# Patient Record
Sex: FEMALE | URBAN - METROPOLITAN AREA
[De-identification: names, ages, dates, MRNs, and addresses within clinical notes are randomized per-mention and may not be internally consistent; named-entity substitution may affect disease eponyms.]

---

## 2024-07-05 ENCOUNTER — HOSPITAL ENCOUNTER (EMERGENCY)
Facility: HOSPITAL | Age: 64
End: 2024-07-05
Attending: EMERGENCY MEDICINE

## 2024-07-05 ENCOUNTER — HOSPITAL ENCOUNTER (INPATIENT)
Facility: HOSPITAL | Age: 64
LOS: 9 days | Discharge: HOME | DRG: 192 | End: 2024-07-14
Attending: INTERNAL MEDICINE | Admitting: INTERNAL MEDICINE
Payer: COMMERCIAL

## 2024-07-05 ENCOUNTER — APPOINTMENT (INPATIENT)
Dept: NON INVASIVE DIAGNOSTICS | Facility: HOSPITAL | Age: 64
DRG: 192 | End: 2024-07-05
Payer: COMMERCIAL

## 2024-07-05 ENCOUNTER — APPOINTMENT (EMERGENCY)
Dept: RADIOLOGY | Facility: HOSPITAL | Age: 64
End: 2024-07-05

## 2024-07-05 VITALS
WEIGHT: 183.42 LBS | TEMPERATURE: 97.6 F | OXYGEN SATURATION: 96 % | DIASTOLIC BLOOD PRESSURE: 57 MMHG | BODY MASS INDEX: 36.01 KG/M2 | RESPIRATION RATE: 27 BRPM | HEART RATE: 85 BPM | HEIGHT: 60 IN | SYSTOLIC BLOOD PRESSURE: 102 MMHG

## 2024-07-05 DIAGNOSIS — I50.9 ACUTE DECOMPENSATED HEART FAILURE (HCC): Primary | ICD-10-CM

## 2024-07-05 DIAGNOSIS — J81.0 FLASH PULMONARY EDEMA (HCC): ICD-10-CM

## 2024-07-05 DIAGNOSIS — I50.9 CHF (CONGESTIVE HEART FAILURE) (HCC): Primary | ICD-10-CM

## 2024-07-05 PROBLEM — I16.1 HYPERTENSIVE EMERGENCY: Status: ACTIVE | Noted: 2024-07-05

## 2024-07-05 PROBLEM — R65.10 SIRS (SYSTEMIC INFLAMMATORY RESPONSE SYNDROME) (HCC): Status: ACTIVE | Noted: 2024-07-05

## 2024-07-05 LAB
2HR DELTA HS TROPONIN: 0 NG/L
4HR DELTA HS TROPONIN: 1 NG/L
ALBUMIN SERPL BCG-MCNC: 4 G/DL (ref 3.5–5)
ALP SERPL-CCNC: 94 U/L (ref 34–104)
ALT SERPL W P-5'-P-CCNC: 40 U/L (ref 7–52)
ANION GAP SERPL CALCULATED.3IONS-SCNC: 8 MMOL/L (ref 4–13)
ANION GAP SERPL CALCULATED.3IONS-SCNC: 9 MMOL/L (ref 4–13)
AORTIC ROOT: 2.9 CM
AORTIC VALVE MEAN VELOCITY: 8.5 M/S
APICAL FOUR CHAMBER EJECTION FRACTION: 6 %
ARTERIAL PATENCY WRIST A: YES
ASCENDING AORTA: 3.4 CM
AST SERPL W P-5'-P-CCNC: 30 U/L (ref 13–39)
ATRIAL RATE: 121 BPM
ATRIAL RATE: 87 BPM
AV AREA BY CONTINUOUS VTI: 1.7 CM2
AV AREA PEAK VELOCITY: 1.7 CM2
AV LVOT MEAN GRADIENT: 1 MMHG
AV LVOT PEAK GRADIENT: 2 MMHG
AV MEAN GRADIENT: 3 MMHG
AV PEAK GRADIENT: 6 MMHG
AV VALVE AREA: 1.65 CM2
AV VELOCITY RATIO: 0.55
BASE EXCESS BLDA CALC-SCNC: -5.1 MMOL/L
BASOPHILS # BLD AUTO: 0.07 THOUSANDS/ÂΜL (ref 0–0.1)
BASOPHILS NFR BLD AUTO: 1 % (ref 0–1)
BILIRUB SERPL-MCNC: 1.34 MG/DL (ref 0.2–1)
BNP SERPL-MCNC: 2673 PG/ML (ref 0–100)
BSA FOR ECHO PROCEDURE: 1.83 M2
BUN SERPL-MCNC: 11 MG/DL (ref 5–25)
BUN SERPL-MCNC: 13 MG/DL (ref 5–25)
CALCIUM SERPL-MCNC: 9 MG/DL (ref 8.4–10.2)
CALCIUM SERPL-MCNC: 9.2 MG/DL (ref 8.4–10.2)
CARDIAC TROPONIN I PNL SERPL HS: 11 NG/L
CARDIAC TROPONIN I PNL SERPL HS: 19 NG/L
CARDIAC TROPONIN I PNL SERPL HS: 19 NG/L
CARDIAC TROPONIN I PNL SERPL HS: 20 NG/L
CHLORIDE SERPL-SCNC: 101 MMOL/L (ref 96–108)
CHLORIDE SERPL-SCNC: 104 MMOL/L (ref 96–108)
CO2 SERPL-SCNC: 25 MMOL/L (ref 21–32)
CO2 SERPL-SCNC: 32 MMOL/L (ref 21–32)
CREAT SERPL-MCNC: 0.61 MG/DL (ref 0.6–1.3)
CREAT SERPL-MCNC: 0.75 MG/DL (ref 0.6–1.3)
DOP CALC AO PEAK VEL: 1.2 M/S
DOP CALC AO VTI: 18.77 CM
DOP CALC LVOT AREA: 3.14 CM2
DOP CALC LVOT CARDIAC INDEX: 1.57 L/MIN/M2
DOP CALC LVOT CARDIAC OUTPUT: 2.88 L/MIN
DOP CALC LVOT DIAMETER: 2 CM
DOP CALC LVOT PEAK VEL VTI: 9.89 CM
DOP CALC LVOT PEAK VEL: 0.66 M/S
DOP CALC LVOT STROKE INDEX: 16.8 ML/M2
DOP CALC LVOT STROKE VOLUME: 31.05
E WAVE DECELERATION TIME: 132 MS
E/A RATIO: 1.71
EOSINOPHIL # BLD AUTO: 0.16 THOUSAND/ÂΜL (ref 0–0.61)
EOSINOPHIL NFR BLD AUTO: 1 % (ref 0–6)
ERYTHROCYTE [DISTWIDTH] IN BLOOD BY AUTOMATED COUNT: 14.3 % (ref 11.6–15.1)
FLUAV RNA RESP QL NAA+PROBE: NEGATIVE
FLUBV RNA RESP QL NAA+PROBE: NEGATIVE
FRACTIONAL SHORTENING: 7 (ref 28–44)
GFR SERPL CREATININE-BSD FRML MDRD: 85 ML/MIN/1.73SQ M
GFR SERPL CREATININE-BSD FRML MDRD: 96 ML/MIN/1.73SQ M
GLUCOSE SERPL-MCNC: 126 MG/DL (ref 65–140)
GLUCOSE SERPL-MCNC: 157 MG/DL (ref 65–140)
GLUCOSE SERPL-MCNC: 182 MG/DL (ref 65–140)
HCO3 BLDA-SCNC: 22.4 MMOL/L (ref 22–28)
HCT VFR BLD AUTO: 47.5 % (ref 34.8–46.1)
HGB BLD-MCNC: 16.1 G/DL (ref 11.5–15.4)
IMM GRANULOCYTES # BLD AUTO: 0.04 THOUSAND/UL (ref 0–0.2)
IMM GRANULOCYTES NFR BLD AUTO: 0 % (ref 0–2)
INTERVENTRICULAR SEPTUM IN DIASTOLE (PARASTERNAL SHORT AXIS VIEW): 0.9 CM
INTERVENTRICULAR SEPTUM: 0.9 CM (ref 0.6–1.1)
IPAP: 14
IVC: 5 MM
LAAS-AP4: 19.9 CM2
LACTATE SERPL-SCNC: 1.9 MMOL/L (ref 0.5–2)
LEFT ATRIUM SIZE: 4.2 CM
LEFT INTERNAL DIMENSION IN SYSTOLE: 5.3 CM (ref 2.1–4)
LEFT VENTRICULAR INTERNAL DIMENSION IN DIASTOLE: 5.7 CM (ref 3.5–6)
LEFT VENTRICULAR POSTERIOR WALL IN END DIASTOLE: 0.8 CM
LEFT VENTRICULAR STROKE VOLUME: 22 ML
LVSV (TEICH): 22 ML
LYMPHOCYTES # BLD AUTO: 2.32 THOUSANDS/ÂΜL (ref 0.6–4.47)
LYMPHOCYTES NFR BLD AUTO: 19 % (ref 14–44)
MAGNESIUM SERPL-MCNC: 2 MG/DL (ref 1.9–2.7)
MCH RBC QN AUTO: 30.7 PG (ref 26.8–34.3)
MCHC RBC AUTO-ENTMCNC: 33.9 G/DL (ref 31.4–37.4)
MCV RBC AUTO: 91 FL (ref 82–98)
MONOCYTES # BLD AUTO: 0.65 THOUSAND/ÂΜL (ref 0.17–1.22)
MONOCYTES NFR BLD AUTO: 5 % (ref 4–12)
MV PEAK A VEL: 0.41 M/S
MV PEAK E VEL: 70 CM/S
MV STENOSIS PRESSURE HALF TIME: 38 MS
MV VALVE AREA P 1/2 METHOD: 5.79
NEUTROPHILS # BLD AUTO: 9.17 THOUSANDS/ÂΜL (ref 1.85–7.62)
NEUTS SEG NFR BLD AUTO: 74 % (ref 43–75)
NON VENT- BIPAP: ABNORMAL
NRBC BLD AUTO-RTO: 0 /100 WBCS
O2 CT BLDA-SCNC: 21.1 ML/DL (ref 16–23)
OXYHGB MFR BLDA: 94.6 % (ref 94–97)
P AXIS: 55 DEGREES
P AXIS: 65 DEGREES
PCO2 BLDA: 50.8 MM HG (ref 36–44)
PEEP MAX SETTING VENT: 6 CM[H2O]
PH BLDA: 7.26 [PH] (ref 7.35–7.45)
PHOSPHATE SERPL-MCNC: 4 MG/DL (ref 2.3–4.1)
PLATELET # BLD AUTO: 262 THOUSANDS/UL (ref 149–390)
PMV BLD AUTO: 12.7 FL (ref 8.9–12.7)
PO2 BLDA: 92.6 MM HG (ref 75–129)
POTASSIUM SERPL-SCNC: 3.2 MMOL/L (ref 3.5–5.3)
POTASSIUM SERPL-SCNC: 3.8 MMOL/L (ref 3.5–5.3)
PR INTERVAL: 138 MS
PR INTERVAL: 158 MS
PROT SERPL-MCNC: 7.1 G/DL (ref 6.4–8.4)
QRS AXIS: 51 DEGREES
QRS AXIS: 55 DEGREES
QRSD INTERVAL: 104 MS
QRSD INTERVAL: 94 MS
QT INTERVAL: 324 MS
QT INTERVAL: 396 MS
QTC INTERVAL: 460 MS
QTC INTERVAL: 477 MS
RA PRESSURE ESTIMATED: 3 MMHG
RBC # BLD AUTO: 5.24 MILLION/UL (ref 3.81–5.12)
RIGHT ATRIAL 2D VOLUME: 49 ML
RIGHT ATRIUM AREA SYSTOLE A4C: 16.7 CM2
RIGHT VENTRICLE ID DIMENSION: 4.2 CM
RSV RNA RESP QL NAA+PROBE: NEGATIVE
SARS-COV-2 RNA RESP QL NAA+PROBE: NEGATIVE
SL CV LV EF: 15
SL CV PED ECHO LEFT VENTRICLE DIASTOLIC VOLUME (MOD BIPLANE) 2D: 158 ML
SL CV PED ECHO LEFT VENTRICLE SYSTOLIC VOLUME (MOD BIPLANE) 2D: 136 ML
SODIUM SERPL-SCNC: 138 MMOL/L (ref 135–147)
SODIUM SERPL-SCNC: 141 MMOL/L (ref 135–147)
SPECIMEN SOURCE: ABNORMAL
T WAVE AXIS: 239 DEGREES
T WAVE AXIS: 27 DEGREES
TRICUSPID ANNULAR PLANE SYSTOLIC EXCURSION: 1.3 CM
VENT BIPAP FIO2: 50 %
VENTRICULAR RATE: 121 BPM
VENTRICULAR RATE: 87 BPM
WBC # BLD AUTO: 12.41 THOUSAND/UL (ref 4.31–10.16)

## 2024-07-05 PROCEDURE — 93306 TTE W/DOPPLER COMPLETE: CPT | Performed by: INTERNAL MEDICINE

## 2024-07-05 PROCEDURE — 84484 ASSAY OF TROPONIN QUANT: CPT

## 2024-07-05 PROCEDURE — 93010 ELECTROCARDIOGRAM REPORT: CPT | Performed by: INTERNAL MEDICINE

## 2024-07-05 PROCEDURE — 84100 ASSAY OF PHOSPHORUS: CPT | Performed by: STUDENT IN AN ORGANIZED HEALTH CARE EDUCATION/TRAINING PROGRAM

## 2024-07-05 PROCEDURE — 80048 BASIC METABOLIC PNL TOTAL CA: CPT

## 2024-07-05 PROCEDURE — 94760 N-INVAS EAR/PLS OXIMETRY 1: CPT

## 2024-07-05 PROCEDURE — 71045 X-RAY EXAM CHEST 1 VIEW: CPT

## 2024-07-05 PROCEDURE — 99291 CRITICAL CARE FIRST HOUR: CPT | Performed by: INTERNAL MEDICINE

## 2024-07-05 PROCEDURE — 93005 ELECTROCARDIOGRAM TRACING: CPT

## 2024-07-05 PROCEDURE — 99255 IP/OBS CONSLTJ NEW/EST HI 80: CPT | Performed by: INTERNAL MEDICINE

## 2024-07-05 PROCEDURE — 99285 EMERGENCY DEPT VISIT HI MDM: CPT | Performed by: EMERGENCY MEDICINE

## 2024-07-05 PROCEDURE — 36415 COLL VENOUS BLD VENIPUNCTURE: CPT

## 2024-07-05 PROCEDURE — 99285 EMERGENCY DEPT VISIT HI MDM: CPT

## 2024-07-05 PROCEDURE — 82805 BLOOD GASES W/O2 SATURATION: CPT | Performed by: EMERGENCY MEDICINE

## 2024-07-05 PROCEDURE — 83735 ASSAY OF MAGNESIUM: CPT | Performed by: STUDENT IN AN ORGANIZED HEALTH CARE EDUCATION/TRAINING PROGRAM

## 2024-07-05 PROCEDURE — 93306 TTE W/DOPPLER COMPLETE: CPT

## 2024-07-05 PROCEDURE — 96375 TX/PRO/DX INJ NEW DRUG ADDON: CPT

## 2024-07-05 PROCEDURE — 93308 TTE F-UP OR LMTD: CPT | Performed by: INTERNAL MEDICINE

## 2024-07-05 PROCEDURE — 85025 COMPLETE CBC W/AUTO DIFF WBC: CPT

## 2024-07-05 PROCEDURE — 83880 ASSAY OF NATRIURETIC PEPTIDE: CPT | Performed by: EMERGENCY MEDICINE

## 2024-07-05 PROCEDURE — 94002 VENT MGMT INPAT INIT DAY: CPT

## 2024-07-05 PROCEDURE — 96366 THER/PROPH/DIAG IV INF ADDON: CPT

## 2024-07-05 PROCEDURE — 80053 COMPREHEN METABOLIC PANEL: CPT

## 2024-07-05 PROCEDURE — 82948 REAGENT STRIP/BLOOD GLUCOSE: CPT

## 2024-07-05 PROCEDURE — 96365 THER/PROPH/DIAG IV INF INIT: CPT

## 2024-07-05 PROCEDURE — 0241U HB NFCT DS VIR RESP RNA 4 TRGT: CPT | Performed by: EMERGENCY MEDICINE

## 2024-07-05 PROCEDURE — 83605 ASSAY OF LACTIC ACID: CPT

## 2024-07-05 RX ORDER — ACETAMINOPHEN 325 MG/1
650 TABLET ORAL EVERY 6 HOURS PRN
Status: DISCONTINUED | OUTPATIENT
Start: 2024-07-05 | End: 2024-07-14 | Stop reason: HOSPADM

## 2024-07-05 RX ORDER — CARVEDILOL 6.25 MG/1
6.25 TABLET ORAL 2 TIMES DAILY WITH MEALS
Status: DISCONTINUED | OUTPATIENT
Start: 2024-07-06 | End: 2024-07-11

## 2024-07-05 RX ORDER — LISINOPRIL 10 MG/1
10 TABLET ORAL DAILY
Status: DISCONTINUED | OUTPATIENT
Start: 2024-07-05 | End: 2024-07-06

## 2024-07-05 RX ORDER — NITROGLYCERIN 0.4 MG/1
TABLET SUBLINGUAL
Status: COMPLETED
Start: 2024-07-05 | End: 2024-07-05

## 2024-07-05 RX ORDER — NITROGLYCERIN 0.4 MG/1
0.4 TABLET SUBLINGUAL
Status: DISCONTINUED | OUTPATIENT
Start: 2024-07-05 | End: 2024-07-05 | Stop reason: HOSPADM

## 2024-07-05 RX ORDER — FUROSEMIDE 10 MG/ML
40 INJECTION INTRAMUSCULAR; INTRAVENOUS ONCE
Status: COMPLETED | OUTPATIENT
Start: 2024-07-05 | End: 2024-07-05

## 2024-07-05 RX ORDER — CARVEDILOL 12.5 MG/1
12.5 TABLET ORAL 2 TIMES DAILY WITH MEALS
Status: DISCONTINUED | OUTPATIENT
Start: 2024-07-05 | End: 2024-07-05

## 2024-07-05 RX ORDER — ASPIRIN 81 MG/1
324 TABLET, CHEWABLE ORAL ONCE
Status: COMPLETED | OUTPATIENT
Start: 2024-07-05 | End: 2024-07-05

## 2024-07-05 RX ORDER — NITROGLYCERIN 20 MG/100ML
5-200 INJECTION INTRAVENOUS
Status: DISCONTINUED | OUTPATIENT
Start: 2024-07-05 | End: 2024-07-05 | Stop reason: HOSPADM

## 2024-07-05 RX ORDER — POTASSIUM CHLORIDE 20MEQ/15ML
40 LIQUID (ML) ORAL
Status: DISCONTINUED | OUTPATIENT
Start: 2024-07-05 | End: 2024-07-05

## 2024-07-05 RX ORDER — NITROGLYCERIN 0.4 MG/1
0.4 TABLET SUBLINGUAL
Status: DISCONTINUED | OUTPATIENT
Start: 2024-07-05 | End: 2024-07-10 | Stop reason: SDUPTHER

## 2024-07-05 RX ORDER — ENOXAPARIN SODIUM 100 MG/ML
40 INJECTION SUBCUTANEOUS DAILY
Status: DISCONTINUED | OUTPATIENT
Start: 2024-07-05 | End: 2024-07-14 | Stop reason: HOSPADM

## 2024-07-05 RX ORDER — FUROSEMIDE 10 MG/ML
40 INJECTION INTRAMUSCULAR; INTRAVENOUS
Status: DISCONTINUED | OUTPATIENT
Start: 2024-07-05 | End: 2024-07-05

## 2024-07-05 RX ORDER — FUROSEMIDE 10 MG/ML
40 INJECTION INTRAMUSCULAR; INTRAVENOUS
Status: DISCONTINUED | OUTPATIENT
Start: 2024-07-06 | End: 2024-07-06

## 2024-07-05 RX ORDER — LORAZEPAM 2 MG/ML
1 INJECTION INTRAMUSCULAR ONCE
Status: COMPLETED | OUTPATIENT
Start: 2024-07-05 | End: 2024-07-05

## 2024-07-05 RX ORDER — NITROGLYCERIN 20 MG/100ML
INJECTION INTRAVENOUS
Status: COMPLETED
Start: 2024-07-05 | End: 2024-07-05

## 2024-07-05 RX ORDER — CHLORHEXIDINE GLUCONATE ORAL RINSE 1.2 MG/ML
15 SOLUTION DENTAL EVERY 12 HOURS SCHEDULED
Status: DISCONTINUED | OUTPATIENT
Start: 2024-07-05 | End: 2024-07-06

## 2024-07-05 RX ORDER — POTASSIUM CHLORIDE 20 MEQ/1
40 TABLET, EXTENDED RELEASE ORAL
Status: COMPLETED | OUTPATIENT
Start: 2024-07-05 | End: 2024-07-05

## 2024-07-05 RX ORDER — NITROGLYCERIN 20 MG/100ML
5-200 INJECTION INTRAVENOUS
Status: DISCONTINUED | OUTPATIENT
Start: 2024-07-05 | End: 2024-07-05

## 2024-07-05 RX ADMIN — FUROSEMIDE 40 MG: 10 INJECTION, SOLUTION INTRAVENOUS at 12:37

## 2024-07-05 RX ADMIN — LISINOPRIL 10 MG: 10 TABLET ORAL at 12:37

## 2024-07-05 RX ADMIN — CARVEDILOL 12.5 MG: 12.5 TABLET, FILM COATED ORAL at 15:45

## 2024-07-05 RX ADMIN — ENOXAPARIN SODIUM 40 MG: 40 INJECTION SUBCUTANEOUS at 12:22

## 2024-07-05 RX ADMIN — PERFLUTREN 0.4 ML/MIN: 6.52 INJECTION, SUSPENSION INTRAVENOUS at 14:54

## 2024-07-05 RX ADMIN — CHLORHEXIDINE GLUCONATE 15 ML: 1.2 RINSE ORAL at 20:00

## 2024-07-05 RX ADMIN — NITROGLYCERIN 5 MCG/MIN: 20 INJECTION INTRAVENOUS at 07:08

## 2024-07-05 RX ADMIN — NITROGLYCERIN 0.4 MG: 0.4 TABLET SUBLINGUAL at 07:03

## 2024-07-05 RX ADMIN — CHLORHEXIDINE GLUCONATE 15 ML: 1.2 RINSE ORAL at 12:22

## 2024-07-05 RX ADMIN — POTASSIUM CHLORIDE 40 MEQ: 1500 TABLET, EXTENDED RELEASE ORAL at 17:45

## 2024-07-05 RX ADMIN — POTASSIUM CHLORIDE 40 MEQ: 1500 TABLET, EXTENDED RELEASE ORAL at 15:45

## 2024-07-05 RX ADMIN — ASPIRIN 81 MG CHEWABLE TABLET 324 MG: 81 TABLET CHEWABLE at 07:06

## 2024-07-05 RX ADMIN — ACETAMINOPHEN 325MG 650 MG: 325 TABLET ORAL at 14:59

## 2024-07-05 RX ADMIN — LORAZEPAM 1 MG: 2 INJECTION INTRAMUSCULAR; INTRAVENOUS at 08:03

## 2024-07-05 RX ADMIN — FUROSEMIDE 40 MG: 10 INJECTION, SOLUTION INTRAVENOUS at 07:07

## 2024-07-05 NOTE — EMTALA/ACUTE CARE TRANSFER
Formerly [facility] EMERGENCY DEPARTMENT  [address] [address]  [phone]  Dept: [phone]      EMTALA TRANSFER CONSENT    NAME Nancy Calderon                                         1960                              MRN [id number]    I have been informed of my rights regarding examination, treatment, and transfer   by Dr. Yasir Tao MD    Benefits: Specialized equipment and/or services available at the receiving facility (Include comment)________________________    Risks: Potential for delay in receiving treatment      Consent for Transfer:  I acknowledge that my medical condition has been evaluated and explained to me by the emergency department physician or other qualified medical person and/or my attending physician, who has recommended that I be transferred to the service of  Accepting Physician: Dr Griffin at Accepting Facility Name, City & State : [facility]. The above potential benefits of such transfer, the potential risks associated with such transfer, and the probable risks of not being transferred have been explained to me, and I fully understand them.  The doctor has explained that, in my case, the benefits of transfer outweigh the risks.  I agree to be transferred.    I authorize the performance of emergency medical procedures and treatments upon me in both transit and upon arrival at the receiving facility.  Additionally, I authorize the release of any and all medical records to the receiving facility and request they be transported with me, if possible.  I understand that the safest mode of transportation during a medical emergency is an ambulance and that the Hospital advocates the use of this mode of transport. Risks of traveling to the receiving facility by car, including absence of medical control, life sustaining equipment, such as oxygen, and medical personnel has been explained to me and I fully understand them.    (CHAU CORRECT BOX  BELOW)  [  ]  I consent to the stated transfer and to be transported by ambulance/helicopter.  [  ]  I consent to the stated transfer, but refuse transportation by ambulance and accept full responsibility for my transportation by car.  I understand the risks of non-ambulance transfers and I exonerate the Hospital and its staff from any deterioration in my condition that results from this refusal.    X___________________________________________    DATE  24  TIME________  Signature of patient or legally responsible individual signing on patient behalf           RELATIONSHIP TO PATIENT_________________________          Provider Certification    NAME Nancy Calderon                                         1960                              MRN [id number]    A medical screening exam was performed on the above named patient.  Based on the examination:    Condition Necessitating Transfer The encounter diagnosis was CHF (congestive heart failure) (HCC).    Patient Condition: The patient has been stabilized such that within reasonable medical probability, no material deterioration of the patient condition or the condition of the unborn child(shivam) is likely to result from the transfer    Reason for Transfer: Level of Care needed not available at this facility    Transfer Requirements: Facility [facility]   Space available and qualified personnel available for treatment as acknowledged by    Agreed to accept transfer and to provide appropriate medical treatment as acknowledged by       Dr Griffin  Appropriate medical records of the examination and treatment of the patient are provided at the time of transfer   STAFF INITIAL WHEN COMPLETED _______  Transfer will be performed by qualified personnel from    and appropriate transfer equipment as required, including the use of necessary and appropriate life support measures.    Provider Certification: I have examined the patient and explained the  following risks and benefits of being transferred/refusing transfer to the patient/family:  General risk, such as traffic hazards, adverse weather conditions, rough terrain or turbulence, possible failure of equipment (including vehicle or aircraft), or consequences of actions of persons outside the control of the transport personnel      Based on these reasonable risks and benefits to the patient and/or the unborn child(shivam), and based upon the information available at the time of the patient’s examination, I certify that the medical benefits reasonably to be expected from the provision of appropriate medical treatments at another medical facility outweigh the increasing risks, if any, to the individual’s medical condition, and in the case of labor to the unborn child, from effecting the transfer.    X____________________________________________ DATE 07/05/24        TIME_______      ORIGINAL - SEND TO MEDICAL RECORDS   COPY - SEND WITH PATIENT DURING TRANSFER

## 2024-07-05 NOTE — CASE MANAGEMENT
Case Management Assessment & Discharge Planning Note    Patient name Nancy Calderon  Location ICU 11/ICU 11 MRN [id number]  : 1960 Date 2024       Current Admission Date: 2024  Current Admission Diagnosis:Acute decompensated heart failure (HCC)   Patient Active Problem List    Diagnosis Date Noted Date Diagnosed    Acute decompensated heart failure (HCC) 2024     Hypertensive emergency 2024     SIRS (systemic inflammatory response syndrome) (HCC) 2024       LOS (days): 0  Geometric Mean LOS (GMLOS) (days):   Days to GMLOS:     OBJECTIVE:    Risk of Unplanned Readmission Score: 8.66         Current admission status: Inpatient       [facility] [facility] [address]  Phone: [phone] Fax: [phone]    Primary Care Provider: No primary care provider on file.    Primary Insurance: [employer] PENDING  Secondary Insurance:     ASSESSMENT:  Active Health Care Proxies    There are no active Health Care Proxies on file.       Advance Directives  Does patient have a Health Care POA?: No  Was patient offered paperwork?: No  Does patient currently have a Health Care decision maker?: Yes, please see Health Care Proxy section  Does patient have Advance Directives?: No  Was patient offered paperwork?: No    Patient Information  Admitted from:: Home  Mental Status: Alert  During Assessment patient was accompanied by: Not accompanied during assessment  Support Systems: [facility]  County of Residence: [place]  What [facility] do you live in?: [place]    Activities of Daily Living Prior to Admission  Functional Status: Independent  Completes ADLs independently?: Yes  Ambulates independently?: Yes  Does patient use assisted devices?: No  Does patient currently own DME?: No  CHARGE DETAILS:             CM contacted family/caregiver?: Yes (voicemail message for Boni Berman - [phone])      Contacts  Patient Contacts: Boni Berman - [phone] (family)  Relationship to Patient:: Family  Contact Method: Phone  Phone Number: [phone] (family)  Reason/Outcome: Emergency Contact    Additional Comments: CM attempted to contact patient's daughter Shireen Berman to complete intake assessment and for discharge planning. CM left voicemail message detailing the nature of the call and CM callback information. CM department will continue to follow patient through hospital discharge.

## 2024-07-05 NOTE — PROCEDURES
POC Cardiac US    Date/Time: 7/5/2024 3:58 PM    Performed by: Francisca Macdonald MD  Authorized by: Francisca Macdonald MD    Patient location:  ICU  Procedure details:     Exam Type:  Diagnostic and educational    Indications: suspected volume depletion      Indications comment:  Heart failure    Assessment / Evaluation for: cardiac function, pericardial effusion, inferior vena cava for fluid responsiveness and intravascular volume status      Exam Type: initial exam      Image quality: limited diagnostic      Image availability:  Not saved  Patient Details:     Cardiac Rhythm:  Regular    Mechanical ventilation: No    Cardiac findings:     Echo technique: limited 2D      Views obtained: parasternal long axis, parasternal short axis, subcostal and apical      Pericardial effusion: absent      Tamponade physiology: present      Wall motion: normal      LV systolic function: depressed      RV dilation: none    Pulmonary findings:     Left Lung Findings: left pleural effusion present and left lung sliding      Right lung findings: right pleural effusion present and right lung sliding      B-lines: 1 to 3    IVC findings:     IVC Inspiratory Collapse: normal      Maximum IVC Diameter (cm):  20  Interpretation:     Fluid Status:  Euvolemic

## 2024-07-05 NOTE — PLAN OF CARE
Problem: PAIN - ADULT  Goal: Verbalizes/displays adequate comfort level or baseline comfort level  Description: Interventions:  - Encourage patient to monitor pain and request assistance  - Assess pain using appropriate pain scale  - Administer analgesics based on type and severity of pain and evaluate response  - Implement non-pharmacological measures as appropriate and evaluate response  - Consider cultural and social influences on pain and pain management  - Notify physician/advanced practitioner if interventions unsuccessful or patient reports new pain  Outcome: Progressing     Problem: INFECTION - ADULT  Goal: Absence or prevention of progression during hospitalization  Description: INTERVENTIONS:  - Assess and monitor for signs and symptoms of infection  - Monitor lab/diagnostic results  - Monitor all insertion sites, i.e. indwelling lines, tubes, and drains  - Monitor endotracheal if appropriate and nasal secretions for changes in amount and color  - [place] appropriate cooling/warming therapies per order  - Administer medications as ordered  - Instruct and encourage patient and family to use good hand hygiene technique  - Identify and instruct in appropriate isolation precautions for identified infection/condition  Outcome: Progressing     Problem: SAFETY ADULT  Goal: Patient will remain free of falls  Description: INTERVENTIONS:  - Educate patient/family on patient safety including physical limitations  - Instruct patient to call for assistance with activity   - Consult OT/PT to assist with strengthening/mobility   - Keep Call bell within reach  - Keep bed low and locked with side rails adjusted as appropriate  - Keep care items and personal belongings within reach  - Initiate and maintain comfort rounds  - Make Fall Risk Sign visible to staff  - Offer Toileting every  Hours, in advance of need  - Initiate/Maintain bed alarm  - Obtain necessary fall  Problem: CARDIOVASCULAR - ADULT  Goal: Maintains  optimal cardiac output and hemodynamic stability  Description: INTERVENTIONS:  - Monitor I/O, vital signs and rhythm  - Monitor for S/S and trends of decreased cardiac output  - Administer and titrate ordered vasoactive medications to optimize hemodynamic stability  - Assess quality of pulses, skin color and temperature  - Assess for signs of decreased coronary artery perfusion  - Instruct patient to report change in severity of symptoms  Reactivated     - Apply yellow socks and bracelet for high fall risk patients  - Consider moving patient to room near nurses station  Outcome: Progressing     Problem: DISCHARGE PLANNING  Goal: Discharge to home or other facility with appropriate resources  Description: INTERVENTIONS:  - Identify barriers to discharge w/patient and caregiver  - Arrange for needed discharge resources and transportation as appropriate  - Identify discharge learning needs (meds, wound care, etc.)  - Arrange for interpretive services to assist at discharge as needed  - Refer to Case Management Department for coordinating discharge planning if the patient needs post-hospital services based on physician/advanced practitioner order or complex needs related to functional status, cognitive ability, or social support system  Outcome: Progressing     Problem: Knowledge Deficit  Goal: Patient/family/caregiver demonstrates understanding of disease process, treatment plan, medications, and discharge instructions  Description: Complete learning assessment and assess knowledge base.  Interventions:  - Provide teaching at level of understanding  - Provide teaching via preferred learning methods  Outcome: Progressing

## 2024-07-05 NOTE — ASSESSMENT & PLAN NOTE
Patient with PMH of hypertension and noncompliant with medications presented with worsening shortness of breath  CXR with flash pulmonary edema    Plan  Vasodilation with nitroglycerin gtt for target -150  See remainder of plan above

## 2024-07-05 NOTE — CONSULTS
Cardiology Consult H&P  Nancy Calderon 63 y.o. female MRN: [id number]  Unit/Bed#: ICU 11 Encounter: [id number]  Physician Requesting Consult: Bao Griffin MD  Reason for Consult: Acute decompensated heart failure.  Chest pain.    HPI  63 y.o. female presented to the ED on 7/5 complaining acute onset chest pain that began this morning around 2 AM.  The symptoms woke her up from sleep.  She also reported shortness of breath and lower extremity edema over the last 2 months.  Her past medical history is unclear as she moved here from the [facility] [place] 5 years ago but she has a reported history of hypertension, heart stents (last in 2011?) and possible CHF.  She followed with a cardiologist in the [facility] but has not establish care with a cardiologist since being in the [place].  She has also not been taking her cardiac medications due to insurance problems (Coreg and valsartan?).  The ED she was noted to be hypoxic with an SpO2 in the 80s requiring Bipap.  Troponin was negative.  BNP was elevated at 2673.  CXR showed pulmonary edema.  She was hemodynamically stable.  Echocardiogram showed a severely reduced EF.  Cardiology was consulted.    The patient was seen and examined at bedside.  Overall she reports feeling better and reports that her chest pain has completely resolved since coming to the hospital.  She is currently on a nitro drip.  Also reports that she has noticed URI-like symptoms including a cough, sore throat and nasal congestion.  She also complains of right leg cramping/pain.  Otherwise she had no other acute complaints.    A&P  #Cardiomyopathy  # Acute decompensated heart Failure with Reduced Ejection Fraction  #Chest Pain  Patient presented to the ED after waking up at 2 AM with chest pain.  She also reported worsening dyspnea on exertion and lower extremity swelling over the last 2 months.  Her prior cardiac history is unclear as she used to follow with a  "cardiologist in [place] over 5 years ago.  In the ED troponin was negative and EKG did not show any acute ischemic changes.  Her BNP was elevated at 2673.  She was also hypoxic requiring supplemental O2.  He was started on a nitro drip and her chest pain completely resolved.  Echocardiogram showed severely reduced systolic function with global hypokinesis and an EF around 15%.  Continue to titrate down the nitro drip.  Continue with up titration of GDMT -> currently receiving Coreg 12.5 mg twice daily and lisinopril 10 mg daily.  Continue diuresis as she appears volume overloaded -> IV Lasix 40 mg twice daily ordered  Monitor I's and O's, volume status and renal function.  Maintain SpO2>90%  Continue telemetry monitoring.  Continue to monitor for return of chest pain symptoms.  Will plan for cardiac catheterization on Monday to evaluate for underlying ischemic disease.    # Hypokalemia  Monitor potassium and replete as needed.    # Leg pain  Patient complained of severe right calf pain that was new.  Is likely secondary to a muscle cramp, however can consider obtaining lower extremity VAS to rule out possible clot.  Continue to monitor    #SIRS  Met SIRS criteria with leukocytosis and tachypnea.  No suspected source of infection and patient is being monitored off antibiotics.  Trend fever curve and WBC count        Intake/Output Summary (Last 24 hours) at 7/5/2024 1555  Last data filed at 7/5/2024 1449  Gross per 24 hour   Intake 120 ml   Output 2650 ml   Net -2530 ml         Bao Fournier MD  -PGY-5 Cardiology Fellow  -[place] text enabled      ======================================================    Physical exam  Vitals: Blood pressure 98/54, pulse 94, temperature (!) 97 °F (36.1 °C), temperature source Axillary, resp. rate 21, height 5' 4\" (1.626 m), weight 78 kg (172 lb), SpO2 96%.  Gen: Well appearing  Psych: AOx3  Skin: Intact  Neck: Supple. JVD.  Cardiac: S1, S2, regular rate, no S3 or S4 " appreciated. No murmurs. +2 PT, radial pulses.  Mild bilateral lower extremity edema. No carotid bruits.  Resp: Decreased breath sounds in lung bases bilaterally.On 6L NC.   Abd: Nontender, nondistended  Rheum: No joint deformities in UE or LE    ======================================================    TREADMILL STRESS  No results found for this or any previous visit.     ----------------------------------------------------------------------------------------------  NUCLEAR STRESS TEST: No results found for this or any previous visit.    No results found for this or any previous visit.      --------------------------------------------------------------------------------  CATH:  No results found for this or any previous visit.    --------------------------------------------------------------------------------  ECHO:   No results found for this or any previous visit.    No results found for this or any previous visit.    --------------------------------------------------------------------------------  HOLTER  No results found for this or any previous visit.    --------------------------------------------------------------------------------  CAROTIDS  No results found for this or any previous visit.       [unfilled]   ======================================================      Review of Systems   Constitutional:  Positive for fatigue. Negative for activity change, appetite change, chills, diaphoresis and fever.   HENT:  Negative for congestion, ear discharge, ear pain, hearing loss, sinus pressure, sinus pain and sore throat.    Eyes:  Negative for pain, discharge, redness and itching.   Respiratory:  Positive for shortness of breath. Negative for cough, chest tightness and wheezing.    Cardiovascular:  Negative for chest pain, palpitations and leg swelling.   Gastrointestinal:  Negative for abdominal distention, abdominal pain, blood in stool, constipation, diarrhea, nausea and vomiting.   Genitourinary:   "Negative for difficulty urinating, dysuria, flank pain and frequency.   Musculoskeletal:  Positive for arthralgias. Negative for back pain and gait problem.   Skin:  Negative for color change, pallor and rash.   Neurological:  Negative for dizziness, weakness, light-headedness, numbness and headaches.   Psychiatric/Behavioral:  Negative for agitation, behavioral problems, confusion and decreased concentration.      ROS as noted above, otherwise 12 point review of systems was performed and is negative.     Historical Information   Past Medical History:   Diagnosis Date    Hypertension      No past surgical history on file.  Social History     Substance and Sexual Activity   Alcohol Use Not on file     Social History     Substance and Sexual Activity   Drug Use Not on file     Social History     Tobacco Use   Smoking Status Not on file   Smokeless Tobacco Not on file     Family History: No family history on file.    Meds/Allergies   Hospital Medications:   Current Facility-Administered Medications   Medication Dose Route Frequency    acetaminophen (TYLENOL) tablet 650 mg  650 mg Oral Q6H PRN    carvedilol (COREG) tablet 12.5 mg  12.5 mg Oral BID With Meals    chlorhexidine (PERIDEX) 0.12 % oral rinse 15 mL  15 mL Mouth/Throat Q12H TWAN    enoxaparin (LOVENOX) subcutaneous injection 40 mg  40 mg Subcutaneous Daily    [START ON 7/6/2024] furosemide (LASIX) injection 40 mg  40 mg Intravenous BID (diuretic)    lisinopril (ZESTRIL) tablet 10 mg  10 mg Oral Daily    nitroglycerin (NITROSTAT) SL tablet 0.4 mg  0.4 mg Sublingual Q5 Min PRN    nitroGLYcerin (TRIDIL) 50 mg in 250 ml infusion (premix)  5-200 mcg/min Intravenous Titrated    potassium chloride (Klor-Con M20) CR tablet 40 mEq  40 mEq Oral Q2H     Home Medications: No medications prior to admission.    No Known Allergies    Objective   Vitals: Blood pressure 98/54, pulse 94, temperature (!) 97 °F (36.1 °C), temperature source Axillary, resp. rate 21, height 5' 4\" " (1.626 m), weight 78 kg (172 lb), SpO2 96%.  Orthostatic Blood Pressures      Flowsheet Row Most Recent Value   Blood Pressure 98/54 filed at 07/05/2024 1530   Patient Position - Orthostatic VS Lying filed at 07/05/2024 1100              Intake/Output Summary (Last 24 hours) at 7/5/2024 1555  Last data filed at 7/5/2024 1449  Gross per 24 hour   Intake 120 ml   Output 2650 ml   Net -2530 ml       Invasive Devices       Peripheral Intravenous Line  Duration             Peripheral IV 07/05/24 Left;Dorsal (posterior) Hand <1 day    Peripheral IV 07/05/24 Proximal;Right;Ventral (anterior) Forearm <1 day              Drain  Duration             External Urinary Catheter <1 day                      Lab Results: I have personally reviewed pertinent lab results.    Results from last 7 days   Lab Units 07/05/24  0704   WBC Thousand/uL 12.41*   HEMOGLOBIN g/dL 16.1*   HEMATOCRIT % 47.5*   PLATELETS Thousands/uL 262     Results from last 7 days   Lab Units 07/05/24  1422 07/05/24  0704   POTASSIUM mmol/L 3.2* 3.8   CHLORIDE mmol/L 101 104   CO2 mmol/L 32 25   BUN mg/dL 11 13   CREATININE mg/dL 0.61 0.75   CALCIUM mg/dL 9.0 9.2         Results from last 7 days   Lab Units 07/05/24  1422   MAGNESIUM mg/dL 2.0

## 2024-07-05 NOTE — PLAN OF CARE
Problem: Prexisting or High Potential for Compromised Skin Integrity  Goal: Skin integrity is maintained or improved  Description: INTERVENTIONS:  - Identify patients at risk for skin breakdown  - Assess and monitor skin integrity  - Assess and monitor nutrition and hydration status  - Monitor labs   - Assess for incontinence   - Turn and reposition patient  - Assist with mobility/ambulation  - Relieve pressure over bony prominences  - Avoid friction and shearing  - Provide appropriate hygiene as needed including keeping skin clean and dry  - Evaluate need for skin moisturizer/barrier cream  - Collaborate with interdisciplinary team   - Patient/family teaching  - Consider wound care consult   Outcome: Progressing     Problem: PAIN - ADULT  Goal: Verbalizes/displays adequate comfort level or baseline comfort level  Description: Interventions:  - Encourage patient to monitor pain and request assistance  - Assess pain using appropriate pain scale  - Administer analgesics based on type and severity of pain and evaluate response  - Implement non-pharmacological measures as appropriate and evaluate response  - Consider cultural and social influences on pain and pain management  - Notify physician/advanced practitioner if interventions unsuccessful or patient reports new pain  Outcome: Progressing     Problem: INFECTION - ADULT  Goal: Absence or prevention of progression during hospitalization  Description: INTERVENTIONS:  - Assess and monitor for signs and symptoms of infection  - Monitor lab/diagnostic results  - Monitor all insertion sites, i.e. indwelling lines, tubes, and drains  - Monitor endotracheal if appropriate and nasal secretions for changes in amount and color  - [place] appropriate cooling/warming therapies per order  - Administer medications as ordered  - Instruct and encourage patient and family to use good hand hygiene technique  - Identify and instruct in appropriate isolation precautions for  identified infection/condition  Outcome: Progressing  Goal: Absence of fever/infection during neutropenic period  Description: INTERVENTIONS:  - Monitor WBC    Outcome: Progressing     Problem: SAFETY ADULT  Goal: Patient will remain free of falls  Description: INTERVENTIONS:  - Educate patient/family on patient safety including physical limitations  - Instruct patient to call for assistance with activity   - Consult OT/PT to assist with strengthening/mobility   - Keep Call bell within reach  - Keep bed low and locked with side rails adjusted as appropriate  - Keep care items and personal belongings within reach  - Initiate and maintain comfort rounds  - Make Fall Risk Sign visible to staff  - Apply yellow socks and bracelet for high fall risk patients  - Consider moving patient to room near nurses station  Outcome: Progressing  Goal: Maintain or return to baseline ADL function  Description: INTERVENTIONS:  -  Assess patient's ability to carry out ADLs; assess patient's baseline for ADL function and identify physical deficits which impact ability to perform ADLs (bathing, care of mouth/teeth, toileting, grooming, dressing, etc.)  - Assess/evaluate cause of self-care deficits   - Assess range of motion  - Assess patient's mobility; develop plan if impaired  - Assess patient's need for assistive devices and provide as appropriate  - Encourage maximum independence but intervene and supervise when necessary  - Involve family in performance of ADLs  - Assess for home care needs following discharge   - Consider OT consult to assist with ADL evaluation and planning for discharge  - Provide patient education as appropriate  Outcome: Progressing  Goal: Maintains/Returns to pre admission functional level  Description: INTERVENTIONS:  - Perform AM-PAC 6 Click Basic Mobility/ Daily Activity assessment daily.  - Set and communicate daily mobility goal to care team and patient/family/caregiver.   - Collaborate with rehabilitation  services on mobility goals if consulted  - Out of bed for toileting  - Record patient progress and toleration of activity level   Outcome: Progressing

## 2024-07-05 NOTE — ASSESSMENT & PLAN NOTE
Wt Readings from Last 3 Encounters:   07/05/24 78.3 kg (172 lb 9.9 oz)   07/05/24 83.2 kg (183 lb 6.8 oz)     Patient with PMH of HTN presented with 1 day history of acutely worsening shortness of breath, chest pain, and 1 month history of bilateral lower extremity edema.  She initially presented to [place] ED, administered nitroglycerin gtt Lasix 40 mg x1 dose. Her respiratory status worsened, following which she was placed on BiPAP.   BNP 2673  CXR - pulmonary edema, mild cardiomegaly  Patient reports that she was previously prescribed antihypertensive medications in the [place], but has not taken it for a long time as she is uninsured. She also reports being told that she had a large heart and a blockage in the [place] after undergoing imaging/testing. Denies any history of myocardial infarction  Etiology: likely secondary to prolonged hypertension/hypertensive emergency    Plan  Continue nitroglycerin gtt nitroglycerin gtt for target -150  Start GDMT with lisinopril, carvedilol  Lasix 40 mg BID  Continue supplemental oxygen to maintain SpO2 > 94, wean as tolerable  Follow troponins  Monitor I/Os  Cardiology consulted, appreciate recommendations

## 2024-07-05 NOTE — ASSESSMENT & PLAN NOTE
Met SIRS criteria with WBC [id number] and tachypnea  No suspected source of infection    Plan  Monitor fever curve and WBC  Monitor off antibiotics

## 2024-07-05 NOTE — ED PROVIDER NOTES
History  Chief Complaint   Patient presents with    Chest Pain     Pt presents to the ED with c/o chest pain and SOB that woke her up from sleeping around 0200. Pt presents with edema to legs and face and is tripoding. Pt is 83% on RA.       Patient with history of hypertension questionable history of CHF has been having problems with shortness of breath for 2 months but got really bad last night at 2 in the morning as well as chest pain has had increased swelling in her lower legs around her face no fevers no vomiting diarrhea      History provided by:  Patient   used: Yes    Chest Pain  Associated symptoms: shortness of breath    Associated symptoms: no abdominal pain, no back pain, no cough, no fever, no palpitations and not vomiting        None       Past Medical History:   Diagnosis Date    Hypertension        No past surgical history on file.    No family history on file.  I have reviewed and agree with the history as documented.    E-Cigarette/Vaping     E-Cigarette/Vaping Substances          Review of Systems   Constitutional:  Negative for chills and fever.   Respiratory:  Positive for shortness of breath. Negative for cough.    Cardiovascular:  Positive for chest pain. Negative for palpitations.   Gastrointestinal:  Negative for abdominal pain and vomiting.   Musculoskeletal:  Negative for arthralgias and back pain.   Skin:  Negative for color change and rash.   Neurological:  Negative for seizures and syncope.   Psychiatric/Behavioral:  Negative for confusion.    All other systems reviewed and are negative.      Physical Exam  Physical Exam  Vitals and nursing note reviewed.   Constitutional:       General: She is in acute distress.      Appearance: She is well-developed. She is diaphoretic. She is not toxic-appearing.   HENT:      Head: Normocephalic and atraumatic.   Eyes:      Extraocular Movements: Extraocular movements intact.      Conjunctiva/sclera: Conjunctivae normal.    Cardiovascular:      Rate and Rhythm: Regular rhythm. Bradycardia present.      Heart sounds: No murmur heard.  Pulmonary:      Effort: Pulmonary effort is normal. No respiratory distress.      Breath sounds: Examination of the right-middle field reveals rales. Examination of the left-middle field reveals rales. Examination of the right-lower field reveals rales. Examination of the left-lower field reveals rales. Rales present.   Abdominal:      Palpations: Abdomen is soft.      Tenderness: There is no abdominal tenderness.   Musculoskeletal:         General: No swelling.      Cervical back: Normal range of motion and neck supple.      Right lower leg: Edema present.      Left lower leg: Edema present.      Comments: 2 pitting edema both lower legs   Skin:     General: Skin is warm.      Capillary Refill: Capillary refill takes less than 2 seconds.   Neurological:      Mental Status: She is alert.   Psychiatric:         Mood and Affect: Mood normal.         Vital Signs  ED Triage Vitals   Temperature Pulse Respirations Blood Pressure SpO2   07/05/24 0706 07/05/24 0703 07/05/24 0703 07/05/24 0703 07/05/24 0703   97.6 °F (36.4 °C) (!) 126 (!) 28 (!) 197/129 98 %      Temp Source Heart Rate Source Patient Position - Orthostatic VS BP Location FiO2 (%)   07/05/24 0706 07/05/24 0703 07/05/24 0703 07/05/24 0703 --   Tympanic Monitor Sitting Left arm       Pain Score       07/05/24 0709       8           Vitals:    07/05/24 0745 07/05/24 0800 07/05/24 0812 07/05/24 0827   BP: (!) 194/121 (!) 190/126 142/91 128/98   Pulse: (!) 118 (!) 132 (!) 113 103   Patient Position - Orthostatic VS: Sitting Sitting Sitting          Visual Acuity      ED Medications  Medications   nitroglycerin (NITROSTAT) SL tablet 0.4 mg (0.4 mg Sublingual Given 7/5/24 0703)   nitroGLYcerin (TRIDIL) 50 mg in 250 ml infusion (premix) (100 mcg/min Intravenous Rate/Dose Change 7/5/24 0805)   aspirin chewable tablet 324 mg (324 mg Oral Given 7/5/24 0706)    furosemide (LASIX) injection 40 mg (40 mg Intravenous Given 7/5/24 0707)   LORazepam (ATIVAN) injection 1 mg (1 mg Intravenous Given 7/5/24 0803)       Diagnostic Studies  Results Reviewed       Procedure Component Value Units Date/Time    Blood gas, arterial [[id number]]  (Abnormal) Collected: 07/05/24 0812    Lab Status: Final result Specimen: Blood, Arterial from Radial, Left Updated: 07/05/24 0821     pH, Arterial 7.262     pCO2, Arterial 50.8 mm Hg      pO2, Arterial 92.6 mm Hg      HCO3, Arterial 22.4 mmol/L      Base Excess, Arterial -5.1 mmol/L      O2 Content, Arterial 21.1 mL/dL      O2 HGB,Arterial  94.6 %      SOURCE Radial, Left     FATUMA TEST Yes     Non Vent type BIPAP BIPAP     IPAP 14     EPAP 6     BIPAP fio2 50 %     FLU/RSV/COVID - if FLU/RSV clinically relevant [[id number]]     Lab Status: No result Specimen: Nares from Nose     B-Type Natriuretic Peptide(BNP) [[id number]]  (Abnormal) Collected: 07/05/24 0704    Lab Status: Final result Specimen: Blood from Arm, Right Updated: 07/05/24 0811     BNP 2,673 pg/mL     HS Troponin I 4hr [[id number]]     Lab Status: No result Specimen: Blood     HS Troponin I 2hr [[id number]]     Lab Status: No result Specimen: Blood     HS Troponin 0hr (reflex protocol) [[id number]]  (Normal) Collected: 07/05/24 0704    Lab Status: Final result Specimen: Blood from Arm, Right Updated: 07/05/24 0732     hs TnI 0hr 11 ng/L     CBC and differential [[id number]]  (Abnormal) Collected: 07/05/24 0704    Lab Status: Final result Specimen: Blood from Arm, Right Updated: 07/05/24 0727     WBC 12.41 Thousand/uL      RBC 5.24 Million/uL      Hemoglobin 16.1 g/dL      Hematocrit 47.5 %      MCV 91 fL      MCH 30.7 pg      MCHC 33.9 g/dL      RDW 14.3 %      MPV 12.7 fL      Platelets 262 Thousands/uL      nRBC 0 /100 WBCs      Segmented % 74 %      Immature Grans % 0 %      Lymphocytes % 19 %      Monocytes % 5 %      Eosinophils Relative 1 %      Basophils Relative 1 %      Absolute  Neutrophils 9.17 Thousands/µL      Absolute Immature Grans 0.04 Thousand/uL      Absolute Lymphocytes 2.32 Thousands/µL      Absolute Monocytes 0.65 Thousand/µL      Eosinophils Absolute 0.16 Thousand/µL      Basophils Absolute 0.07 Thousands/µL     Comprehensive metabolic panel [[id number]]  (Abnormal) Collected: 07/05/24 0704    Lab Status: Final result Specimen: Blood from Arm, Right Updated: 07/05/24 0725     Sodium 138 mmol/L      Potassium 3.8 mmol/L      Chloride 104 mmol/L      CO2 25 mmol/L      ANION GAP 9 mmol/L      BUN 13 mg/dL      Creatinine 0.75 mg/dL      Glucose 182 mg/dL      Calcium 9.2 mg/dL      AST 30 U/L      ALT 40 U/L      Alkaline Phosphatase 94 U/L      Total Protein 7.1 g/dL      Albumin 4.0 g/dL      Total Bilirubin 1.34 mg/dL      eGFR 85 ml/min/1.73sq m     Narrative:      National Kidney Disease Foundation guidelines for Chronic Kidney Disease (CKD):     Stage 1 with normal or high GFR (GFR > 90 mL/min/1.73 square meters)    Stage 2 Mild CKD (GFR = 60-89 mL/min/1.73 square meters)    Stage 3A Moderate CKD (GFR = 45-59 mL/min/1.73 square meters)    Stage 3B Moderate CKD (GFR = 30-44 mL/min/1.73 square meters)    Stage 4 Severe CKD (GFR = 15-29 mL/min/1.73 square meters)    Stage 5 End Stage CKD (GFR <15 mL/min/1.73 square meters)  Note: GFR calculation is accurate only with a steady state creatinine                   XR chest 1 view portable   ED Interpretation by Yasir Tao MD (07/05 0726)   Cm  pulmonary edema                 Procedures  ECG 12 Lead Documentation Only    Date/Time: 7/5/2024 7:10 AM    Performed by: Yasir Tao MD  Authorized by: Yasir Tao MD    Patient location:  ED  Rate:     ECG rate:  121  Rhythm:     Rhythm: sinus tachycardia    QRS:     QRS intervals:  Normal  ST segments:     ST segments:  Normal  Comments:      Qt nml           ED Course                               SBIRT 22yo+      Flowsheet Row Most Recent Value   Initial Alcohol Screen: US AUDIT-C      1. How often do you have a drink containing alcohol? 0 Filed at: 07/05/2024 0715   2. How many drinks containing alcohol do you have on a typical day you are drinking?  0 Filed at: 07/05/2024 0715   3a. Male UNDER 65: How often do you have five or more drinks on one occasion? 0 Filed at: 07/05/2024 0715   3b. FEMALE Any Age, or MALE 65+: How often do you have 4 or more drinks on one occassion? 0 Filed at: 07/05/2024 0715   Audit-C Score 0 Filed at: 07/05/2024 0715   MARIFER: How many times in the past year have you...    Used an illegal drug or used a prescription medication for non-medical reasons? Never Filed at: 07/05/2024 0715                      Medical Decision Making  Patient presents in acute respiratory distress diaphragmatic clinically in CHF patient was given nitro and placed on a nitro drip titrated eventually to t 80 mics now as well as got Lasix here and aspirin initial EKG shows sinus tach no signs of ischemia troponin was 11 her BNP was elevated at 2600 roughly after initial treatment she seemed to be doing little better then she took a turn for the worse again and her blood pressure increased and respiratory effort got worse she was placed on BiPAP with the thought of possible intubation after adjusting the settings on the BiPAP patient has seemed to turn the corner now tidal volume is about 500 her respiratory rates in the mid 20s 98 9697% on 50%  Chest x-ray my reading is consistent with cardiomegaly and moderate-severe pulmonary edema    Case discussed with Dr Griffin critical care accepts the patient at [facility]  Show diagnosis includes acute CHF possible exacerbated by ischemic event second troponin pending COVID was sent   Differentials valvular problem PE    Amount and/or Complexity of Data Reviewed  Labs: ordered.  Radiology: independent interpretation performed.    Risk  OTC drugs.  Prescription drug management.    Arterial blood gas showed a pH of 7.26 pCO2 of 50 she had respiratory acidosis  after somewhat adjustment on the BiPAP patient is seem to turn the corner and doing better at this time low volumes about 500 Center rate is in the mid 20s         Disposition  Final diagnoses:   CHF (congestive heart failure) (HCC)     Time reflects when diagnosis was documented in both MDM as applicable and the Disposition within this note       Time User Action Codes Description Comment    7/5/2024  8:44 AM Yasir Tao Add [I50.9] CHF (congestive heart failure) (HCC)           ED Disposition       ED Disposition   Transfer to Another Facility-In Network    Condition   --    Date/Time   Fri Jul 5, 2024 0844    Comment   Nancy Calderon should be transferred out to [facility].               MD Documentation      Flowsheet Row Most Recent Value   Patient Condition The patient has been stabilized such that within reasonable medical probability, no material deterioration of the patient condition or the condition of the unborn child(shivam) is likely to result from the transfer   Reason for Transfer Level of Care needed not available at this facility   Benefits of Transfer Specialized equipment and/or services available at the receiving facility (Include comment)________________________   Risks of Transfer Potential for delay in receiving treatment   Accepting Physician Dr Griffin   Accepting Facility Name, [facility]   Sending MD Dr Tao   Provider Certification General risk, such as traffic hazards, adverse weather conditions, rough terrain or turbulence, possible failure of equipment (including vehicle or aircraft), or consequences of actions of persons outside the control of the transport personnel          RN Documentation      Flowsheet Row Most Recent Value   Accepting Facility Name, [facility]          Follow-up Information    None         Patient's Medications    No medications on file       No discharge procedures on file.    PDMP Review       None             ED Provider  Electronically Signed by             Yasir Tao MD  07/05/24 0851

## 2024-07-05 NOTE — H&P
Critical access hospital  H&P  Name: Nancy Calderon 63 y.o. female I MRN: [id number]  Unit/Bed#: ICU 11 I Date of Admission: 7/5/2024   Date of Service: 7/5/2024 I Hospital Day: 0      Assessment & Plan   Acute decompensated heart failure (HCC)  Assessment & Plan  Wt Readings from Last 3 Encounters:   07/05/24 78.3 kg (172 lb 9.9 oz)   07/05/24 83.2 kg (183 lb 6.8 oz)     Patient with PMH of HTN presented with 1 day history of acutely worsening shortness of breath, chest pain, and 1 month history of bilateral lower extremity edema.  She initially presented to [place] ED, administered nitroglycerin gtt Lasix 40 mg x1 dose. Her respiratory status worsened, following which she was placed on BiPAP.   BNP 2673  CXR - pulmonary edema, mild cardiomegaly  Patient reports that she was previously prescribed antihypertensive medications in the [place], but has not taken it for a long time as she is uninsured. She also reports being told that she had a large heart and a blockage in the [place] after undergoing imaging/testing. Denies any history of myocardial infarction  Etiology: likely secondary to prolonged hypertension/hypertensive emergency    Plan  Continue nitroglycerin gtt nitroglycerin gtt for target -150  Start GDMT with lisinopril, carvedilol  Lasix 40 mg BID  Continue supplemental oxygen to maintain SpO2 > 94, wean as tolerable  Follow troponins  Monitor I/Os  Cardiology consulted, appreciate recommendations    Hypertensive emergency  Assessment & Plan  Patient with PMH of hypertension and noncompliant with medications presented with worsening shortness of breath  CXR with flash pulmonary edema    Plan  Vasodilation with nitroglycerin gtt for target -150  See remainder of plan above    SIRS (systemic inflammatory response syndrome) (HCC)  Assessment & Plan  Met SIRS criteria with WBC 84651u and tachypnea  No suspected source of  "infection    Plan  Monitor fever curve and WBC  Monitor off antibiotics           History of Present Illness     HPI: Nancy Calderon is a Romansh-speaking 63 y.o. with PMH of HTN who initially presented to the [place] ED due to stabbing chest pain and acutely worsening shortness of breath onset last night. Patient reported that she has been having shortness of breath for the past 1-2 months along with bilateral lower extremity edema. In the [place] ED, CXR revealed pulmonary edema. She was started on a nitroglycerin gtt and given Lasix 40 mg x1 dose. She was also placed on BiPAP due to worsening respiratory status and hypercarbia, following which she stabilized and was transferred to [facility] ICU.     On my evaluation, patient is on MFNC 6L/min. She denies any more chest pain, and states she is feeling better compared to last night. She states that she moved to the  from the [place] approximately 5 years ago, and has been unable to take antihypertensives for a long time due to lack of medical insurance. In the [place], she had reportedly underwent echocardiogram, and was told she had a \"large heart\" and a \"blockage.\"     History obtained from the patient and review of ED note.  Review of Systems: See HPI for Review of Systems  Disposition: Critical care  Historical Information   Past Medical History:  No date: Hypertension No past surgical history on file.   No current outpatient medications No Known Allergies     No family history on file.       Objective                            Vitals I/O      Most Recent Min/Max in 24hrs   Temp 97.6 °F (36.4 °C) Temp  Min: 97.6 °F (36.4 °C)  Max: 97.6 °F (36.4 °C)   Pulse 88 Pulse  Min: 85  Max: 132   Resp 22 Resp  Min: 17  Max: 37   /66 BP  Min: 96/75  Max: 197/129   O2 Sat 96 % SpO2  Min: 93 %  Max: 98 %      Intake/Output Summary (Last 24 hours) at 7/5/2024 1426  Last data filed at 7/5/2024 1310  Gross per 24 hour   Intake -- "   Output 1450 ml   Net -1450 ml       Diet Cardiovascular; Sodium 2 GM; Fluid Restriction 1800 ML    Invasive Monitoring           Physical Exam   Physical Exam  Eyes:      Extraocular Movements: Extraocular movements intact.      Pupils: Pupils are equal, round, and reactive to light.   Skin:     General: Skin is warm.   HENT:      Head: Normocephalic and atraumatic.   Cardiovascular:      Rate and Rhythm: Normal rate and regular rhythm.      Heart sounds: Normal heart sounds.   Musculoskeletal:      Right lower le+ Edema present.      Left lower le+ Edema present.   Abdominal:      Palpations: Abdomen is soft.      Tenderness: There is no abdominal tenderness.   Pulmonary:      Breath sounds: Rales (bilateral middle and lower lung fields) present.   Neurological:      Mental Status: She is alert and oriented to person, place and time. Mental status is at baseline.      Motor: Strength full and intact in all extremities.            Diagnostic Studies      EKG: NSR 87 bpm  Imaging: CXR with pulmonary edema I have personally reviewed pertinent reports.   and I have personally reviewed pertinent films in PACS     Medications:  Scheduled PRN   carvedilol, 12.5 mg, BID With Meals  chlorhexidine, 15 mL, Q12H TWAN  enoxaparin, 40 mg, Daily  furosemide, 40 mg, BID (diuretic)  lisinopril, 10 mg, Daily      acetaminophen, 650 mg, Q6H PRN  nitroglycerin, 0.4 mg, Q5 Min PRN       Continuous    nitroGLYcerin, 5-200 mcg/min, Last Rate: 55 mcg/min (24 1416)         Labs:    CBC    Recent Labs     24  0704   WBC 12.41*   HGB 16.1*   HCT 47.5*        BMP    Recent Labs     24  0704   SODIUM 138   K 3.8      CO2 25   AGAP 9   BUN 13   CREATININE 0.75   CALCIUM 9.2       Coags    No recent results     Additional Electrolytes  No recent results       Blood Gas    Recent Labs     24  0812   PHART 7.262*   KQZ2QNR 50.8*   PO2ART 92.6   OSF8STP 22.4   BEART -5.1   SOURCE Radial, Left     Recent  Labs     07/05/24  0812   SOURCE Radial, Left    LFTs  Recent Labs     07/05/24  0704   ALT 40   AST 30   ALKPHOS 94   ALB 4.0   TBILI 1.34*       Infectious  No recent results  Glucose  Recent Labs     07/05/24  0704   GLUC 182*             Anticipated Length of Stay is > 2 midnights  Reshma Song MD

## 2024-07-06 ENCOUNTER — APPOINTMENT (INPATIENT)
Dept: RADIOLOGY | Facility: HOSPITAL | Age: 64
DRG: 192 | End: 2024-07-06
Payer: COMMERCIAL

## 2024-07-06 LAB
ANION GAP SERPL CALCULATED.3IONS-SCNC: 5 MMOL/L (ref 4–13)
BASOPHILS # BLD AUTO: 0.05 THOUSANDS/ÂΜL (ref 0–0.1)
BASOPHILS NFR BLD AUTO: 1 % (ref 0–1)
BUN SERPL-MCNC: 15 MG/DL (ref 5–25)
CA-I BLD-SCNC: 1.12 MMOL/L (ref 1.12–1.32)
CALCIUM SERPL-MCNC: 8.7 MG/DL (ref 8.4–10.2)
CHLORIDE SERPL-SCNC: 105 MMOL/L (ref 96–108)
CO2 SERPL-SCNC: 31 MMOL/L (ref 21–32)
CREAT SERPL-MCNC: 0.66 MG/DL (ref 0.6–1.3)
EOSINOPHIL # BLD AUTO: 0.19 THOUSAND/ÂΜL (ref 0–0.61)
EOSINOPHIL NFR BLD AUTO: 2 % (ref 0–6)
ERYTHROCYTE [DISTWIDTH] IN BLOOD BY AUTOMATED COUNT: 14.1 % (ref 11.6–15.1)
EST. AVERAGE GLUCOSE BLD GHB EST-MCNC: 180 MG/DL
GFR SERPL CREATININE-BSD FRML MDRD: 94 ML/MIN/1.73SQ M
GLUCOSE SERPL-MCNC: 116 MG/DL (ref 65–140)
HBA1C MFR BLD: 7.9 %
HCT VFR BLD AUTO: 44.7 % (ref 34.8–46.1)
HGB BLD-MCNC: 14.5 G/DL (ref 11.5–15.4)
IMM GRANULOCYTES # BLD AUTO: 0.03 THOUSAND/UL (ref 0–0.2)
IMM GRANULOCYTES NFR BLD AUTO: 0 % (ref 0–2)
LACTATE SERPL-SCNC: 1.9 MMOL/L (ref 0.5–2)
LYMPHOCYTES # BLD AUTO: 1.84 THOUSANDS/ÂΜL (ref 0.6–4.47)
LYMPHOCYTES NFR BLD AUTO: 18 % (ref 14–44)
MAGNESIUM SERPL-MCNC: 2.2 MG/DL (ref 1.9–2.7)
MCH RBC QN AUTO: 30.3 PG (ref 26.8–34.3)
MCHC RBC AUTO-ENTMCNC: 32.4 G/DL (ref 31.4–37.4)
MCV RBC AUTO: 93 FL (ref 82–98)
MONOCYTES # BLD AUTO: 0.69 THOUSAND/ÂΜL (ref 0.17–1.22)
MONOCYTES NFR BLD AUTO: 7 % (ref 4–12)
NEUTROPHILS # BLD AUTO: 7.68 THOUSANDS/ÂΜL (ref 1.85–7.62)
NEUTS SEG NFR BLD AUTO: 72 % (ref 43–75)
NRBC BLD AUTO-RTO: 0 /100 WBCS
PHOSPHATE SERPL-MCNC: 4.6 MG/DL (ref 2.3–4.1)
PLATELET # BLD AUTO: 215 THOUSANDS/UL (ref 149–390)
PMV BLD AUTO: 12 FL (ref 8.9–12.7)
POTASSIUM SERPL-SCNC: 5 MMOL/L (ref 3.5–5.3)
RBC # BLD AUTO: 4.79 MILLION/UL (ref 3.81–5.12)
SODIUM SERPL-SCNC: 141 MMOL/L (ref 135–147)
WBC # BLD AUTO: 10.48 THOUSAND/UL (ref 4.31–10.16)

## 2024-07-06 PROCEDURE — 82330 ASSAY OF CALCIUM: CPT

## 2024-07-06 PROCEDURE — 83036 HEMOGLOBIN GLYCOSYLATED A1C: CPT | Performed by: INTERNAL MEDICINE

## 2024-07-06 PROCEDURE — 99232 SBSQ HOSP IP/OBS MODERATE 35: CPT | Performed by: INTERNAL MEDICINE

## 2024-07-06 PROCEDURE — 80048 BASIC METABOLIC PNL TOTAL CA: CPT

## 2024-07-06 PROCEDURE — NC001 PR NO CHARGE: Performed by: INTERNAL MEDICINE

## 2024-07-06 PROCEDURE — 84100 ASSAY OF PHOSPHORUS: CPT

## 2024-07-06 PROCEDURE — 83735 ASSAY OF MAGNESIUM: CPT

## 2024-07-06 PROCEDURE — 83605 ASSAY OF LACTIC ACID: CPT | Performed by: PHYSICIAN ASSISTANT

## 2024-07-06 PROCEDURE — 99233 SBSQ HOSP IP/OBS HIGH 50: CPT | Performed by: INTERNAL MEDICINE

## 2024-07-06 PROCEDURE — 85025 COMPLETE CBC W/AUTO DIFF WBC: CPT

## 2024-07-06 RX ORDER — LISINOPRIL 5 MG/1
5 TABLET ORAL DAILY
Status: DISCONTINUED | OUTPATIENT
Start: 2024-07-07 | End: 2024-07-13

## 2024-07-06 RX ORDER — ALBUMIN (HUMAN) 12.5 G/50ML
75 SOLUTION INTRAVENOUS ONCE
Status: COMPLETED | OUTPATIENT
Start: 2024-07-06 | End: 2024-07-06

## 2024-07-06 RX ORDER — FUROSEMIDE 10 MG/ML
20 INJECTION INTRAMUSCULAR; INTRAVENOUS
Status: DISCONTINUED | OUTPATIENT
Start: 2024-07-06 | End: 2024-07-10

## 2024-07-06 RX ADMIN — ENOXAPARIN SODIUM 40 MG: 40 INJECTION SUBCUTANEOUS at 08:00

## 2024-07-06 RX ADMIN — CHLORHEXIDINE GLUCONATE 15 ML: 1.2 RINSE ORAL at 08:00

## 2024-07-06 RX ADMIN — FUROSEMIDE 40 MG: 10 INJECTION, SOLUTION INTRAVENOUS at 07:38

## 2024-07-06 RX ADMIN — FUROSEMIDE 20 MG: 10 INJECTION, SOLUTION INTRAMUSCULAR; INTRAVENOUS at 16:10

## 2024-07-06 RX ADMIN — CARVEDILOL 6.25 MG: 6.25 TABLET, FILM COATED ORAL at 17:41

## 2024-07-06 RX ADMIN — ALBUMIN (HUMAN) 75 G: 0.25 INJECTION, SOLUTION INTRAVENOUS at 10:13

## 2024-07-06 RX ADMIN — CARVEDILOL 6.25 MG: 6.25 TABLET, FILM COATED ORAL at 07:38

## 2024-07-06 NOTE — ASSESSMENT & PLAN NOTE
Wt Readings from Last 3 Encounters:   07/05/24 78 kg (172 lb)   07/05/24 83.2 kg (183 lb 6.8 oz)     Patient with PMH of HTN presented with 1 day history of acutely worsening shortness of breath, chest pain, and 1 month history of bilateral lower extremity edema.  She initially presented to [place] ED, administered nitroglycerin gtt Lasix 40 mg x1 dose. Her respiratory status worsened, following which she was placed on BiPAP.   BNP 2673. Troponins 19 > 19 > 20.  CXR - pulmonary edema, mild cardiomegaly  Patient reports that she was previously prescribed antihypertensive medications in the [place], but has not taken it for a long time as she is uninsured. She also reports being told that she had a large heart and a blockage in the [place] after undergoing imaging/testing. Denies any history of myocardial infarction  Etiology: likely secondary to prolonged hypertension/hypertensive emergency    Plan  S/p nitroglycerin gtt  Continue lisinopril and carvedilol (started this admission)  Lasix 40 mg BID  Continue supplemental oxygen to maintain SpO2 > 94, wean as tolerable  Monitor I/Os closely  Cardiology consulted, appreciate recommendations  Cardiac catheterization prior to discharge

## 2024-07-06 NOTE — ASSESSMENT & PLAN NOTE
Met SIRS criteria with WBC 40827p and tachypnea  No suspected source of infection    Plan  Monitor fever curve and WBC  Monitor off antibiotics

## 2024-07-06 NOTE — PLAN OF CARE
Problem: PAIN - ADULT  Goal: Verbalizes/displays adequate comfort level or baseline comfort level  Description: Interventions:  - Encourage patient to monitor pain and request assistance  - Assess pain using appropriate pain scale  - Administer analgesics based on type and severity of pain and evaluate response  - Implement non-pharmacological measures as appropriate and evaluate response  - Consider cultural and social influences on pain and pain management  - Notify physician/advanced practitioner if interventions unsuccessful or patient reports new pain  Outcome: Progressing     Problem: SAFETY ADULT  Goal: Patient will remain free of falls  Description: INTERVENTIONS:  - Educate patient/family on patient safety including physical limitations  - Instruct patient to call for assistance with activity   - Consult OT/PT to assist with strengthening/mobility   - Keep Call bell within reach  - Keep bed low and locked with side rails adjusted as appropriate  - Keep care items and personal belongings within reach  - Initiate and maintain comfort rounds  - Make Fall Risk Sign visible to staff  - Offer Toileting every  Hours, in advance of need  - Initiate/Maintain alarm  - Obtain necessary fall risk management equipment:   Problem: DISCHARGE PLANNING  Goal: Discharge to home or other facility with appropriate resources  Description: INTERVENTIONS:  - Identify barriers to discharge w/patient and caregiver  - Arrange for needed discharge resources and transportation as appropriate  - Identify discharge learning needs (meds, wound care, etc.)  - Arrange for interpretive services to assist at discharge as needed  - Refer to Case Management Department for coordinating discharge planning if the patient needs post-hospital services based on physician/advanced practitioner order or complex needs related to functional status, cognitive ability, or social support system  Outcome: Progressing     Problem: Knowledge Deficit  Goal:  Patient/family/caregiver demonstrates understanding of disease process, treatment plan, medications, and discharge instructions  Description: Complete learning assessment and assess knowledge base.  Interventions:  - Provide teaching at level of understanding  - Provide teaching via preferred learning methods  Outcome: Progressing     Problem: CARDIOVASCULAR - ADULT  Goal: Maintains optimal cardiac output and hemodynamic stability  Description: INTERVENTIONS:  - Monitor I/O, vital signs and rhythm  - Monitor for S/S and trends of decreased cardiac output  - Administer and titrate ordered vasoactive medications to optimize hemodynamic stability  - Assess quality of pulses, skin color and temperature  - Assess for signs of decreased coronary artery perfusion  - Instruct patient to report change in severity of symptoms  Outcome: Progressing     - Apply yellow socks and bracelet for high fall risk patients  - Consider moving patient to room near nurses station  Outcome: Progressing     Problem: DISCHARGE PLANNING  Goal: Discharge to home or other facility with appropriate resources  Description: INTERVENTIONS:  - Identify barriers to discharge w/patient and caregiver  - Arrange for needed discharge resources and transportation as appropriate  - Identify discharge learning needs (meds, wound care, etc.)  - Arrange for interpretive services to assist at discharge as needed  - Refer to Case Management Department for coordinating discharge planning if the patient needs post-hospital services based on physician/advanced practitioner order or complex needs related to functional status, cognitive ability, or social support system  Outcome: Progressing

## 2024-07-06 NOTE — ASSESSMENT & PLAN NOTE
Patient with PMH of hypertension and noncompliant with medications presented with worsening shortness of breath  CXR with flash pulmonary edema  S/p nitroglycerin gtt    Plan  See remainder of plan above

## 2024-07-06 NOTE — PROGRESS NOTES
Progress Note - Cardiology   Nancy Calderon 63 y.o. female MRN: [id number]  Unit/Bed#: ICU 11 Encounter: [id number]        Problem List:  Principal Problem:    Acute decompensated heart failure (HCC)  Active Problems:    Hypertensive emergency    SIRS (systemic inflammatory response syndrome) (HCC)      Assessment:  Acute HFrEF/ Unspecified cardiomyopathy  7/5/2024 echo: EF 50%, grade 2 diastolic dysfunction, normal RV size with reduced RV systolic function, no hemodynamically significant valvular disease  Prehospital patient not taking any medications due to lack of insurance  Vague history of dilated cardiomyopathy although we have no records as patient received this diagnosis and most medical care in the [facility] [place] prior to moving to the  (approximately 5 years ago)  Hypotension  7/6/2024 patient noted to be hypotensive with systolic pressure in the 80s    Plan/ Discussion:  Acute HFrEF/ cardiomyopathy with EF 15%  Volume overloaded on arrival  Treated with IV diuretics and net -2.2 L  Renal function and electrolytes stable but she is now hypotensive. Legs somewhat cool on exam. We will check a lactic acid level   IV albumin infusing for hypotension, but if lactic is up significantly then may need to consider inotropic support  GDMT as tolerated  Plan for left heart cath next week    Subjective:  Feeling ok today. Having a cough. Urinating well    Vitals:  Vitals:    07/05/24 1207 07/05/24 1445   Weight: 78.3 kg (172 lb 9.9 oz) 78 kg (172 lb)   ,  Vitals:    07/06/24 0757 07/06/24 0900 07/06/24 1000 07/06/24 1012   BP: 119/84 (!) 88/58 (!) 79/51    BP Location:       Pulse: 98 82 76 76   Resp: 20 19 (!) 24 (!) 26   Temp:    97.9 °F (36.6 °C)   TempSrc:    Oral   SpO2: 97% 94% 93% 94%   Weight:       Height:           Exam:  General: Alert awake and oriented, no acute distress  Heart:  Regular rate and rhythm, no murmurs, Normal S1, no edema    Respiratory effort/ Lungs:  Breathing  comfortably on room air, bilateral rales, no wheezing  Abdominal: Non-tender to palpation, + bowel sounds, soft, no masses or distension  Lower Limbs: 1-2+ pitting edema            Telemetry:       Normal sinus rhythm, , Heart Rate 70's    Medications:    Current Facility-Administered Medications:     acetaminophen (TYLENOL) tablet 650 mg, 650 mg, Oral, Q6H PRN, Reshma Song MD, 650 mg at 07/05/24 1459    carvedilol (COREG) tablet 6.25 mg, 6.25 mg, Oral, BID With Meals, Keven Zhao MD, 6.25 mg at 07/06/24 0738    chlorhexidine (PERIDEX) 0.12 % oral rinse 15 mL, 15 mL, Mouth/Throat, Q12H TWAN, Reshma Song MD, 15 mL at 07/06/24 0800    enoxaparin (LOVENOX) subcutaneous injection 40 mg, 40 mg, Subcutaneous, Daily, Reshma Song MD, 40 mg at 07/06/24 0800    lisinopril (ZESTRIL) tablet 10 mg, 10 mg, Oral, Daily, Reshma Song MD, 10 mg at 07/05/24 1237    nitroglycerin (NITROSTAT) SL tablet 0.4 mg, 0.4 mg, Sublingual, Q5 Min PRN, Reshma Song MD      Labs/Data:        Results from last 7 days   Lab Units 07/06/24  0618 07/05/24  0704   WBC Thousand/uL 10.48* 12.41*   HEMOGLOBIN g/dL 14.5 16.1*   HEMATOCRIT % 44.7 47.5*   PLATELETS Thousands/uL 215 262     Results from last 7 days   Lab Units 07/06/24  0618 07/05/24  1422 07/05/24  0704   POTASSIUM mmol/L 5.0 3.2* 3.8   CHLORIDE mmol/L 105 101 104   CO2 mmol/L 31 32 25   BUN mg/dL 15 11 13   CREATININE mg/dL 0.66 0.61 0.75

## 2024-07-06 NOTE — PROGRESS NOTES
Critical Care Interval Transfer Note:    Brief Hospital Summary:   63 y.o.[place]oc[place]-speaking female with PMH of HTN who initially presented to [place] ED on 7/5 with stabbing chest pain and acute shortness of breath onset the night before. She was hypertensive to 190s/110s, and found to have diffuse pulmonary edema. She was started on nitroglycerin gtt  Due to worsening dyspnea and hypoxia, she was placed on BiPAP and transferred to [facility] ICU. For background, patient has not taken antihypertensive medications that she was previously prescribed due to lack of medical insurance.    Chest pain has since resolved. Lisinopril, carvedilol, and IV Lasix were started. Patient was weaned off nitroglycerin gtt and BiPAP yesterday. She is diuresing appropriately. TTE revealed severely reduced EF and severe global hypokinesis. Cardiology was consulted, and are planning for cardiac catheterization. Earlier this morning, patient was hypotensive, but responded well to concentrated albumin. Thought to be secondary to diuresis. She is now stable for downgrade to [facility] with telemetry.    Barriers to discharge:   Acute on chronic systolic heart failure - newly diagnosed severely reduced EF 15%  Cardiac catheterization prior to discharge - tentatively planned for 7/8  Case management assistance with prescription drug cost coverage     Consults: IP CONSULT TO CARDIOLOGY  IP CONSULT TO CASE MANAGEMENT    Recommended to review admission imaging for incidental findings and document in discharge navigator: Chart reviewed, no known incidental findings noted at this time.      Discharge Plan: Anticipate discharge in 48-72 hrs to home.            Patient seen and evaluated by Critical Care today and deemed to be appropriate for transfer to [facility] with Telemetry. Spoke to Dr. Tristan from [facility] to accept transfer. Critical care can be contacted via [employer] with any questions or concerns.

## 2024-07-06 NOTE — PLAN OF CARE
Problem: Prexisting or High Potential for Compromised Skin Integrity  Goal: Skin integrity is maintained or improved  Description: INTERVENTIONS:  - Identify patients at risk for skin breakdown  - Assess and monitor skin integrity  - Assess and monitor nutrition and hydration status  - Monitor labs   - Assess for incontinence   - Turn and reposition patient  - Assist with mobility/ambulation  - Relieve pressure over bony prominences  - Avoid friction and shearing  - Provide appropriate hygiene as needed including keeping skin clean and dry  - Evaluate need for skin moisturizer/barrier cream  - Collaborate with interdisciplinary team   - Patient/family teaching  - Consider wound care consult   Outcome: Progressing     Problem: PAIN - ADULT  Goal: Verbalizes/displays adequate comfort level or baseline comfort level  Description: Interventions:  - Encourage patient to monitor pain and request assistance  - Assess pain using appropriate pain scale  - Administer analgesics based on type and severity of pain and evaluate response  - Implement non-pharmacological measures as appropriate and evaluate response  - Consider cultural and social influences on pain and pain management  - Notify physician/advanced practitioner if interventions unsuccessful or patient reports new pain  Outcome: Progressing     Problem: INFECTION - ADULT  Goal: Absence or prevention of progression during hospitalization  Description: INTERVENTIONS:  - Assess and monitor for signs and symptoms of infection  - Monitor lab/diagnostic results  - Monitor all insertion sites, i.e. indwelling lines, tubes, and drains  - Monitor endotracheal if appropriate and nasal secretions for changes in amount and color  - [place] appropriate cooling/warming therapies per order  - Administer medications as ordered  - Instruct and encourage patient and family to use good hand hygiene technique  - Identify and instruct in appropriate isolation precautions for  identified infection/condition  Outcome: Progressing  Goal: Absence of fever/infection during neutropenic period  Description: INTERVENTIONS:  - Monitor WBC    Outcome: Progressing     Problem: SAFETY ADULT  Goal: Patient will remain free of falls  Description: INTERVENTIONS:  - Educate patient/family on patient safety including physical limitations  - Instruct patient to call for assistance with activity   - Consult OT/PT to assist with strengthening/mobility   - Keep Call bell within reach  - Keep bed low and locked with side rails adjusted as appropriate  - Keep care items and personal belongings within reach  - Initiate and maintain comfort rounds  - Make Fall Risk Sign visible to staff  - Apply yellow socks and bracelet for high fall risk patients  - Consider moving patient to room near nurses station  Outcome: Progressing  Goal: Maintain or return to baseline ADL function  Description: INTERVENTIONS:  -  Assess patient's ability to carry out ADLs; assess patient's baseline for ADL function and identify physical deficits which impact ability to perform ADLs (bathing, care of mouth/teeth, toileting, grooming, dressing, etc.)  - Assess/evaluate cause of self-care deficits   - Assess range of motion  - Assess patient's mobility; develop plan if impaired  - Assess patient's need for assistive devices and provide as appropriate  - Encourage maximum independence but intervene and supervise when necessary  - Involve family in performance of ADLs  - Assess for home care needs following discharge   - Consider OT consult to assist with ADL evaluation and planning for discharge  - Provide patient education as appropriate  Outcome: Progressing  Goal: Maintains/Returns to pre admission functional level  Description: INTERVENTIONS:  - Perform AM-PAC 6 Click Basic Mobility/ Daily Activity assessment daily.  - Set and communicate daily mobility goal to care team and patient/family/caregiver.   - Collaborate with rehabilitation  services on mobility goals if consulted  - Out of bed for toileting  - Record patient progress and toleration of activity level   Outcome: Progressing

## 2024-07-06 NOTE — PROGRESS NOTES
[facility]  Progress Note  Name: Nancy Small  MRN: [id number]  Unit/Bed#: ICU 11 I Date of Admission: 7/5/2024   Date of Service: 7/6/2024 I Hospital Day: 1    Assessment & Plan   * Acute decompensated heart failure (HCC)  Assessment & Plan  Wt Readings from Last 3 Encounters:   07/05/24 78 kg (172 lb)   07/05/24 83.2 kg (183 lb 6.8 oz)     Patient with PMH of HTN presented with 1 day history of acutely worsening shortness of breath, chest pain, and 1 month history of bilateral lower extremity edema.  She initially presented to [place] ED, administered nitroglycerin gtt Lasix 40 mg x1 dose. Her respiratory status worsened, following which she was placed on BiPAP.   BNP 2673. Troponins 19 > 19 > 20.  CXR - pulmonary edema, mild cardiomegaly  Patient reports that she was previously prescribed antihypertensive medications in the [place], but has not taken it for a long time as she is uninsured. She also reports being told that she had a large heart and a blockage in the [place] after undergoing imaging/testing. Denies any history of myocardial infarction  Etiology: likely secondary to prolonged hypertension/hypertensive emergency    Plan  S/p nitroglycerin gtt  Continue lisinopril and carvedilol (started this admission)  Lasix 40 mg BID  Continue supplemental oxygen to maintain SpO2 > 94, wean as tolerable  Monitor I/Os closely  Cardiology consulted, appreciate recommendations  Cardiac catheterization prior to discharge    Hypertensive emergency  Assessment & Plan  Patient with PMH of hypertension and noncompliant with medications presented with worsening shortness of breath  CXR with flash pulmonary edema  S/p nitroglycerin gtt    Plan  See remainder of plan above    SIRS (systemic inflammatory response syndrome) (HCC)  Assessment & Plan  Met SIRS criteria with WBC 65011u and tachypnea  No suspected source of infection    Plan  Monitor fever  curve and WBC  Monitor off antibiotics             Disposition: Critical care. Plan to downgrade to Med/Surg with telemetry    ICU Core Measures     A: Assess, Prevent, and Manage Pain Has pain been assessed? Yes  Need for changes to pain regimen? No   B: Both SAT/SAT  N/A   C: Choice of Sedation RASS Goal: N/A patient not on sedation  Need for changes to sedation or analgesia regimen? NA   D: Delirium CAM-ICU: Negative   E: Early Mobility  Plan for early mobility? Yes   F: Family Engagement Plan for family engagement today? Yes         Prophylaxis:  VTE VTE covered by:  enoxaparin, Subcutaneous, 40 mg at 07/06/24 0800       Stress Ulcer  not ordered         Significant 24hr Events     24hr events: No acute overnight events. Patient was seen and examined at bedside. She reports some generalized weakness and dry cough, but denies any chest pain, shortness of breath, nausea, vomiting, abdominal pain. The bilateral lower extremity pain she experienced yesterday improved with Tylenol and ambulation. Appropriate urine outputs noted. She moved her bowels this morning. No other complaints at this time.      Subjective   Review of Systems: See HPI for Review of Systems     Objective                            Vitals I/O      Most Recent Min/Max in 24hrs   Temp 97.7 °F (36.5 °C) Temp  Min: 97 °F (36.1 °C)  Max: 98 °F (36.7 °C)   Pulse 98 Pulse  Min: 64  Max: 110   Resp 20 Resp  Min: 12  Max: 37   /84 BP  Min: 95/67  Max: 145/99   O2 Sat 97 % SpO2  Min: 93 %  Max: 99 %      Intake/Output Summary (Last 24 hours) at 7/6/2024 0853  Last data filed at 7/5/2024 1801  Gross per 24 hour   Intake 240 ml   Output 2650 ml   Net -2410 ml       Diet Cardiovascular; Sodium 2 GM; Fluid Restriction 1800 ML    Invasive Monitoring           Physical Exam   Physical Exam  Eyes:      Extraocular Movements: Extraocular movements intact.      Pupils: Pupils are equal, round, and reactive to light.   Skin:     General: Skin is warm.   HENT:       Head: Normocephalic and atraumatic.   Cardiovascular:      Rate and Rhythm: Normal rate and regular rhythm.      Heart sounds: Normal heart sounds.   Musculoskeletal:      Right lower le+ Edema present.      Left lower le+ Edema present.   Abdominal:      Palpations: Abdomen is soft.      Tenderness: There is no abdominal tenderness.   Constitutional:       Appearance: She is well-developed and well-nourished. She is not ill-appearing.   Pulmonary:      Breath sounds: Rales (bilateral middle and lower lung fields) present.   Neurological:      Mental Status: She is alert and oriented to person, place and time. Mental status is at baseline.      Motor: Strength full and intact in all extremities.            Diagnostic Studies      EKG: Telemetry reviewed, no acute events  Imaging:  No new imaging to review     Medications:  Scheduled PRN   carvedilol, 6.25 mg, BID With Meals  chlorhexidine, 15 mL, Q12H TWAN  enoxaparin, 40 mg, Daily  furosemide, 40 mg, BID (diuretic)  lisinopril, 10 mg, Daily      acetaminophen, 650 mg, Q6H PRN  nitroglycerin, 0.4 mg, Q5 Min PRN       Continuous          Labs:    CBC    Recent Labs     24  0704 24  0618   WBC 12.41* 10.48*   HGB 16.1* 14.5   HCT 47.5* 44.7    215     BMP    Recent Labs     24  1422 24  0618   SODIUM 141 141   K 3.2* 5.0    105   CO2 32 31   AGAP 8 5   BUN 11 15   CREATININE 0.61 0.66   CALCIUM 9.0 8.7       Coags    No recent results     Additional Electrolytes  Recent Labs     24  1422 24  0618   MG 2.0 2.2   PHOS 4.0 4.6*   CAIONIZED  --  1.12          Blood Gas    Recent Labs     24  0812   PHART 7.262*   [id number] 50.8*   PO2ART 92.6   BIP4OZB 22.4   BEART -5.1   SOURCE Radial, Left     Recent Labs     24  0812   SOURCE Radial, Left    LFTs  Recent Labs     24  0704   ALT 40   AST 30   ALKPHOS 94   ALB 4.0   TBILI 1.34*       Infectious  No recent results  Glucose  Recent Labs      07/05/24  0704 07/05/24  1422 07/06/24  0618   GLUC 182* 126 116               Reshma Song MD

## 2024-07-07 ENCOUNTER — APPOINTMENT (INPATIENT)
Dept: RADIOLOGY | Facility: HOSPITAL | Age: 64
DRG: 192 | End: 2024-07-07
Payer: COMMERCIAL

## 2024-07-07 PROBLEM — Z59.89 DOES NOT HAVE HEALTH INSURANCE: Status: ACTIVE | Noted: 2024-07-07

## 2024-07-07 PROBLEM — Z59.71 DOES NOT HAVE HEALTH INSURANCE: Status: ACTIVE | Noted: 2024-07-07

## 2024-07-07 LAB
ANION GAP SERPL CALCULATED.3IONS-SCNC: 9 MMOL/L (ref 4–13)
BUN SERPL-MCNC: 14 MG/DL (ref 5–25)
CALCIUM SERPL-MCNC: 9.2 MG/DL (ref 8.4–10.2)
CHLORIDE SERPL-SCNC: 99 MMOL/L (ref 96–108)
CO2 SERPL-SCNC: 31 MMOL/L (ref 21–32)
CREAT SERPL-MCNC: 0.6 MG/DL (ref 0.6–1.3)
ERYTHROCYTE [DISTWIDTH] IN BLOOD BY AUTOMATED COUNT: 14 % (ref 11.6–15.1)
GFR SERPL CREATININE-BSD FRML MDRD: 97 ML/MIN/1.73SQ M
GLUCOSE SERPL-MCNC: 100 MG/DL (ref 65–140)
HCT VFR BLD AUTO: 40.9 % (ref 34.8–46.1)
HGB BLD-MCNC: 13.3 G/DL (ref 11.5–15.4)
MAGNESIUM SERPL-MCNC: 2.1 MG/DL (ref 1.9–2.7)
MCH RBC QN AUTO: 30 PG (ref 26.8–34.3)
MCHC RBC AUTO-ENTMCNC: 32.5 G/DL (ref 31.4–37.4)
MCV RBC AUTO: 92 FL (ref 82–98)
PHOSPHATE SERPL-MCNC: 4 MG/DL (ref 2.3–4.1)
PLATELET # BLD AUTO: 202 THOUSANDS/UL (ref 149–390)
PMV BLD AUTO: 12.6 FL (ref 8.9–12.7)
POTASSIUM SERPL-SCNC: 3.6 MMOL/L (ref 3.5–5.3)
RBC # BLD AUTO: 4.43 MILLION/UL (ref 3.81–5.12)
SODIUM SERPL-SCNC: 139 MMOL/L (ref 135–147)
WBC # BLD AUTO: 9.4 THOUSAND/UL (ref 4.31–10.16)

## 2024-07-07 PROCEDURE — 71046 X-RAY EXAM CHEST 2 VIEWS: CPT

## 2024-07-07 PROCEDURE — 85027 COMPLETE CBC AUTOMATED: CPT

## 2024-07-07 PROCEDURE — 84100 ASSAY OF PHOSPHORUS: CPT

## 2024-07-07 PROCEDURE — 80048 BASIC METABOLIC PNL TOTAL CA: CPT

## 2024-07-07 PROCEDURE — 83735 ASSAY OF MAGNESIUM: CPT

## 2024-07-07 PROCEDURE — 99232 SBSQ HOSP IP/OBS MODERATE 35: CPT | Performed by: INTERNAL MEDICINE

## 2024-07-07 RX ADMIN — LISINOPRIL 5 MG: 5 TABLET ORAL at 08:49

## 2024-07-07 RX ADMIN — ENOXAPARIN SODIUM 40 MG: 40 INJECTION SUBCUTANEOUS at 08:49

## 2024-07-07 RX ADMIN — FUROSEMIDE 20 MG: 10 INJECTION, SOLUTION INTRAMUSCULAR; INTRAVENOUS at 15:41

## 2024-07-07 RX ADMIN — CARVEDILOL 6.25 MG: 6.25 TABLET, FILM COATED ORAL at 15:41

## 2024-07-07 RX ADMIN — FUROSEMIDE 20 MG: 10 INJECTION, SOLUTION INTRAMUSCULAR; INTRAVENOUS at 08:49

## 2024-07-07 NOTE — NURSING NOTE
Patient transfer to [facility] 4. Report given to Cathy MOTA RN. Patient transfer with all belongings.

## 2024-07-07 NOTE — ASSESSMENT & PLAN NOTE
[place]  [facility]  Hospital will provide medications for her at time of discharge

## 2024-07-07 NOTE — PROGRESS NOTES
Progress Note - Cardiology   Nancy Calderon 63 y.o. female MRN: [id number]  Unit/Bed#: E4 -01 Encounter: [id number]        Problem List:  Principal Problem:    Acute decompensated heart failure (HCC)  Active Problems:    Hypertensive emergency    SIRS (systemic inflammatory response syndrome) (HCC)      Assessment:  Acute HFrEF/ Unspecified cardiomyopathy  7/5/2024 echo: EF 50%, grade 2 diastolic dysfunction, normal RV size with reduced RV systolic function, no hemodynamically significant valvular disease  Prehospital patient not taking any medications due to lack of insurance  Vague history of dilated cardiomyopathy although we have no records as patient received this diagnosis and most medical care in the [facility] prior to moving to the  (approximately 5 years ago)  Hypotension  7/6/2024 patient noted to be hypotensive with systolic pressure in the 80s  7/7/2024 blood pressure improved with IV albumin systolic now trending around 100-110     Plan/ Discussion:  Acute HFrEF/ cardiomyopathy with EF 15%  Volume overloaded on arrival and treated with IV diuretics  Standing weight today 159 pounds (no prior to compare)  Looks nearly euvolemic with lower extremity edema essentially resolved but still some faint rales at lung bases  Continue IV furosemide today but likely transition to oral in the next 24-48 hours  Continue GDMT with beta-blocker and ACE inhibitor as tolerated  Plan for heart catheterization once fully euvolemic    Subjective:  Breathing is better today  Legs no longer feel swollen  Urinating frequently  Feeling somewhat weak and has a headache    Vitals:  Vitals:    07/05/24 1445 07/07/24 0600   Weight: 78 kg (172 lb) 72.3 kg (159 lb 6.3 oz)   ,  Vitals:    07/06/24 2223 07/07/24 0259 07/07/24 0600 07/07/24 0856   BP: 113/67 112/58  98/69   BP Location: Left arm Left arm  Right arm   Pulse: 101 99  93   Resp: 22 18  18   Temp: 97.8 °F (36.6 °C) (!) 97.2 °F (36.2 °C)  (!)  97.3 °F (36.3 °C)   TempSrc: Temporal Temporal  Temporal   SpO2: 95% 96%  97%   Weight:   72.3 kg (159 lb 6.3 oz)    Height:           Exam:  General: Alert awake and oriented, no acute distress  Heart:  Regular rate and rhythm, no murmurs, Normal S1, no edema    Respiratory effort/ Lungs:  Breathing comfortably on room air, faint rales at lung bases, no wheezing or rhonchi  Abdominal: Non-tender to palpation, + bowel sounds, soft, no masses or distension  Lower Limbs:  No edema            Telemetry:       Normal sinus rhythm, , Heart Rate 80s    Medications:    Current Facility-Administered Medications:     acetaminophen (TYLENOL) tablet 650 mg, 650 mg, Oral, Q6H PRN, HEATHER Nugent, 650 mg at 07/05/24 1459    carvedilol (COREG) tablet 6.25 mg, 6.25 mg, Oral, BID With Meals, HEATHER Nugent, 6.25 mg at 07/06/24 1741    enoxaparin (LOVENOX) subcutaneous injection 40 mg, 40 mg, Subcutaneous, Daily, HEATHER Nugent, 40 mg at 07/07/24 0849    furosemide (LASIX) injection 20 mg, 20 mg, Intravenous, BID (diuretic), HEATHER Nugent, 20 mg at 07/07/24 0849    lisinopril (ZESTRIL) tablet 5 mg, 5 mg, Oral, Daily, HEATHER Nugent, 5 mg at 07/07/24 0849    [Transfer Hold] nitroglycerin (NITROSTAT) SL tablet 0.4 mg, 0.4 mg, Sublingual, Q5 Min PRN, HEATHER Nugent      Labs/Data:        Results from last 7 days   Lab Units 07/07/24  0434 07/06/24  0618 07/05/24  0704   WBC Thousand/uL 9.40 10.48* 12.41*   HEMOGLOBIN g/dL 13.3 14.5 16.1*   HEMATOCRIT % 40.9 44.7 47.5*   PLATELETS Thousands/uL 202 215 262     Results from last 7 days   Lab Units 07/07/24  0434 07/06/24  0618 07/05/24  1422   POTASSIUM mmol/L 3.6 5.0 3.2*   CHLORIDE mmol/L 99 105 101   CO2 mmol/L 31 31 32   BUN mg/dL 14 15 11   CREATININE mg/dL 0.60 0.66 0.61

## 2024-07-07 NOTE — ASSESSMENT & PLAN NOTE
Met sirs criteria with [place] of [address] and tachypnea  No source of infection  Monitoring off of antibiotics   Wbc has since normalized

## 2024-07-07 NOTE — PROGRESS NOTES
[facility]  Progress Note  Name: Nancy Small  MRN: [id number]  Unit/Bed#: E4 -01 I Date of Admission: 7/5/2024   Date of Service: 7/7/2024 I Hospital Day: 2    Assessment & Plan   * Acute decompensated heart failure (HCC)  Assessment & Plan  Wt Readings from Last 3 Encounters:   07/07/24 72.3 kg (159 lb 6.3 oz)   07/05/24 83.2 kg (183 lb 6.8 oz)     Presented with 1 day history of worsening sob, cp and lower ext edema  She had been at  and treated with nitro gtt, lasix 40mg IV x1. Her respiratory status worsened in which she was then placed on bipap.   Bnp was 2673, trops normal  Cxr showed pulmonary edema  She is from the  but had not taken her hypertensive medications in a while due to not having insurance  Etiology of chf was due to htn emergency  She was transferred to [facility] ICU  S/p nitro gtt  Originally started on lisinopril but this has been d/dileep as bp has improved  She will continue coreg   Lasix IV 40mg bid which is being managed by cardiology and likely change to po in 24 to 48 hours  Pt will need cardiac cath- timing of this is yet to be determined.           Does not have health insurance  Assessment & Plan  She is originally from the   She is ma pending  Hospital will provide medications for her at time of discharge    SIRS (systemic inflammatory response syndrome) ([facility])  Assessment & Plan  Met sirs criteria [place] [address] and tachypnea  No source of infection  Monitoring off of antibiotics   Wbc has since normalized     Hypertensive emergency  Assessment & Plan  Has a pmhx of htn in which she was noncompliant with medications due to lack of insurance.   S/p nitro gtt  Started on coreg and lisinopril  Bp improved and was lower at times in which lisinopril was d/dileep  Continue coreg                VTE Pharmacologic Prophylaxis:   lovenox    Mobility:   Basic Mobility Inpatient Raw Score: 23  JH-HLM Goal: 7: Walk 25 feet or more  JH-HLM  Achieved: 1: Laying in bed      Patient Centered Rounds: I performed bedside rounds with nursing staff today.       Education and Discussions with Family / Patient: Patient declined call to .     Current Length of Stay: 2 day(s)  Current Patient Status: Inpatient     Discharge Plan: Anticipate discharge in 24-48 hrs to home. Depending if heart cath will be inpt or outpt     Code Status: Level 1 - Full Code    Subjective:   Pt seen and examined. Interpretation provided by help of nursing staff. No problems reported. She is ambulating with walker     Objective:     Vitals:   Temp (24hrs), Av.6 °F (36.4 °C), Min:97.2 °F (36.2 °C), Max:98.2 °F (36.8 °C)    Temp:  [97.2 °F (36.2 °C)-98.2 °F (36.8 °C)] 97.3 °F (36.3 °C)  HR:  [] 93  Resp:  [15-37] 18  BP: ()/(53-88) 98/69  SpO2:  [94 %-97 %] 97 %  Body mass index is 27.36 kg/m².     Input and Output Summary (last 24 hours):     Intake/Output Summary (Last 24 hours) at 2024 1042  Last data filed at 2024 1746  Gross per 24 hour   Intake 300 ml   Output --   Net 300 ml       Physical Exam:   Physical Exam  Constitutional:       Appearance: Normal appearance.   HENT:      Head: Normocephalic and atraumatic.   Eyes:      Extraocular Movements: Extraocular movements intact.      Pupils: Pupils are equal, round, and reactive to light.   Cardiovascular:      Rate and Rhythm: Normal rate and regular rhythm.      Heart sounds: No murmur heard.     No friction rub. No gallop.   Pulmonary:      Effort: Pulmonary effort is normal. No respiratory distress.      Breath sounds: Normal breath sounds. No wheezing, rhonchi or rales.   Abdominal:      General: Bowel sounds are normal. There is no distension.      Palpations: Abdomen is soft.      Tenderness: There is no abdominal tenderness. There is no guarding or rebound.   Musculoskeletal:      Right lower leg: Edema present.      Left lower leg: Edema present.   Neurological:      Mental Status: She  is alert and oriented to person, place, and time.          Additional Data:     Labs:  Results from last 7 days   Lab Units 07/07/24  0434 07/06/24  0618   WBC Thousand/uL 9.40 10.48*   HEMOGLOBIN g/dL 13.3 14.5   HEMATOCRIT % 40.9 44.7   PLATELETS Thousands/uL 202 215   SEGS PCT %  --  72   LYMPHO PCT %  --  18   MONO PCT %  --  7   EOS PCT %  --  2     Results from last 7 days   Lab Units 07/07/24  0434 07/05/24  1422 07/05/24  0704   SODIUM mmol/L 139   < > 138   POTASSIUM mmol/L 3.6   < > 3.8   CHLORIDE mmol/L 99   < > 104   CO2 mmol/L 31   < > 25   BUN mg/dL 14   < > 13   CREATININE mg/dL 0.60   < > 0.75   ANION GAP mmol/L 9   < > 9   CALCIUM mg/dL 9.2   < > 9.2   ALBUMIN g/dL  --   --  4.0   TOTAL BILIRUBIN mg/dL  --   --  1.34*   ALK PHOS U/L  --   --  94   ALT U/L  --   --  40   AST U/L  --   --  30   GLUCOSE RANDOM mg/dL 100   < > 182*    < > = values in this interval not displayed.         Results from last 7 days   Lab Units 07/05/24  1528   POC GLUCOSE mg/dl 157*     Results from last 7 days   Lab Units 07/06/24  0618   HEMOGLOBIN A1C % 7.9*     Results from last 7 days   Lab Units 07/06/24  1033 07/05/24  1736   LACTIC ACID mmol/L 1.9 1.9       Lines/Drains:  Invasive Devices       Peripheral Intravenous Line  Duration             Peripheral IV 07/05/24 Left;Dorsal (posterior) Hand 2 days    Peripheral IV 07/06/24 Distal;Left;Ventral (anterior) Forearm 1 day                      Telemetry:  Telemetry Orders (From admission, onward)               24 Hour Telemetry Monitoring  Continuous x 24 Hours (Telem)        Question:  Reason for 24 Hour Telemetry  Answer:  Decompensated CHF- and any one of the following: continuous diuretic infusion or total diuretic dose >200 mg daily, associated electrolyte derangement (I.e. K < 3.0), ionotropic drip (continuous infusion), hx of ventricular arrhythmia, or new EF < 35%                            Imaging: No pertinent imaging reviewed.    Recent Cultures (last 7  days):         Last 24 Hours Medication List:   Current Facility-Administered Medications   Medication Dose Route Frequency Provider Last Rate    acetaminophen  650 mg Oral Q6H PRN HEATHER Nugent      carvedilol  6.25 mg Oral BID With Meals HEATHRE Nugent      enoxaparin  40 mg Subcutaneous Daily HEATHER Nugent      furosemide  20 mg Intravenous BID (diuretic) HEATHER Nugent      lisinopril  5 mg Oral Daily HEATHER Nugent      [Transfer Hold] nitroglycerin  0.4 mg Sublingual Q5 Min PRN HEATHER Nugent          Today, Patient Was Seen By: Loretta Tristan DO

## 2024-07-07 NOTE — UTILIZATION REVIEW
Initial Clinical Review    Admission: Date/Time/Statement:   Admission Orders (From admission, onward)       Ordered        07/05/24 1206  Inpatient Admission  Once                          Orders Placed This Encounter   Procedures    Inpatient Admission     Standing Status:   Standing     Number of Occurrences:   1     Order Specific Question:   Level of Care     Answer:   Critical Care [15]     Order Specific Question:   Estimated length of stay     Answer:   More than 2 Midnights     Order Specific Question:   Certification     Answer:   I certify that inpatient services are medically necessary for this patient for a duration of greater than two midnights. See H&P and MD Progress Notes for additional information about the patient's course of treatment.           Initial Presentation: 63 y.o. female presented to the [place] ED and transferred to [facility] ICU admitted Inpatient d/t Acute hypoxic respiratory failure due to cardiogenic pulmonary edema. Bilateral pulmonary effusions.Acute on chronic systolic heart failure, new diagnosis, severely reduced EF.  history of hypertension noncompliant with medications, prior unclear cardiac history in the [place], lack of medical insurance, who is presenting due to shortness of breath, lower extremity swelling. over the past few months has been having worsening fatigue and lower extremity swelling. She does not have medical insurance and unfortunately has not been taking any blood pressure medications or measuring her blood pressure. started having stabbing chest pain and acute shortness of breath last night and presented to the ED where she was found to have diffuse pulmonary edema, and severe hypertension. She was dyspneic and hypoxic and started on BiPAP and transferred to ICU.  'S. BNP 2673.WBC 12. K 3.2.echo-severely depressed LVEF. Global hypokinesis. Grade 2 diastolic dysfunction. Normal RV size and reduced systolic function. No pericardial effusion.  No obvious pulmonary hypertension. Trace mitral and tricuspid valve regurgitation. Ntg gtt. IV lasix.carvedilol,lisinopril.Bipap.O2@6L. need cardiac catheterization before discharge.Cardiology consult.    7/5 CARDIOLOGY CONSULT:Acute decompensated heart failure, heart failure with reduced ejection fraction.Unspecified cardiomyopathy dilated left ventricle with severely reduced LV function, suspected chronic cardiomyopathy.At risk for cardiogenic shock.Hyponatremia.presentation symptoms were concerning for acute heart failure and.  No acute evidence of acute coronary syndrome.  Physical examination patient reports that she was diagnosed with enlarged heart, probably in the past.  Has not been taking her and she is on recommended.  Systolic function with mobile hypokinesis with some regional variability.  The urine output is so far is adequate.  There is some discrepancy between ECG from [place] this morning and from here.  There is left ventricular hypertrophy with repolarization abnormalities. IV diuresis with goal of 1.5 L to 2 L negative for 24 hours.Wean off of nitroglycerin. Given her state of her left ventricular and her pressures I am reducing the dose of carvedilol to 6.25 mg twice daily instead of 12.5 twice daily.lsinopril.Will need cardiac catheterization prior to discharge.        Anticipated Length of Stay/Certification Statement: Anticipated Length of Stay is > 2 midnights     Date: 7/6   Day 2:  Lactic acid levels within normal limits. Blood pressure improved. WBC 10.IV lasix.low-dose carvedilol. Lisinopril held this morning for marginally low blood pressure.Eventual cardiac catheterization once volume status compensated.    ED Triage Vitals   Temperature Pulse Respirations Blood Pressure SpO2 Pain Score   07/05/24 1100 07/05/24 1100 07/05/24 1100 07/05/24 1100 07/05/24 1048 07/05/24 1100   97.6 °F (36.4 °C) 98 (!) 37 145/99 97 % No Pain     Weight (last 2 days)       Date/Time Weight    07/07/24  0600 72.3 (159.39)    07/05/24 1445 78 (172)    07/05/24 1207 78.3 (172.62)    07/05/24 1100 78.3 (172.62)            Vital Signs (last 3 days)       Date/Time Temp Pulse Resp BP MAP (mmHg) SpO2 Calculated FIO2 (%) - Nasal Cannula Nasal Cannula O2 Flow Rate (L/min) O2 Device O2 Interface Device Patient Position - Orthostatic VS [place] Coma Scale Score Pain    07/07/24 1143 97.5 °F (36.4 °C) 79 18 101/62 75 95 % -- -- None (Room air) -- Sitting -- --    07/07/24 0856 97.3 °F (36.3 °C) 93 18 98/69 74 97 % -- -- None (Room air) -- Sitting -- --    07/07/24 0700 -- -- -- -- -- -- -- -- -- -- -- -- No Pain    07/07/24 0259 97.2 °F (36.2 °C) 99 18 112/58 78 96 % 24 1 L/min Nasal cannula -- Lying -- --    07/06/24 2223 97.8 °F (36.6 °C) 101 22 113/67 85 95 % 24 1 L/min Nasal cannula -- Sitting -- --    07/06/24 2100 -- 88 28 109/88 101 96 % -- -- -- -- -- -- --    07/06/24 2000 -- 82 18 103/69 81 96 % -- -- -- -- -- -- --    07/06/24 1948 -- -- -- -- -- -- -- -- -- -- -- 15 No Pain    07/06/24 1900 -- 80 19 106/78 86 95 % -- -- -- -- -- -- --    07/06/24 1744 -- 84 22 109/65 82 95 % -- -- -- -- -- -- --    07/06/24 1741 -- 86 -- 109/69 -- -- -- -- -- -- -- -- --    07/06/24 1737 -- 82 30 105/65 78 95 % -- -- -- -- -- -- --    07/06/24 1700 -- 90 25 109/69 86 96 % -- -- -- -- -- -- --    07/06/24 1610 -- 90 24 110/75 90 97 % -- -- -- -- -- -- --    07/06/24 1553 -- 86 22 105/71 87 97 % -- -- -- -- -- -- --    07/06/24 1513 98.2 °F (36.8 °C) 86 18 -- -- 96 % -- -- -- -- -- -- --    07/06/24 1500 -- 86 15 113/79 91 97 % -- -- -- -- -- -- --    07/06/24 1400 -- 88 37 102/67 84 96 % 24 1 L/min Nasal cannula -- -- -- --    07/06/24 1300 -- 82 18 110/67 89 95 % 28 2 L/min Nasal cannula -- -- -- --    07/06/24 1200 -- 90 26 93/53 70 95 % 28 2 L/min Nasal cannula -- -- 15 No Pain    07/06/24 1100 -- 74 15 92/58 68 95 % -- -- -- -- -- -- --    07/06/24 1044 -- 72 16 85/54 65 94 % -- -- -- -- -- -- --    07/06/24 1012 97.9 °F (36.6  °C) 76 26 -- -- 94 % -- -- -- -- -- -- --    07/06/24 1000 -- 76 24 79/51 60 93 % -- -- -- -- -- -- --    07/06/24 0900 -- 82 19 88/58 73 94 % 28 2 L/min Nasal cannula -- -- 15 No Pain    07/06/24 0757 -- 98 20 119/84 94 97 % 28 2 L/min Nasal cannula -- -- -- --    07/06/24 0700 97.7 °F (36.5 °C) 88 21 113/82 91 97 % 28 2 L/min Nasal cannula -- Lying -- --    07/06/24 0600 -- 82 16 121/81 96 99 % -- -- -- -- -- -- --    07/06/24 0500 -- 80 12 115/77 89 98 % -- -- -- -- -- -- --    07/06/24 0400 98 °F (36.7 °C) 74 15 113/75 87 98 % 28 2 L/min Nasal cannula -- -- 15 No Pain    07/06/24 0300 -- 76 16 -- 108 99 % -- -- -- -- -- -- --    07/06/24 0200 -- 74 13 120/70 89 98 % -- -- -- -- -- -- --    07/06/24 0100 -- 64 13 97/77 85 98 % -- -- -- -- -- -- --    07/06/24 0000 97.8 °F (36.6 °C) 68 12 114/77 88 97 % 28 2 L/min Nasal cannula -- -- 15 No Pain    07/05/24 2300 -- 70 14 106/70 82 97 % -- -- -- -- -- -- --    07/05/24 2200 -- 70 14 108/67 83 97 % -- -- -- -- -- -- --    07/05/24 2100 -- 70 14 105/76 82 97 % -- -- -- -- -- -- --    07/05/24 2052 -- -- -- -- -- 97 % 44 6 L/min Mid flow nasal cannula -- -- -- --    07/05/24 2000 -- 78 21 95/67 81 97 % 44 6 L/min Mid flow nasal cannula -- -- -- --    07/05/24 1954 -- -- -- -- -- -- -- -- -- -- -- 15 No Pain    07/05/24 1952 97.8 °F (36.6 °C) -- -- -- -- -- -- -- -- -- -- -- --    07/05/24 1900 -- 74 14 95/58 70 97 % -- -- -- -- -- -- --    07/05/24 1830 -- 76 16 104/61 79 95 % -- -- -- -- -- -- --    07/05/24 1815 -- 86 24 102/74 86 97 % -- -- -- -- -- -- --    07/05/24 1800 -- 86 25 103/68 79 96 % -- -- -- -- -- -- --    07/05/24 1752 -- 98 20 101/64 79 96 % 44 6 L/min Mid flow nasal cannula -- -- -- --    07/05/24 1730 -- 92 22 103/75 90 96 % -- -- -- -- -- -- --    07/05/24 1700 -- 92 22 119/76 92 97 % -- -- -- -- -- -- --    07/05/24 1600 -- 92 24 132/76 105 97 % -- -- -- -- -- -- --    07/05/24 1530 -- 94 21 98/54 68 96 % -- -- -- -- -- -- --    07/05/24 1515 --  96 23 118/69 -- 97 % -- -- -- -- -- -- --    07/05/24 1505 -- 108 20 118/69 92 98 % -- -- -- -- -- -- --    07/05/24 1502 97 °F (36.1 °C) 100 16 -- -- 97 % -- -- -- -- -- -- --    07/05/24 1500 -- 108 25 111/67 85 97 % -- -- -- -- -- -- --    07/05/24 1459 -- 110 21 123/70 90 97 % -- -- -- -- -- -- 5    07/05/24 1456 -- -- -- 123/70 -- -- -- -- -- -- -- -- --    07/05/24 1445 -- 108 23 133/81 102 97 % -- -- -- -- -- -- --    07/05/24 1430 -- 108 25 138/79 108 95 % -- -- -- -- -- -- --    07/05/24 1415 -- 88 22 108/66 81 96 % -- -- -- -- -- -- --    07/05/24 1404 -- 98 22 111/68 89 98 % -- -- -- -- -- -- --    07/05/24 1345 -- 90 21 108/66 89 95 % -- -- -- -- -- -- --    07/05/24 1340 -- 88 23 116/77 89 94 % -- -- -- -- -- -- --    07/05/24 1335 -- 98 17 96/75 85 94 % -- -- -- -- -- -- --    07/05/24 1315 -- 94 24 126/72 95 96 % -- -- -- -- -- -- --    07/05/24 1300 -- 94 25 121/81 98 96 % -- -- -- -- -- -- --    07/05/24 1245 -- 96 27 121/77 -- 97 % -- -- -- -- -- -- --    07/05/24 1237 -- -- -- 119/71 -- -- -- -- -- -- -- -- --    07/05/24 1232 -- -- -- -- -- -- -- -- -- -- -- -- No Pain    07/05/24 1231 97.6 °F (36.4 °C) -- -- -- -- -- -- -- -- -- -- -- --    07/05/24 1200 -- 94 25 119/71 87 94 % 44 6 L/min Mid flow nasal cannula -- -- -- --    07/05/24 1137 -- 88 21 -- -- 95 % -- -- -- -- -- -- --    07/05/24 1100 97.6 °F (36.4 °C) 98 37 145/99 113 93 % 44 6 L/min Mid flow nasal cannula -- Lying -- No Pain    07/05/24 1055 -- -- -- -- -- 97 % 44 6 L/min Mid flow nasal cannula -- -- -- --    07/05/24 1048 -- -- -- -- -- 97 % -- -- -- Full face mask -- -- --              Pertinent Labs/Diagnostic Test Results:   Radiology:    7/5 CXR:Moderate pulmonary edema with probable small bilateral pleural effusions.         XR chest pa & lateral   Final Interpretation by Mary Kay Estrella MD (07/07 0819)      Improving pulmonary edema with small pleural effusions.            Workstation performed: [id number]            Cardiology:  Echo complete w/ contrast if indicated   Final Result by Keven Zhao MD (07/05 1629)   Addendum (preliminary) 1 of 1 by Keven Zhao MD (07/05 1629)   Technically difficult but adequate transthoracic echocardiogram study.     Definity contrast was used for endocardial border enhancement.      Moderately dilated left ventricle cavity, severely reduced left    ventricular systolic function.  There is global hypokinesis with some.     Was here regional wall motion variability.  Ejection fraction estimated    around 15%.  Suspected grade 2 diastolic dysfunction.   Normal RV size, reduced right ventricular systolic function.   No significant left or right atrial cavity enlargement.   No significant valvular stenosis.  Trace mitral and tricuspid valve    regurgitation.   No obvious pulmonary hypertension   No pericardial effusion   Mild to moderate left-sided pleural effusion.      There is no previous echocardiogram available for comparison.            ECG 12 lead   Final Result by Tip Pizarro DO (07/05 1714)   Normal sinus rhythm   Moderate voltage criteria for LVH, may be normal variant   ST & T wave abnormality, consider inferolateral ischemia   Prolonged QT   Abnormal ECG   When compared with ECG of 05-JUL-2024 07:01,   Non-specific change in ST segment in Anterior leads   T wave inversion now evident in Lateral leads   Confirmed by Tip Pizarro ([id number]) on 7/5/2024 5:14:29 PM              Narrative    Yasir Tao MD     7/5/2024  8:51 AM  ECG 12 Lead Documentation Only    Date/Time: 7/5/2024 7:10 AM    Performed by: Yasir Tao MD  Authorized by: Yasir Tao MD    Patient location:  ED  Rate:    ECG rate:  121  Rhythm:    Rhythm: sinus tachycardia    QRS:    QRS intervals:  Normal  ST segments:    ST segments:  Normal  Comments:     Qt nml        Sinus tachycardia  Possible Left atrial enlargement  Low voltage QRS  Borderline ECG  No previous ECGs available  Confirmed by Tip Pizarro ([id number])  on 7/5/2024 10:20:46 AM    GI:  No orders to display       Results from last 7 days   Lab Units 07/05/24  0904   SARS-COV-2  Negative     Results from last 7 days   Lab Units 07/07/24  0434 07/06/24 0618 07/05/24  0704   WBC Thousand/uL 9.40 10.48* 12.41*   HEMOGLOBIN g/dL 13.3 14.5 16.1*   HEMATOCRIT % 40.9 44.7 47.5*   PLATELETS Thousands/uL 202 215 262   TOTAL NEUT ABS Thousands/µL  --  7.68* 9.17*         Results from last 7 days   Lab Units 07/07/24  0434 07/06/24 0618 07/05/24  1422 07/05/24  0704   SODIUM mmol/L 139 141 141 138   POTASSIUM mmol/L 3.6 5.0 3.2* 3.8   CHLORIDE mmol/L 99 105 101 104   CO2 mmol/L 31 31 32 25   ANION GAP mmol/L 9 5 8 9   BUN mg/dL 14 15 11 13   CREATININE mg/dL 0.60 0.66 0.61 0.75   EGFR ml/min/1.73sq m 97 94 96 85   CALCIUM mg/dL 9.2 8.7 9.0 9.2   CALCIUM, IONIZED mmol/L  --  1.12  --   --    MAGNESIUM mg/dL 2.1 2.2 2.0  --    PHOSPHORUS mg/dL 4.0 4.6* 4.0  --      Results from last 7 days   Lab Units 07/05/24  0704   AST U/L 30   ALT U/L 40   ALK PHOS U/L 94   TOTAL PROTEIN g/dL 7.1   ALBUMIN g/dL 4.0   TOTAL BILIRUBIN mg/dL 1.34*     Results from last 7 days   Lab Units 07/05/24  1528   POC GLUCOSE mg/dl 157*     Results from last 7 days   Lab Units 07/07/24  0434 07/06/24 0618 07/05/24  1422 07/05/24  0704   GLUCOSE RANDOM mg/dL 100 116 126 182*         Results from last 7 days   Lab Units 07/06/24  0618   HEMOGLOBIN A1C % 7.9*   EAG mg/dl 180       Results from last 7 days   Lab Units 07/05/24  0812   PH ART  7.262*   PCO2 ART mm Hg 50.8*   PO2 ART mm Hg 92.6   HCO3 ART mmol/L 22.4   BASE EXC ART mmol/L -5.1   O2 CONTENT ART mL/dL 21.1   O2 HGB, ARTERIAL % 94.6   ABG SOURCE  Radial, Left                 Results from last 7 days   Lab Units 07/05/24  1817 07/05/24  1656 07/05/24  1422 07/05/24  0704   HS TNI 0HR ng/L  --   --  19 11   HS TNI 2HR ng/L  --  19  --   --    HSTNI D2 ng/L  --  0  --   --    HS TNI 4HR ng/L 20  --   --   --    HSTNI D4 ng/L 1  --   --   --         Results from last 7 days   Lab Units 07/06/24  1033 07/05/24  1736   LACTIC ACID mmol/L 1.9 1.9             Results from last 7 days   Lab Units 07/05/24  0704   BNP pg/mL 2,673*       Results from last 7 days   Lab Units 07/05/24  0904   INFLUENZA A PCR  Negative   INFLUENZA B PCR  Negative   RSV PCR  Negative       Past Medical History:   Diagnosis Date    Hypertension          Admitting Diagnosis: CHF (congestive heart failure) (HCC) [I50.9]  Age/Sex: 63 y.o. female  Admission Orders:  Scheduled Medications:  carvedilol, 6.25 mg, Oral, BID With Meals  enoxaparin, 40 mg, Subcutaneous, Daily  furosemide, 20 mg, Intravenous, BID (diuretic)  lisinopril, 5 mg, Oral, Daily      Continuous IV Infusions:  nitroGLYcerin (TRIDIL) 50 mg in 250 ml infusion (premix)  Rate: 1.5-60 mL/hr Dose: 5-200 mcg/min  Freq: Titrated Route: IV  Last Dose: Stopped (07/05/24 1545)  Start: 07/05/24 1200 End: 07/05/24 1723   Admin Instructions:              1200 [C]     1240     1245 [C]     1315     1337     1340     1348     1357     1404     1416     1430     1445     1450     1455     1504     1513     1526     1545 [C]     1723-D/C'd               PRN Meds:  acetaminophen, 650 mg, Oral, Q6H PRN 7/5 x 1  [Transfer Hold] nitroglycerin, 0.4 mg, Sublingual, Q5 Min PRN    TELE  SCD  DAILY WEIGHTS  I&O  OOB  FALL PRECAUTIONS  CARDIO-PULM MONITORING    IP CONSULT TO CARDIOLOGY  IP CONSULT TO CASE MANAGEMENT    Network Utilization Review Department  ATTENTION: Please call with any questions or concerns to [phone] and carefully listen to the prompts so that you are directed to the right person. All voicemails are confidential.   For Discharge needs, contact Care Management DC Support Team at [phone] opt. [phone]  Send all requests for admission clinical reviews, approved or denied determinations and any other requests to dedicated fax number below belonging to the campus where the patient is receiving treatment. List of dedicated fax  numbers for the Facilities:  FACILITY NAME UR FAX NUMBER   ADMISSION DENIALS (Administrative/Medical Necessity) [phone]   DISCHARGE SUPPORT TEAM (NETWORK) [phone]   PARENT CHILD HEALTH (Maternity/NICU/Pediatrics) [phone]   [facility] [phone]   General acute hospital [phone]   Critical access hospital [phone]   [facility] [phone]   [facility] [phone]   [facility] [phone]   [facility] [phone]   [facility] [phone]   [facility] [phone]   [facility] [phone]   [facility] [phone]   [facility] [phone]

## 2024-07-07 NOTE — ASSESSMENT & PLAN NOTE
Has a pmhx of htn in which she was noncompliant with medications due to lack of insurance.   S/p nitro gtt  Started on coreg and lisinopril  Bp improved and was lower at times in which lisinopril was d/dileep  Continue coreg

## 2024-07-07 NOTE — ASSESSMENT & PLAN NOTE
Wt Readings from Last 3 Encounters:   07/07/24 72.3 kg (159 lb 6.3 oz)   07/05/24 83.2 kg (183 lb 6.8 oz)     Presented with 1 day history of worsening sob, cp and lower ext edema  She had been at  and treated with nitro gtt, lasix 40mg IV x1. Her respiratory status worsened in which she was then placed on bipap.   Bnp was 2673, trops normal  Cxr showed pulmonary edema  She is from the  but had not taken her hypertensive medications in a while due to not having insurance  Etiology of chf was due to htn emergency  She was transferred to [facility] ICU  S/p nitro gtt  Originally started on lisinopril but this has been d/dileep as bp has improved  She will continue coreg   Lasix IV 40mg bid which is being managed by cardiology and likely change to po in 24 to 48 hours  Pt will need cardiac cath- timing of this is yet to be determined.

## 2024-07-08 LAB
ANION GAP SERPL CALCULATED.3IONS-SCNC: 9 MMOL/L (ref 4–13)
BUN SERPL-MCNC: 17 MG/DL (ref 5–25)
CALCIUM SERPL-MCNC: 9.1 MG/DL (ref 8.4–10.2)
CHLORIDE SERPL-SCNC: 98 MMOL/L (ref 96–108)
CO2 SERPL-SCNC: 32 MMOL/L (ref 21–32)
CREAT SERPL-MCNC: 0.63 MG/DL (ref 0.6–1.3)
ERYTHROCYTE [DISTWIDTH] IN BLOOD BY AUTOMATED COUNT: 13.8 % (ref 11.6–15.1)
GFR SERPL CREATININE-BSD FRML MDRD: 95 ML/MIN/1.73SQ M
GLUCOSE SERPL-MCNC: 105 MG/DL (ref 65–140)
HCT VFR BLD AUTO: 44.2 % (ref 34.8–46.1)
HGB BLD-MCNC: 14.6 G/DL (ref 11.5–15.4)
MCH RBC QN AUTO: 30.1 PG (ref 26.8–34.3)
MCHC RBC AUTO-ENTMCNC: 33 G/DL (ref 31.4–37.4)
MCV RBC AUTO: 91 FL (ref 82–98)
PLATELET # BLD AUTO: 208 THOUSANDS/UL (ref 149–390)
PMV BLD AUTO: 12.7 FL (ref 8.9–12.7)
POTASSIUM SERPL-SCNC: 3.6 MMOL/L (ref 3.5–5.3)
RBC # BLD AUTO: 4.85 MILLION/UL (ref 3.81–5.12)
SODIUM SERPL-SCNC: 139 MMOL/L (ref 135–147)
WBC # BLD AUTO: 8.71 THOUSAND/UL (ref 4.31–10.16)

## 2024-07-08 PROCEDURE — 99232 SBSQ HOSP IP/OBS MODERATE 35: CPT | Performed by: INTERNAL MEDICINE

## 2024-07-08 PROCEDURE — 99232 SBSQ HOSP IP/OBS MODERATE 35: CPT

## 2024-07-08 PROCEDURE — 80048 BASIC METABOLIC PNL TOTAL CA: CPT | Performed by: INTERNAL MEDICINE

## 2024-07-08 PROCEDURE — 85027 COMPLETE CBC AUTOMATED: CPT | Performed by: INTERNAL MEDICINE

## 2024-07-08 RX ADMIN — FUROSEMIDE 20 MG: 10 INJECTION, SOLUTION INTRAMUSCULAR; INTRAVENOUS at 09:13

## 2024-07-08 RX ADMIN — CARVEDILOL 6.25 MG: 6.25 TABLET, FILM COATED ORAL at 09:13

## 2024-07-08 RX ADMIN — LISINOPRIL 5 MG: 5 TABLET ORAL at 09:13

## 2024-07-08 NOTE — PLAN OF CARE
Problem: Prexisting or High Potential for Compromised Skin Integrity  Goal: Skin integrity is maintained or improved  Description: INTERVENTIONS:  - Identify patients at risk for skin breakdown  - Assess and monitor skin integrity  - Assess and monitor nutrition and hydration status  - Monitor labs   - Assess for incontinence   - Turn and reposition patient  - Assist with mobility/ambulation  - Relieve pressure over bony prominences  - Avoid friction and shearing  - Provide appropriate hygiene as needed including keeping skin clean and dry  - Evaluate need for skin moisturizer/barrier cream  - Collaborate with interdisciplinary team   - Patient/family teaching  - Consider wound care consult   Outcome: Progressing     Problem: PAIN - ADULT  Goal: Verbalizes/displays adequate comfort level or baseline comfort level  Description: Interventions:  - Encourage patient to monitor pain and request assistance  - Assess pain using appropriate pain scale  - Administer analgesics based on type and severity of pain and evaluate response  - Implement non-pharmacological measures as appropriate and evaluate response  - Consider cultural and social influences on pain and pain management  - Notify physician/advanced practitioner if interventions unsuccessful or patient reports new pain  Outcome: Progressing     Problem: INFECTION - ADULT  Goal: Absence or prevention of progression during hospitalization  Description: INTERVENTIONS:  - Assess and monitor for signs and symptoms of infection  - Monitor lab/diagnostic results  - Monitor all insertion sites, i.e. indwelling lines, tubes, and drains  - Monitor endotracheal if appropriate and nasal secretions for changes in amount and color  - [place] appropriate cooling/warming therapies per order  - Administer medications as ordered  - Instruct and encourage patient and family to use good hand hygiene technique  - Identify and instruct in appropriate isolation precautions for  identified infection/condition  Outcome: Progressing  Goal: Absence of fever/infection during neutropenic period  Description: INTERVENTIONS:  - Monitor WBC    Outcome: Progressing     Problem: SAFETY ADULT  Goal: Patient will remain free of falls  Description: INTERVENTIONS:  - Educate patient/family on patient safety including physical limitations  - Instruct patient to call for assistance with activity   - Consult OT/PT to assist with strengthening/mobility   - Keep Call bell within reach  - Keep bed low and locked with side rails adjusted as appropriate  - Keep care items and personal belongings within reach  - Initiate and maintain comfort rounds  - Make Fall Risk Sign visible to staff  - Offer Toileting every 2 Hours, in advance of need  - Initiate/Maintain bed alarm  - Obtain necessary fall risk management equipment: In place   - Apply yellow socks and bracelet for high fall risk patients  - Consider moving patient to room near nurses station  Outcome: Progressing  Goal: Maintain or return to baseline ADL function  Description: INTERVENTIONS:  -  Assess patient's ability to carry out ADLs; assess patient's baseline for ADL function and identify physical deficits which impact ability to perform ADLs (bathing, care of mouth/teeth, toileting, grooming, dressing, etc.)  - Assess/evaluate cause of self-care deficits   - Assess range of motion  - Assess patient's mobility; develop plan if impaired  - Assess patient's need for assistive devices and provide as appropriate  - Encourage maximum independence but intervene and supervise when necessary  - Involve family in performance of ADLs  - Assess for home care needs following discharge   - Consider OT consult to assist with ADL evaluation and planning for discharge  - Provide patient education as appropriate  Outcome: Progressing  Goal: Maintains/Returns to pre admission functional level  Description: INTERVENTIONS:  - Perform AM-PAC 6 Click Basic Mobility/ Daily  Activity assessment daily.  - Set and communicate daily mobility goal to care team and patient/family/caregiver.   - Collaborate with rehabilitation services on mobility goals if consulted  - Perform Range of Motion 3 times a day.  - Reposition patient every 2 hours.  - Dangle patient 3 times a day  - Stand patient 3 times a day  - Ambulate patient 3 times a day  - Out of bed to chair 3 times a day   - Out of bed for meals 3 times a day  - Out of bed for toileting  - Record patient progress and toleration of activity level   Outcome: Progressing     Problem: DISCHARGE PLANNING  Goal: Discharge to home or other facility with appropriate resources  Description: INTERVENTIONS:  - Identify barriers to discharge w/patient and caregiver  - Arrange for needed discharge resources and transportation as appropriate  - Identify discharge learning needs (meds, wound care, etc.)  - Arrange for interpretive services to assist at discharge as needed  - Refer to Case Management Department for coordinating discharge planning if the patient needs post-hospital services based on physician/advanced practitioner order or complex needs related to functional status, cognitive ability, or social support system  Outcome: Progressing     Problem: Knowledge Deficit  Goal: Patient/family/caregiver demonstrates understanding of disease process, treatment plan, medications, and discharge instructions  Description: Complete learning assessment and assess knowledge base.  Interventions:  - Provide teaching at level of understanding  - Provide teaching via preferred learning methods  Outcome: Progressing     Problem: CARDIOVASCULAR - ADULT  Goal: Maintains optimal cardiac output and hemodynamic stability  Description: INTERVENTIONS:  - Monitor I/O, vital signs and rhythm  - Monitor for S/S and trends of decreased cardiac output  - Administer and titrate ordered vasoactive medications to optimize hemodynamic stability  - Assess quality of pulses,  skin color and temperature  - Assess for signs of decreased coronary artery perfusion  - Instruct patient to report change in severity of symptoms  Outcome: Progressing  Goal: Absence of cardiac dysrhythmias or at baseline rhythm  Description: INTERVENTIONS:  - Continuous cardiac monitoring, vital signs, obtain 12 lead EKG if ordered  - Administer antiarrhythmic and heart rate control medications as ordered  - Monitor electrolytes and administer replacement therapy as ordered  Outcome: Progressing

## 2024-07-08 NOTE — ASSESSMENT & PLAN NOTE
Wt Readings from Last 3 Encounters:   07/08/24 71 kg (156 lb 8.4 oz)   07/05/24 83.2 kg (183 lb 6.8 oz)     Presented with 1 day history of worsening sob, cp and lower ext edema  She had been at  and treated with nitro gtt, lasix 40mg IV x1. Her respiratory status worsened in which she was then placed on bipap.   Bnp was 2673, trops normal  Cxr showed pulmonary edema  She is from the  but had not taken her hypertensive medications in a while due to not having insurance  Him 7/5/2024 revealed ejection fraction 15%, grade 2 diastolic dysfunction  Cardiology input appreciated currently on IV furosemide 20 mg twice daily carvedilol, lisinopril with hold parameters  Monitor volume status  If euvolemic plan for cardiac catheterization on Wednesday

## 2024-07-08 NOTE — PROGRESS NOTES
Cardiology Progress Note   Nancy Calderon 63 y.o. female MRN: [id number]  Unit/Bed#: E4 -01 Encounter: [id number]    Hospital Course/Assessment  63 y.o. female presented to the ED on 7/5 complaining acute onset chest pain that began this morning around 2 AM.  The symptoms woke her up from sleep.  She also reported shortness of breath and lower extremity edema over the last 2 months.  Her past medical history is unclear as she moved here from the [place] 5 years ago but she has a reported history of hypertension, heart stents (last in 2011?) and possible CHF.  She followed with a cardiologist in the [facility] but has not establish care with a cardiologist since being in the [place].  She has also not been taking her cardiac medications due to insurance problems (Coreg and valsartan?).  The ED she was noted to be hypoxic with an SpO2 in the 80s requiring Bipap.  Troponin was negative.  BNP was elevated at 2673.  CXR showed pulmonary edema.  She was hemodynamically stable.  Echocardiogram showed a severely reduced EF of 15%.  She was started on IV Lasix and GDMT with plans for eventual ischemic workup following improvement of volume status.    Subjective:   Today the patient was seen and examined at bedside.  She had no acute events overnight.  Telemetry shows normal sinus rhythm.  She reports feeling tired but otherwise has no other acute complaints.  She denies any recent chest pain, shortness of breath, palpitations or lightheadedness.  She reports that her lower extremity swelling is continuing to improve.    Plan  # Acute on chronic systolic and diastolic CHF  # Severe cardiomyopathy, EF 15%  # Hypotension  TTE 7/5/24 showed a severely reduced EF of 15% and suspected grade 2 diastolic dysfunction.  She did report a vague history of a cardiomyopathy for which she followed with a doctor in [place] but [place] the [place].  She was not taking any  "medications at home due to insurance issues.  Continue IV Lasix 20 mg twice daily -> patient appears slightly volume up on exam today  Continue GDMT with carvedilol and lisinopril.  Will consider adding spironolactone and/or Jardiance.  Monitor I's and O's, daily weights and renal function  Continue telemetry monitoring  Patient would benefit from LifeVest at time of discharge  Plan for ischemic workup with left heart catheterization once volume status improves -> tentatively Wednesday 7/10.         Bao Fournier MD  -PGY-5 Cardiology Fellow  -Tiger text enabled    ======================================================  Objective  VS: Blood pressure 104/65, pulse 81, temperature (!) 97.2 °F (36.2 °C), temperature source Temporal, resp. rate 18, height 5' 4\" (1.626 m), weight 71 kg (156 lb 8.4 oz), SpO2 95%.  Gen: Well appearing  Psych: AOx3  Skin: Intact  Neck: Supple  Cardiac: S1, S2, regular rate, no S3 or S4 appreciated. No murmurs. +2 PT, radial pulses. Mild peripheral edema. No carotid bruits.  Resp: CTABL. No crackles  Abd: Nontender, nondistended  Rheum: No joint deformities in UE or LE  ======================================================  TREADMILL STRESS  No results found for this or any previous visit.     ----------------------------------------------------------------------------------------------  NUCLEAR STRESS TEST: No results found for this or any previous visit.    No results found for this or any previous visit.      --------------------------------------------------------------------------------  CATH:  No results found for this or any previous visit.    --------------------------------------------------------------------------------  ECHO:   No results found for this or any previous visit.    No results found for this or any previous visit.    --------------------------------------------------------------------------------  HOLTER  No results found for this or any previous " "visit.    --------------------------------------------------------------------------------  CAROTIDS  No results found for this or any previous visit.       [unfilled]   =====================================================    Active Meds    Current Facility-Administered Medications:     acetaminophen (TYLENOL) tablet 650 mg, 650 mg, Oral, Q6H PRN, HEATHER Nugent, 650 mg at 07/05/24 1459    carvedilol (COREG) tablet 6.25 mg, 6.25 mg, Oral, BID With Meals, HEATHER Nugent, 6.25 mg at 07/08/24 0913    enoxaparin (LOVENOX) subcutaneous injection 40 mg, 40 mg, Subcutaneous, Daily, HEATHER Nugent, 40 mg at 07/07/24 0849    furosemide (LASIX) injection 20 mg, 20 mg, Intravenous, BID (diuretic), HEATHER Nugent, 20 mg at 07/08/24 0913    lisinopril (ZESTRIL) tablet 5 mg, 5 mg, Oral, Daily, HEATHER Nugent, 5 mg at 07/08/24 0913    [Transfer Hold] nitroglycerin (NITROSTAT) SL tablet 0.4 mg, 0.4 mg, Sublingual, Q5 Min PRN, HEATHER Nugent    Labs & Results  No results found for: \"CKTOTAL\", \"CKMB\", \"CKMBINDEX\", \"TROPONINI\"  Lab Results   Component Value Date    CALCIUM 9.1 07/08/2024    K 3.6 07/08/2024    CO2 32 07/08/2024    CL 98 07/08/2024    BUN 17 07/08/2024    CREATININE 0.63 07/08/2024     Lab Results   Component Value Date    WBC 8.71 07/08/2024    HGB 14.6 07/08/2024    HCT 44.2 07/08/2024    MCV 91 07/08/2024     07/08/2024         No results found for: \"CHOL\"  No results found for: \"HDL\"  No results found for: \"LDLCALC\"  No results found for: \"TRIG\"  Lab Results   Component Value Date    ALT 40 07/05/2024    AST 30 07/05/2024    ALKPHOS 94 07/05/2024          "

## 2024-07-08 NOTE — PROGRESS NOTES
[facility]  Progress Note  Name: Nancy Small  MRN: [id number]  Unit/Bed#: E4 -01 I Date of Admission: 7/5/2024   Date of Service: 7/8/2024 I Hospital Day: 3    Assessment & Plan   * Acute decompensated heart failure (HCC)  Assessment & Plan  Wt Readings from Last 3 Encounters:   07/08/24 71 kg (156 lb 8.4 oz)   07/05/24 83.2 kg (183 lb 6.8 oz)     Presented with 1 day history of worsening sob, cp and lower ext edema  She had been at  and treated with nitro gtt, lasix 40mg IV x1. Her respiratory status worsened in which she was then placed on bipap.   Bnp was 2673, trops normal  Cxr showed pulmonary edema  [place] hypertensive medications in a while due to not having insurance  Him 7/5/2024 revealed ejection fraction 15%, grade 2 diastolic dysfunction  Cardiology input appreciated currently on IV furosemide 20 mg twice daily carvedilol, lisinopril with hold parameters  Monitor volume status  If euvolemic plan for cardiac catheterization on Wednesday    Does not have health insurance  Assessment & Plan  [place]  She is [place] pending  Hospital will provide medications for her at time of discharge    SIRS (systemic inflammatory response syndrome) ([facility])  Assessment & Plan  Met [place] [address] and tachypnea  No source of infection  Monitoring off of antibiotics   Wbc has since normalized     Hypertensive emergency  Assessment & Plan  Has a pmhx of htn in which she was noncompliant with medications due to lack of insurance.   S/p nitro gtt  Started on coreg and lisinopril  Blood pressure has been on the lower side, closely monitor             Mobility:  Basic Mobility Inpatient Raw Score: 18  JH-HLM Goal: 6: Walk 10 steps or more  JH-HLM Achieved: 7: Walk 25 feet or more  JH-HLM Goal achieved. Continue to encourage appropriate mobility.    VTE Pharmacologic Prophylaxis:   Pharmacologic:  lovenox    Patient Centered Rounds: I have performed bedside rounds with nursing staff today.    Education and Discussions with Family / Patient: Patient, declined family update    Time Spent for Care:   More than 50% of total time spent on counseling and coordination of care as described above.    Current Length of Stay: 3 day(s)    Current Patient Status: Inpatient   Certification Statement: The patient will continue to require additional inpatient hospital stay due to IV diuresis    Discharge Plan / Estimated Discharge Date: TBD    Code Status: Level 1 - Full Code      Subjective:   Patient seen and examined at bedside, chest pain or dyspnea, Finnish interpretation via [employer], [place] [id number]    Objective:     Vitals:   Temp (24hrs), Av.2 °F (36.2 °C), Min:95.9 °F (35.5 °C), Max:97.8 °F (36.6 °C)    Temp:  [95.9 °F (35.5 °C)-97.8 °F (36.6 °C)] 97.1 °F (36.2 °C)  HR:  [78-87] 79  Resp:  [18] 18  BP: ()/(56-84) 82/56  SpO2:  [90 %-95 %] 95 %  Body mass index is 26.87 kg/m².     Input and Output Summary (last 24 hours):     No intake or output data in the 24 hours ending 24 1718    Physical Exam:    Constitutional: Patient is oriented to person, place and time, no acute distress  HEENT:  Normocephalic, atraumatic  Cardiovascular: Normal S1S2, RRR, No murmurs/rubs/gallops appreciated.  Pulmonary:  Bilateral air entry, mild scattered rales  Abdominal: Soft, Bowel sounds present, Non-tender, Non-distended  Extremities:  No cyanosis, clubbing or edema.   Neurological: Cranial nerves II-XII grossly intact, sensation intact, otherwise no focal neurological symptoms.   Skin:  Warm, dry    Additional Data:     Labs:    Results from last 7 days   Lab Units 24  0534 24  0434 24  0618   WBC Thousand/uL 8.71   < > 10.48*   HEMOGLOBIN g/dL 14.6   < > 14.5   HEMATOCRIT % 44.2   < > 44.7   PLATELETS Thousands/uL 208   < > 215   SEGS PCT %  --   --  72   LYMPHO PCT %  --   --  18   MONO PCT %  --   --   7   EOS PCT %  --   --  2    < > = values in this interval not displayed.     Results from last 7 days   Lab Units 07/08/24  0534 07/05/24  1422 07/05/24  0704   POTASSIUM mmol/L 3.6   < > 3.8   CHLORIDE mmol/L 98   < > 104   CO2 mmol/L 32   < > 25   BUN mg/dL 17   < > 13   CREATININE mg/dL 0.63   < > 0.75   CALCIUM mg/dL 9.1   < > 9.2   ALK PHOS U/L  --   --  94   ALT U/L  --   --  40   AST U/L  --   --  30    < > = values in this interval not displayed.            I Have Reviewed All Lab Data Listed Above.    Invasive Devices       Peripheral Intravenous Line  Duration             Peripheral IV 07/05/24 Left;Dorsal (posterior) Hand 3 days    Peripheral IV 07/06/24 Distal;Left;Ventral (anterior) Forearm 2 days                      Recent Cultures (last 7 days):           Last 24 Hours Medication List:   Current Facility-Administered Medications   Medication Dose Route Frequency Provider Last Rate    acetaminophen  650 mg Oral Q6H PRN HEATHER Nugent      carvedilol  6.25 mg Oral BID With Meals HEATHER Nugent      enoxaparin  40 mg Subcutaneous Daily HEATHER Nugent      furosemide  20 mg Intravenous BID (diuretic) HEATHER Nugent      lisinopril  5 mg Oral Daily HEATHER Nugent      [Transfer Hold] nitroglycerin  0.4 mg Sublingual Q5 Min PRN HEATHER Nugent          Today, Patient Was Seen By: David Phan MD

## 2024-07-08 NOTE — ASSESSMENT & PLAN NOTE
Has a pmhx of htn in which she was noncompliant with medications due to lack of insurance.   S/p nitro gtt  Started on coreg and lisinopril  Blood pressure has been on the lower side, closely monitor

## 2024-07-08 NOTE — ASSESSMENT & PLAN NOTE
Met sirs criteria with wbc of 560[address] and tachypnea  No source of infection  Monitoring off of antibiotics   Wbc has since normalized

## 2024-07-08 NOTE — ASSESSMENT & PLAN NOTE
[place]  [facility]  Hospital will provide medications for her at time of discharge

## 2024-07-08 NOTE — PLAN OF CARE
Problem: Prexisting or High Potential for Compromised Skin Integrity  Goal: Skin integrity is maintained or improved  Description: INTERVENTIONS:  - Identify patients at risk for skin breakdown  - Assess and monitor skin integrity  - Assess and monitor nutrition and hydration status  - Monitor labs   - Assess for incontinence   - Turn and reposition patient  - Assist with mobility/ambulation  - Relieve pressure over bony prominences  - Avoid friction and shearing  - Provide appropriate hygiene as needed including keeping skin clean and dry  - Evaluate need for skin moisturizer/barrier cream  - Collaborate with interdisciplinary team   - Patient/family teaching  - Consider wound care consult   Outcome: Progressing     Problem: PAIN - ADULT  Goal: Verbalizes/displays adequate comfort level or baseline comfort level  Description: Interventions:  - Encourage patient to monitor pain and request assistance  - Assess pain using appropriate pain scale  - Administer analgesics based on type and severity of pain and evaluate response  - Implement non-pharmacological measures as appropriate and evaluate response  - Consider cultural and social influences on pain and pain management  - Notify physician/advanced practitioner if interventions unsuccessful or patient reports new pain  Outcome: Progressing     Problem: INFECTION - ADULT  Goal: Absence or prevention of progression during hospitalization  Description: INTERVENTIONS:  - Assess and monitor for signs and symptoms of infection  - Monitor lab/diagnostic results  - Monitor all insertion sites, i.e. indwelling lines, tubes, and drains  - Monitor endotracheal if appropriate and nasal secretions for changes in amount and color  - [place] appropriate cooling/warming therapies per order  - Administer medications as ordered  - Instruct and encourage patient and family to use good hand hygiene technique  - Identify and instruct in appropriate isolation precautions for  identified infection/condition  Outcome: Progressing  Goal: Absence of fever/infection during neutropenic period  Description: INTERVENTIONS:  - Monitor WBC    Outcome: Progressing     Problem: SAFETY ADULT  Goal: Patient will remain free of falls  Description: INTERVENTIONS:  - Educate patient/family on patient safety including physical limitations  - Instruct patient to call for assistance with activity   - Consult OT/PT to assist with strengthening/mobility   - Keep Call bell within reach  - Keep bed low and locked with side rails adjusted as appropriate  - Keep care items and personal belongings within reach  - Initiate and maintain comfort rounds  - Make Fall Risk Sign visible to staff  - Offer Toileting every 2 Hours, in advance of need  - Initiate/Maintain bed alarm  - Obtain necessary fall risk management equipment:   - Apply yellow socks and bracelet for high fall risk patients  - Consider moving patient to room near nurses station  Outcome: Progressing  Goal: Maintain or return to baseline ADL function  Description: INTERVENTIONS:  -  Assess patient's ability to carry out ADLs; assess patient's baseline for ADL function and identify physical deficits which impact ability to perform ADLs (bathing, care of mouth/teeth, toileting, grooming, dressing, etc.)  - Assess/evaluate cause of self-care deficits   - Assess range of motion  - Assess patient's mobility; develop plan if impaired  - Assess patient's need for assistive devices and provide as appropriate  - Encourage maximum independence but intervene and supervise when necessary  - Involve family in performance of ADLs  - Assess for home care needs following discharge   - Consider OT consult to assist with ADL evaluation and planning for discharge  - Provide patient education as appropriate  Outcome: Progressing  Goal: Maintains/Returns to pre admission functional level  Description: INTERVENTIONS:  - Perform AM-PAC 6 Click Basic Mobility/ Daily Activity  assessment daily.  - Set and communicate daily mobility goal to care team and patient/family/caregiver.   - Collaborate with rehabilitation services on mobility goals if consulted  - Perform Range of Motion 4 times a day.  - Reposition patient every 2 hours.  - Dangle patient 4 times a day  - Stand patient 4 times a day  - Ambulate patient 4 times a day  - Out of bed to chair 4 times a day   - Out of bed for meals 4 times a day  - Out of bed for toileting  - Record patient progress and toleration of activity level   Outcome: Progressing

## 2024-07-09 LAB
ANION GAP SERPL CALCULATED.3IONS-SCNC: 8 MMOL/L (ref 4–13)
BASOPHILS # BLD AUTO: 0.05 THOUSANDS/ÂΜL (ref 0–0.1)
BASOPHILS NFR BLD AUTO: 1 % (ref 0–1)
BUN SERPL-MCNC: 21 MG/DL (ref 5–25)
CALCIUM SERPL-MCNC: 9.5 MG/DL (ref 8.4–10.2)
CHLORIDE SERPL-SCNC: 96 MMOL/L (ref 96–108)
CO2 SERPL-SCNC: 35 MMOL/L (ref 21–32)
CREAT SERPL-MCNC: 0.83 MG/DL (ref 0.6–1.3)
EOSINOPHIL # BLD AUTO: 0.23 THOUSAND/ÂΜL (ref 0–0.61)
EOSINOPHIL NFR BLD AUTO: 3 % (ref 0–6)
ERYTHROCYTE [DISTWIDTH] IN BLOOD BY AUTOMATED COUNT: 13.9 % (ref 11.6–15.1)
GFR SERPL CREATININE-BSD FRML MDRD: 75 ML/MIN/1.73SQ M
GLUCOSE SERPL-MCNC: 97 MG/DL (ref 65–140)
HCT VFR BLD AUTO: 48 % (ref 34.8–46.1)
HGB BLD-MCNC: 15.8 G/DL (ref 11.5–15.4)
IMM GRANULOCYTES # BLD AUTO: 0.03 THOUSAND/UL (ref 0–0.2)
IMM GRANULOCYTES NFR BLD AUTO: 0 % (ref 0–2)
LYMPHOCYTES # BLD AUTO: 2.18 THOUSANDS/ÂΜL (ref 0.6–4.47)
LYMPHOCYTES NFR BLD AUTO: 28 % (ref 14–44)
MCH RBC QN AUTO: 29.9 PG (ref 26.8–34.3)
MCHC RBC AUTO-ENTMCNC: 32.9 G/DL (ref 31.4–37.4)
MCV RBC AUTO: 91 FL (ref 82–98)
MONOCYTES # BLD AUTO: 0.84 THOUSAND/ÂΜL (ref 0.17–1.22)
MONOCYTES NFR BLD AUTO: 11 % (ref 4–12)
NEUTROPHILS # BLD AUTO: 4.49 THOUSANDS/ÂΜL (ref 1.85–7.62)
NEUTS SEG NFR BLD AUTO: 57 % (ref 43–75)
NRBC BLD AUTO-RTO: 0 /100 WBCS
PLATELET # BLD AUTO: 232 THOUSANDS/UL (ref 149–390)
PMV BLD AUTO: 12.3 FL (ref 8.9–12.7)
POTASSIUM SERPL-SCNC: 3.7 MMOL/L (ref 3.5–5.3)
RBC # BLD AUTO: 5.28 MILLION/UL (ref 3.81–5.12)
SODIUM SERPL-SCNC: 139 MMOL/L (ref 135–147)
WBC # BLD AUTO: 7.82 THOUSAND/UL (ref 4.31–10.16)

## 2024-07-09 PROCEDURE — 80048 BASIC METABOLIC PNL TOTAL CA: CPT | Performed by: INTERNAL MEDICINE

## 2024-07-09 PROCEDURE — 99232 SBSQ HOSP IP/OBS MODERATE 35: CPT | Performed by: INTERNAL MEDICINE

## 2024-07-09 PROCEDURE — 85025 COMPLETE CBC W/AUTO DIFF WBC: CPT | Performed by: INTERNAL MEDICINE

## 2024-07-09 PROCEDURE — 99232 SBSQ HOSP IP/OBS MODERATE 35: CPT

## 2024-07-09 RX ADMIN — CARVEDILOL 6.25 MG: 6.25 TABLET, FILM COATED ORAL at 17:12

## 2024-07-09 RX ADMIN — FUROSEMIDE 20 MG: 10 INJECTION, SOLUTION INTRAMUSCULAR; INTRAVENOUS at 17:13

## 2024-07-09 RX ADMIN — ENOXAPARIN SODIUM 40 MG: 40 INJECTION SUBCUTANEOUS at 09:30

## 2024-07-09 NOTE — CASE MANAGEMENT
"     Case Management Assessment & Discharge Planning Note    Patient name Nancy Calderon  Location [facility] 4 /E4 -* MRN [id number]  : 1960 Date 2024       Current Admission Date: 2024  Current Admission Diagnosis:Acute decompensated heart failure (HCC)   Patient Active Problem List    Diagnosis Date Noted Date Diagnosed    Does not have health insurance 2024     Acute decompensated heart failure (HCC) 2024     Hypertensive emergency 2024     SIRS (systemic inflammatory response syndrome) (HCC) 2024       LOS (days): 4  Geometric Mean LOS (GMLOS) (days):   Days to GMLOS:     OBJECTIVE:    Risk of Unplanned Readmission Score: 8.43         Current admission status: Inpatient       Preferred Pharmacy:    "[facility]  [address] [address]  Phone: [phone] Fax: [phone]    Primary Care Provider: No primary care provider on file.    Primary Insurance: CELENA LOWE PENDING  Secondary Insurance:     ASSESSMENT:  Active Health Care Proxies    There are no active Health Care Proxies on file.                      Patient Information  Admitted from:: Home  Mental Status: Alert  During Assessment patient was accompanied by: Not accompanied during assessment  Assessment information provided by:: Daughter, Patient  Primary Caregiver: Self  Support Systems: Daughter  County of Residence: [place]  What [facility] do you live in?: [place]  Home entry access options. Select all that apply.: Stairs  Number of steps to enter home.: One Flight (pt may have 2-3 flights of steps rented room.)  Type of Current Residence: Other (Comment) (\"Rents a room from a lady\")  Living Arrangements: Lives Alone  Is patient a ?: No    Activities of Daily Living Prior to Admission  Functional Status: Independent  Completes ADLs independently?: Yes  Ambulates independently?: Yes  Does patient use assisted devices?: No  Does patient " currently own DME?: No  Does patient have a history of Outpatient Therapy (PT/OT)?: No  Does the patient have a history of Short-Term Rehab?: No  Does patient have a history of HHC?: No  Does patient currently have HHC?: No         Patient Information Continued  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 Y   In the past 12 months, how many times have you moved where you were living? 1   At any time in the past 12 months, were you homeless or living in a shelter (including now)? N   Transportation Needs    In the past 12 months, has lack of transportation kept you from medical appointments or from getting medications? no   In the past 12 months, has lack of transportation kept you from meetings, work, or from getting things needed for daily living? No   Food Insecurity    Within the past 12 months, you worried that your food would run out before you got the money to buy more. Sometimes   Within the past 12 months, the food you bought just didn't last and you didn't have money to get more. Sometimes   Utilities    In the past 12 months has the electric, gas, oil, or water company threatened to shut off services in your home? No            DISCHARGE DETAILS:    Discharge planning discussed with:: pt and Maya  [place] of Choice: Yes     CM contacted family/caregiver?: Yes  Were Treatment Team discharge recommendations reviewed with patient/caregiver?: Yes  Did patient/caregiver verbalize understanding of patient care needs?: Yes  Were patient/caregiver advised of the risks associated with  not following Treatment Team discharge recommendations?: Yes    Contacts  Patient Contacts: Boni Berman - [phone] (family)  Contact Method: Phone  Phone Number: [phone] (family)  Reason/Outcome: Emergency Contact              Other Referral/Resources/Interventions Provided:  Financial Resources Provided: Financial Counselor, Medicaid                                                      Additional Comments: CM able to speak with the pt and her dtr via phone.   services were used.  Interprester [id number] assisted.  Per pt and dtr pt does not live with family./  She rents a room. on the 2-3floor with no elevator access.  Per pt and dtr she has loived here for 5 years and originally from [place].  She has had heart rouble before moving here and has not seen a doctor since bieng in the \[facility]".  No insyrance, no medications and no PCP.  Per pt and dtr pt has her permanent immigration card and SS card.  Dtr will assist and get a copy of those documents to  for records.  Pt has not applied for medical assistance.  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  Per pt she has been able to pay for her room but the food can be difficult due to higher prices.  OP CM has been consulted to assist with out pt services and insurance.  Will make referral to [facility] to help establish a PCP.  Will reach out to [employer] for any available assistance.  Pt is aware that she will have C.Cath in am.  Course of care will be determined after C. Cath.  Have asked dtr to brign in documents or email directly to this CM.  CM to follow.

## 2024-07-09 NOTE — PROGRESS NOTES
Cardiology Progress Note   Nancy Calderon 63 y.o. female MRN: [id number]  Unit/Bed#: E4 -01 Encounter: [id number]    Hospital Course/Assessment  63 y.o. female presented to the ED on 7/5 complaining acute onset chest pain that began this morning around 2 AM.  The symptoms woke her up from sleep.  She also reported shortness of breath and lower extremity edema over the last 2 months.  Her past medical history is unclear as she moved here from the [place] 5 years ago but she has a reported history of hypertension, heart stents (last in 2011?) and possible CHF.  She followed with a cardiologist in the [facility] but has not establish care with a cardiologist since being in the [place].  She has also not been taking her cardiac medications due to insurance problems (Coreg and valsartan?).  The ED she was noted to be hypoxic with an SpO2 in the 80s requiring Bipap.  Troponin was negative.  BNP was elevated at 2673.  CXR showed pulmonary edema.  She was hemodynamically stable.  Echocardiogram showed a severely reduced EF of 15%.  She was started on IV Lasix and GDMT with plans for eventual ischemic workup following improvement of volume status.    Subjective:   Today the patient was seen and examined at bedside.  She had no acute events overnight.  Overall she reports feeling well.  She does have mild fatigue but otherwise had no other acute complaints.    Plan  # Acute on chronic systolic and diastolic CHF  # Severe cardiomyopathy, EF 15%  # Hypotension  TTE 7/5/24 showed a severely reduced EF of 15% and suspected grade 2 diastolic dysfunction.  She did report a vague history of a cardiomyopathy for which she followed with a doctor in [place].  She was not taking any medications at home due to insurance issues.  Continue IV Lasix 20 mg twice daily -> Tentative plan to transition to PO tomorrow  Continue GDMT with carvedilol and  "lisinopril.  Will consider adding spironolactone and/or Jardiance.  Monitor I's and O's, daily weights and renal function  Continue telemetry monitoring  Patient would benefit from LifeVest at time of discharge.  Case management consulted.  Plan for ischemic workup with left heart catheterization once volume status improves -> tentatively tomorrow 7/10. NPO at midnight.         Bao Fournier MD  -PGY-5 Cardiology Fellow  -Tiger text enabled    ======================================================  Objective  VS: Blood pressure 109/77, pulse 79, temperature (!) 96.8 °F (36 °C), temperature source Temporal, resp. rate 18, height 5' 4\" (1.626 m), weight 69.6 kg (153 lb 7 oz), SpO2 98%.  Gen: Well appearing  Psych: AOx3  Skin: Intact  Neck: Supple  Cardiac: S1, S2, regular rate, no S3 or S4 appreciated. No murmurs. +2 PT, radial pulses. Mild peripheral edema. No carotid bruits.  Resp: CTABL. No crackles  Abd: Nontender, nondistended  Rheum: No joint deformities in UE or LE  ======================================================  TREADMILL STRESS  No results found for this or any previous visit.     ----------------------------------------------------------------------------------------------  NUCLEAR STRESS TEST: No results found for this or any previous visit.    No results found for this or any previous visit.      --------------------------------------------------------------------------------  CATH:  No results found for this or any previous visit.    --------------------------------------------------------------------------------  ECHO:   No results found for this or any previous visit.    No results found for this or any previous visit.    --------------------------------------------------------------------------------  HOLTER  No results found for this or any previous visit.    --------------------------------------------------------------------------------  CAROTIDS  No results found for this or any previous " "visit.       [unfilled]   =====================================================    Active Meds    Current Facility-Administered Medications:     acetaminophen (TYLENOL) tablet 650 mg, 650 mg, Oral, Q6H PRN, HEATHER Nugent, 650 mg at 07/05/24 1459    carvedilol (COREG) tablet 6.25 mg, 6.25 mg, Oral, BID With Meals, HEATHER Nugent, 6.25 mg at 07/08/24 0913    enoxaparin (LOVENOX) subcutaneous injection 40 mg, 40 mg, Subcutaneous, Daily, HEATHER Nugent, 40 mg at 07/09/24 0930    furosemide (LASIX) injection 20 mg, 20 mg, Intravenous, BID (diuretic), HEATHER Nugent, 20 mg at 07/08/24 0913    lisinopril (ZESTRIL) tablet 5 mg, 5 mg, Oral, Daily, CRYSTAL NugentNP, 5 mg at 07/08/24 0913    nitroglycerin (NITROSTAT) SL tablet 0.4 mg, 0.4 mg, Sublingual, Q5 Min PRN, Reshma Song MD    Labs & Results  No results found for: \"CKTOTAL\", \"CKMB\", \"CKMBINDEX\", \"TROPONINI\"  Lab Results   Component Value Date    CALCIUM 9.5 07/09/2024    K 3.7 07/09/2024    CO2 35 (H) 07/09/2024    CL 96 07/09/2024    BUN 21 07/09/2024    CREATININE 0.83 07/09/2024     Lab Results   Component Value Date    WBC 7.82 07/09/2024    HGB 15.8 (H) 07/09/2024    HCT 48.0 (H) 07/09/2024    MCV 91 07/09/2024     07/09/2024         No results found for: \"CHOL\"  No results found for: \"HDL\"  No results found for: \"LDLCALC\"  No results found for: \"TRIG\"  Lab Results   Component Value Date    ALT 40 07/05/2024    AST 30 07/05/2024    ALKPHOS 94 07/05/2024          "

## 2024-07-09 NOTE — ASSESSMENT & PLAN NOTE
Wt Readings from Last 3 Encounters:   07/09/24 69.6 kg (153 lb 7 oz)   07/05/24 83.2 kg (183 lb 6.8 oz)     Presented with 1 day history of worsening sob, cp and lower ext edema  She had been at  and treated with nitro gtt, lasix 40mg IV x1. Her respiratory status worsened in which she was then placed on bipap.   Bnp was 2673, trops normal  Cxr showed pulmonary edema  She is from the  but had not taken her hypertensive medications in a while due to not having insurance  TTE 7/5/2024 revealed ejection fraction 15%, grade 2 diastolic dysfunction  Cardiology follow-up appreciated continue Lasix 20 g IV twice daily   Plan for cardiac cath tomorrow  Case management for LifeVest if patient could qualify

## 2024-07-09 NOTE — ASSESSMENT & PLAN NOTE
Met sirs criteria [place] [address] and tachypnea  No source of infection  Monitoring off of antibiotics   Wbc has since normalized

## 2024-07-09 NOTE — ASSESSMENT & PLAN NOTE
[place]  [facility]  Hospital will provide medications for her at time of discharge

## 2024-07-09 NOTE — PLAN OF CARE
Problem: Prexisting or High Potential for Compromised Skin Integrity  Goal: Skin integrity is maintained or improved  Description: INTERVENTIONS:  - Identify patients at risk for skin breakdown  - Assess and monitor skin integrity  - Assess and monitor nutrition and hydration status  - Monitor labs   - Assess for incontinence   - Turn and reposition patient  - Assist with mobility/ambulation  - Relieve pressure over bony prominences  - Avoid friction and shearing  - Provide appropriate hygiene as needed including keeping skin clean and dry  - Evaluate need for skin moisturizer/barrier cream  - Collaborate with interdisciplinary team   - Patient/family teaching  - Consider wound care consult   Outcome: Progressing     Problem: PAIN - ADULT  Goal: Verbalizes/displays adequate comfort level or baseline comfort level  Description: Interventions:  - Encourage patient to monitor pain and request assistance  - Assess pain using appropriate pain scale  - Administer analgesics based on type and severity of pain and evaluate response  - Implement non-pharmacological measures as appropriate and evaluate response  - Consider cultural and social influences on pain and pain management  - Notify physician/advanced practitioner if interventions unsuccessful or patient reports new pain  Outcome: Progressing     Problem: INFECTION - ADULT  Goal: Absence or prevention of progression during hospitalization  Description: INTERVENTIONS:  - Assess and monitor for signs and symptoms of infection  - Monitor lab/diagnostic results  - Monitor all insertion sites, i.e. indwelling lines, tubes, and drains  - Monitor endotracheal if appropriate and nasal secretions for changes in amount and color  - [place] appropriate cooling/warming therapies per order  - Administer medications as ordered  - Instruct and encourage patient and family to use good hand hygiene technique  - Identify and instruct in appropriate isolation precautions for  identified infection/condition  Outcome: Progressing  Goal: Absence of fever/infection during neutropenic period  Description: INTERVENTIONS:  - Monitor WBC    Outcome: Progressing     Problem: SAFETY ADULT  Goal: Patient will remain free of falls  Description: INTERVENTIONS:  - Educate patient/family on patient safety including physical limitations  - Instruct patient to call for assistance with activity   - Consult OT/PT to assist with strengthening/mobility   - Keep Call bell within reach  - Keep bed low and locked with side rails adjusted as appropriate  - Keep care items and personal belongings within reach  - Initiate and maintain comfort rounds  - Make Fall Risk Sign visible to staff  - Offer Toileting every 2 Hours, in advance of need  - Initiate/Maintain bed alarm  - Obtain necessary fall risk management equipment: In place   - Apply yellow socks and bracelet for high fall risk patients  - Consider moving patient to room near nurses station  Outcome: Progressing  Goal: Maintain or return to baseline ADL function  Description: INTERVENTIONS:  -  Assess patient's ability to carry out ADLs; assess patient's baseline for ADL function and identify physical deficits which impact ability to perform ADLs (bathing, care of mouth/teeth, toileting, grooming, dressing, etc.)  - Assess/evaluate cause of self-care deficits   - Assess range of motion  - Assess patient's mobility; develop plan if impaired  - Assess patient's need for assistive devices and provide as appropriate  - Encourage maximum independence but intervene and supervise when necessary  - Involve family in performance of ADLs  - Assess for home care needs following discharge   - Consider OT consult to assist with ADL evaluation and planning for discharge  - Provide patient education as appropriate  Outcome: Progressing  Goal: Maintains/Returns to pre admission functional level  Description: INTERVENTIONS:  - Perform AM-PAC 6 Click Basic Mobility/ Daily  Activity assessment daily.  - Set and communicate daily mobility goal to care team and patient/family/caregiver.   - Collaborate with rehabilitation services on mobility goals if consulted  - Perform Range of Motion 3 times a day.  - Reposition patient every 2 hours.  - Dangle patient 3 times a day  - Stand patient 3 times a day  - Ambulate patient 3 times a day  - Out of bed to chair 3 times a day   - Out of bed for meals 3 times a day  - Out of bed for toileting  - Record patient progress and toleration of activity level   Outcome: Progressing     Problem: DISCHARGE PLANNING  Goal: Discharge to home or other facility with appropriate resources  Description: INTERVENTIONS:  - Identify barriers to discharge w/patient and caregiver  - Arrange for needed discharge resources and transportation as appropriate  - Identify discharge learning needs (meds, wound care, etc.)  - Arrange for interpretive services to assist at discharge as needed  - Refer to Case Management Department for coordinating discharge planning if the patient needs post-hospital services based on physician/advanced practitioner order or complex needs related to functional status, cognitive ability, or social support system  Outcome: Progressing     Problem: Knowledge Deficit  Goal: Patient/family/caregiver demonstrates understanding of disease process, treatment plan, medications, and discharge instructions  Description: Complete learning assessment and assess knowledge base.  Interventions:  - Provide teaching at level of understanding  - Provide teaching via preferred learning methods  Outcome: Progressing     Problem: CARDIOVASCULAR - ADULT  Goal: Maintains optimal cardiac output and hemodynamic stability  Description: INTERVENTIONS:  - Monitor I/O, vital signs and rhythm  - Monitor for S/S and trends of decreased cardiac output  - Administer and titrate ordered vasoactive medications to optimize hemodynamic stability  - Assess quality of pulses,  skin color and temperature  - Assess for signs of decreased coronary artery perfusion  - Instruct patient to report change in severity of symptoms  Outcome: Progressing  Goal: Absence of cardiac dysrhythmias or at baseline rhythm  Description: INTERVENTIONS:  - Continuous cardiac monitoring, vital signs, obtain 12 lead EKG if ordered  - Administer antiarrhythmic and heart rate control medications as ordered  - Monitor electrolytes and administer replacement therapy as ordered  Outcome: Progressing

## 2024-07-09 NOTE — PROGRESS NOTES
[facility]  Progress Note  Name: Nancy Small  MRN: [id number]  Unit/Bed#: E4 -01 I Date of Admission: 7/5/2024   Date of Service: 7/9/2024 I Hospital Day: 4    Assessment & Plan   * Acute decompensated heart failure (HCC)  Assessment & Plan  Wt Readings from Last 3 Encounters:   07/09/24 69.6 kg (153 lb 7 oz)   07/05/24 83.2 kg (183 lb 6.8 oz)     Presented with 1 day history of worsening sob, cp and lower ext edema  She had been at  and treated with nitro gtt, lasix 40mg IV x1. Her respiratory status worsened in which she was then placed on bipap.   Bnp was 2673, trops normal  Cxr showed pulmonary edema  [place] had not taken her hypertensive medications in a while due to not having insurance  TTE 7/5/2024 revealed ejection fraction 15%, grade 2 diastolic dysfunction  Cardiology follow-up appreciated continue Lasix 20 g IV twice daily   Plan for cardiac cath tomorrow  Case management for LifeVest if patient could qualify    Does not have health insurance  Assessment & Plan  [place]  She is ma pending  Hospital will provide medications for her at time of discharge    SIRS (systemic inflammatory response syndrome) (HCC)  Assessment & Plan  Met sirs criteria with wbc of 35[address] and tachypnea  No source of infection  Monitoring off of antibiotics   Wbc has since normalized     Hypertensive emergency  Assessment & Plan  Has a pmhx of htn in which she was noncompliant with medications due to lack of insurance.   S/p nitro gtt  Started on coreg and lisinopril  Blood pressure has been on the lower side, closely monitor             Mobility:  Basic Mobility Inpatient Raw Score: 18  JH-HLM Goal: 6: Walk 10 steps or more  JH-HLM Achieved: 7: Walk 25 feet or more  JH-HLM Goal achieved. Continue to encourage appropriate mobility.    VTE Pharmacologic Prophylaxis:   Pharmacologic: lovenox    Patient Centered Rounds: I have performed  bedside rounds with nursing staff today.    Education and Discussions with Family / Patient: patient    Time Spent for Care:   More than 50% of total time spent on counseling and coordination of care as described above.    Current Length of Stay: 4 day(s)    Current Patient Status: Inpatient   Certification Statement: The patient will continue to require additional inpatient hospital stay due to IV diuretics, cardiac cath    Discharge Plan / Estimated Discharge Date: TBD    Code Status: Level 1 - Full Code      Subjective:   Patient seen and examined at bedside, denies any chest pain, dyspnea states she feels better.  Hungarian interpretation used via [place], [place] [id number]    Objective:     Vitals:   Temp (24hrs), Av.7 °F (36.5 °C), Min:96.4 °F (35.8 °C), Max:98.7 °F (37.1 °C)    Temp:  [96.4 °F (35.8 °C)-98.7 °F (37.1 °C)] 96.4 °F (35.8 °C)  HR:  [70-97] 79  Resp:  [18-20] 18  BP: ()/(60-77) 113/75  SpO2:  [93 %-98 %] 97 %  Body mass index is 26.34 kg/m².     Input and Output Summary (last 24 hours):       Intake/Output Summary (Last 24 hours) at 2024 1658  Last data filed at 2024 0901  Gross per 24 hour   Intake 270 ml   Output 300 ml   Net -30 ml       Physical Exam:    Constitutional: Patient is oriented to person, place and time, no acute distress  HEENT:  Normocephalic, atraumatic  Cardiovascular: Normal S1S2, RRR, No murmurs/rubs/gallops appreciated.  Pulmonary:  Bilateral air entry, No rhonchi/rales/wheezing appreciated  Abdominal: Soft, Bowel sounds present, Non-tender, Non-distended  Extremities:  No cyanosis, clubbing or edema.   Neurological: awake, alert  Skin:  Warm, dry    Additional Data:     Labs:    Results from last 7 days   Lab Units 24  0553   WBC Thousand/uL 7.82   HEMOGLOBIN g/dL 15.8*   HEMATOCRIT % 48.0*   PLATELETS Thousands/uL 232   SEGS PCT % 57   LYMPHO PCT % 28   MONO PCT % 11   EOS PCT % 3     Results from last 7 days   Lab Units 24  0553 24  1422  07/05/24  0704   POTASSIUM mmol/L 3.7   < > 3.8   CHLORIDE mmol/L 96   < > 104   CO2 mmol/L 35*   < > 25   BUN mg/dL 21   < > 13   CREATININE mg/dL 0.83   < > 0.75   CALCIUM mg/dL 9.5   < > 9.2   ALK PHOS U/L  --   --  94   ALT U/L  --   --  40   AST U/L  --   --  30    < > = values in this interval not displayed.            I Have Reviewed All Lab Data Listed Above.    Invasive Devices       Peripheral Intravenous Line  Duration             Peripheral IV 07/05/24 Left;Dorsal (posterior) Hand 4 days    Peripheral IV 07/06/24 Distal;Left;Ventral (anterior) Forearm 3 days                         Recent Cultures (last 7 days):           Last 24 Hours Medication List:   Current Facility-Administered Medications   Medication Dose Route Frequency Provider Last Rate    acetaminophen  650 mg Oral Q6H PRN HEATHER Nugent      carvedilol  6.25 mg Oral BID With Meals HEATHER Nugent      enoxaparin  40 mg Subcutaneous Daily HEATHER Nugent      furosemide  20 mg Intravenous BID (diuretic) HEATHER Nugent      lisinopril  5 mg Oral Daily HEATHER Nugent      nitroglycerin  0.4 mg Sublingual Q5 Min PRN Reshma Song MD          Today, Patient Was Seen By: David Phan MD

## 2024-07-10 LAB
ANION GAP SERPL CALCULATED.3IONS-SCNC: 6 MMOL/L (ref 4–13)
BASOPHILS # BLD AUTO: 0.05 THOUSANDS/ÂΜL (ref 0–0.1)
BASOPHILS NFR BLD AUTO: 1 % (ref 0–1)
BUN SERPL-MCNC: 17 MG/DL (ref 5–25)
CALCIUM SERPL-MCNC: 9.6 MG/DL (ref 8.4–10.2)
CHLORIDE SERPL-SCNC: 99 MMOL/L (ref 96–108)
CO2 SERPL-SCNC: 34 MMOL/L (ref 21–32)
CREAT SERPL-MCNC: 0.69 MG/DL (ref 0.6–1.3)
EOSINOPHIL # BLD AUTO: 0.21 THOUSAND/ÂΜL (ref 0–0.61)
EOSINOPHIL NFR BLD AUTO: 3 % (ref 0–6)
ERYTHROCYTE [DISTWIDTH] IN BLOOD BY AUTOMATED COUNT: 13.7 % (ref 11.6–15.1)
ERYTHROCYTE [DISTWIDTH] IN BLOOD BY AUTOMATED COUNT: 13.8 % (ref 11.6–15.1)
GFR SERPL CREATININE-BSD FRML MDRD: 92 ML/MIN/1.73SQ M
GLUCOSE SERPL-MCNC: 111 MG/DL (ref 65–140)
HCT VFR BLD AUTO: 47.1 % (ref 34.8–46.1)
HCT VFR BLD AUTO: 49.1 % (ref 34.8–46.1)
HGB BLD-MCNC: 15.4 G/DL (ref 11.5–15.4)
HGB BLD-MCNC: 16.1 G/DL (ref 11.5–15.4)
IMM GRANULOCYTES # BLD AUTO: 0.03 THOUSAND/UL (ref 0–0.2)
IMM GRANULOCYTES NFR BLD AUTO: 0 % (ref 0–2)
INR PPP: 1.04 (ref 0.84–1.19)
LYMPHOCYTES # BLD AUTO: 2.4 THOUSANDS/ÂΜL (ref 0.6–4.47)
LYMPHOCYTES NFR BLD AUTO: 30 % (ref 14–44)
MCH RBC QN AUTO: 30.3 PG (ref 26.8–34.3)
MCH RBC QN AUTO: 30.4 PG (ref 26.8–34.3)
MCHC RBC AUTO-ENTMCNC: 32.7 G/DL (ref 31.4–37.4)
MCHC RBC AUTO-ENTMCNC: 32.8 G/DL (ref 31.4–37.4)
MCV RBC AUTO: 92 FL (ref 82–98)
MCV RBC AUTO: 93 FL (ref 82–98)
MONOCYTES # BLD AUTO: 0.75 THOUSAND/ÂΜL (ref 0.17–1.22)
MONOCYTES NFR BLD AUTO: 9 % (ref 4–12)
NEUTROPHILS # BLD AUTO: 4.57 THOUSANDS/ÂΜL (ref 1.85–7.62)
NEUTS SEG NFR BLD AUTO: 57 % (ref 43–75)
NRBC BLD AUTO-RTO: 0 /100 WBCS
PLATELET # BLD AUTO: 234 THOUSANDS/UL (ref 149–390)
PLATELET # BLD AUTO: 237 THOUSANDS/UL (ref 149–390)
PMV BLD AUTO: 11.9 FL (ref 8.9–12.7)
PMV BLD AUTO: 12 FL (ref 8.9–12.7)
POTASSIUM SERPL-SCNC: 3.9 MMOL/L (ref 3.5–5.3)
PROTHROMBIN TIME: 13.8 SECONDS (ref 11.6–14.5)
RBC # BLD AUTO: 5.07 MILLION/UL (ref 3.81–5.12)
RBC # BLD AUTO: 5.32 MILLION/UL (ref 3.81–5.12)
SODIUM SERPL-SCNC: 139 MMOL/L (ref 135–147)
WBC # BLD AUTO: 8.01 THOUSAND/UL (ref 4.31–10.16)
WBC # BLD AUTO: 8.32 THOUSAND/UL (ref 4.31–10.16)

## 2024-07-10 PROCEDURE — C1894 INTRO/SHEATH, NON-LASER: HCPCS | Performed by: INTERNAL MEDICINE

## 2024-07-10 PROCEDURE — 99152 MOD SED SAME PHYS/QHP 5/>YRS: CPT | Performed by: INTERNAL MEDICINE

## 2024-07-10 PROCEDURE — B2111ZZ FLUOROSCOPY OF MULTIPLE CORONARY ARTERIES USING LOW OSMOLAR CONTRAST: ICD-10-PCS | Performed by: INTERNAL MEDICINE

## 2024-07-10 PROCEDURE — C1769 GUIDE WIRE: HCPCS | Performed by: INTERNAL MEDICINE

## 2024-07-10 PROCEDURE — 99232 SBSQ HOSP IP/OBS MODERATE 35: CPT | Performed by: STUDENT IN AN ORGANIZED HEALTH CARE EDUCATION/TRAINING PROGRAM

## 2024-07-10 PROCEDURE — 99153 MOD SED SAME PHYS/QHP EA: CPT | Performed by: INTERNAL MEDICINE

## 2024-07-10 PROCEDURE — 93454 CORONARY ARTERY ANGIO S&I: CPT | Performed by: INTERNAL MEDICINE

## 2024-07-10 PROCEDURE — 85025 COMPLETE CBC W/AUTO DIFF WBC: CPT | Performed by: INTERNAL MEDICINE

## 2024-07-10 PROCEDURE — 85027 COMPLETE CBC AUTOMATED: CPT | Performed by: INTERNAL MEDICINE

## 2024-07-10 PROCEDURE — 85610 PROTHROMBIN TIME: CPT | Performed by: STUDENT IN AN ORGANIZED HEALTH CARE EDUCATION/TRAINING PROGRAM

## 2024-07-10 PROCEDURE — 99232 SBSQ HOSP IP/OBS MODERATE 35: CPT | Performed by: INTERNAL MEDICINE

## 2024-07-10 PROCEDURE — 80048 BASIC METABOLIC PNL TOTAL CA: CPT | Performed by: INTERNAL MEDICINE

## 2024-07-10 RX ORDER — ASPIRIN 81 MG/1
81 TABLET, CHEWABLE ORAL DAILY
Status: DISCONTINUED | OUTPATIENT
Start: 2024-07-10 | End: 2024-07-14 | Stop reason: HOSPADM

## 2024-07-10 RX ORDER — SODIUM CHLORIDE 9 MG/ML
30 INJECTION, SOLUTION INTRAVENOUS CONTINUOUS
Status: DISPENSED | OUTPATIENT
Start: 2024-07-10 | End: 2024-07-10

## 2024-07-10 RX ORDER — NITROGLYCERIN 0.4 MG/1
0.4 TABLET SUBLINGUAL
Status: DISCONTINUED | OUTPATIENT
Start: 2024-07-10 | End: 2024-07-14 | Stop reason: HOSPADM

## 2024-07-10 RX ORDER — HEPARIN SODIUM 1000 [USP'U]/ML
INJECTION, SOLUTION INTRAVENOUS; SUBCUTANEOUS CODE/TRAUMA/SEDATION MEDICATION
Status: DISCONTINUED | OUTPATIENT
Start: 2024-07-10 | End: 2024-07-10 | Stop reason: HOSPADM

## 2024-07-10 RX ORDER — ASPIRIN 81 MG/1
TABLET, CHEWABLE ORAL CODE/TRAUMA/SEDATION MEDICATION
Status: DISCONTINUED | OUTPATIENT
Start: 2024-07-10 | End: 2024-07-10 | Stop reason: HOSPADM

## 2024-07-10 RX ORDER — LIDOCAINE HYDROCHLORIDE 10 MG/ML
INJECTION, SOLUTION EPIDURAL; INFILTRATION; INTRACAUDAL; PERINEURAL CODE/TRAUMA/SEDATION MEDICATION
Status: DISCONTINUED | OUTPATIENT
Start: 2024-07-10 | End: 2024-07-10 | Stop reason: HOSPADM

## 2024-07-10 RX ORDER — MIDAZOLAM HYDROCHLORIDE 2 MG/2ML
INJECTION, SOLUTION INTRAMUSCULAR; INTRAVENOUS CODE/TRAUMA/SEDATION MEDICATION
Status: DISCONTINUED | OUTPATIENT
Start: 2024-07-10 | End: 2024-07-10 | Stop reason: HOSPADM

## 2024-07-10 RX ORDER — SODIUM CHLORIDE 9 MG/ML
50 INJECTION, SOLUTION INTRAVENOUS CONTINUOUS
Status: DISPENSED | OUTPATIENT
Start: 2024-07-10 | End: 2024-07-10

## 2024-07-10 RX ORDER — FUROSEMIDE 40 MG/1
40 TABLET ORAL DAILY
Status: DISCONTINUED | OUTPATIENT
Start: 2024-07-11 | End: 2024-07-14 | Stop reason: HOSPADM

## 2024-07-10 RX ORDER — FENTANYL CITRATE 50 UG/ML
INJECTION, SOLUTION INTRAMUSCULAR; INTRAVENOUS CODE/TRAUMA/SEDATION MEDICATION
Status: DISCONTINUED | OUTPATIENT
Start: 2024-07-10 | End: 2024-07-10 | Stop reason: HOSPADM

## 2024-07-10 RX ORDER — NITROGLYCERIN 20 MG/100ML
INJECTION INTRAVENOUS CODE/TRAUMA/SEDATION MEDICATION
Status: DISCONTINUED | OUTPATIENT
Start: 2024-07-10 | End: 2024-07-10 | Stop reason: HOSPADM

## 2024-07-10 RX ADMIN — SODIUM CHLORIDE 30 ML/HR: 0.9 INJECTION, SOLUTION INTRAVENOUS at 15:17

## 2024-07-10 RX ADMIN — ACETAMINOPHEN 325MG 650 MG: 325 TABLET ORAL at 21:28

## 2024-07-10 RX ADMIN — ASPIRIN 81 MG CHEWABLE TABLET 81 MG: 81 TABLET CHEWABLE at 15:31

## 2024-07-10 RX ADMIN — SODIUM CHLORIDE 50 ML/HR: 0.9 INJECTION, SOLUTION INTRAVENOUS at 09:11

## 2024-07-10 NOTE — ASSESSMENT & PLAN NOTE
Has a pmhx of htn in which she was noncompliant with medications due to lack of insurance.   S/p nitro gtt  Started on coreg and lisinopril

## 2024-07-10 NOTE — ASSESSMENT & PLAN NOTE
[place]  [facility]  Hospital will provide medications for her at time of discharge

## 2024-07-10 NOTE — PROGRESS NOTES
Cardiology Progress Note   Nancy Calderon 63 y.o. female MRN: [id number]  Unit/Bed#: E4 -01 Encounter: [id number]    Hospital Course/Assessment  63 y.o. female presented to the ED on 7/5 complaining acute onset chest pain that began this morning around 2 AM.  The symptoms woke her up from sleep.  She also reported shortness of breath and lower extremity edema over the last 2 months.  Her past medical history is unclear as she moved here from the [place] 5 years ago but she has a reported history of hypertension, heart stents (last in 2011?) and possible CHF.  She followed with a cardiologist in the [facility] but has not establish care with a cardiologist since [place] in the [place].  She has also not been taking her cardiac medications due to insurance problems (Coreg and valsartan?).  The ED she was noted to be hypoxic with an SpO2 in the 80s requiring Bipap.  Troponin was negative.  BNP was elevated at 2673.  CXR showed pulmonary edema.  She was hemodynamically stable.  Echocardiogram showed a severely reduced EF of 15%.     Subjective:   Today the patient was seen and examined at bedside.  She had no acute events overnight.  Reports generalized fatigue but otherwise had no other acute complaints.  She had 1 short 4 beat run of NSVT on telemetry but was otherwise in normal sinus rhythm.    Plan  # Acute on chronic systolic and diastolic CHF  # Severe cardiomyopathy, EF 15%  # Hypotension  TTE 7/5/24 showed a severely reduced EF of 15% and suspected grade 2 diastolic dysfunction.  She did report a vague history of a cardiomyopathy for which she followed with a doctor in [place].  She was not taking any medications at home due to insurance issues.  Appears euvolemic on exam.  Will DC IV Lasix and start p.o. Lasix 40 mg daily tomorrow morning  Continue GDMT with carvedilol and lisinopril.  Will consider adding spironolactone  "and/or Jardiance.  Monitor I's and O's, daily weights and renal function  Continue telemetry monitoring  Patient would benefit from LifeVest at time of discharge.  Plan for ischemic workup with left heart catheterization today 7/10.  Case management following for assistance with obtaining medical insurance.        Bao Fournier MD  -PGY-5 Cardiology Fellow  -Tiger text enabled    ======================================================  Objective  VS: Blood pressure 109/73, pulse 88, temperature (!) 97.4 °F (36.3 °C), temperature source Temporal, resp. rate 18, height 5' 4\" (1.626 m), weight 70.1 kg (154 lb 8.7 oz), SpO2 98%.  Gen: Well appearing  Psych: AOx3  Skin: Intact  Neck: Supple  Cardiac: S1, S2, regular rate, no S3 or S4 appreciated. No murmurs. +2 PT, radial pulses. Mild peripheral edema. No carotid bruits.  Resp: CTABL. No crackles  Abd: Nontender, nondistended  Rheum: No joint deformities in UE or LE  ======================================================  TREADMILL STRESS  No results found for this or any previous visit.     ----------------------------------------------------------------------------------------------  NUCLEAR STRESS TEST: No results found for this or any previous visit.    No results found for this or any previous visit.      --------------------------------------------------------------------------------  CATH:  No results found for this or any previous visit.    --------------------------------------------------------------------------------  ECHO:   No results found for this or any previous visit.    No results found for this or any previous visit.    --------------------------------------------------------------------------------  HOLTER  No results found for this or any previous visit.    --------------------------------------------------------------------------------  CAROTIDS  No results found for this or any previous visit.       [unfilled] " "  =====================================================    Active Meds    Current Facility-Administered Medications:     acetaminophen (TYLENOL) tablet 650 mg, 650 mg, Oral, Q6H PRN, HEATHER Nugent, 650 mg at 07/05/24 1459    carvedilol (COREG) tablet 6.25 mg, 6.25 mg, Oral, BID With Meals, HEATHER Nugent, 6.25 mg at 07/09/24 1712    enoxaparin (LOVENOX) subcutaneous injection 40 mg, 40 mg, Subcutaneous, Daily, HEATHER Nugent, 40 mg at 07/09/24 0930    [START ON 7/11/2024] furosemide (LASIX) tablet 40 mg, 40 mg, Oral, Daily, Bao Fournier MD    lisinopril (ZESTRIL) tablet 5 mg, 5 mg, Oral, Daily, HEATHER Nugent, 5 mg at 07/08/24 0913    nitroglycerin (NITROSTAT) SL tablet 0.4 mg, 0.4 mg, Sublingual, Q5 Min PRN, Reshma Song MD    Labs & Results  No results found for: \"CKTOTAL\", \"CKMB\", \"CKMBINDEX\", \"TROPONINI\"  Lab Results   Component Value Date    CALCIUM 9.6 07/10/2024    K 3.9 07/10/2024    CO2 34 (H) 07/10/2024    CL 99 07/10/2024    BUN 17 07/10/2024    CREATININE 0.69 07/10/2024     Lab Results   Component Value Date    WBC 8.01 07/10/2024    HGB 16.1 (H) 07/10/2024    HCT 49.1 (H) 07/10/2024    MCV 92 07/10/2024     07/10/2024     Results from last 7 days   Lab Units 07/10/24  1024   INR  1.04     No results found for: \"CHOL\"  No results found for: \"HDL\"  No results found for: \"LDLCALC\"  No results found for: \"TRIG\"  Lab Results   Component Value Date    ALT 40 07/05/2024    AST 30 07/05/2024    ALKPHOS 94 07/05/2024          "

## 2024-07-10 NOTE — PLAN OF CARE
Problem: Prexisting or High Potential for Compromised Skin Integrity  Goal: Skin integrity is maintained or improved  Description: INTERVENTIONS:  - Identify patients at risk for skin breakdown  - Assess and monitor skin integrity  - Assess and monitor nutrition and hydration status  - Monitor labs   - Assess for incontinence   - Turn and reposition patient  - Assist with mobility/ambulation  - Relieve pressure over bony prominences  - Avoid friction and shearing  - Provide appropriate hygiene as needed including keeping skin clean and dry  - Evaluate need for skin moisturizer/barrier cream  - Collaborate with interdisciplinary team   - Patient/family teaching  - Consider wound care consult   Outcome: Progressing     Problem: PAIN - ADULT  Goal: Verbalizes/displays adequate comfort level or baseline comfort level  Description: Interventions:  - Encourage patient to monitor pain and request assistance  - Assess pain using appropriate pain scale  - Administer analgesics based on type and severity of pain and evaluate response  - Implement non-pharmacological measures as appropriate and evaluate response  - Consider cultural and social influences on pain and pain management  - Notify physician/advanced practitioner if interventions unsuccessful or patient reports new pain  Outcome: Progressing     Problem: INFECTION - ADULT  Goal: Absence or prevention of progression during hospitalization  Description: INTERVENTIONS:  - Assess and monitor for signs and symptoms of infection  - Monitor lab/diagnostic results  - Monitor all insertion sites, i.e. indwelling lines, tubes, and drains  - Monitor endotracheal if appropriate and nasal secretions for changes in amount and color  - [place] appropriate cooling/warming therapies per order  - Administer medications as ordered  - Instruct and encourage patient and family to use good hand hygiene technique  - Identify and instruct in appropriate isolation precautions for  identified infection/condition  Outcome: Progressing  Goal: Absence of fever/infection during neutropenic period  Description: INTERVENTIONS:  - Monitor WBC    Outcome: Progressing     Problem: SAFETY ADULT  Goal: Patient will remain free of falls  Description: INTERVENTIONS:  - Educate patient/family on patient safety including physical limitations  - Instruct patient to call for assistance with activity   - Consult OT/PT to assist with strengthening/mobility   - Keep Call bell within reach  - Keep bed low and locked with side rails adjusted as appropriate  - Keep care items and personal belongings within reach  - Initiate and maintain comfort rounds  - Make Fall Risk Sign visible to staff  - Offer Toileting every 2 Hours, in advance of need  - Initiate/Maintain bed alarm  - Obtain necessary fall risk management equipment:   - Apply yellow socks and bracelet for high fall risk patients  - Consider moving patient to room near nurses station  Outcome: Progressing  Goal: Maintain or return to baseline ADL function  Description: INTERVENTIONS:  -  Assess patient's ability to carry out ADLs; assess patient's baseline for ADL function and identify physical deficits which impact ability to perform ADLs (bathing, care of mouth/teeth, toileting, grooming, dressing, etc.)  - Assess/evaluate cause of self-care deficits   - Assess range of motion  - Assess patient's mobility; develop plan if impaired  - Assess patient's need for assistive devices and provide as appropriate  - Encourage maximum independence but intervene and supervise when necessary  - Involve family in performance of ADLs  - Assess for home care needs following discharge   - Consider OT consult to assist with ADL evaluation and planning for discharge  - Provide patient education as appropriate  Outcome: Progressing  Goal: Maintains/Returns to pre admission functional level  Description: INTERVENTIONS:  - Perform AM-PAC 6 Click Basic Mobility/ Daily Activity  assessment daily.  - Set and communicate daily mobility goal to care team and patient/family/caregiver.   - Collaborate with rehabilitation services on mobility goals if consulted  - Perform Range of Motion 4 times a day.  - Reposition patient every 2 hours.  - Dangle patient 4 times a day  - Stand patient 4 times a day  - Ambulate patient 4 times a day  - Out of bed to chair 4 times a day   - Out of bed for meals 4 times a day  - Out of bed for toileting  - Record patient progress and toleration of activity level   Outcome: Progressing     Problem: DISCHARGE PLANNING  Goal: Discharge to home or other facility with appropriate resources  Description: INTERVENTIONS:  - Identify barriers to discharge w/patient and caregiver  - Arrange for needed discharge resources and transportation as appropriate  - Identify discharge learning needs (meds, wound care, etc.)  - Arrange for interpretive services to assist at discharge as needed  - Refer to Case Management Department for coordinating discharge planning if the patient needs post-hospital services based on physician/advanced practitioner order or complex needs related to functional status, cognitive ability, or social support system  Outcome: Progressing     Problem: Knowledge Deficit  Goal: Patient/family/caregiver demonstrates understanding of disease process, treatment plan, medications, and discharge instructions  Description: Complete learning assessment and assess knowledge base.  Interventions:  - Provide teaching at level of understanding  - Provide teaching via preferred learning methods  Outcome: Progressing

## 2024-07-10 NOTE — ASSESSMENT & PLAN NOTE
Met sirs criteria with [place]bc of [address] and tachypnea  No source of infection  Monitoring off of antibiotics   Wbc has since normalized

## 2024-07-10 NOTE — CASE MANAGEMENT
Case Management Discharge Planning Note    Patient name Nancy Calderon  [facility] /[facility] -* MRN [id number]  : 1960 Date 7/10/2024       Current Admission Date: 2024  Current Admission Diagnosis:Acute decompensated heart failure (HCC)   Patient Active Problem List    Diagnosis Date Noted Date Diagnosed    Does not have health insurance 2024     Acute decompensated heart failure (HCC) 2024     Hypertensive emergency 2024     SIRS (systemic inflammatory response syndrome) (HCC) 2024       LOS (days): 5  Geometric Mean LOS (GMLOS) (days):   Days to GMLOS:     OBJECTIVE:  Risk of Unplanned Readmission Score: 7.82         Current admission status: Inpatient   [facility] [place] [address]  Phone: [phone] Fax: [phone]    Primary Care Provider: No primary care provider on file.    Primary Insurance: [employer] LOWE PENDING  Secondary Insurance:     DISCHARGE DETAILS:                                                                                                 Additional Comments: CM met wiht the pt.  CM informed that the pt had documents.  Pt had [place] card, [place] card and [place] card.  Documents were copied and faxed to [employer] via [employer].  OP CM also informed.  Hosptial Finance counselors  messaged for [employer].  Cardiology stating that the pt will need medicaitons and most likely Life vest.  C. Cath pending today.  Spoke to Lauryn.  she is pt's sister not dtr.  Updated in EPIC.  Asked sister if she can reside with her due to current status.  She stated that she would need to ambulate 28 steps to her room.  I was able to verify her address in [employer] and Lauryn stated her landlord is Lou [phone].  Sister unaware how long she could live there due to inability to work and not sure how much money she has to pay rent.  Lou was called and unable to understand  English.  Spoke to her son, Andre.  They confirmed that she lives in a room on the 2nd floor with 15 steps to [facility].  No ability to provide her a 1st floor room.  Andre states someone is home most of the time and that he would help her with the steps and that she could not carry things up.  Cardiology and attending made aware that docuemtns were sent to [facility].   to follow.

## 2024-07-10 NOTE — PLAN OF CARE
Problem: Prexisting or High Potential for Compromised Skin Integrity  Goal: Skin integrity is maintained or improved  Description: INTERVENTIONS:  - Identify patients at risk for skin breakdown  - Assess and monitor skin integrity  - Assess and monitor nutrition and hydration status  - Monitor labs   - Assess for incontinence   - Turn and reposition patient  - Assist with mobility/ambulation  - Relieve pressure over bony prominences  - Avoid friction and shearing  - Provide appropriate hygiene as needed including keeping skin clean and dry  - Evaluate need for skin moisturizer/barrier cream  - Collaborate with interdisciplinary team   - Patient/family teaching  - Consider wound care consult   Outcome: Progressing     Problem: PAIN - ADULT  Goal: Verbalizes/displays adequate comfort level or baseline comfort level  Description: Interventions:  - Encourage patient to monitor pain and request assistance  - Assess pain using appropriate pain scale  - Administer analgesics based on type and severity of pain and evaluate response  - Implement non-pharmacological measures as appropriate and evaluate response  - Consider cultural and social influences on pain and pain management  - Notify physician/advanced practitioner if interventions unsuccessful or patient reports new pain  Outcome: Progressing     Problem: INFECTION - ADULT  Goal: Absence or prevention of progression during hospitalization  Description: INTERVENTIONS:  - Assess and monitor for signs and symptoms of infection  - Monitor lab/diagnostic results  - Monitor all insertion sites, i.e. indwelling lines, tubes, and drains  - Monitor endotracheal if appropriate and nasal secretions for changes in amount and color  - Orange appropriate cooling/warming therapies per order  - Administer medications as ordered  - Instruct and encourage patient and family to use good hand hygiene technique  - Identify and instruct in appropriate isolation precautions for  identified infection/condition  Outcome: Progressing  Goal: Absence of fever/infection during neutropenic period  Description: INTERVENTIONS:  - Monitor WBC    Outcome: Progressing     Problem: SAFETY ADULT  Goal: Patient will remain free of falls  Description: INTERVENTIONS:  - Educate patient/family on patient safety including physical limitations  - Instruct patient to call for assistance with activity   - Consult OT/PT to assist with strengthening/mobility   - Keep Call bell within reach  - Keep bed low and locked with side rails adjusted as appropriate  - Keep care items and personal belongings within reach  - Initiate and maintain comfort rounds  - Make Fall Risk Sign visible to staff  - Offer Toileting every 2 Hours, in advance of need  - Initiate/Maintain bed alarm  - Obtain necessary fall risk management equipment: bed/chair  - Apply yellow socks and bracelet for high fall risk patients  - Consider moving patient to room near nurses station  Outcome: Progressing  Goal: Maintain or return to baseline ADL function  Description: INTERVENTIONS:  -  Assess patient's ability to carry out ADLs; assess patient's baseline for ADL function and identify physical deficits which impact ability to perform ADLs (bathing, care of mouth/teeth, toileting, grooming, dressing, etc.)  - Assess/evaluate cause of self-care deficits   - Assess range of motion  - Assess patient's mobility; develop plan if impaired  - Assess patient's need for assistive devices and provide as appropriate  - Encourage maximum independence but intervene and supervise when necessary  - Involve family in performance of ADLs  - Assess for home care needs following discharge   - Consider OT consult to assist with ADL evaluation and planning for discharge  - Provide patient education as appropriate  Outcome: Progressing  Goal: Maintains/Returns to pre admission functional level  Description: INTERVENTIONS:  - Perform AM-PAC 6 Click Basic Mobility/ Daily  Activity assessment daily.  - Set and communicate daily mobility goal to care team and patient/family/caregiver.   - Collaborate with rehabilitation services on mobility goals if consulted  - Perform Range of Motion 3 times a day.  - Reposition patient every 2 hours.  - Out of bed for toileting  - Record patient progress and toleration of activity level   Outcome: Progressing     Problem: DISCHARGE PLANNING  Goal: Discharge to home or other facility with appropriate resources  Description: INTERVENTIONS:  - Identify barriers to discharge w/patient and caregiver  - Arrange for needed discharge resources and transportation as appropriate  - Identify discharge learning needs (meds, wound care, etc.)  - Arrange for interpretive services to assist at discharge as needed  - Refer to Case Management Department for coordinating discharge planning if the patient needs post-hospital services based on physician/advanced practitioner order or complex needs related to functional status, cognitive ability, or social support system  Outcome: Progressing     Problem: Knowledge Deficit  Goal: Patient/family/caregiver demonstrates understanding of disease process, treatment plan, medications, and discharge instructions  Description: Complete learning assessment and assess knowledge base.  Interventions:  - Provide teaching at level of understanding  - Provide teaching via preferred learning methods  Outcome: Progressing     Problem: CARDIOVASCULAR - ADULT  Goal: Maintains optimal cardiac output and hemodynamic stability  Description: INTERVENTIONS:  - Monitor I/O, vital signs and rhythm  - Monitor for S/S and trends of decreased cardiac output  - Administer and titrate ordered vasoactive medications to optimize hemodynamic stability  - Assess quality of pulses, skin color and temperature  - Assess for signs of decreased coronary artery perfusion  - Instruct patient to report change in severity of symptoms  Outcome: Progressing  Goal:  Absence of cardiac dysrhythmias or at baseline rhythm  Description: INTERVENTIONS:  - Continuous cardiac monitoring, vital signs, obtain 12 lead EKG if ordered  - Administer antiarrhythmic and heart rate control medications as ordered  - Monitor electrolytes and administer replacement therapy as ordered  Outcome: Progressing

## 2024-07-10 NOTE — ASSESSMENT & PLAN NOTE
Wt Readings from Last 3 Encounters:   07/10/24 70.1 kg (154 lb 8.7 oz)   07/05/24 83.2 kg (183 lb 6.8 oz)     Presented with 1 day history of worsening sob, cp and lower ext edema  She had been at  and treated with nitro gtt, lasix 40mg IV x1. Her respiratory status worsened in which she was then placed on bipap.   TTE 7/5/2024 revealed ejection fraction 15%, grade 2 diastolic dysfunction  Bnp was 2673, trops normal  Cxr showed pulmonary edema  This postcardiac catheterization 7/10/2024 revealed obstructive CAD  Transition to oral diuretics furosemide 40 mg daily, continue carvedilol 6.25 mg twice daily, lisinopril 5 mg daily  Case management for LifeVest

## 2024-07-10 NOTE — PROGRESS NOTES
[facility]  Progress Note  Name: Nancy Small  MRN: [id number]  Unit/Bed#: E4 -01 I Date of Admission: 7/5/2024   Date of Service: 7/10/2024 I Hospital Day: 5    Assessment & Plan   * Acute decompensated heart failure (HCC)  Assessment & Plan  Wt Readings from Last 3 Encounters:   07/10/24 70.1 kg (154 lb 8.7 oz)   07/05/24 83.2 kg (183 lb 6.8 oz)     Presented with 1 day history of worsening sob, cp and lower ext edema  She had been at  and treated with nitro gtt, lasix 40mg IV x1. Her respiratory status worsened in which she was then placed on bipap.   TTE 7/5/2024 revealed ejection fraction 15%, grade 2 diastolic dysfunction  Bnp was 2673, trops normal  Cxr showed pulmonary edema  This postcardiac catheterization 7/10/2024 revealed obstructive CAD  Transition to oral diuretics furosemide 40 mg daily, continue carvedilol 6.25 mg twice daily, lisinopril 5 mg daily  Case management for Sandra    Does not have health insurance  Assessment & Plan  [place]  She is ma pending  Hospital will provide medications for her at time of discharge    SIRS (systemic inflammatory response syndrome) ([facility])  Assessment & Plan  Met sirs criteria with wbc of 198[address] and tachypnea  No source of infection  Monitoring off of antibiotics   Wbc has since normalized     Hypertensive emergency  Assessment & Plan  Has a pmhx of htn in which she was noncompliant with medications due to lack of insurance.   S/p nitro gtt  Started on coreg and lisinopril             Mobility:  Basic Mobility Inpatient Raw Score: 18  JH-HLM Goal: 6: Walk 10 steps or more  JH-HLM Achieved: 7: Walk 25 feet or more  JH-HLM Goal achieved. Continue to encourage appropriate mobility.    VTE Pharmacologic Prophylaxis:   Pharmacologic: lovenox    Patient Centered Rounds: I have performed bedside rounds with nursing staff today.    Education and Discussions with Family / Patient: patient    Time  Spent for Care:   More than 50% of total time spent on counseling and coordination of care as described above.    Current Length of Stay: 5 day(s)    Current Patient Status: Inpatient   Certification Statement: The patient will continue to require additional inpatient hospital stay due to cardiac cath    Discharge Plan / Estimated Discharge Date: 24-48h    Code Status: Level 1 - Full Code      Subjective:   Patient seen and examined at bedside, denies any chest pain, dyspnea    Objective:     Vitals:   Temp (24hrs), Av.4 °F (36.3 °C), Min:96.8 °F (36 °C), Max:97.9 °F (36.6 °C)    Temp:  [96.8 °F (36 °C)-97.9 °F (36.6 °C)] 97.9 °F (36.6 °C)  HR:  [62-88] 77  Resp:  [18-20] 20  BP: (101-121)/(59-77) 108/73  SpO2:  [93 %-99 %] 95 %  Body mass index is 26.53 kg/m².     Input and Output Summary (last 24 hours):       Intake/Output Summary (Last 24 hours) at 7/10/2024 1627  Last data filed at 7/10/2024 1517  Gross per 24 hour   Intake 210 ml   Output 600 ml   Net -390 ml       Physical Exam:    Constitutional: Patient is oriented to person, place and time, no acute distress  HEENT:  Normocephalic, atraumatic  Cardiovascular: Normal S1S2, RRR, No murmurs/rubs/gallops appreciated.  Pulmonary:  Bilateral air entry, No rhonchi/rales/wheezing appreciated  Abdominal: Soft, Bowel sounds present, Non-tender, Non-distended  Extremities:  No cyanosis, clubbing or edema.   Neurological: awake, alert  Skin:  Warm, dry    Additional Data:     Labs:    Results from last 7 days   Lab Units 07/10/24  0548   WBC Thousand/uL 8.01   HEMOGLOBIN g/dL 16.1*   HEMATOCRIT % 49.1*   PLATELETS Thousands/uL 237   SEGS PCT % 57   LYMPHO PCT % 30   MONO PCT % 9   EOS PCT % 3     Results from last 7 days   Lab Units 07/10/24  0548 24  1422 24  0704   POTASSIUM mmol/L 3.9   < > 3.8   CHLORIDE mmol/L 99   < > 104   CO2 mmol/L 34*   < > 25   BUN mg/dL 17   < > 13   CREATININE mg/dL 0.69   < > 0.75   CALCIUM mg/dL 9.6   < > 9.2   ALK PHOS  U/L  --   --  94   ALT U/L  --   --  40   AST U/L  --   --  30    < > = values in this interval not displayed.     Results from last 7 days   Lab Units 07/10/24  1024   INR  1.04        I Have Reviewed All Lab Data Listed Above.    Invasive Devices       Peripheral Intravenous Line  Duration             Peripheral IV 07/10/24 Left;Ventral (anterior) Forearm <1 day                         Recent Cultures (last 7 days):           Last 24 Hours Medication List:   Current Facility-Administered Medications   Medication Dose Route Frequency Provider Last Rate    acetaminophen  650 mg Oral Q6H PRN David Phan MD      aspirin  81 mg Oral Daily Justino Patel MD      carvedilol  6.25 mg Oral BID With Meals David Phan MD      enoxaparin  40 mg Subcutaneous Daily David Phan MD      [START ON 7/11/2024] furosemide  40 mg Oral Daily David Phan MD      lisinopril  5 mg Oral Daily David Phan MD      nitroglycerin  0.4 mg Sublingual Q5 Min PRN Justino Patel MD      sodium chloride  30 mL/hr Intravenous Continuous Justino Patel MD 30 mL/hr (07/10/24 2267)        Today, Patient Was Seen By: David Phan MD       Never

## 2024-07-11 ENCOUNTER — APPOINTMENT (INPATIENT)
Dept: RADIOLOGY | Facility: HOSPITAL | Age: 64
DRG: 192 | End: 2024-07-11
Payer: COMMERCIAL

## 2024-07-11 LAB
ANION GAP SERPL CALCULATED.3IONS-SCNC: 9 MMOL/L (ref 4–13)
BASOPHILS # BLD AUTO: 0.08 THOUSANDS/ÂΜL (ref 0–0.1)
BASOPHILS NFR BLD AUTO: 1 % (ref 0–1)
BUN SERPL-MCNC: 17 MG/DL (ref 5–25)
CALCIUM SERPL-MCNC: 9.4 MG/DL (ref 8.4–10.2)
CHLORIDE SERPL-SCNC: 105 MMOL/L (ref 96–108)
CO2 SERPL-SCNC: 27 MMOL/L (ref 21–32)
CREAT SERPL-MCNC: 0.62 MG/DL (ref 0.6–1.3)
EOSINOPHIL # BLD AUTO: 0.24 THOUSAND/ÂΜL (ref 0–0.61)
EOSINOPHIL NFR BLD AUTO: 3 % (ref 0–6)
ERYTHROCYTE [DISTWIDTH] IN BLOOD BY AUTOMATED COUNT: 13.8 % (ref 11.6–15.1)
GFR SERPL CREATININE-BSD FRML MDRD: 96 ML/MIN/1.73SQ M
GLUCOSE SERPL-MCNC: 109 MG/DL (ref 65–140)
HCT VFR BLD AUTO: 46.4 % (ref 34.8–46.1)
HGB BLD-MCNC: 15.1 G/DL (ref 11.5–15.4)
IMM GRANULOCYTES # BLD AUTO: 0.03 THOUSAND/UL (ref 0–0.2)
IMM GRANULOCYTES NFR BLD AUTO: 0 % (ref 0–2)
LYMPHOCYTES # BLD AUTO: 1.87 THOUSANDS/ÂΜL (ref 0.6–4.47)
LYMPHOCYTES NFR BLD AUTO: 22 % (ref 14–44)
MCH RBC QN AUTO: 29.8 PG (ref 26.8–34.3)
MCHC RBC AUTO-ENTMCNC: 32.5 G/DL (ref 31.4–37.4)
MCV RBC AUTO: 92 FL (ref 82–98)
MONOCYTES # BLD AUTO: 0.77 THOUSAND/ÂΜL (ref 0.17–1.22)
MONOCYTES NFR BLD AUTO: 9 % (ref 4–12)
NEUTROPHILS # BLD AUTO: 5.39 THOUSANDS/ÂΜL (ref 1.85–7.62)
NEUTS SEG NFR BLD AUTO: 65 % (ref 43–75)
NRBC BLD AUTO-RTO: 0 /100 WBCS
PLATELET # BLD AUTO: 210 THOUSANDS/UL (ref 149–390)
PMV BLD AUTO: 12.2 FL (ref 8.9–12.7)
POTASSIUM SERPL-SCNC: 4.1 MMOL/L (ref 3.5–5.3)
RBC # BLD AUTO: 5.07 MILLION/UL (ref 3.81–5.12)
SODIUM SERPL-SCNC: 141 MMOL/L (ref 135–147)
WBC # BLD AUTO: 8.38 THOUSAND/UL (ref 4.31–10.16)

## 2024-07-11 PROCEDURE — 97535 SELF CARE MNGMENT TRAINING: CPT

## 2024-07-11 PROCEDURE — 97167 OT EVAL HIGH COMPLEX 60 MIN: CPT

## 2024-07-11 PROCEDURE — 99232 SBSQ HOSP IP/OBS MODERATE 35: CPT | Performed by: INTERNAL MEDICINE

## 2024-07-11 PROCEDURE — 71045 X-RAY EXAM CHEST 1 VIEW: CPT

## 2024-07-11 PROCEDURE — 97163 PT EVAL HIGH COMPLEX 45 MIN: CPT

## 2024-07-11 PROCEDURE — 99232 SBSQ HOSP IP/OBS MODERATE 35: CPT | Performed by: STUDENT IN AN ORGANIZED HEALTH CARE EDUCATION/TRAINING PROGRAM

## 2024-07-11 PROCEDURE — 85025 COMPLETE CBC W/AUTO DIFF WBC: CPT | Performed by: INTERNAL MEDICINE

## 2024-07-11 PROCEDURE — 97530 THERAPEUTIC ACTIVITIES: CPT

## 2024-07-11 PROCEDURE — 80048 BASIC METABOLIC PNL TOTAL CA: CPT | Performed by: INTERNAL MEDICINE

## 2024-07-11 RX ORDER — METOPROLOL SUCCINATE 25 MG/1
25 TABLET, EXTENDED RELEASE ORAL DAILY
Status: DISCONTINUED | OUTPATIENT
Start: 2024-07-12 | End: 2024-07-13

## 2024-07-11 RX ORDER — CARVEDILOL 6.25 MG/1
6.25 TABLET ORAL 2 TIMES DAILY WITH MEALS
Status: DISPENSED | OUTPATIENT
Start: 2024-07-11 | End: 2024-07-12

## 2024-07-11 RX ADMIN — LISINOPRIL 5 MG: 5 TABLET ORAL at 08:12

## 2024-07-11 RX ADMIN — ASPIRIN 81 MG CHEWABLE TABLET 81 MG: 81 TABLET CHEWABLE at 08:12

## 2024-07-11 RX ADMIN — FUROSEMIDE 40 MG: 40 TABLET ORAL at 08:12

## 2024-07-11 RX ADMIN — CARVEDILOL 6.25 MG: 6.25 TABLET, FILM COATED ORAL at 08:12

## 2024-07-11 RX ADMIN — ENOXAPARIN SODIUM 40 MG: 40 INJECTION SUBCUTANEOUS at 08:12

## 2024-07-11 NOTE — PROGRESS NOTES
Cardiology Progress Note   Nancy Calderon 63 y.o. female MRN: [id number]  Unit/Bed#: E4 -01 Encounter: [id number]    Hospital Course/Assessment  63 y.o. female presented to the ED on 7/5 complaining acute onset chest pain that began this morning around 2 AM.  The symptoms woke her up from sleep.  She also reported shortness of breath and lower extremity edema over the last 2 months.  Her past medical history is unclear as she moved here from the [facility] 5 years ago but she has a reported history of hypertension, heart stents (last in 2011?) and possible CHF.  She followed with a cardiologist in the [facility] but has not establish care with a cardiologist since being in the [place].  She has also not been taking her cardiac medications due to insurance problems (Coreg and valsartan?).  The ED she was noted to be hypoxic with an SpO2 in the 80s requiring Bipap.  Troponin was negative.  BNP was elevated at 2673.  CXR showed pulmonary edema.  She was hemodynamically stable.  Echocardiogram showed a severely reduced EF of 15%.     Subjective:   Today the patient was seen and examined at bedside.  She had no acute events overnight.  Continues to endorse mild fatigue but otherwise is feeling well and had no acute complaints.  She reports that she has been walking with her walker without issues.  Telemetry showed a short 4 beat run of NSVT but otherwise was normal sinus rhythm.  He is awaiting LifeVest fitting.  When asked if she feels comfortable going home today, she reported no as she lives on the third story of a building and does not believe she would be able to walk up the steps.    Plan  # Acute on chronic systolic and diastolic CHF  # Severe cardiomyopathy, EF 15%  # Hypotension  TTE 7/5/24 showed a severely reduced EF of 15% and suspected grade 2 diastolic dysfunction.  She did report a vague history of a cardiomyopathy for which she followed with a doctor in [facility].  She was not taking any medications at home due to insurance issues.  Continue p.o. Lasix 40 mg daily.  Continue GDMT with lisinopril 5 mg daily and Toprol 25 mg daily (Coreg was switched to Toprol-XL starting tomorrow given soft BPs).  Can consider adding spironolactone and/or Jardiance in the future.  Monitor I's and O's, daily weights and renal function  Continue telemetry monitoring  Left heart catheterization 7/10 showing no obstructive coronary disease.   Case management following for assistance with obtaining medical insurance.  May also require assistance with her living situation as she reports that she cannot walk up to 3 flights of stairs to her apartment.  LifeVest ordered, will likely be fitted today.  Pending LifeVest fitting and social issues (obtaining medical insurance/figuring out living situation), patient is stable for discharge from a cardiology standpoint.  Continue medical regimen as above and recheck BMP 1 week after discharge.  Will plan for outpatient cardiology and advanced HF follow up. Plan to obtain repeat echo in 3 months to assess LV function and evaluate for possible ICD.         Bao Fournier MD  -PGY-5 Cardiology Fellow  -Tiger text enabled    ======================================================  Objective  VS: Blood pressure 100/69, pulse 87, temperature (!) 97.4 °F (36.3 °C), temperature source Temporal, resp. rate 20, height 5' 4\" (1.626 m), weight 69.6 kg (153 lb 7 oz), SpO2 97%.  Gen: Well appearing  Psych: AOx3  Skin: Intact  Neck: Supple  Cardiac: S1, S2, regular rate, no S3 or S4 appreciated. No murmurs. +2 PT, radial pulses.  No peripheral edema. No carotid bruits.  Resp: CTABL. No crackles  Abd: Nontender, nondistended  Rheum: No joint deformities in UE or LE  ======================================================  TREADMILL STRESS  No results found for this or any previous visit.   " "  ----------------------------------------------------------------------------------------------  NUCLEAR STRESS TEST: No results found for this or any previous visit.    No results found for this or any previous visit.      --------------------------------------------------------------------------------  CATH:  No results found for this or any previous visit.    --------------------------------------------------------------------------------  ECHO:   No results found for this or any previous visit.    No results found for this or any previous visit.    --------------------------------------------------------------------------------  HOLTER  No results found for this or any previous visit.    --------------------------------------------------------------------------------  CAROTIDS  No results found for this or any previous visit.       [unfilled]   =====================================================    Active Meds    Current Facility-Administered Medications:     acetaminophen (TYLENOL) tablet 650 mg, 650 mg, Oral, Q6H PRN, David Phan MD, 650 mg at 07/10/24 2128    aspirin chewable tablet 81 mg, 81 mg, Oral, Daily, Justino Patel MD, 81 mg at 07/11/24 0812    carvedilol (COREG) tablet 6.25 mg, 6.25 mg, Oral, BID With Meals, Bao Fournier MD    enoxaparin (LOVENOX) subcutaneous injection 40 mg, 40 mg, Subcutaneous, Daily, David Phan MD, 40 mg at 07/11/24 0812    furosemide (LASIX) tablet 40 mg, 40 mg, Oral, Daily, David Phan MD, 40 mg at 07/11/24 0812    lisinopril (ZESTRIL) tablet 5 mg, 5 mg, Oral, Daily, David Phan MD, 5 mg at 07/11/24 0812    [START ON 7/12/2024] metoprolol succinate (TOPROL-XL) 24 hr tablet 25 mg, 25 mg, Oral, Daily, Bao Fournier MD    nitroglycerin (NITROSTAT) SL tablet 0.4 mg, 0.4 mg, Sublingual, Q5 Min PRN, Justino Patel MD    Labs & Results  No results found for: \"CKTOTAL\", \"CKMB\", \"CKMBINDEX\", \"TROPONINI\"  Lab Results   Component Value Date    CALCIUM 9.4 07/11/2024    " "K 4.1 07/11/2024    CO2 27 07/11/2024     07/11/2024    BUN 17 07/11/2024    CREATININE 0.62 07/11/2024     Lab Results   Component Value Date    WBC 8.38 07/11/2024    HGB 15.1 07/11/2024    HCT 46.4 (H) 07/11/2024    MCV 92 07/11/2024     07/11/2024     Results from last 7 days   Lab Units 07/10/24  1024   INR  1.04     No results found for: \"CHOL\"  No results found for: \"HDL\"  No results found for: \"LDLCALC\"  No results found for: \"TRIG\"  Lab Results   Component Value Date    ALT 40 07/05/2024    AST 30 07/05/2024    ALKPHOS 94 07/05/2024          "

## 2024-07-11 NOTE — ASSESSMENT & PLAN NOTE
[place]  [facility]  Hospital will provide medications for her at time of discharge

## 2024-07-11 NOTE — ASSESSMENT & PLAN NOTE
Wt Readings from Last 3 Encounters:   07/11/24 69.6 kg (153 lb 7 oz)   07/05/24 83.2 kg (183 lb 6.8 oz)     Presented with 1 day history of worsening sob, cp and lower ext edema  She had been at  and treated with nitro gtt, lasix 40mg IV x1. Her respiratory status worsened in which she was then placed on bipap.   TTE 7/5/2024 revealed ejection fraction 15%, grade 2 diastolic dysfunction  Bnp was 2673, trops normal  Patient still having dyspnea with exertion, will repeat chest x-ray  This postcardiac catheterization 7/10/2024 revealed obstructive CAD  Transition to oral diuretics furosemide 40 mg daily, continue carvedilol 6.25 mg twice daily, lisinopril 5 mg daily  Case management for LifeVest

## 2024-07-11 NOTE — PROGRESS NOTES
[facility]  Progress Note  Name: Nancy Small  MRN: [id number]  Unit/Bed#: E4 -01 I Date of Admission: 7/5/2024   Date of Service: 7/11/2024 I Hospital Day: 6    Assessment & Plan   * Acute on chronic systolic and diastolic heart failure, HFrEF, LVEF 15%, LVIDd 5.7 cm, etiology is NICM  Assessment & Plan  Wt Readings from Last 3 Encounters:   07/11/24 69.6 kg (153 lb 7 oz)   07/05/24 83.2 kg (183 lb 6.8 oz)     Presented with 1 day history of worsening sob, cp and lower ext edema  She had been at  and treated with nitro gtt, lasix 40mg IV x1. Her respiratory status worsened in which she was then placed on bipap.   TTE 7/5/2024 revealed ejection fraction 15%, grade 2 diastolic dysfunction  Bnp was 2673, trops normal  Patient still having dyspnea with exertion, will repeat chest x-ray  This postcardiac catheterization 7/10/2024 revealed obstructive CAD  Transition to oral diuretics furosemide 40 mg daily, continue carvedilol 6.25 mg twice daily, lisinopril 5 mg daily  Case management for LifeVest    Does not have health insurance  Assessment & Plan  [place]  She is ma pending  Hospital will provide medications for her at time of discharge    SIRS (systemic inflammatory response syndrome) (HCC)  Assessment & Plan  Met sirs criteria [place] [address] and tachypnea  No source of infection  Monitoring off of antibiotics   Wbc has since normalized     Hypertensive emergency  Assessment & Plan  Has a pmhx of htn in which she was noncompliant with medications due to lack of insurance.   S/p nitro gtt  Started on coreg and lisinopril             Mobility:  Basic Mobility Inpatient Raw Score: 17  JH-HLM Goal: 5: Stand one or more mins  JH-HLM Achieved: 7: Walk 25 feet or more  JH-HLM Goal achieved. Continue to encourage appropriate mobility.    VTE Pharmacologic Prophylaxis:   Pharmacologic: lovenox    Patient Centered Rounds: I have performed bedside  rounds with nursing staff today.    Education and Discussions with Family / Patient: patient    Time Spent for Care:   More than 50% of total time spent on counseling and coordination of care as described above.    Current Length of Stay: 6 day(s)    Current Patient Status: Inpatient   Certification Statement: The patient will continue to require additional inpatient hospital stay due to need for rehab    Discharge Plan / Estimated Discharge Date: TBD    Code Status: Level 1 - Full Code      Subjective:   Patient seen and examined at bedside,complains of dyspnea with exertion, denies any chest pain.  Azeri interpretation via [employer], [place] [id number]    Objective:     Vitals:   Temp (24hrs), Av.1 °F (36.2 °C), Min:96.7 °F (35.9 °C), Max:97.4 °F (36.3 °C)    Temp:  [96.7 °F (35.9 °C)-97.4 °F (36.3 °C)] 97.2 °F (36.2 °C)  HR:  [74-99] 88  Resp:  [20] 20  BP: ()/(54-82) 91/54  SpO2:  [94 %-99 %] 99 %  Body mass index is 26.34 kg/m².     Input and Output Summary (last 24 hours):       Intake/Output Summary (Last 24 hours) at 2024 1708  Last data filed at 2024 0900  Gross per 24 hour   Intake 500 ml   Output --   Net 500 ml       Physical Exam:    Constitutional: Patient is oriented to person, place and time, no acute distress  HEENT:  Normocephalic, atraumatic  Cardiovascular: Normal S1S2, RRR, No murmurs/rubs/gallops appreciated.  Pulmonary:  Bilateral air entry, No rhonchi/rales/wheezing appreciated  Abdominal: Soft, Bowel sounds present, Non-tender, Non-distended  Extremities:  No cyanosis, clubbing or edema.   Neurological: Cranial nerves II-XII grossly intact, sensation intact, otherwise no focal neurological symptoms.   Skin:  Warm, dry    Additional Data:     Labs:    Results from last 7 days   Lab Units 24  0528   WBC Thousand/uL 8.38   HEMOGLOBIN g/dL 15.1   HEMATOCRIT % 46.4*   PLATELETS Thousands/uL 210   SEGS PCT % 65   LYMPHO PCT % 22   MONO PCT % 9   EOS PCT % 3     Results from last 7  days   Lab Units 07/11/24  0528 07/05/24  1422 07/05/24  0704   POTASSIUM mmol/L 4.1   < > 3.8   CHLORIDE mmol/L 105   < > 104   CO2 mmol/L 27   < > 25   BUN mg/dL 17   < > 13   CREATININE mg/dL 0.62   < > 0.75   CALCIUM mg/dL 9.4   < > 9.2   ALK PHOS U/L  --   --  94   ALT U/L  --   --  40   AST U/L  --   --  30    < > = values in this interval not displayed.     Results from last 7 days   Lab Units 07/10/24  1024   INR  1.04        I Have Reviewed All Lab Data Listed Above.    Invasive Devices       Peripheral Intravenous Line  Duration             Peripheral IV 07/10/24 Left;Ventral (anterior) Forearm 1 day                      Recent Cultures (last 7 days):           Last 24 Hours Medication List:   Current Facility-Administered Medications   Medication Dose Route Frequency Provider Last Rate    acetaminophen  650 mg Oral Q6H PRN David Phan MD      aspirin  81 mg Oral Daily Justino Patel MD      carvedilol  6.25 mg Oral BID With Meals Bao Fournier MD      enoxaparin  40 mg Subcutaneous Daily David Phan MD      furosemide  40 mg Oral Daily David Phan MD      lisinopril  5 mg Oral Daily David Phan MD      [START ON 7/12/2024] metoprolol succinate  25 mg Oral Daily Bao Fournier MD      nitroglycerin  0.4 mg Sublingual Q5 Min PRN Justino Patel MD          Today, Patient Was Seen By: David Phan MD

## 2024-07-11 NOTE — PLAN OF CARE
Problem: OCCUPATIONAL THERAPY ADULT  Goal: Performs self-care activities at highest level of function for planned discharge setting.  See evaluation for individualized goals.  Description: Treatment Interventions: ADL retraining, Functional transfer training, UE strengthening/ROM, Patient/family training, Equipment evaluation/education, Neuromuscular reeducation, Compensatory technique education, Energy conservation, Activityengagement          See flowsheet documentation for full assessment, interventions and recommendations.   Outcome: Progressing  Note: Limitation: Decreased ADL status, Decreased UE strength, Decreased Safe judgement during ADL, Decreased endurance, Decreased sensation, Decreased high-level ADLs, Decreased self-care trans  Prognosis: Good  Assessment: Nancy Calderon is a 63 y.o. female seen for OT evaluation s/p [facility] w/ Acute decompensated heart failure (HCC).  Comorbidities affecting pt's functional performance at time of assessment include: HTN. Pt with active OT orders and activity orders for Up and OOB as tolerated. Personal factors affecting pt at time of IE include:[place] home environment, limited home support, difficulty performing ADLs, difficulty performing IADLs, and difficulty performing transfers/mobility. Prior to admission, pt lives in a rented 3rd floor room with 2 FOS to enter. Has a tub shower. No DME. Was I with ADLS, IADLS and mobility with no device. 0 falls. Does not drive. Works. 0 falls. Supportive sister and NASIM. Upon evaluation: Pt currently requires supervision for UB ADLs, Calvin for LB ADLs, supervision for toileting, BRITTANY for bed mobility, Calvin for functional transfers, and Calvin x2 HHA va supervision RW mobility 2* the following deficits impacting occupational performance:weakness, decreased strength , decreased balance, and decreased activity tolerance. Pt to benefit from continued skilled OT tx while in the hospital to address deficits  as defined above and maximize level of functional independence w ADL's and functional mobility. Occupational Performance areas to address include: bathing/shower, toilet hygiene, dressing, functional mobility, community mobility, and clothing management. From OT standpoint, recommendation at time of d/c would be level 1-2 resources. OT to follow pt on caseload 3-5x/wk.     Rehab Resource Intensity Level, OT: I (Maximum Resource Intensity)

## 2024-07-11 NOTE — PLAN OF CARE
Problem: PHYSICAL THERAPY ADULT  Goal: Performs mobility at highest level of function for planned discharge setting.  See evaluation for individualized goals.  Description: Treatment/Interventions: Functional transfer training, LE strengthening/ROM, Elevations, Therapeutic exercise, Endurance training, Equipment eval/education, Gait training, Bed mobility, Compensatory technique education, Spoke to nursing, Spoke to case management, Spoke to MD, OT, Family  Equipment Recommended: Walker       See flowsheet documentation for full assessment, interventions and recommendations.  Note: Prognosis: Fair  Problem List: Decreased strength, Decreased endurance, Impaired balance, Decreased mobility, Impaired judgement, Decreased safety awareness, Obesity, Pain  Assessment: Pt is a 63 y.o. female y/o presenting to [facility] on [facility]/2024 with stabbing chest pain and acutely worsening SOB, and inc b/l LE edema x2 months. ED she was noted to be hypoxic with an SpO2 in the 80s requiring Bipap. Workup: CXR (+)  pulmonary edema. TTE 7/5/2024 w/ EF 15%, mild to moderate left-sided pleural effusion. Left heart catheterization 7/10 (-) obstructive CAD, abnormal lab values. Primary dx: Acute decompensated heart failure (HCC). Significant pmhx per chart: acute decompensated HF, HTN emergency, SIRS. PT consulted to assess strength/functional mobility, activity tolerance and d/c needs. Active PT orders. PTA, pt reports functioning at independent w/ functional mobility w/o AD, ADLs, (-)drive, (-) falls.      During PT IE, pt presenting with above (see flowsheet) outlined functional impairments including dec strength, balance, gait, (+) edema, and deficits that limit functional mobility and activity tolerance relative to baseline. Pt currently requires min Ax1 for transfers, min Ax1 w/o AD vs. Close supervision for ambulation with RW for limited household distances. Pt currently demonstrating inc fall risk, requiring use of more  "restrictive AD. Recommend continued use of RW at this time. Fall risk education provided with verbal understanding. Nsg staff most recent vital signs as follows: /69 (BP Location: Left arm)   Pulse 87   Temp (!) 97.4 °F (36.3 °C) (Temporal)   Resp 20   Ht 5' 4\" (1.626 m)   Wt 69.6 kg (153 lb 7 oz)   SpO2 97%   BMI 26.34 kg/m² . Reporting R calf pain that inc w/ activity w/ tenderness to palpation, no erythema/warmth noted. MD aware. Denied additional sxs throughout session. Recommend continued mobilization of pt with nsg staff as tolerated to prevent further decline in function.  Pt will benefit from continued PT services to progress mobility independence necessary for return to PLOF. Based on pt presentation and impairments, pt would most appropriately benefit from level II (mod) rehab intensity resources  pending progress. Additional tx session assessment below.  Barriers to Discharge: Inaccessible home environment, Decreased caregiver support ((+) stairs, uncertain level of support at home)     Rehab Resource Intensity Level, PT: II (Moderate Resource Intensity)    See flowsheet documentation for full assessment.        "

## 2024-07-11 NOTE — ASSESSMENT & PLAN NOTE
Met sirs criteria with [place]bc of 4[address] and tachypnea  No source of infection  Monitoring off of antibiotics   Wbc has since normalized

## 2024-07-11 NOTE — CASE MANAGEMENT
Case Management Discharge Planning Note    Patient name Nancy Calderon  Location [facility] 4 /E4 -* MRN [id number]  : 1960 Date 2024       Current Admission Date: 2024  Current Admission Diagnosis:Acute on chronic systolic and diastolic heart failure, HFrEF, LVEF 15%, LVIDd 5.7 cm, etiology is NICM   Patient Active Problem List    Diagnosis Date Noted Date Diagnosed    Does not have health insurance 2024     Acute on chronic systolic and diastolic heart failure, HFrEF, LVEF 15%, LVIDd 5.7 cm, etiology is NICM 2024     Hypertensive emergency 2024     SIRS (systemic inflammatory response syndrome) (HCC) 2024       LOS (days): 6  Geometric Mean LOS (GMLOS) (days):   Days to GMLOS:     OBJECTIVE:  Risk of Unplanned Readmission Score: 8.15         Current admission status: Inpatient   Preferred Pharmacy:    [facility] [place] [address]  Phone: [phone] Fax: [phone]    [facility] [address]  [facility],  [address] [place] [address],  [place] [id number]  Phone: [phone] Fax: [phone]    Primary Care Provider: No primary care provider on file.    Primary Insurance: CELENA LOWE PENDING  Secondary Insurance:     DISCHARGE DETAILS:        Additional Comments: CM emailed payment agreement to Alessia Bojorquez. [employer] rep reports he will try to come fit pt today but most likely tomorrow. Awaiting PT/OT rec and any DME recs . CM to continue to provide support until d/c.

## 2024-07-11 NOTE — ASSESSMENT & PLAN NOTE
Compensated today at 154#.  Neurohormonal Blockade:  --Beta Blocker: Decrease Toprol from 25 mg QD to 12.5 mg QD given soft BP.  --ARNi / ACEi / ARB: Lisinopril 5 mg QD  --Aldosterone Antagonist: None, consider adding if BP can tolerate.  --SGLT2 Inhibitor: No insurance, cost prohibited  --Home Diuretic: None  --Inpatient Diuretic: PO Lasix 40 mg QD    Sudden Cardiac Death Risk Reduction:  --ICD: Plan to fit for Life Vest prior to hospital discharge.  Limited echo to re-check EF can be ordered earliest (mid-Oct) for ICD planning.   I reached out to barbara and she stated the cramps are not bad. However, the acne she was concerned about. She stated she did see dermatology and they had given her scripts. She did make an appointment with Dr. Vanessa for reoccurring yeast infection

## 2024-07-11 NOTE — OCCUPATIONAL THERAPY NOTE
Occupational Therapy Evaluation + Treatment     Patient Name: Nancy Calderon  Today's Date: 7/11/2024  Problem List  Principal Problem:    Acute decompensated heart failure (HCC)  Active Problems:    Hypertensive emergency    SIRS (systemic inflammatory response syndrome) (HCC)    Does not have health insurance    Past Medical History  Past Medical History:   Diagnosis Date    Hypertension      Past Surgical History  Past Surgical History:   Procedure Laterality Date    CARDIAC CATHETERIZATION Left 7/10/2024    Procedure: Cardiac Left Heart Cath;  Surgeon: Justino Patel MD;  Location: [facility];  Service: Cardiology           07/11/24 1156   OT Last Visit   OT Visit Date 07/11/24   Note Type   Note type Evaluation  (+ treatment)   Additional Comments greeted in chair and agreeable to skilled OT eval.  #[id number]   Pain Assessment   Pain Assessment Tool 0-10   Pain Score 4   Pain Location/Orientation Orientation: Right;Location: Leg  (calf)   Restrictions/Precautions   Weight Bearing Precautions Per Order No   Other Precautions Telemetry;Fall Risk;Pain   Home Living   Type of Home   (rented room - 2rd floor.)   Home Layout Multi-level   Bathroom Shower/Tub Tub/shower unit   Home Equipment   (no DME)   Prior Function   Level of [place] Independent with ADLs;Independent with functional mobility;Independent with IADLS   Lives With Alone   Receives Help From Family   IADLs Independent with meal prep;Independent with medication management;Family/Friend/Other provides transportation   Falls in the last 6 months 0   Vocational Full time employment   Comments Prior to admission, pt lives in a rented 3rd floor room with 2 FOS to enter. Has a tub shower. No DME. Was I with ADLS, IADLS and mobility with no device. 0 falls. Does not drive. Works. 0 falls. Supportive sister and NASIM.   ADL   Where Assessed Edge of bed   Eating Assistance 7  Independent   Grooming Assistance 6  Modified  Independent   UB Bathing Assistance 5  Supervision/Setup   LB Bathing Assistance 5  Supervision/Setup   UB Dressing Assistance 5  Supervision/Setup   LB Dressing Assistance 4  Minimal Assistance   Toileting Assistance  5  Supervision/Setup   Bed Mobility   Supine to Sit Unable to assess   Transfers   Sit to Stand 4  Minimal assistance   Additional items Increased time required;Verbal cues   Stand to Sit 4  Minimal assistance   Additional items Increased time required;Verbal cues   Toilet transfer 4  Minimal assistance   Additional items Increased time required;Verbal cues   Functional Mobility   Functional Mobility 5  Supervision   Additional Comments RW, household distances.   Additional items Rolling walker   Balance   Static Sitting Fair +   Dynamic Sitting Fair   Static Standing Fair -   Dynamic Standing Poor +   Ambulatory Poor +   Activity Tolerance   Activity Tolerance Patient limited by fatigue;Patient limited by pain   Medical Staff Made Aware PT Fermin   Nurse Made Aware RN   RUE Assessment   RUE Assessment WFL   LUE Assessment   LUE Assessment WFL   Hand Function   Gross Motor Coordination Functional   Fine Motor Coordination Functional   Psychosocial   Psychosocial (WDL) WDL   Cognition   Overall Cognitive Status WFL   Arousal/Participation Cooperative   Attention Within functional limits   Orientation Level Oriented to person;Oriented to place;Oriented to time   Following Commands Follows one step commands without difficulty   Comments anxious   Assessment   Limitation Decreased ADL status;Decreased UE strength;Decreased Safe judgement during ADL;Decreased endurance;Decreased sensation;Decreased high-level ADLs;Decreased self-care trans   Prognosis Good   Assessment Nancy Calderon is a 63 y.o. female seen for OT evaluation s/p admit to [facility] on 7/5/2024 w/ Acute decompensated heart failure (HCC).  Comorbidities affecting pt's functional performance at time of assessment include: HTN. Pt  with active OT orders and activity orders for Up and OOB as tolerated. Personal factors affecting pt at time of IE include:[place] home environment, limited home support, difficulty performing ADLs, difficulty performing IADLs, and difficulty performing transfers/mobility. Prior to admission, pt lives in a rented 3rd floor room with 2 FOS to enter. Has a tub shower. No DME. Was I with ADLS, IADLS and mobility with no device. 0 falls. Does not drive. Works. 0 falls. Supportive sister and NASIM. Upon evaluation: Pt currently requires supervision for UB ADLs, Calvin for LB ADLs, supervision for toileting, BRITTANY for bed mobility, Calvin for functional transfers, and Calvin x2 HHA va supervision RW mobility 2* the following deficits impacting occupational performance:weakness, decreased strength , decreased balance, and decreased activity tolerance. Pt to benefit from continued skilled OT tx while in the hospital to address deficits as defined above and maximize level of functional independence w ADL's and functional mobility. Occupational Performance areas to address include: bathing/shower, toilet hygiene, dressing, functional mobility, community mobility, and clothing management. From OT standpoint, recommendation at time of d/c would be level 1-2 resources. OT to follow pt on caseload 3-5x/wk.   Goals   STG Time Frame 3-5   Short Term Goal #1 Pt will improve activity tolerance to G for min 30 min txment sessions for increase engagement in functional tasks   Short Term Goal #2 Pt will complete bed mobility at a Mod I level w/ G balance/safety demonstrated to decrease caregiver assistance required   Short Term Goal  Pt will complete LB dressing/self care w/ mod I using adaptive device and DME as needed   LTG Time Frame 10-14   Long Term Goal #1 Pt will complete toileting w/ mod I w/ G hygiene/thoroughness using DME as needed   Long Term Goal #2 Pt will improve functional transfers to Mod I on/off all surfaces using DME as needed w/  G balance/safety   Long Term Goal Pt will improve functional mobility during ADL/IADL/leisure tasks to Mod I using DME as needed w/ G balance/safety   Plan   Treatment Interventions ADL retraining;Functional transfer training;UE strengthening/ROM;Patient/family training;Equipment evaluation/education;Neuromuscular reeducation;Compensatory technique education;Energy conservation;Activityengagement   Goal Expiration Date 07/25/24   OT Treatment Day 0   OT Frequency 3-5x/wk   Discharge Recommendation   Rehab Resource Intensity Level, OT I (Maximum Resource Intensity)   Additional Comments  The patient's raw score on the AM-PAC Daily Activity Inpatient Short Form is 19. A raw score of greater than or equal to 19 suggests the patient may benefit from discharge to home. Please refer to the recommendation of the Occupational Therapist for safe discharge planning.   AM-PAC Daily Activity Inpatient   Lower Body Dressing 2   Bathing 3   Toileting 3   Upper Body Dressing 3   Grooming 4   Eating 4   Daily Activity Raw Score 19   Daily Activity Standardized Score (Calc for Raw Score >=11) 40.22   -PAC Applied Cognition Inpatient   Following a Speech/Presentation 4   Understanding Ordinary Conversation 3   Taking Medications 2   Remembering Where Things Are Placed or Put Away 3   Remembering List of 4-5 Errands 3   Taking Care of Complicated Tasks 3   Applied Cognition Raw Score 18   Applied Cognition Standardized Score 38.07   Additional Treatment Session   Start Time 1220   End Time 1230   Treatment Assessment pt completed toileting. transfer superivsion / Calvin. hygiene and CM superivsion. mobility in and out of bathroom with RW superivsion.   Additional Treatment Day 1   Usha Mancia, OT

## 2024-07-11 NOTE — PLAN OF CARE
Problem: Prexisting or High Potential for Compromised Skin Integrity  Goal: Skin integrity is maintained or improved  Description: INTERVENTIONS:  - Identify patients at risk for skin breakdown  - Assess and monitor skin integrity  - Assess and monitor nutrition and hydration status  - Monitor labs   - Assess for incontinence   - Turn and reposition patient  - Assist with mobility/ambulation  - Relieve pressure over bony prominences  - Avoid friction and shearing  - Provide appropriate hygiene as needed including keeping skin clean and dry  - Evaluate need for skin moisturizer/barrier cream  - Collaborate with interdisciplinary team   - Patient/family teaching  - Consider wound care consult   Outcome: Progressing     Problem: PAIN - ADULT  Goal: Verbalizes/displays adequate comfort level or baseline comfort level  Description: Interventions:  - Encourage patient to monitor pain and request assistance  - Assess pain using appropriate pain scale  - Administer analgesics based on type and severity of pain and evaluate response  - Implement non-pharmacological measures as appropriate and evaluate response  - Consider cultural and social influences on pain and pain management  - Notify physician/advanced practitioner if interventions unsuccessful or patient reports new pain  Outcome: Progressing     Problem: INFECTION - ADULT  Goal: Absence or prevention of progression during hospitalization  Description: INTERVENTIONS:  - Assess and monitor for signs and symptoms of infection  - Monitor lab/diagnostic results  - Monitor all insertion sites, i.e. indwelling lines, tubes, and drains  - Monitor endotracheal if appropriate and nasal secretions for changes in amount and color  - [place] appropriate cooling/warming therapies per order  - Administer medications as ordered  - Instruct and encourage patient and family to use good hand hygiene technique  - Identify and instruct in appropriate isolation precautions for  identified infection/condition  Outcome: Progressing  Goal: Absence of fever/infection during neutropenic period  Description: INTERVENTIONS:  - Monitor WBC    Outcome: Progressing     Problem: SAFETY ADULT  Goal: Patient will remain free of falls  Description: INTERVENTIONS:  - Educate patient/family on patient safety including physical limitations  - Instruct patient to call for assistance with activity   - Consult OT/PT to assist with strengthening/mobility   - Keep Call bell within reach  - Keep bed low and locked with side rails adjusted as appropriate  - Keep care items and personal belongings within reach  - Initiate and maintain comfort rounds  - Make Fall Risk Sign visible to staff  - Offer Toileting every 2 Hours, in advance of need  - Initiate/Maintain bed alarm  - Apply yellow socks and bracelet for high fall risk patients  - Consider moving patient to room near nurses station  Outcome: Progressing  Goal: Maintain or return to baseline ADL function  Description: INTERVENTIONS:  -  Assess patient's ability to carry out ADLs; assess patient's baseline for ADL function and identify physical deficits which impact ability to perform ADLs (bathing, care of mouth/teeth, toileting, grooming, dressing, etc.)  - Assess/evaluate cause of self-care deficits   - Assess range of motion  - Assess patient's mobility; develop plan if impaired  - Assess patient's need for assistive devices and provide as appropriate  - Encourage maximum independence but intervene and supervise when necessary  - Involve family in performance of ADLs  - Assess for home care needs following discharge   - Consider OT consult to assist with ADL evaluation and planning for discharge  - Provide patient education as appropriate  Outcome: Progressing  Goal: Maintains/Returns to pre admission functional level  Description: INTERVENTIONS:  - Perform AM-PAC 6 Click Basic Mobility/ Daily Activity assessment daily.  - Set and communicate daily mobility  goal to care team and patient/family/caregiver.   - Collaborate with rehabilitation services on mobility goals if consulted  - Perform Range of Motion 3 times a day.  - Reposition patient every 2 hours.  - Out of bed for toileting  - Record patient progress and toleration of activity level   Outcome: Progressing     Problem: DISCHARGE PLANNING  Goal: Discharge to home or other facility with appropriate resources  Description: INTERVENTIONS:  - Identify barriers to discharge w/patient and caregiver  - Arrange for needed discharge resources and transportation as appropriate  - Identify discharge learning needs (meds, wound care, etc.)  - Arrange for interpretive services to assist at discharge as needed  - Refer to Case Management Department for coordinating discharge planning if the patient needs post-hospital services based on physician/advanced practitioner order or complex needs related to functional status, cognitive ability, or social support system  Outcome: Progressing     Problem: Knowledge Deficit  Goal: Patient/family/caregiver demonstrates understanding of disease process, treatment plan, medications, and discharge instructions  Description: Complete learning assessment and assess knowledge base.  Interventions:  - Provide teaching at level of understanding  - Provide teaching via preferred learning methods  Outcome: Progressing     Problem: CARDIOVASCULAR - ADULT  Goal: Maintains optimal cardiac output and hemodynamic stability  Description: INTERVENTIONS:  - Monitor I/O, vital signs and rhythm  - Monitor for S/S and trends of decreased cardiac output  - Administer and titrate ordered vasoactive medications to optimize hemodynamic stability  - Assess quality of pulses, skin color and temperature  - Assess for signs of decreased coronary artery perfusion  - Instruct patient to report change in severity of symptoms  Outcome: Progressing  Goal: Absence of cardiac dysrhythmias or at baseline  rhythm  Description: INTERVENTIONS:  - Continuous cardiac monitoring, vital signs, obtain 12 lead EKG if ordered  - Administer antiarrhythmic and heart rate control medications as ordered  - Monitor electrolytes and administer replacement therapy as ordered  Outcome: Progressing

## 2024-07-11 NOTE — PHYSICAL THERAPY NOTE
PHYSICAL THERAPY EVALUATION [id number] and tx [id number]    NAME:  Nancy Calderon  DATE: 07/11/24    AGE:   63 y.o.  Mrn:   [id number]  ADMIT DX:  CHF (congestive heart failure) (HCC) [I50.9]    Patient Active Problem List   Diagnosis    Acute decompensated heart failure (HCC)    Hypertensive emergency    SIRS (systemic inflammatory response syndrome) (HCC)    Does not have health insurance       Past Medical History:   Diagnosis Date    Hypertension        Past Surgical History:   Procedure Laterality Date    CARDIAC CATHETERIZATION Left 7/10/2024    Procedure: Cardiac Left Heart Cath;  Surgeon: Justino Patel MD;  Location: [facility];  Service: Cardiology       Imaging Studies:  XR chest pa & lateral   Final Result by Mary Kay Estrella MD (07/07 0819)      Improving pulmonary edema with small pleural effusions.            Workstation performed: [id number]             Past Medical History:   Diagnosis Date    Hypertension      Length Of Stay: 6  Performed at least 2 patient identifiers during session: Name and Birthday  PHYSICAL THERAPY EVALUATION :        07/11/24 1230   PT Last Visit   PT Visit Date 07/11/24   Note Type   Note type Evaluation  (and treatment)   Additional Comments Greeted seated in bedside chair. Amenable to session.   Pain Assessment   Pain Assessment Tool FLACC   Pain Location/Orientation Orientation: Right  (calf; inc w/ activity)   Pain Rating: FLACC (Rest) - Face 0   Pain Rating: FLACC (Rest) - Legs 0   Pain Rating: FLACC (Rest) - Activity 0   Pain Rating: FLACC (Rest) - Cry 1   Pain Rating: FLACC (Rest) - Consolability 0   Score: FLACC (Rest) 1   Pain Rating: FLACC (Activity) - Face 2   Pain Rating: FLACC (Activity) - Legs 0   Pain Rating: FLACC (Activity) - Activity 0   Pain Rating: FLACC (Activity) - Cry 1   Pain Rating: FLACC (Activity) - Consolability 1   Score: FLACC (Activity) 4   Restrictions/Precautions   Weight Bearing Precautions Per Order No   Other  Precautions Fall Risk;Telemetry;Pain  (Syriac speaking;  [id number])   Home Living   Type of Home Other (Comment)  (Rented room w/in house)   Home Layout Other (Comment)  (Room on 2nd Floor; (+) BHAVANA, kitchen and bathroom on main level, FOS w/ rail up to bedroom.)   Bathroom Shower/Tub Tub/shower unit   Home Equipment   (denies)   Prior Function   Level of [place] Independent with ADLs;Independent with functional mobility   IADLs Family/Friend/Other provides transportation   Falls in the last 6 months 0   Comments PTA, pt reports functioning at independent w/ functional mobility w/o AD, ADLs, (-)drive, (-) falls.   General   Family/Caregiver Present Yes  (Sister and sisters )   Cognition   Overall Cognitive Status WFL   Arousal/Participation Alert   Orientation Level Oriented to person;Oriented to place   Following Commands Follows one step commands with increased time or repetition   Comments Inc anxious behaviors w/ inc respiratory rate during additional tx session. Refer to assessment below   Subjective   Subjective Reporting R calf pain that worsens w/ activity. (+) Tenderness to touch, (-) erythema or warmth   RUE Assessment   RUE Assessment   (refer to OT)   LUE Assessment   LUE Assessment   (refer to OT)   RLE Assessment   RLE Assessment WFL  (3+/5 grossly, (+) slight edema)   LLE Assessment   LLE Assessment WFL  (3+/5 grossly, (+) slight edema)   Transfers   Sit to Stand 4  Minimal assistance   Additional items Increased time required;Verbal cues;Armrests   Stand to Sit 4  Minimal assistance   Additional items Increased time required;Verbal cues;Armrests   Toilet transfer 4  Minimal assistance   Additional items Assist x 1;Increased time required;Standard toilet   Car transfer 4  Minimal assistance   Additional items Assist x 1;Increased time required;Verbal cues  (RW and LE management 2 reps additional tx session)   Additional Comments min Ax1 w/ HHA x1 rep bedside chair vs. RW w/ min  Ax1. Toilet transfer; Cues throughout eval and session for safe technique, hand placement/ RW management.   Ambulation/Elevation   Gait pattern Improper Weight shift;Poor UE support;Forward Flexion;Wide YOSELIN;Decreased foot clearance;Foward flexed;Excessively slow;Knees flexed;Step through pattern;Antalgic   Gait Assistance   (Min Ax1 w/ HHA vs. close Supervision w/ RW)   Additional items Assist x 1;Verbal cues   Assistive Device None;Rolling walker   Distance 10'x1 w/ HHA min Ax1 unsteady, RW 15'x1 at close supervision; additional tx session RW 15'x1, 25'x1 + 15'x2 +10'x1 w/ RW and close supervision  (seated therapeutic rest breaks)   Stair Management Assistance (S)  3  Moderate assist  (additional tx session)   Additional items Assist x 1;Verbal cues;Tactile cues;Increased time required  (2nd person Stand by for safety)   Stair Management Technique Two rails;Step to pattern   Number of Stairs 4   Ambulation/Elevation Additional Comments Unsteady patterning without gross LOB w/ HHA x1. Inc stability w/ trial of RW requiring close supervision w/o gross LOB. Dec tolerance 2/2 fatigue and R calf pain. Seated therapeutic rest breaks required; additional tx session gait w/ cues throughout for proximity to RW, upright posture, step length. Stairs w/ cues for safety and manual faciliation of weight shifting/stability/force production w/ R knee buckle w/ descent requiring knee block; on stairs pt observed w/ FRIEDMAN aw/ inc respiratory rate, anxious behavior SpO2 98% and HR 99. Educated on pursed lip breathing to slow respiratory rate, reassurance provided. Denying chest pain/cardiorespiratory symptoms   Balance   Static Sitting Fair +   Dynamic Sitting Fair   Static Standing Fair -   Dynamic Standing Poor +   Ambulatory Poor +   Endurance Deficit   Endurance Deficit Yes   Endurance Deficit Description limited ambulatory distances, FRIEDMAN, fatigue   Activity Tolerance   Activity Tolerance Patient limited by fatigue;Patient limited  "by pain;Other (Comment)  (R calf pain; SpO2 >96% on RA, HR 99 post stairs. BP post-tx session 111/78.)   Medical Staff Made Aware MD, OT   Nurse Made Aware RN cleared   Assessment   Prognosis Fair   Problem List Decreased strength;Decreased endurance;Impaired balance;Decreased mobility;Impaired judgement;Decreased safety awareness;Obesity;Pain   Assessment Pt is a 63 y.o. female y/o [facility] with stabbing chest pain and acutely worsening SOB, and inc b/l LE edema x2 months. ED she was noted to be hypoxic with an SpO2 in the 80s requiring Bipap. Workup: CXR (+)  pulmonary edema. TTE 7/5/2024 w/ EF 15%, mild to moderate left-sided pleural effusion. Left heart catheterization 7/10 (-) obstructive CAD, abnormal lab values. Primary dx: Acute decompensated heart failure (HCC). Significant pmhx per chart: acute decompensated HF, HTN emergency, SIRS. PT consulted to assess strength/functional mobility, activity tolerance and d/c needs. Active PT orders. PTA, pt reports functioning at independent w/ functional mobility w/o AD, ADLs, (-)drive, (-) falls.      During PT IE, pt presenting with above (see flowsheet) outlined functional impairments including dec strength, balance, gait, (+) edema, and deficits that limit functional mobility and activity tolerance relative to baseline. Pt currently requires min Ax1 for transfers, min Ax1 w/o AD vs. Close supervision for ambulation with RW for limited household distances. Pt currently demonstrating inc fall risk, requiring use of more restrictive AD. Recommend continued use of RW at this time. Fall risk education provided with verbal understanding. [facility] staff most recent vital signs as follows: /69 (BP Location: Left arm)   Pulse 87   Temp (!) 97.4 °F (36.3 °C) (Temporal)   Resp 20   Ht 5' 4\" (1.626 m)   Wt 69.6 kg (153 lb 7 oz)   SpO2 97%   BMI 26.34 kg/m² . Reporting R calf pain that inc w/ activity w/ tenderness to palpation, no " erythema/warmth noted. MD aware. Denied additional sxs throughout session. Recommend continued mobilization of pt with nsg staff as tolerated to prevent further decline in function.  Pt will benefit from continued PT services to progress mobility independence necessary for return to PLOF. Based on pt presentation and impairments, pt would most appropriately benefit from level II (mod) rehab intensity resources  pending progress. Additional tx session assessment below.   Barriers to Discharge Inaccessible home environment;Decreased caregiver support  ((+) stairs, uncertain level of support at home)   Goals   Patient Goals go to the bathroom   STG Expiration Date 07/25/24   Short Term Goal #1 1).  Pt will perform bed mobility with Jess demonstrating appropriate technique 100% of the time in order to improve function.2)  Perform all transfers with Jess demonstrating safe and appropriate technique 100% of the time in order to improve ability to negotiate safely in home environment.3) Amb with least restrictive AD > 200'x1 with mod I in order to demonstrate ability to negotiate in home environment. 4)  Improve overall strength and balance 1/2 grade in order to optimize ability to perform functional tasks and reduce fall risk. 5) Increase activity tolerance to 45 minutes in order to improve endurance to functional tasks. 6)  Negotiate >/= flight of stairs using most appropriate technique and mod I in order to be able to negotiate safely in home environment. 7) PT for ongoing patient and family/caregiver education, DME needs and d/c planning in order to promote highest level of function in least restrictive environment.   PT Treatment Day 0   Plan   Treatment/Interventions Functional transfer training;LE strengthening/ROM;Elevations;Therapeutic exercise;Endurance training;Equipment eval/education;Gait training;Bed mobility;Compensatory technique education;Spoke to nursing;Spoke to case management;Spoke to MD;OT;Family   PT  Frequency 3-5x/wk   Discharge Recommendation   Rehab Resource Intensity Level, PT II (Moderate Resource Intensity)   Equipment Recommended Walker   Additional Comments The patient's AM-PAC Basic Mobility Inpatient Short Form Raw Score is 17. A Raw score of greater than 16 suggests the patient may benefit from discharge to home. Please also refer to the recommendation of the Physical Therapist for safe discharge planning.  However, pt requires stairs to access home w/ limited home support.   AM-PAC Basic Mobility Inpatient   Turning in Flat Bed Without Bedrails 3   Lying on Back to Sitting on Edge of Flat Bed Without Bedrails 3   Moving Bed to Chair 3   Standing Up From Chair Using Arms 3   Walk in Room 3   Climb 3-5 Stairs With Railing 2   Basic Mobility Inpatient Raw Score 17   Basic Mobility Standardized Score 39.67   [facility] Highest Level Of Mobility   -HLM Goal 5: Stand one or more mins   -HLM Achieved 7: Walk 25 feet or more   Additional Treatment Session   Start Time 1207   End Time 1230   Treatment Assessment Additional tx session following IE w/ focus on transfers, gait training, stair negotiation, and pt/family education. Progressing towards functional goals w/ fair to poor tolerance to session limited 2/2 fatigue, inc R calf pain w/ activity, and inc respiratory rate/FRIEDMAN during stairs. Currently requiring close supervision w/ RW for short distances, min Ax1 for transfers from variable surfaces w/ RW, and mod Ax1 for limited stair negotiation. Multimodal skilled cues provided throughout for safe technique/sequencing. Despite presentation, VSS and denied chest pain/cardiorespiratory sxs throughout session. Frequent seated rest breaks required during 2/2 dec activity tolerance. Demonstrates deficits relative to PLOF and will continue to benefit from skilled PT per POC. Recommend level II (mod) rehab intensity resources upon d/c pending progress w/ continued use of RW at this time. MD aware.Pt seen  for co-/treatment with skilled Occupational Therapist 2* clinically unstable/unpredictable presentation, medical complexity, fall risk, cognitive impairments, functional/physical limitations, impaired functional balance, decreased safety awareness, limited activity tolerance which is decline from PLOF and may impact overall functional mobility/mobility safety.   Equipment Use refer to flowsheet   Additional Treatment Day 1   End of Consult   Patient Position at End of Consult Bedside chair;All needs within reach;Other (comment)  (Pt stable. Family in room. Instructed to call for assistance from nursing prior to mobilizing.)       Pt requires PT/OT co-eval for pt's best interest due to medical complexity, safety concerns, fall risk, dec activity tolerance which is decline from PLOF, significant assistance with mobility, medical acuity.    (Please find full objective findings from PT assessment regarding body systems outlined above).     Hx/personal factors: inaccessible home environment, step(s) to enter environment, multi-level environment, preferred language not English (language barrier), and use of more restrictive AD, co-morbidities, dec caregiver support, telemetry, use of AD, pain, and fall risk, coping styles, social background, past experience, behavior pattern, post op precautions  Examination: assessed/impairments of systems including multiple body structures involved; dec mobility, dec balance, dec endurance, dec amb, risk for falls, pain, impairements in locomotion, musculoskeletal, balance, endurance, posture, coordination, assessed cognition, edema, AM-PAC score suggesting inc assistance/supervision vs baseline, use of more restrictive AD/brace/orthosis/prosthesis, gait deviations , dec activity tolerance/endurance vs baseline ,   Clinical: unpredictable (ongoing medical status, abnormal lab values, risk for falls, pain mgt, and need for input for mobility technique/safety, )  Complexity: high      Fermin Hurd, PT,DPT   07/11/24

## 2024-07-12 LAB
ANION GAP SERPL CALCULATED.3IONS-SCNC: 7 MMOL/L (ref 4–13)
BASOPHILS # BLD AUTO: 0.06 THOUSANDS/ÂΜL (ref 0–0.1)
BASOPHILS NFR BLD AUTO: 1 % (ref 0–1)
BUN SERPL-MCNC: 16 MG/DL (ref 5–25)
CALCIUM SERPL-MCNC: 9.7 MG/DL (ref 8.4–10.2)
CHLORIDE SERPL-SCNC: 102 MMOL/L (ref 96–108)
CO2 SERPL-SCNC: 32 MMOL/L (ref 21–32)
CREAT SERPL-MCNC: 0.81 MG/DL (ref 0.6–1.3)
EOSINOPHIL # BLD AUTO: 0.24 THOUSAND/ÂΜL (ref 0–0.61)
EOSINOPHIL NFR BLD AUTO: 3 % (ref 0–6)
ERYTHROCYTE [DISTWIDTH] IN BLOOD BY AUTOMATED COUNT: 13.8 % (ref 11.6–15.1)
GFR SERPL CREATININE-BSD FRML MDRD: 77 ML/MIN/1.73SQ M
GLUCOSE SERPL-MCNC: 104 MG/DL (ref 65–140)
HCT VFR BLD AUTO: 49 % (ref 34.8–46.1)
HGB BLD-MCNC: 15.9 G/DL (ref 11.5–15.4)
IMM GRANULOCYTES # BLD AUTO: 0.01 THOUSAND/UL (ref 0–0.2)
IMM GRANULOCYTES NFR BLD AUTO: 0 % (ref 0–2)
LYMPHOCYTES # BLD AUTO: 2.44 THOUSANDS/ÂΜL (ref 0.6–4.47)
LYMPHOCYTES NFR BLD AUTO: 29 % (ref 14–44)
MCH RBC QN AUTO: 29.8 PG (ref 26.8–34.3)
MCHC RBC AUTO-ENTMCNC: 32.4 G/DL (ref 31.4–37.4)
MCV RBC AUTO: 92 FL (ref 82–98)
MONOCYTES # BLD AUTO: 0.77 THOUSAND/ÂΜL (ref 0.17–1.22)
MONOCYTES NFR BLD AUTO: 9 % (ref 4–12)
NEUTROPHILS # BLD AUTO: 4.84 THOUSANDS/ÂΜL (ref 1.85–7.62)
NEUTS SEG NFR BLD AUTO: 58 % (ref 43–75)
NRBC BLD AUTO-RTO: 0 /100 WBCS
PLATELET # BLD AUTO: 211 THOUSANDS/UL (ref 149–390)
PMV BLD AUTO: 12.1 FL (ref 8.9–12.7)
POTASSIUM SERPL-SCNC: 4 MMOL/L (ref 3.5–5.3)
RBC # BLD AUTO: 5.33 MILLION/UL (ref 3.81–5.12)
SODIUM SERPL-SCNC: 141 MMOL/L (ref 135–147)
WBC # BLD AUTO: 8.36 THOUSAND/UL (ref 4.31–10.16)

## 2024-07-12 PROCEDURE — 80048 BASIC METABOLIC PNL TOTAL CA: CPT | Performed by: INTERNAL MEDICINE

## 2024-07-12 PROCEDURE — 99232 SBSQ HOSP IP/OBS MODERATE 35: CPT | Performed by: INTERNAL MEDICINE

## 2024-07-12 PROCEDURE — 99232 SBSQ HOSP IP/OBS MODERATE 35: CPT | Performed by: STUDENT IN AN ORGANIZED HEALTH CARE EDUCATION/TRAINING PROGRAM

## 2024-07-12 PROCEDURE — 85025 COMPLETE CBC W/AUTO DIFF WBC: CPT | Performed by: INTERNAL MEDICINE

## 2024-07-12 RX ADMIN — LISINOPRIL 5 MG: 5 TABLET ORAL at 08:58

## 2024-07-12 RX ADMIN — FUROSEMIDE 40 MG: 40 TABLET ORAL at 08:58

## 2024-07-12 RX ADMIN — ENOXAPARIN SODIUM 40 MG: 40 INJECTION SUBCUTANEOUS at 08:58

## 2024-07-12 RX ADMIN — METOPROLOL SUCCINATE 25 MG: 25 TABLET, EXTENDED RELEASE ORAL at 08:58

## 2024-07-12 RX ADMIN — ASPIRIN 81 MG CHEWABLE TABLET 81 MG: 81 TABLET CHEWABLE at 08:58

## 2024-07-12 NOTE — ASSESSMENT & PLAN NOTE
[place]  She is ma [facility]  Hospital will provide medications for her at time of discharge

## 2024-07-12 NOTE — PLAN OF CARE
Problem: Prexisting or High Potential for Compromised Skin Integrity  Goal: Skin integrity is maintained or improved  Description: INTERVENTIONS:  - Identify patients at risk for skin breakdown  - Assess and monitor skin integrity  - Assess and monitor nutrition and hydration status  - Monitor labs   - Assess for incontinence   - Turn and reposition patient  - Assist with mobility/ambulation  - Relieve pressure over bony prominences  - Avoid friction and shearing  - Provide appropriate hygiene as needed including keeping skin clean and dry  - Evaluate need for skin moisturizer/barrier cream  - Collaborate with interdisciplinary team   - Patient/family teaching  - Consider wound care consult   Outcome: Progressing     Problem: PAIN - ADULT  Goal: Verbalizes/displays adequate comfort level or baseline comfort level  Description: Interventions:  - Encourage patient to monitor pain and request assistance  - Assess pain using appropriate pain scale  - Administer analgesics based on type and severity of pain and evaluate response  - Implement non-pharmacological measures as appropriate and evaluate response  - Consider cultural and social influences on pain and pain management  - Notify physician/advanced practitioner if interventions unsuccessful or patient reports new pain  Outcome: Progressing     Problem: INFECTION - ADULT  Goal: Absence or prevention of progression during hospitalization  Description: INTERVENTIONS:  - Assess and monitor for signs and symptoms of infection  - Monitor lab/diagnostic results  - Monitor all insertion sites, i.e. indwelling lines, tubes, and drains  - Monitor endotracheal if appropriate and nasal secretions for changes in amount and color  - [place] appropriate cooling/warming therapies per order  - Administer medications as ordered  - Instruct and encourage patient and family to use good hand hygiene technique  - Identify and instruct in appropriate isolation precautions for  identified infection/condition  Outcome: Progressing  Goal: Absence of fever/infection during neutropenic period  Description: INTERVENTIONS:  - Monitor WBC    Outcome: Progressing     Problem: SAFETY ADULT  Goal: Patient will remain free of falls  Description: INTERVENTIONS:  - Educate patient/family on patient safety including physical limitations  - Instruct patient to call for assistance with activity   - Consult OT/PT to assist with strengthening/mobility   - Keep Call bell within reach  - Keep bed low and locked with side rails adjusted as appropriate  - Keep care items and personal belongings within reach  - Initiate and maintain comfort rounds  - Make Fall Risk Sign visible to staff  - Offer Toileting every 2 Hours, in advance of need  - Initiate/Maintain bed alarm  - Apply yellow socks and bracelet for high fall risk patients  - Consider moving patient to room near nurses station  Outcome: Progressing  Goal: Maintain or return to baseline ADL function  Description: INTERVENTIONS:  -  Assess patient's ability to carry out ADLs; assess patient's baseline for ADL function and identify physical deficits which impact ability to perform ADLs (bathing, care of mouth/teeth, toileting, grooming, dressing, etc.)  - Assess/evaluate cause of self-care deficits   - Assess range of motion  - Assess patient's mobility; develop plan if impaired  - Assess patient's need for assistive devices and provide as appropriate  - Encourage maximum independence but intervene and supervise when necessary  - Involve family in performance of ADLs  - Assess for home care needs following discharge   - Consider OT consult to assist with ADL evaluation and planning for discharge  - Provide patient education as appropriate  Outcome: Progressing  Goal: Maintains/Returns to pre admission functional level  Description: INTERVENTIONS:  - Perform AM-PAC 6 Click Basic Mobility/ Daily Activity assessment daily.  - Set and communicate daily mobility  goal to care team and patient/family/caregiver.   - Collaborate with rehabilitation services on mobility goals if consulted  - Perform Range of Motion 3 times a day.  - Record patient progress and toleration of activity level   Outcome: Progressing     Problem: DISCHARGE PLANNING  Goal: Discharge to home or other facility with appropriate resources  Description: INTERVENTIONS:  - Identify barriers to discharge w/patient and caregiver  - Arrange for needed discharge resources and transportation as appropriate  - Identify discharge learning needs (meds, wound care, etc.)  - Arrange for interpretive services to assist at discharge as needed  - Refer to Case Management Department for coordinating discharge planning if the patient needs post-hospital services based on physician/advanced practitioner order or complex needs related to functional status, cognitive ability, or social support system  Outcome: Progressing     Problem: Knowledge Deficit  Goal: Patient/family/caregiver demonstrates understanding of disease process, treatment plan, medications, and discharge instructions  Description: Complete learning assessment and assess knowledge base.  Interventions:  - Provide teaching at level of understanding  - Provide teaching via preferred learning methods  Outcome: Progressing     Problem: CARDIOVASCULAR - ADULT  Goal: Maintains optimal cardiac output and hemodynamic stability  Description: INTERVENTIONS:  - Monitor I/O, vital signs and rhythm  - Monitor for S/S and trends of decreased cardiac output  - Administer and titrate ordered vasoactive medications to optimize hemodynamic stability  - Assess quality of pulses, skin color and temperature  - Assess for signs of decreased coronary artery perfusion  - Instruct patient to report change in severity of symptoms  Outcome: Progressing  Goal: Absence of cardiac dysrhythmias or at baseline rhythm  Description: INTERVENTIONS:  - Continuous cardiac monitoring, vital signs,  obtain 12 lead EKG if ordered  - Administer antiarrhythmic and heart rate control medications as ordered  - Monitor electrolytes and administer replacement therapy as ordered  Outcome: Progressing

## 2024-07-12 NOTE — PROGRESS NOTES
Cardiology Progress Note   Nancy Calderon 63 y.o. female MRN: [id number]  Unit/Bed#: E4 -01 Encounter: [id number]    Hospital Course/Assessment  63 y.o. female presented to the ED on 7/5 complaining acute onset chest pain that began this morning around 2 AM.  The symptoms woke her up from sleep.  She also reported shortness of breath and lower extremity edema over the last 2 months.  Her past medical history is unclear as she moved here from the [place] 5 years ago but she has a reported history of hypertension, heart stents (last in 2011?) and possible CHF.  She followed with a cardiologist in the [facility] but has not establish care with a cardiologist since being in the [place].  She has also not been taking her cardiac medications due to insurance problems (Coreg and valsartan?).  The ED she was noted to be hypoxic with an SpO2 in the 80s requiring Bipap.  Troponin was negative.  BNP was elevated at 2673.  CXR showed pulmonary edema.  She was hemodynamically stable.  Echocardiogram showed a severely reduced EF of 15%.     Subjective:   Today the patient was seen and examined at bedside.  She had no acute events overnight.  Today overall she reports feeling well and had no acute cardiac complaints.  She is primarily worried about her social/living situation.  She also reported that she does not want to go to rehab, but is unsure if she would be able to live on her own.  She is wearing her LifeVest.  She had no events on telemetry.    Plan  # Acute on chronic systolic and diastolic CHF  # Severe cardiomyopathy, EF 15%  # Hypotension  TTE 7/5/24 showed a severely reduced EF of 15% and suspected grade 2 diastolic dysfunction.  She did report a vague history of a cardiomyopathy for which she followed with a doctor in [place] but [place] the [place] 5 [place] ago.  She was not taking any medications at home due to insurance issues.  Continue p.o. Lasix 40 mg  "daily.  Continue GDMT with lisinopril 5 mg daily and Toprol 25 mg daily   Can consider adding spironolactone and/or Jardiance in the future.  Monitor I's and O's, daily weights and renal function  Continue telemetry monitoring  Left heart catheterization 7/10 showing no obstructive coronary disease.   PT/OT recommending rehab.  Has LifeVest -> encourage continued use  Case management following for assistance with obtaining medical insurance and with her social issues/living situation.  Patient is stable for discharge from a cardiology standpoint.  Continue medical regimen and LifeVest as above and recheck BMP 1 week after discharge.  Will plan for outpatient cardiology and advanced HF follow up. Plan to obtain repeat echo in 3 months to assess LV function and evaluate for possible ICD.       Cardiology will sign off.  Please reach out with any questions.      Bao Fournier MD  -PGY-5 Cardiology Fellow  -Tiger text enabled    ======================================================  Objective  VS: Blood pressure 135/81, pulse 98, temperature 98.2 °F (36.8 °C), temperature source Temporal, resp. rate 18, height 5' 4\" (1.626 m), weight 69.7 kg (153 lb 10.6 oz), SpO2 100%.  Gen: Well appearing  Psych: AOx3  Skin: Intact  Neck: Supple  Cardiac: S1, S2, regular rate, no S3 or S4 appreciated. No murmurs. +2 PT, radial pulses.  No peripheral edema. No carotid bruits.  Resp: CTABL. No crackles  Abd: Nontender, nondistended  Rheum: No joint deformities in UE or LE  ======================================================  TREADMILL STRESS  No results found for this or any previous visit.     ----------------------------------------------------------------------------------------------  NUCLEAR STRESS TEST: No results found for this or any previous visit.    No results found for this or any previous visit.      --------------------------------------------------------------------------------  CATH:  No results found for this or " "any previous visit.    --------------------------------------------------------------------------------  ECHO:   No results found for this or any previous visit.    No results found for this or any previous visit.    --------------------------------------------------------------------------------  HOLTER  No results found for this or any previous visit.    --------------------------------------------------------------------------------  CAROTIDS  No results found for this or any previous visit.       [unfilled]   =====================================================    Active Meds    Current Facility-Administered Medications:     acetaminophen (TYLENOL) tablet 650 mg, 650 mg, Oral, Q6H PRN, David Phan MD, 650 mg at 07/10/24 2128    aspirin chewable tablet 81 mg, 81 mg, Oral, Daily, Justino Patel MD, 81 mg at 07/12/24 0858    enoxaparin (LOVENOX) subcutaneous injection 40 mg, 40 mg, Subcutaneous, Daily, David Phan MD, 40 mg at 07/12/24 0858    furosemide (LASIX) tablet 40 mg, 40 mg, Oral, Daily, David Phan MD, 40 mg at 07/12/24 0858    lisinopril (ZESTRIL) tablet 5 mg, 5 mg, Oral, Daily, David Phan MD, 5 mg at 07/12/24 0858    metoprolol succinate (TOPROL-XL) 24 hr tablet 25 mg, 25 mg, Oral, Daily, Bao Fournier MD, 25 mg at 07/12/24 0858    nitroglycerin (NITROSTAT) SL tablet 0.4 mg, 0.4 mg, Sublingual, Q5 Min PRN, Justino Patel MD    Labs & Results  No results found for: \"CKTOTAL\", \"CKMB\", \"CKMBINDEX\", \"TROPONINI\"  Lab Results   Component Value Date    CALCIUM 9.7 07/12/2024    K 4.0 07/12/2024    CO2 32 07/12/2024     07/12/2024    BUN 16 07/12/2024    CREATININE 0.81 07/12/2024     Lab Results   Component Value Date    WBC 8.36 07/12/2024    HGB 15.9 (H) 07/12/2024    HCT 49.0 (H) 07/12/2024    MCV 92 07/12/2024     07/12/2024     Results from last 7 days   Lab Units 07/10/24  1024   INR  1.04     No results found for: \"CHOL\"  No results found for: \"HDL\"  No results found for: " "\"LDLCALC\"  No results found for: \"TRIG\"  Lab Results   Component Value Date    ALT 40 07/05/2024    AST 30 07/05/2024    ALKPHOS 94 07/05/2024          "

## 2024-07-12 NOTE — ASSESSMENT & PLAN NOTE
Wt Readings from Last 3 Encounters:   07/12/24 69.7 kg (153 lb 10.6 oz)   07/05/24 83.2 kg (183 lb 6.8 oz)     Presented with 1 day history of worsening sob, cp and lower ext edema  She had been at  and treated with nitro gtt, lasix 40mg IV x1. Her respiratory status worsened in which she was then placed on bipap.   TTE 7/5/2024 revealed ejection fraction 15%, grade 2 diastolic dysfunction  Bnp was 2673, trops normal  Patient still having dyspnea with exertion, will repeat chest x-ray  Status post cardiac catheterization 7/10/2024 revealed obstructive CAD  Transition to oral diuretics furosemide 40 mg daily, continue carvedilol 6.25 mg twice daily, lisinopril 5 mg daily  Fitted for life [email or web address]   Outpatient cardiology follow-up

## 2024-07-12 NOTE — ASSESSMENT & PLAN NOTE
Met sirs criteria [place] [address] and tachypnea  No source of infection  Monitoring off of antibiotics   Wbc has since normalized

## 2024-07-12 NOTE — PROGRESS NOTES
[facility]  Progress Note  Name: Nancy Small  MRN: [id number]  Unit/Bed#: E4 -01 I Date of Admission: 7/5/2024   Date of Service: 7/12/2024 I Hospital Day: 7    Assessment & Plan   * Acute on chronic systolic and diastolic heart failure, HFrEF, LVEF 15%, LVIDd 5.7 cm, etiology is NICM  Assessment & Plan  Wt Readings from Last 3 Encounters:   07/12/24 69.7 kg (153 lb 10.6 oz)   07/05/24 83.2 kg (183 lb 6.8 oz)     Presented with 1 day history of worsening sob, cp and lower ext edema  She had been at  and treated with nitro gtt, lasix 40mg IV x1. Her respiratory status worsened in which she was then placed on bipap.   TTE 7/5/2024 revealed ejection fraction 15%, grade 2 diastolic dysfunction  Bnp was 2673, trops normal  Patient still having dyspnea with exertion, will repeat chest x-ray  Status post cardiac catheterization 7/10/2024 revealed obstructive CAD  Transition to oral diuretics furosemide 40 mg daily, continue carvedilol 6.25 mg twice daily, lisinopril 5 mg daily  Fitted for [employer]   Outpatient cardiology follow-up    Does not have health insurance  Assessment & Plan  [place]  She is ma pending  [facility] will provide medications for her at time of discharge    SIRS (systemic inflammatory response syndrome) (HCC)  Assessment & Plan  Met sirs criteria [place] [address] and tachypnea  No source of infection  Monitoring off of antibiotics   Wbc has since normalized     Hypertensive emergency  Assessment & Plan  Has a pmhx of htn in which she was noncompliant with medications due to lack of insurance.   S/p nitro gtt  Started on metoprolol and lisinopril             Mobility:  Basic Mobility Inpatient Raw Score: 17  JH-HLM Goal: 5: Stand one or more mins  JH-HLM Achieved: 6: Walk 10 steps or more  JH-HLM Goal achieved. Continue to encourage appropriate mobility.    VTE Pharmacologic Prophylaxis:   Pharmacologic: lovenox    Patient  Centered Rounds: I have performed bedside rounds with nursing staff today.    Education and Discussions with Family / Patient: patient    Time Spent for Care:   More than 50% of total time spent on counseling and coordination of care as described above.    Current Length of Stay: 7 day(s)    Current Patient Status: Inpatient   Certification Statement: The patient will continue to require additional inpatient hospital stay due to arranging safe discharge    Discharge Plan / Estimated Discharge Date: TBD    Code Status: Level 1 - Full Code      Subjective:   Patient seen and examined at bedside, denies any chest pain, dyspnea, Armenian interpretation used via [employer] ID [id number]    Objective:     Vitals:   Temp (24hrs), Av.8 °F (36.6 °C), Min:96.9 °F (36.1 °C), Max:98.6 °F (37 °C)    Temp:  [96.9 °F (36.1 °C)-98.6 °F (37 °C)] 97.3 °F (36.3 °C)  HR:  [71-98] 79  Resp:  [16-18] 16  BP: ()/(55-81) 103/68  SpO2:  [95 %-100 %] 95 %  Body mass index is 26.38 kg/m².     Input and Output Summary (last 24 hours):       Intake/Output Summary (Last 24 hours) at 2024 1656  Last data filed at 2024 1512  Gross per 24 hour   Intake 180 ml   Output 925 ml   Net -745 ml       Physical Exam:    Constitutional: Patient is oriented to person, place and time, no acute distress  HEENT:  Normocephalic, atraumatic  Cardiovascular: Normal S1S2, RRR, No murmurs/rubs/gallops appreciated.  Pulmonary:  Bilateral air entry, No rhonchi/rales/wheezing appreciated  Abdominal: Soft, Bowel sounds present, Non-tender, Non-distended  Extremities:  No cyanosis, clubbing or edema.   Neurological: awake, alert  Skin:  Warm, dry    Additional Data:     Labs:    Results from last 7 days   Lab Units 24  0519   WBC Thousand/uL 8.36   HEMOGLOBIN g/dL 15.9*   HEMATOCRIT % 49.0*   PLATELETS Thousands/uL 211   SEGS PCT % 58   LYMPHO PCT % 29   MONO PCT % 9   EOS PCT % 3     Results from last 7 days   Lab Units 24  0519   POTASSIUM  mmol/L 4.0   CHLORIDE mmol/L 102   CO2 mmol/L 32   BUN mg/dL 16   CREATININE mg/dL 0.81   CALCIUM mg/dL 9.7     Results from last 7 days   Lab Units 07/10/24  1024   INR  1.04        I Have Reviewed All Lab Data Listed Above.    Invasive Devices       Peripheral Intravenous Line  Duration             Peripheral IV 07/10/24 Left;Ventral (anterior) Forearm 2 days                       Recent Cultures (last 7 days):           Last 24 Hours Medication List:   Current Facility-Administered Medications   Medication Dose Route Frequency Provider Last Rate    acetaminophen  650 mg Oral Q6H PRN David Phan MD      aspirin  81 mg Oral Daily Justino Patel MD      enoxaparin  40 mg Subcutaneous Daily David Phan MD      furosemide  40 mg Oral Daily David Phan MD      lisinopril  5 mg Oral Daily David Phan MD      metoprolol succinate  25 mg Oral Daily Bao Fournier MD      nitroglycerin  0.4 mg Sublingual Q5 Min PRN Justino Patel MD          Today, Patient Was Seen By: David Phan MD

## 2024-07-12 NOTE — ASSESSMENT & PLAN NOTE
Has a pmhx of htn in which she was noncompliant with medications due to lack of insurance.   S/p nitro gtt  Started on metoprolol and lisinopril

## 2024-07-13 LAB
ANION GAP SERPL CALCULATED.3IONS-SCNC: 7 MMOL/L (ref 4–13)
BASOPHILS # BLD AUTO: 0.07 THOUSANDS/ÂΜL (ref 0–0.1)
BASOPHILS NFR BLD AUTO: 1 % (ref 0–1)
BUN SERPL-MCNC: 15 MG/DL (ref 5–25)
CALCIUM SERPL-MCNC: 9.5 MG/DL (ref 8.4–10.2)
CHLORIDE SERPL-SCNC: 103 MMOL/L (ref 96–108)
CO2 SERPL-SCNC: 30 MMOL/L (ref 21–32)
CREAT SERPL-MCNC: 0.59 MG/DL (ref 0.6–1.3)
EOSINOPHIL # BLD AUTO: 0.2 THOUSAND/ÂΜL (ref 0–0.61)
EOSINOPHIL NFR BLD AUTO: 3 % (ref 0–6)
ERYTHROCYTE [DISTWIDTH] IN BLOOD BY AUTOMATED COUNT: 13.8 % (ref 11.6–15.1)
GFR SERPL CREATININE-BSD FRML MDRD: 97 ML/MIN/1.73SQ M
GLUCOSE SERPL-MCNC: 103 MG/DL (ref 65–140)
HCT VFR BLD AUTO: 48.6 % (ref 34.8–46.1)
HGB BLD-MCNC: 15.8 G/DL (ref 11.5–15.4)
IMM GRANULOCYTES # BLD AUTO: 0.02 THOUSAND/UL (ref 0–0.2)
IMM GRANULOCYTES NFR BLD AUTO: 0 % (ref 0–2)
LYMPHOCYTES # BLD AUTO: 2.18 THOUSANDS/ÂΜL (ref 0.6–4.47)
LYMPHOCYTES NFR BLD AUTO: 28 % (ref 14–44)
MCH RBC QN AUTO: 29.8 PG (ref 26.8–34.3)
MCHC RBC AUTO-ENTMCNC: 32.5 G/DL (ref 31.4–37.4)
MCV RBC AUTO: 92 FL (ref 82–98)
MONOCYTES # BLD AUTO: 0.84 THOUSAND/ÂΜL (ref 0.17–1.22)
MONOCYTES NFR BLD AUTO: 11 % (ref 4–12)
NEUTROPHILS # BLD AUTO: 4.61 THOUSANDS/ÂΜL (ref 1.85–7.62)
NEUTS SEG NFR BLD AUTO: 57 % (ref 43–75)
NRBC BLD AUTO-RTO: 0 /100 WBCS
PLATELET # BLD AUTO: 197 THOUSANDS/UL (ref 149–390)
PMV BLD AUTO: 12.6 FL (ref 8.9–12.7)
POTASSIUM SERPL-SCNC: 3.7 MMOL/L (ref 3.5–5.3)
RBC # BLD AUTO: 5.3 MILLION/UL (ref 3.81–5.12)
SODIUM SERPL-SCNC: 140 MMOL/L (ref 135–147)
WBC # BLD AUTO: 7.92 THOUSAND/UL (ref 4.31–10.16)

## 2024-07-13 PROCEDURE — 80048 BASIC METABOLIC PNL TOTAL CA: CPT | Performed by: INTERNAL MEDICINE

## 2024-07-13 PROCEDURE — 99232 SBSQ HOSP IP/OBS MODERATE 35: CPT | Performed by: INTERNAL MEDICINE

## 2024-07-13 PROCEDURE — 85025 COMPLETE CBC W/AUTO DIFF WBC: CPT | Performed by: INTERNAL MEDICINE

## 2024-07-13 RX ORDER — METOPROLOL SUCCINATE 25 MG/1
12.5 TABLET, EXTENDED RELEASE ORAL 2 TIMES DAILY
Status: DISCONTINUED | OUTPATIENT
Start: 2024-07-14 | End: 2024-07-14 | Stop reason: HOSPADM

## 2024-07-13 RX ORDER — METOPROLOL SUCCINATE 25 MG/1
12.5 TABLET, EXTENDED RELEASE ORAL DAILY
Status: DISCONTINUED | OUTPATIENT
Start: 2024-07-14 | End: 2024-07-13

## 2024-07-13 RX ORDER — LISINOPRIL 2.5 MG/1
2.5 TABLET ORAL DAILY
Status: DISCONTINUED | OUTPATIENT
Start: 2024-07-14 | End: 2024-07-14 | Stop reason: HOSPADM

## 2024-07-13 RX ORDER — POTASSIUM CHLORIDE 20 MEQ/1
40 TABLET, EXTENDED RELEASE ORAL ONCE
Status: COMPLETED | OUTPATIENT
Start: 2024-07-13 | End: 2024-07-13

## 2024-07-13 RX ORDER — PANTOPRAZOLE SODIUM 40 MG/1
40 TABLET, DELAYED RELEASE ORAL
Status: DISCONTINUED | OUTPATIENT
Start: 2024-07-14 | End: 2024-07-14 | Stop reason: HOSPADM

## 2024-07-13 RX ADMIN — ASPIRIN 81 MG CHEWABLE TABLET 81 MG: 81 TABLET CHEWABLE at 08:51

## 2024-07-13 RX ADMIN — METOPROLOL SUCCINATE 25 MG: 25 TABLET, EXTENDED RELEASE ORAL at 08:50

## 2024-07-13 RX ADMIN — FUROSEMIDE 40 MG: 40 TABLET ORAL at 08:51

## 2024-07-13 RX ADMIN — POTASSIUM CHLORIDE 40 MEQ: 1500 TABLET, EXTENDED RELEASE ORAL at 13:46

## 2024-07-13 RX ADMIN — ENOXAPARIN SODIUM 40 MG: 40 INJECTION SUBCUTANEOUS at 08:51

## 2024-07-13 RX ADMIN — LISINOPRIL 5 MG: 5 TABLET ORAL at 08:51

## 2024-07-13 NOTE — PROGRESS NOTES
[facility]  Progress Note  Name: Nancy Small  MRN: [id number]  Unit/Bed#: E4 -01 I Date of Admission: 7/5/2024   Date of Service: 7/13/2024 I Hospital Day: 8    Assessment & Plan   * Acute on chronic systolic and diastolic heart failure, HFrEF, LVEF 15%, LVIDd 5.7 cm, etiology is NICM  Assessment & Plan  Wt Readings from Last 3 Encounters:   07/13/24 70 kg (154 lb 5.2 oz)   07/05/24 83.2 kg (183 lb 6.8 oz)     Presented with 1 day history of worsening sob, cp and lower ext edema  She had been at  and treated with nitro gtt, lasix 40mg IV x1. Her respiratory status worsened in which she was then placed on bipap.   TTE 7/5/2024 revealed ejection fraction 15%, grade 2 diastolic dysfunction  Bnp was 2673, trops normal  Patient still having dyspnea with exertion, will repeat chest x-ray  Status post cardiac catheterization 7/10/2024 revealed obstructive CAD  furosemide 40 mg daily, continue carvedilol 6.25 mg twice daily, lisinopril 5 mg daily  Fitted for life vest   Outpatient cardiology follow-up and cardiac rehab    Does not have health insurance  Assessment & Plan  She is originally from the   She is ma pending  Hospital will provide medications for her at time of discharge  Renting a room in a house did have a lengthy discussion regarding safe discharge planning with case management at bedside.  Does feel safe returning to her current place there is a bathroom on her floor she will have to use the stairs to leave the house for doctors appointments and can have family or friends bring her food and her medications to her room    SIRS (systemic inflammatory response syndrome) (HCC)  Assessment & Plan  Met sirs criteria [place]bc of [address] and tachypnea  No source of infection  Monitoring off of antibiotics   Wbc has since normalized     Hypertensive emergency  Assessment & Plan  Has a pmhx of htn in which she was noncompliant with medications due to lack of  insurance.   S/p nitro gtt  Started on metoprolol and lisinopril             Mobility:  Basic Mobility Inpatient Raw Score: 17  JH-HLM Goal: 5: Stand one or more mins  JH-HLM Achieved: 4: Move to chair/commode  JH-HLM Goal NOT achieved. Continue with multidisciplinary rounding and encourage appropriate mobility to improve upon JH-HLM goals.    VTE Pharmacologic Prophylaxis:   Pharmacologic: lovenox    Patient Centered Rounds: I have performed bedside rounds with nursing staff today.    Discussions with Specialists or Other Care Team Provider: CM, cardiology    Education and Discussions with Family / Patient: Updated sister at bedside    Time Spent for Care:   More than 50% of total time spent on counseling and coordination of care as described above.    Current Length of Stay: 8 day(s)    Current Patient Status: Inpatient   Certification Statement: The patient will continue to require additional inpatient hospital stay due to hypotension    Discharge Plan / Estimated Discharge Date: 24h    Code Status: Level 1 - Full Code      Subjective:   Patient seen and examined at bedside, planing of some burning chest discomfort, metallic taste in mouth.  Denies any dyspnea.  Ivorian interpretation via [employer], ID [id number]    Objective:     Vitals:   Temp (24hrs), Av.4 °F (36.3 °C), Min:96 °F (35.6 °C), Max:97.9 °F (36.6 °C)    Temp:  [96 °F (35.6 °C)-97.9 °F (36.6 °C)] 97.8 °F (36.6 °C)  HR:  [70-87] 80  Resp:  [14-20] 20  BP: ()/(50-68) 96/57  SpO2:  [94 %-98 %] 94 %  Body mass index is 26.49 kg/m².     Input and Output Summary (last 24 hours):       Intake/Output Summary (Last 24 hours) at 2024 1637  Last data filed at 2024 0924  Gross per 24 hour   Intake 620 ml   Output 1100 ml   Net -480 ml       Physical Exam:    Constitutional: Patient is oriented to person, place and time, no acute distress  HEENT:  Normocephalic, atraumatic  Cardiovascular: Normal S1S2, RRR, No murmurs/rubs/gallops  appreciated.  Pulmonary:  Bilateral air entry, No rhonchi/rales/wheezing appreciated  Abdominal: Soft, Bowel sounds present, Non-tender, Non-distended  Extremities:  No cyanosis, clubbing or edema.   Neurological: Awake, alert  Skin:  Warm, dry    Additional Data:     Labs:    Results from last 7 days   Lab Units 07/13/24  0538   WBC Thousand/uL 7.92   HEMOGLOBIN g/dL 15.8*   HEMATOCRIT % 48.6*   PLATELETS Thousands/uL 197   SEGS PCT % 57   LYMPHO PCT % 28   MONO PCT % 11   EOS PCT % 3     Results from last 7 days   Lab Units 07/13/24  0538   POTASSIUM mmol/L 3.7   CHLORIDE mmol/L 103   CO2 mmol/L 30   BUN mg/dL 15   CREATININE mg/dL 0.59*   CALCIUM mg/dL 9.5     Results from last 7 days   Lab Units 07/10/24  1024   INR  1.04        I Have Reviewed All Lab Data Listed Above.    Invasive Devices       Peripheral Intravenous Line  Duration             Peripheral IV 07/10/24 Left;Ventral (anterior) Forearm 3 days                      Recent Cultures (last 7 days):           Last 24 Hours Medication List:   Current Facility-Administered Medications   Medication Dose Route Frequency Provider Last Rate    acetaminophen  650 mg Oral Q6H PRN David Phan MD      aspirin  81 mg Oral Daily Justino Patel MD      enoxaparin  40 mg Subcutaneous Daily David Phan MD      furosemide  40 mg Oral Daily David Phan MD      lisinopril  5 mg Oral Daily David Phan MD      metoprolol succinate  25 mg Oral Daily Bao Fournier MD      nitroglycerin  0.4 mg Sublingual Q5 Min PRN Justino Patel MD      [START ON 7/14/2024] pantoprazole  40 mg Oral Early Morning David Phan MD          Today, Patient Was Seen By: David Phan MD

## 2024-07-13 NOTE — ASSESSMENT & PLAN NOTE
[place]  She is ma pending  Hospital will provide medications for her at time of discharge  Renting a room in a house did have a lengthy discussion regarding safe discharge planning with case management at bedside.  Does feel safe returning to her current place there is a bathroom on her floor she will have to use the stairs to leave the house for doctors appointments and can have family or friends bring her food and her medications to her room

## 2024-07-13 NOTE — PROGRESS NOTES
Counts include 234 beds at the [facility]  Progress Note  Name: Nancy Small  MRN: [id number]  Unit/Bed#: E4 -01 I Date of Admission: 7/5/2024   Date of Service: 7/13/2024 I Hospital Day: 8    Assessment & Plan   * Acute on chronic systolic and diastolic heart failure, HFrEF, LVEF 15%, LVIDd 5.7 cm, etiology is NICM  Neurohormonal Blockade:  --Beta Blocker: She has been having some hypotension to 84/50 mmHg but on re-check a few minutes later the BP is averaging /53-68 mmHg which is fine for her condition. We will continue her Toprol 25 mg QD. If there becomes symptomatic hypotension or persistent SBP < 90 mmHg would decrease to 12. 5 mg QD.  --ARNi / ACEi / ARB: Lisinopril 5 mg QD  --Aldosterone Antagonist: None, consider adding this in the future if BP can tolerate.  --SGLT2 Inhibitor: No insurance, cost prohibited  --Home Diuretic: None  --Inpatient Diuretic: PO Lasix 40 mg QD    Sudden Cardiac Death Risk Reduction:  --ICD: She is wearing her life vest.    Compensated today at 154#.  Limited echo to re-check EF can be ordered earliest (mid-Oct) for ICD planning.  Yesterday the cardiology clerical staff was made aware of her need for hospital fu appt.     Subjective:  Patient reports she occasionally feels a strong heart beat in her chest but this is happening infrequently. Patient denies chest pain or sob. I told the patient she is having some low BP measurements but when prompted, the patient denies lightheadedness or dizziness.    Vitals:  Vitals:    07/12/24 0553 07/13/24 0545   Weight: 69.7 kg (153 lb 10.6 oz) 70 kg (154 lb 5.2 oz)   ,  Vitals:    07/13/24 0303 07/13/24 0308 07/13/24 0545 07/13/24 0833   BP: (!) 84/50 93/59  115/68   BP Location: Right arm Right arm  Right arm   Pulse: 70   87   Resp: 14   18   Temp: (!) 97.3 °F (36.3 °C)   97.9 °F (36.6 °C)   TempSrc: Temporal   Temporal   SpO2: 97%   98%   Weight:   70 kg (154 lb 5.2 oz)    Height:         Exam:  Physical  Exam  Vitals and nursing note reviewed.   Constitutional:       General: She is not in acute distress.  HENT:      Head: Normocephalic and atraumatic.      Nose: Nose normal.      Mouth/Throat:      Mouth: Mucous membranes are moist.   Eyes:      Conjunctiva/sclera: Conjunctivae normal.   Neck:      Vascular: No JVD.   Cardiovascular:      Rate and Rhythm: Regular rhythm.      Pulses:           Radial pulses are 2+ on the left side.      Heart sounds: S1 normal and S2 normal. No murmur heard.     Comments: Hr approx 75 bpm  Pulmonary:      Effort: Pulmonary effort is normal.      Breath sounds: No wheezing, rhonchi or rales.   Chest:      Comments: She is wearing the life vest  Abdominal:      General: Abdomen is flat.   Musculoskeletal:         General: No swelling.   Skin:     General: Skin is warm and dry.   Neurological:      General: No focal deficit present.      Mental Status: She is alert.   Psychiatric:         Behavior: Behavior normal.      Telemetry:       SR with average HR 78 bpm    Medications:    Current Facility-Administered Medications:     acetaminophen (TYLENOL) tablet 650 mg, 650 mg, Oral, Q6H PRN, David Phan MD, 650 mg at 07/10/24 2128    aspirin chewable tablet 81 mg, 81 mg, Oral, Daily, Justino Patel MD, 81 mg at 07/13/24 0851    enoxaparin (LOVENOX) subcutaneous injection 40 mg, 40 mg, Subcutaneous, Daily, David Phan MD, 40 mg at 07/13/24 0851    furosemide (LASIX) tablet 40 mg, 40 mg, Oral, Daily, David Phan MD, 40 mg at 07/13/24 0851    lisinopril (ZESTRIL) tablet 5 mg, 5 mg, Oral, Daily, David Pahn MD, 5 mg at 07/13/24 0851    metoprolol succinate (TOPROL-XL) 24 hr tablet 25 mg, 25 mg, Oral, Daily, Bao Fournier MD, 25 mg at 07/13/24 0850    nitroglycerin (NITROSTAT) SL tablet 0.4 mg, 0.4 mg, Sublingual, Q5 Min PRN, Justino Patel MD    Labs/Data:        Results from last 7 days   Lab Units 07/13/24  0538 07/12/24  0519 07/11/24  0528   WBC Thousand/uL 7.92 8.36 8.38    HEMOGLOBIN g/dL 15.8* 15.9* 15.1   HEMATOCRIT % 48.6* 49.0* 46.4*   PLATELETS Thousands/uL 197 211 210     Results from last 7 days   Lab Units 07/13/24  0538 07/12/24  0519 07/11/24  0528   POTASSIUM mmol/L 3.7 4.0 4.1   CHLORIDE mmol/L 103 102 105   CO2 mmol/L 30 32 27   BUN mg/dL 15 16 17   CREATININE mg/dL 0.59* 0.81 0.62     Xi Porras PA-C

## 2024-07-13 NOTE — ASSESSMENT & PLAN NOTE
Met sirs criteria with wbc of [address] and tachypnea  No source of infection  Monitoring off of antibiotics   Wbc has since normalized

## 2024-07-13 NOTE — PLAN OF CARE
Problem: Prexisting or High Potential for Compromised Skin Integrity  Goal: Skin integrity is maintained or improved  Description: INTERVENTIONS:  - Identify patients at risk for skin breakdown  - Assess and monitor skin integrity  - Assess and monitor nutrition and hydration status  - Monitor labs   - Assess for incontinence   - Turn and reposition patient  - Assist with mobility/ambulation  - Relieve pressure over bony prominences  - Avoid friction and shearing  - Provide appropriate hygiene as needed including keeping skin clean and dry  - Evaluate need for skin moisturizer/barrier cream  - Collaborate with interdisciplinary team   - Patient/family teaching  - Consider wound care consult   Outcome: Progressing     Problem: PAIN - ADULT  Goal: Verbalizes/displays adequate comfort level or baseline comfort level  Description: Interventions:  - Encourage patient to monitor pain and request assistance  - Assess pain using appropriate pain scale  - Administer analgesics based on type and severity of pain and evaluate response  - Implement non-pharmacological measures as appropriate and evaluate response  - Consider cultural and social influences on pain and pain management  - Notify physician/advanced practitioner if interventions unsuccessful or patient reports new pain  Outcome: Progressing     Problem: INFECTION - ADULT  Goal: Absence or prevention of progression during hospitalization  Description: INTERVENTIONS:  - Assess and monitor for signs and symptoms of infection  - Monitor lab/diagnostic results  - Monitor all insertion sites, i.e. indwelling lines, tubes, and drains  - Monitor endotracheal if appropriate and nasal secretions for changes in amount and color  - [place] appropriate cooling/warming therapies per order  - Administer medications as ordered  - Instruct and encourage patient and family to use good hand hygiene technique  - Identify and instruct in appropriate isolation precautions for  identified infection/condition  Outcome: Progressing  Goal: Absence of fever/infection during neutropenic period  Description: INTERVENTIONS:  - Monitor WBC    Outcome: Progressing     Problem: SAFETY ADULT  Goal: Patient will remain free of falls  Description: INTERVENTIONS:  - Educate patient/family on patient safety including physical limitations  - Instruct patient to call for assistance with activity   - Consult OT/PT to assist with strengthening/mobility   - Keep Call bell within reach  - Keep bed low and locked with side rails adjusted as appropriate  - Keep care items and personal belongings within reach  - Initiate and maintain comfort rounds  - Make Fall Risk Sign visible to staff  - Offer Toileting every 2 Hours, in advance of need  - Initiate/Maintain bed alarm  - Obtain necessary fall risk management equipment: call bell  - Apply yellow socks and bracelet for high fall risk patients  - Consider moving patient to room near nurses station  Outcome: Progressing  Goal: Maintain or return to baseline ADL function  Description: INTERVENTIONS:  -  Assess patient's ability to carry out ADLs; assess patient's baseline for ADL function and identify physical deficits which impact ability to perform ADLs (bathing, care of mouth/teeth, toileting, grooming, dressing, etc.)  - Assess/evaluate cause of self-care deficits   - Assess range of motion  - Assess patient's mobility; develop plan if impaired  - Assess patient's need for assistive devices and provide as appropriate  - Encourage maximum independence but intervene and supervise when necessary  - Involve family in performance of ADLs  - Assess for home care needs following discharge   - Consider OT consult to assist with ADL evaluation and planning for discharge  - Provide patient education as appropriate  Outcome: Progressing  Goal: Maintains/Returns to pre admission functional level  Description: INTERVENTIONS:  - Perform AM-PAC 6 Click Basic Mobility/ Daily  Activity assessment daily.  - Set and communicate daily mobility goal to care team and patient/family/caregiver.   - Collaborate with rehabilitation services on mobility goals if consulted  - Perform Range of Motion 3 times a day.  - Reposition patient every 2 hours.  - Dangle patient 3 times a day  - Stand patient 3 times a day  - Ambulate patient 3 times a day  - Out of bed to chair 3 times a day   - Out of bed for meals 3  Problem: DISCHARGE PLANNING  Goal: Discharge to home or other facility with appropriate resources  Description: INTERVENTIONS:  - Identify barriers to discharge w/patient and caregiver  - Arrange for needed discharge resources and transportation as appropriate  - Identify discharge learning needs (meds, wound care, etc.)  - Arrange for interpretive services to assist at discharge as needed  - Refer to Case Management Department for coordinating discharge planning if the patient needs post-hospital services based on physician/advanced practitioner order or complex needs related to functional status, cognitive ability, or social support system  Outcome: Progressing     Problem: Knowledge Deficit  Goal: Patient/family/caregiver demonstrates understanding of disease process, treatment plan, medications, and discharge instructions  Description: Complete learning assessment and assess knowledge base.  Interventions:  - Provide teaching at level of understanding  - Provide teaching via preferred learning methods  Outcome: Progressing     Problem: CARDIOVASCULAR - ADULT  Goal: Maintains optimal cardiac output and hemodynamic stability  Description: INTERVENTIONS:  - Monitor I/O, vital signs and rhythm  - Monitor for S/S and trends of decreased cardiac output  - Administer and titrate ordered vasoactive medications to optimize hemodynamic stability  - Assess quality of pulses, skin color and temperature  - Assess for signs of decreased coronary artery perfusion  - Instruct patient to report change in  severity of symptoms  Outcome: Progressing  Goal: Absence of cardiac dysrhythmias or at baseline rhythm  Description: INTERVENTIONS:  - Continuous cardiac monitoring, vital signs, obtain 12 lead EKG if ordered  - Administer antiarrhythmic and heart rate control medications as ordered  - Monitor electrolytes and administer replacement therapy as ordered  Outcome: Progressing    times a day  - Out of bed for toileting  - Record patient progress and toleration of activity level   Outcome: Progressing

## 2024-07-13 NOTE — ASSESSMENT & PLAN NOTE
Wt Readings from Last 3 Encounters:   07/13/24 70 kg (154 lb 5.2 oz)   07/05/24 83.2 kg (183 lb 6.8 oz)     Presented with 1 day history of worsening sob, cp and lower ext edema  She had been at  and treated with nitro gtt, lasix 40mg IV x1. Her respiratory status worsened in which she was then placed on bipap.   TTE 7/5/2024 revealed ejection fraction 15%, grade 2 diastolic dysfunction  Bnp was 2673, trops normal  Patient still having dyspnea with exertion, will repeat chest x-ray  Status post cardiac catheterization 7/10/2024 revealed obstructive CAD  furosemide 40 mg daily, continue carvedilol 6.25 mg twice daily, lisinopril 5 mg daily  Fitted for life vest   Outpatient cardiology follow-up and cardiac rehab

## 2024-07-14 ENCOUNTER — APPOINTMENT (INPATIENT)
Dept: RADIOLOGY | Facility: HOSPITAL | Age: 64
DRG: 192 | End: 2024-07-14
Payer: COMMERCIAL

## 2024-07-14 VITALS
OXYGEN SATURATION: 97 % | TEMPERATURE: 97.2 F | DIASTOLIC BLOOD PRESSURE: 68 MMHG | BODY MASS INDEX: 26.16 KG/M2 | HEIGHT: 64 IN | HEART RATE: 84 BPM | SYSTOLIC BLOOD PRESSURE: 98 MMHG | WEIGHT: 153.22 LBS | RESPIRATION RATE: 38 BRPM

## 2024-07-14 PROBLEM — R07.2 PRECORDIAL CHEST PAIN: Status: ACTIVE | Noted: 2024-07-14

## 2024-07-14 LAB
ANION GAP SERPL CALCULATED.3IONS-SCNC: 6 MMOL/L (ref 4–13)
ATRIAL RATE: 84 BPM
BASOPHILS # BLD AUTO: 0.05 THOUSANDS/ÂΜL (ref 0–0.1)
BASOPHILS NFR BLD AUTO: 1 % (ref 0–1)
BUN SERPL-MCNC: 18 MG/DL (ref 5–25)
CALCIUM SERPL-MCNC: 9.6 MG/DL (ref 8.4–10.2)
CHLORIDE SERPL-SCNC: 104 MMOL/L (ref 96–108)
CO2 SERPL-SCNC: 28 MMOL/L (ref 21–32)
CREAT SERPL-MCNC: 0.69 MG/DL (ref 0.6–1.3)
CRP SERPL QL: 6.8 MG/L
EOSINOPHIL # BLD AUTO: 0.18 THOUSAND/ÂΜL (ref 0–0.61)
EOSINOPHIL NFR BLD AUTO: 2 % (ref 0–6)
ERYTHROCYTE [DISTWIDTH] IN BLOOD BY AUTOMATED COUNT: 13.7 % (ref 11.6–15.1)
GFR SERPL CREATININE-BSD FRML MDRD: 92 ML/MIN/1.73SQ M
GLUCOSE SERPL-MCNC: 119 MG/DL (ref 65–140)
GLUCOSE SERPL-MCNC: 131 MG/DL (ref 65–140)
HCT VFR BLD AUTO: 47.3 % (ref 34.8–46.1)
HGB BLD-MCNC: 15.2 G/DL (ref 11.5–15.4)
IMM GRANULOCYTES # BLD AUTO: 0.01 THOUSAND/UL (ref 0–0.2)
IMM GRANULOCYTES NFR BLD AUTO: 0 % (ref 0–2)
LYMPHOCYTES # BLD AUTO: 2.33 THOUSANDS/ÂΜL (ref 0.6–4.47)
LYMPHOCYTES NFR BLD AUTO: 28 % (ref 14–44)
MCH RBC QN AUTO: 30.2 PG (ref 26.8–34.3)
MCHC RBC AUTO-ENTMCNC: 32.1 G/DL (ref 31.4–37.4)
MCV RBC AUTO: 94 FL (ref 82–98)
MONOCYTES # BLD AUTO: 0.79 THOUSAND/ÂΜL (ref 0.17–1.22)
MONOCYTES NFR BLD AUTO: 9 % (ref 4–12)
NEUTROPHILS # BLD AUTO: 5 THOUSANDS/ÂΜL (ref 1.85–7.62)
NEUTS SEG NFR BLD AUTO: 60 % (ref 43–75)
NRBC BLD AUTO-RTO: 0 /100 WBCS
P AXIS: 50 DEGREES
PLATELET # BLD AUTO: 199 THOUSANDS/UL (ref 149–390)
PMV BLD AUTO: 12.4 FL (ref 8.9–12.7)
POTASSIUM SERPL-SCNC: 4 MMOL/L (ref 3.5–5.3)
PR INTERVAL: 176 MS
QRS AXIS: 21 DEGREES
QRSD INTERVAL: 106 MS
QT INTERVAL: 376 MS
QTC INTERVAL: 444 MS
RBC # BLD AUTO: 5.04 MILLION/UL (ref 3.81–5.12)
SODIUM SERPL-SCNC: 138 MMOL/L (ref 135–147)
T WAVE AXIS: 74 DEGREES
VENTRICULAR RATE: 84 BPM
WBC # BLD AUTO: 8.36 THOUSAND/UL (ref 4.31–10.16)

## 2024-07-14 PROCEDURE — 80048 BASIC METABOLIC PNL TOTAL CA: CPT | Performed by: INTERNAL MEDICINE

## 2024-07-14 PROCEDURE — 99239 HOSP IP/OBS DSCHRG MGMT >30: CPT | Performed by: INTERNAL MEDICINE

## 2024-07-14 PROCEDURE — 71045 X-RAY EXAM CHEST 1 VIEW: CPT

## 2024-07-14 PROCEDURE — 85025 COMPLETE CBC W/AUTO DIFF WBC: CPT | Performed by: INTERNAL MEDICINE

## 2024-07-14 PROCEDURE — 93005 ELECTROCARDIOGRAM TRACING: CPT

## 2024-07-14 PROCEDURE — 93010 ELECTROCARDIOGRAM REPORT: CPT | Performed by: INTERNAL MEDICINE

## 2024-07-14 PROCEDURE — 82948 REAGENT STRIP/BLOOD GLUCOSE: CPT

## 2024-07-14 PROCEDURE — 99232 SBSQ HOSP IP/OBS MODERATE 35: CPT | Performed by: INTERNAL MEDICINE

## 2024-07-14 PROCEDURE — 86140 C-REACTIVE PROTEIN: CPT | Performed by: INTERNAL MEDICINE

## 2024-07-14 RX ORDER — METOPROLOL SUCCINATE 25 MG/1
12.5 TABLET, EXTENDED RELEASE ORAL 2 TIMES DAILY
Qty: 30 TABLET | Refills: 0 | Status: SHIPPED | OUTPATIENT
Start: 2024-07-14 | End: 2024-07-17 | Stop reason: SDUPTHER

## 2024-07-14 RX ORDER — ASPIRIN 81 MG/1
81 TABLET, CHEWABLE ORAL DAILY
Qty: 30 TABLET | Refills: 1 | Status: SHIPPED | OUTPATIENT
Start: 2024-07-15 | End: 2024-07-17 | Stop reason: SDUPTHER

## 2024-07-14 RX ORDER — LISINOPRIL 2.5 MG/1
2.5 TABLET ORAL DAILY
Qty: 30 TABLET | Refills: 1 | Status: SHIPPED | OUTPATIENT
Start: 2024-07-15 | End: 2024-07-17 | Stop reason: SDUPTHER

## 2024-07-14 RX ORDER — PANTOPRAZOLE SODIUM 40 MG/1
40 TABLET, DELAYED RELEASE ORAL
Qty: 30 TABLET | Refills: 0 | Status: SHIPPED | OUTPATIENT
Start: 2024-07-15 | End: 2024-07-17 | Stop reason: SDUPTHER

## 2024-07-14 RX ORDER — COLCHICINE 0.6 MG/1
0.6 TABLET ORAL DAILY
Status: DISCONTINUED | OUTPATIENT
Start: 2024-07-14 | End: 2024-07-14 | Stop reason: HOSPADM

## 2024-07-14 RX ORDER — FUROSEMIDE 40 MG/1
40 TABLET ORAL DAILY
Qty: 30 TABLET | Refills: 1 | Status: SHIPPED | OUTPATIENT
Start: 2024-07-15 | End: 2024-07-17 | Stop reason: SDUPTHER

## 2024-07-14 RX ORDER — COLCHICINE 0.6 MG/1
0.6 TABLET ORAL DAILY
Qty: 30 TABLET | Refills: 1 | Status: SHIPPED | OUTPATIENT
Start: 2024-07-14

## 2024-07-14 RX ADMIN — LISINOPRIL 2.5 MG: 2.5 TABLET ORAL at 08:43

## 2024-07-14 RX ADMIN — METOPROLOL SUCCINATE 12.5 MG: 25 TABLET, EXTENDED RELEASE ORAL at 08:44

## 2024-07-14 RX ADMIN — ENOXAPARIN SODIUM 40 MG: 40 INJECTION SUBCUTANEOUS at 08:47

## 2024-07-14 RX ADMIN — ASPIRIN 81 MG CHEWABLE TABLET 81 MG: 81 TABLET CHEWABLE at 08:43

## 2024-07-14 RX ADMIN — COLCHICINE 0.6 MG: 0.6 TABLET ORAL at 12:58

## 2024-07-14 RX ADMIN — PANTOPRAZOLE SODIUM 40 MG: 40 TABLET, DELAYED RELEASE ORAL at 05:10

## 2024-07-14 NOTE — NURSING NOTE
Patient discharged to home. IV removed. All belongings were taken. AVS reviewed with the patient. No further questions at this time.

## 2024-07-14 NOTE — CASE MANAGEMENT
Case Management Discharge Planning Note    Patient name Nancy Calderon  Location [facility] 4 /E4 -* MRN [id number]  : 1960 Date 2024       Current Admission Date: 2024  Current Admission Diagnosis:Acute on chronic systolic and diastolic heart failure, HFrEF, LVEF 15%, LVIDd 5.7 cm, etiology is NICM   Patient Active Problem List    Diagnosis Date Noted Date Diagnosed    Precordial chest pain 2024     Does not have health insurance 2024     Acute on chronic systolic and diastolic heart failure, HFrEF, LVEF 15%, LVIDd 5.7 cm, etiology is NICM 2024     Hypertensive emergency 2024     SIRS (systemic inflammatory response syndrome) (HCC) 2024       LOS (days): 9  Geometric Mean LOS (GMLOS) (days):   Days to GMLOS:     OBJECTIVE:  Risk of Unplanned Readmission Score: 8.64         Current admission status: Inpatient   Preferred Pharmacy:    [facility] [place] [id number]  Phone: [phone] Fax: [phone]    [facility] [address]  [facility],  [address]  [facility],  First Floor [place] [address]  Phone: [phone] Fax: [phone]    Primary Care Provider: No primary care provider on file.    Primary Insurance: CELENA LOWE PENDING  Secondary Insurance:     DISCHARGE DETAILS:    Discharge planning discussed with:: Pt, Dtr Sister Mai Combs  Freedom of Choice: Yes  Comments - Freedom of Choice: Pt is asking to discharge home  CM contacted family/caregiver?: Yes (Dtr by telephone, Sister at bedside)  Were Treatment Team discharge recommendations reviewed with patient/caregiver?: Yes  Did patient/caregiver verbalize understanding of patient care needs?: Yes  Were patient/caregiver advised of the risks associated with not following Treatment Team discharge recommendations?: Yes    Contacts  Patient Contacts: Mai Miller (Sister) [phone]  (M)  Relationship to Patient:: Family  Contact Method: In Person  Reason/Outcome: Emergency Contact, Discharge Planning, Continuity of Care, Referral    Requested Home Health Care         Is the patient interested in HHC at discharge?: No  DME Referral Provided  Referral made for DME?: No    Other Referral/Resources/Interventions Provided:  Financial Resources Provided: Indigent Medication, Financial Counselor (PATHS: [place] Pending)    Treatment Team Recommendation: Short Term Rehab  Discharge Destination Plan:: Home (w/ out-patient follow up)  Transport at Discharge : BLS Ambulance  Dispatcher Contacted: Yes  Number/Name of Dispatcher: Roundtrip  Transported by (Company and Unit #): in progress  ETA of Transport (Date): 07/14/24  ETA of Transport (Time): 1430  Transfer Mode: Stretcher  Accompanied by: EMS personnel  Transfer Equipment: ChallengePostS devices    Additional Comments: SACHIN met w/ Pt, her sister at bedside and her daughter by telephone (from ). SLIM also present for a portion of this visit. Lamonte spent several minutes with Pt and her family reviewing safety factors of Pts discharge and recommendations for discharge. The therapy recommendation stands as STR. Pt is refusing any rehab services including Home PT/OT. Pt states she is able to walk and care for herself independently. Pt's concern is for her heart and how she will manage daily living without putting too much strain on her heart. It has been determined, between Pt, her sister and daughter, SACHIN and SLIM that Pt geetha be able to safely discharge to the room she has been renting with some adjustments. Pt has the plan to  limit her trips up and down the one flight of stairs she needs to use to access the main floor kitchen and entrance / exit of the home. Pt has access to a bathroom on the second floor where her room is located. Family and friends are able to assist with groceries and necessities. Pt will need to utilise [facility] services as  "appropriate. Whilst Pt's Dtr is only available by telephone, she is heard to be very supportive and able to maintain status on Pt's wellbeing, with Pt's sister being a \"life-line\". CM to schedule a BLS w/ stair chair to facilitate Pts return home to her rented room. CM inquired if Pt will be able to remain in her rental with her being out of work for an unknown amount of time. There was discussion between Pt and her sister followed by affirmation that Pt will be able to remain in her rented room.  Lastly,  Pt is agreeable to Cardiac Rehab. CM confirmed with Cardiology that Pt is appropriate for this intervention. Once Medicaid is fully approved and active, Pt will be able to start this rehabilitation program. Pt made aware of the importance of Cariology follow up. Pt acknowledged. Lastly, medications sent to [facility]. CM to cover indigent and deliver to Pt's Nurse in preparation for discharge. CM to note details in Pts EMR. CM following for additonal interventions through discharge.    "

## 2024-07-14 NOTE — ASSESSMENT & PLAN NOTE
Met sirs criteria with wbc of 407[address] and tachypnea  No source of infection  Monitoring off of antibiotics   Wbc has since normalized

## 2024-07-14 NOTE — ASSESSMENT & PLAN NOTE
Wt Readings from Last 3 Encounters:   07/14/24 69.5 kg (153 lb 3.5 oz)   07/05/24 83.2 kg (183 lb 6.8 oz)     Presented with 1 day history of worsening sob, cp and lower ext edema  She had been at  and treated with nitro gtt, lasix 40mg IV x1. Her respiratory status worsened in which she was then placed on bipap.   TTE 7/5/2024 revealed ejection fraction 15%, grade 2 diastolic dysfunction  Bnp was 2673, trops normal  Patient still having dyspnea with exertion, will repeat chest x-ray  Status post cardiac catheterization 7/10/2024 revealed obstructive CAD  furosemide 40 mg daily, continue carvedilol 6.25 mg twice daily, lisinopril 5 mg daily  Fitted for life vest   Outpatient cardiology follow-up and cardiac rehab

## 2024-07-14 NOTE — NURSING NOTE
Pt with low BP, asymptomatic SLIM made aware. JAH spoke with Pt at bed side, will recheck BP and continue to monitor Pt.

## 2024-07-14 NOTE — CASE MANAGEMENT
Case Management Discharge Planning Note    Patient name Nancy Calderon  Location [facility] 4 /E4 -* MRN [id number]  : 1960 Date 2024       Current Admission Date: 2024  Current Admission Diagnosis:Acute on chronic systolic and diastolic heart failure, HFrEF, LVEF 15%, LVIDd 5.7 cm, etiology is NICM   Patient Active Problem List    Diagnosis Date Noted Date Diagnosed    Precordial chest pain 2024     Does not have health insurance 2024     Acute on chronic systolic and diastolic heart failure, HFrEF, LVEF 15%, LVIDd 5.7 cm, etiology is NICM 2024     Hypertensive emergency 2024     SIRS (systemic inflammatory response syndrome) (HCC) 2024       LOS (days): 9  Geometric Mean LOS (GMLOS) (days):   Days to GMLOS:     OBJECTIVE:  Risk of Unplanned Readmission Score: 9.38         Current admission status: Inpatient   Preferred Pharmacy:    PH[facility]Y [place] [id number]  Phone: [phone] Fax: [phone]    [facility] [address]  [facility],  [address] [place] [address],  [address] [place] [address]  Phone: [phone] Fax: [phone]    Primary Care Provider: No primary care provider on file.    Primary Insurance: CELENA LOWE PENDING  Secondary Insurance:     DISCHARGE DETAILS:    Discharge planning discussed with:: Patient  Freedom of Choice: Yes  Comments - Freedom of Choice: Pt discharging home  CM contacted family/caregiver?: Yes (Sister Mia at bedside)  Were Treatment Team discharge recommendations reviewed with patient/caregiver?: Yes  Did patient/caregiver verbalize understanding of patient care needs?: Yes  Were patient/caregiver advised of the risks associated with not following Treatment Team discharge recommendations?: Yes    Contacts  Patient Contacts: Mai Miller (Sister) [phone] (M)  Relationship to Patient:: Family  Contact Method: In  "Person  Reason/Outcome: Emergency Contact, Discharge Planning, Continuity of Care, Referral    Discharge Destination Plan:: Home (w/ out-patient follow up to Cardiology)  Transport at Discharge : S Ambulance  Dispatcher Contacted: Yes  Number/Name of Dispatcher: Roundtrip  Transported by (Company and Unit #): JOEY  ETA of Transport (Date): 07/14/24  ETA of Transport (Time): 1900  Transfer Mode: Stretcher  Accompanied by: EMS personnel  Transfer Equipment: BLS devices, Other (comments) (Stair Chair)    Additional Comments: CM requesting BLS to [facility] Pt home due to 16 stairs total. 4 BHAVANA the home and10 stairs to access Pt's 2nd floor room rental. Pt in agreement with the plan. Transport to be billed to  cost center as indigent due to Pt's [place] Pending and confirmed 100% eligibility for radha care. CM informed Pt and SLA care team of transport.  delivered \"Meds to Beds\" via Pt's RN. No other needs at this time. CM remains available through discharge.      "

## 2024-07-14 NOTE — ASSESSMENT & PLAN NOTE
This morning had chest pain with tachypnea which resolved on its own  CXR clear  EKG same as prior no ischemic features  Suspect MSK pain due to Life Vest being too tight; asked RN to adjust how tight it is  Ask patient to ambulate to monitor for s/sxs of exertional component  CRP elevated; treat for possible pericarditis with Colchicine 0.6 mg QD

## 2024-07-14 NOTE — PLAN OF CARE
Problem: Prexisting or High Potential for Compromised Skin Integrity  Goal: Skin integrity is maintained or improved  Description: INTERVENTIONS:  - Identify patients at risk for skin breakdown  - Assess and monitor skin integrity  - Assess and monitor nutrition and hydration status  - Monitor labs   - Assess for incontinence   - Turn and reposition patient  - Assist with mobility/ambulation  - Relieve pressure over bony prominences  - Avoid friction and shearing  - Provide appropriate hygiene as needed including keeping skin clean and dry  - Evaluate need for skin moisturizer/barrier cream  - Collaborate with interdisciplinary team   - Patient/family teaching  - Consider wound care consult   Outcome: Progressing     Problem: PAIN - ADULT  Goal: Verbalizes/displays adequate comfort level or baseline comfort level  Description: Interventions:  - Encourage patient to monitor pain and request assistance  - Assess pain using appropriate pain scale  - Administer analgesics based on type and severity of pain and evaluate response  - Implement non-pharmacological measures as appropriate and evaluate response  - Consider cultural and social influences on pain and pain management  - Notify physician/advanced practitioner if interventions unsuccessful or patient reports new pain  Outcome: Progressing     Problem: INFECTION - ADULT  Goal: Absence or prevention of progression during hospitalization  Description: INTERVENTIONS:  - Assess and monitor for signs and symptoms of infection  - Monitor lab/diagnostic results  - Monitor all insertion sites, i.e. indwelling lines, tubes, and drains  - Monitor endotracheal if appropriate and nasal secretions for changes in amount and color  - [place] appropriate cooling/warming therapies per order  - Administer medications as ordered  - Instruct and encourage patient and family to use good hand hygiene technique  - Identify and instruct in appropriate isolation precautions for  identified infection/condition  Outcome: Progressing  Goal: Absence of fever/infection during neutropenic period  Description: INTERVENTIONS:  - Monitor WBC    Outcome: Progressing     Problem: SAFETY ADULT  Goal: Patient will remain free of falls  Description: INTERVENTIONS:  - Educate patient/family on patient safety including physical limitations  - Instruct patient to call for assistance with activity   - Consult OT/PT to assist with strengthening/mobility   - Keep Call bell within reach  - Keep bed low and locked with side rails adjusted as appropriate  - Keep care items and personal belongings within reach  - Initiate and maintain comfort rounds  - Make Fall Risk Sign visible to staff  - Offer Toileting every 2 Hours, in advance of need  - Initiate/Maintain bed alarm  - Obtain necessary fall risk management equipment: call bell  - Apply yellow socks and bracelet for high fall risk patients  - Consider moving patient to room near nurses station  Outcome: Progressing  Goal: Maintain or return to baseline ADL function  Description: INTERVENTIONS:  -  Assess patient's ability to carry out ADLs; assess patient's baseline for ADL function and identify physical deficits which impact ability to perform ADLs (bathing, care of mouth/teeth, toileting, grooming, dressing, etc.)  - Assess/evaluate cause of self-care deficits   - Assess range of motion  - Assess patient's mobility; develop plan if impaired  - Assess patient's need for assistive devices and provide as appropriate  - Encourage maximum independence but intervene and supervise when necessary  - Involve family in performance of ADLs  - Assess for home care needs following discharge   - Consider OT consult to assist with ADL evaluation and planning for discharge  - Provide patient education as appropriate  Outcome: Progressing  Goal: Maintains/Returns to pre admission functional level  Description: INTERVENTIONS:  - Perform AM-PAC 6 Click Basic Mobility/ Daily  Activity assessment daily.  - Set and communicate daily mobility goal to care team and patient/family/caregiver.   - Collaborate with rehabilitation services on mobility goals if consulted  - Perform Range of Motion 3 times a day.  - Reposition patient every 2 hours.  - Dangle patient 3 times a day  - Stand patient 3 times a day  - Ambulate patient 3 times a day  - Out of bed to chair 3 times a day   - Out of bed for meals 3  Problem: DISCHARGE PLANNING  Goal: Discharge to home or other facility with appropriate resources  Description: INTERVENTIONS:  - Identify barriers to discharge w/patient and caregiver  - Arrange for needed discharge resources and transportation as appropriate  - Identify discharge learning needs (meds, wound care, etc.)  - Arrange for interpretive services to assist at discharge as needed  - Refer to Case Management Department for coordinating discharge planning if the patient needs post-hospital services based on physician/advanced practitioner order or complex needs related to functional status, cognitive ability, or social support system  Outcome: Progressing     Problem: Knowledge Deficit  Goal: Patient/family/caregiver demonstrates understanding of disease process, treatment plan, medications, and discharge instructions  Description: Complete learning assessment and assess knowledge base.  Interventions:  - Provide teaching at level of understanding  - Provide teaching via preferred learning methods  Outcome: Progressing     Problem: CARDIOVASCULAR - ADULT  Goal: Maintains optimal cardiac output and hemodynamic stability  Description: INTERVENTIONS:  - Monitor I/O, vital signs and rhythm  - Monitor for S/S and trends of decreased cardiac output  - Administer and titrate ordered vasoactive medications to optimize hemodynamic stability  - Assess quality of pulses, skin color and temperature  - Assess for signs of decreased coronary artery perfusion  - Instruct patient to report change in  severity of symptoms  Outcome: Progressing  Goal: Absence of cardiac dysrhythmias or at baseline rhythm  Description: INTERVENTIONS:  - Continuous cardiac monitoring, vital signs, obtain 12 lead EKG if ordered  - Administer antiarrhythmic and heart rate control medications as ordered  - Monitor electrolytes and administer replacement therapy as ordered  Outcome: Progressing    times a day  - Out of bed for toileting  - Record patient progress and toleration of activity level   Outcome: Progressing

## 2024-07-14 NOTE — PLAN OF CARE
Problem: Prexisting or High Potential for Compromised Skin Integrity  Goal: Skin integrity is maintained or improved  Description: INTERVENTIONS:  - Identify patients at risk for skin breakdown  - Assess and monitor skin integrity  - Assess and monitor nutrition and hydration status  - Monitor labs   - Assess for incontinence   - Turn and reposition patient  - Assist with mobility/ambulation  - Relieve pressure over bony prominences  - Avoid friction and shearing  - Provide appropriate hygiene as needed including keeping skin clean and dry  - Evaluate need for skin moisturizer/barrier cream  - Collaborate with interdisciplinary team   - Patient/family teaching  - Consider wound care consult   Outcome: Progressing     Problem: PAIN - ADULT  Goal: Verbalizes/displays adequate comfort level or baseline comfort level  Description: Interventions:  - Encourage patient to monitor pain and request assistance  - Assess pain using appropriate pain scale  - Administer analgesics based on type and severity of pain and evaluate response  - Implement non-pharmacological measures as appropriate and evaluate response  - Consider cultural and social influences on pain and pain management  - Notify physician/advanced practitioner if interventions unsuccessful or patient reports new pain  Outcome: Progressing     Problem: INFECTION - ADULT  Goal: Absence or prevention of progression during hospitalization  Description: INTERVENTIONS:  - Assess and monitor for signs and symptoms of infection  - Monitor lab/diagnostic results  - Monitor all insertion sites, i.e. indwelling lines, tubes, and drains  - Monitor endotracheal if appropriate and nasal secretions for changes in amount and color  - [place] appropriate cooling/warming therapies per order  - Administer medications as ordered  - Instruct and encourage patient and family to use good hand hygiene technique  - Identify and instruct in appropriate isolation precautions for  identified infection/condition  Outcome: Progressing  Goal: Absence of fever/infection during neutropenic period  Description: INTERVENTIONS:  - Monitor WBC    Outcome: Progressing     Problem: SAFETY ADULT  Goal: Patient will remain free of falls  Description: INTERVENTIONS:  - Educate patient/family on patient safety including physical limitations  - Instruct patient to call for assistance with activity   - Consult OT/PT to assist with strengthening/mobility   - Keep Call bell within reach  - Keep bed low and locked with side rails adjusted as appropriate  - Keep care items and personal belongings within reach  - Initiate and maintain comfort rounds  - Make Fall Risk Sign visible to staff  - Apply yellow socks and bracelet for high fall risk patients  - Consider moving patient to room near nurses station  Outcome: Progressing  Goal: Maintain or return to baseline ADL function  Description: INTERVENTIONS:  -  Assess patient's ability to carry out ADLs; assess patient's baseline for ADL function and identify physical deficits which impact ability to perform ADLs (bathing, care of mouth/teeth, toileting, grooming, dressing, etc.)  - Assess/evaluate cause of self-care deficits   - Assess range of motion  - Assess patient's mobility; develop plan if impaired  - Assess patient's need for assistive devices and provide as appropriate  - Encourage maximum independence but intervene and supervise when necessary  - Involve family in performance of ADLs  - Assess for home care needs following discharge   - Consider OT consult to assist with ADL evaluation and planning for discharge  - Provide patient education as appropriate  Outcome: Progressing  Goal: Maintains/Returns to pre admission functional level  Description: INTERVENTIONS:  - Perform AM-PAC 6 Click Basic Mobility/ Daily Activity assessment daily.  - Set and communicate daily mobility goal to care team and patient/family/caregiver.   - Collaborate with rehabilitation  services on mobility goals if consulted  - Record patient progress and toleration of activity level   Outcome: Progressing     Problem: DISCHARGE PLANNING  Goal: Discharge to home or other facility with appropriate resources  Description: INTERVENTIONS:  - Identify barriers to discharge w/patient and caregiver  - Arrange for needed discharge resources and transportation as appropriate  - Identify discharge learning needs (meds, wound care, etc.)  - Arrange for interpretive services to assist at discharge as needed  - Refer to Case Management Department for coordinating discharge planning if the patient needs post-hospital services based on physician/advanced practitioner order or complex needs related to functional status, cognitive ability, or social support system  Outcome: Progressing     Problem: Knowledge Deficit  Goal: Patient/family/caregiver demonstrates understanding of disease process, treatment plan, medications, and discharge instructions  Description: Complete learning assessment and assess knowledge base.  Interventions:  - Provide teaching at level of understanding  - Provide teaching via preferred learning methods  Outcome: Progressing     Problem: CARDIOVASCULAR - ADULT  Goal: Maintains optimal cardiac output and hemodynamic stability  Description: INTERVENTIONS:  - Monitor I/O, vital signs and rhythm  - Monitor for S/S and trends of decreased cardiac output  - Administer and titrate ordered vasoactive medications to optimize hemodynamic stability  - Assess quality of pulses, skin color and temperature  - Assess for signs of decreased coronary artery perfusion  - Instruct patient to report change in severity of symptoms  Outcome: Progressing  Goal: Absence of cardiac dysrhythmias or at baseline rhythm  Description: INTERVENTIONS:  - Continuous cardiac monitoring, vital signs, obtain 12 lead EKG if ordered  - Administer antiarrhythmic and heart rate control medications as ordered  - Monitor  electrolytes and administer replacement therapy as ordered  Outcome: Progressing

## 2024-07-14 NOTE — DISCHARGE SUMMARY
[facility]  Discharge- Nancy Calderon 1960, 63 y.o. female MRN: [id number]  Unit/Bed#: E4 -01 Encounter: [id number]  Primary Care Provider: No primary care provider on file.   Date and time admitted to hospital: 7/5/2024 10:42 AM    * Acute on chronic systolic and diastolic heart failure, HFrEF, LVEF 15%, LVIDd 5.7 cm, etiology is NICM  Assessment & Plan  Wt Readings from Last 3 Encounters:   07/14/24 69.5 kg (153 lb 3.5 oz)   07/05/24 83.2 kg (183 lb 6.8 oz)     Presented with 1 day history of worsening sob, cp and lower ext edema  She had been at  and treated with nitro gtt, lasix 40mg IV x1. Her respiratory status worsened in which she was then placed on bipap.   TTE 7/5/2024 revealed ejection fraction 15%, grade 2 diastolic dysfunction  Bnp was 2673, trops normal  Patient still having dyspnea with exertion, will repeat chest x-ray  Status post cardiac catheterization 7/10/2024 revealed non obstructive CAD  furosemide 40 mg daily, continue lisinopril 2.5 mg daily, metoprolol succinate 12 5 mg twice daily  Fitted for life vest   Outpatient cardiology follow-up and cardiac rehab    Does not have health insurance  Assessment & Plan  [place] from [place]   She is ma pending  Hospital will provide medications for her at time of discharge  Renting a room in a house did have a lengthy discussion regarding safe discharge planning with case management at bedside.  Does feel safe returning to her current place there is a bathroom on her floor she will have to use the stairs to leave the house for doctors appointments and can have family or friends bring her food and her medications to her room    SIRS (systemic inflammatory response syndrome) (HCC)  Assessment & Plan  Met sirs [place] [address] and tachypnea  No source of infection  Monitoring off of antibiotics   Wbc has since normalized     Hypertensive emergency  Assessment & Plan  Has a pmhx of  htn in which she was noncompliant with medications due to lack of insurance.   S/p nitro gtt  Started on metoprolol and lisinopril        Transition of Care Discharge Summary - [facility]    Patient Information: Nancy Calderon 63 y.o. female MRN: [id number]  Unit/Bed#: E4 -01 Encounter: [id number]    Discharging Physician / Practitioner: David Phan MD  PCP: No primary care provider on file.  Admission Date: 7/5/2024  Discharge Date: 07/14/24    Disposition:      Other: home      Reason for Admission: Acute CHF    Discharge Diagnoses:     Principal Problem:    Acute on chronic systolic and diastolic heart failure, HFrEF, LVEF 15%, LVIDd 5.7 cm, etiology is NICM  Active Problems:    Hypertensive emergency    SIRS (systemic inflammatory response syndrome) (HCC)    Does not have health insurance    Precordial chest pain  Resolved Problems:    * No resolved hospital problems. *      Consultations During Hospital Stay:  IP CONSULT TO CARDIOLOGY  IP CONSULT TO CASE MANAGEMENT  IP CONSULT TO CASE MANAGEMENT      Procedures Performed:     Cardiac cath    Medication Adjustments and Discharge Medications:  Medication Dosing Tapers - Please refer to Discharge Medication List for details on any medication dosing tapers (if applicable to patient).  Discharge Medication List: See after visit summary for reconciled discharge medications.     Wound Care Recommendations:  When applicable, please see wound care section of After Visit Summary.    Diet Recommendations at Discharge:  Diet -        Diet Orders   (From admission, onward)                 Start     Ordered    07/10/24 2045  Diet Cardiovascular; Sodium 2 GM; Fluid Restriction 1800 ML  Diet effective now        References:    Adult Nutrition Support Algorithm    RD Therapeutic Diet Order Protocol   Question Answer Comment   Diet Type Cardiovascular    Cardiac Sodium 2 GM    Other Restriction(s): Fluid Restriction 1800 ML    RD to  adjust diet per protocol? Yes        07/10/24 2046                  Fluid Restriction - Continue Fluid Restriction as Listed Above at Discharge.      Significant Findings / Test Results:     XR chest pa & lateral    Result Date: 7/7/2024  Impression: Improving pulmonary edema with small pleural effusions. Workstation performed: [id number]     Echo complete w/ contrast if indicated    Addendum Date: 7/5/2024    Technically difficult but adequate transthoracic echocardiogram study.  Definity contrast was used for endocardial border enhancement. Moderately dilated left ventricle cavity, severely reduced left ventricular systolic function.  There is global hypokinesis with some.  Was here regional wall motion variability.  Ejection fraction estimated around 15%.  Suspected grade 2 diastolic dysfunction. Normal RV size, reduced right ventricular systolic function. No significant left or right atrial cavity enlargement. No significant valvular stenosis.  Trace mitral and tricuspid valve regurgitation. No obvious pulmonary hypertension No pericardial effusion Mild to moderate left-sided pleural effusion. There is no previous echocardiogram available for comparison.     XR chest 1 view portable    Result Date: 7/5/2024  Impression: Moderate pulmonary edema with probable small bilateral pleural effusions. Resident: DONA CUADRA I, the attending radiologist, have reviewed the images and agree with the final report above. Workstation performed: [id number]      Hospital Course:     Nancy Calderon is a 63 y.o. female patient who originally presented to the hospital on 7/5/2024 due to acute CHF, eval by cardiology was treated with nitro drip and Lasix she underwent echocardiogram found to have ejection fraction 15% with grade 2 diastolic dysfunction cardiac cath revealed nonobstructive CAD.  She will be maintained on furosemide 40 mg daily, lisinopril 2.5 mg daily, metoprolol 12.5 mg twice daily she was also  "started on colchicine for pleuritic pain today.  She will follow-up with cardiologist outpatient.    Please see above problem list for further details.      Condition at Discharge: good     Discharge Day Visit / Exam:     Subjective: Patient seen and examined at bedside, earlier she had some chest discomfort and dyspnea this has improved she denies any nausea or vomiting.  This patient rotation provided by [facility] and [email or web address], [place] [id number]    Vitals: Blood Pressure: 92/58 (07/14/24 1156)  Pulse: 82 (07/14/24 1156)  Temperature: (!) 97 °F (36.1 °C) (07/14/24 1156)  Temp Source: Temporal (07/14/24 1156)  Respirations: (!) 24 (07/14/24 1156)  Height: 5' 4\" (162.6 cm) (07/05/24 1445)  Weight - Scale: 69.5 kg (153 lb 3.5 oz) (07/14/24 0600)  SpO2: 98 % (07/14/24 1156)    Physical Exam:    Constitutional: Patient is oriented to person, place and time, no acute distress  HEENT:  Normocephalic, atraumatic  Cardiovascular: Normal S1S2, RRR, No murmurs/rubs/gallops appreciated.  Pulmonary:  Bilateral air entry, No rhonchi/rales/wheezing appreciated  Abdominal: Soft, Bowel sounds present, Non-tender, Non-distended  Extremities:  No cyanosis, clubbing or edema.   Neurological: Cranial nerves II-XII grossly intact, sensation intact, otherwise no focal neurological symptoms.     Discharge instructions/Information to patient and family:   See after visit summary section titled Discharge Instructions for information provided to patient and family.      Planned Readmission: no      Discharge Statement:  I spent 35 minutes discharging the patient. This time was spent on the day of discharge. I had direct contact with the patient on the day of discharge. Greater than 50% of the total time was spent examining patient, answering all patient questions, arranging and discussing plan of care with patient as well as directly providing post-discharge instructions.  Additional time then spent on discharge activities.    ** Please Note: This note has " been constructed using a voice recognition system **

## 2024-07-14 NOTE — QUICK NOTE
[facility]  [address] [place] [address]  (P) [phone]  (F) [phone]  24      RE:  Nancy Calderon 63 y.o. female  : 1960    To whom it may concern,     Nancy Calderon was hospitalized under my care from 2024 to 24. Please excuse from all appointments and/or work related activities during this interim.     Feel free to contact me with any questions if necessary.         Sincerely,             David Phan M.D.       [facility]       Diplomate, American Board of Internal Medicine

## 2024-07-14 NOTE — ASSESSMENT & PLAN NOTE
Compensated today at 154#.  Neurohormonal Blockade:  --Beta Blocker: Decrease Toprol 12.5 mg QD   --ARNi / ACEi / ARB: Lisinopril 2.5 mg QD  --Aldosterone Antagonist: None, consider adding if BP can tolerate.  --SGLT2 Inhibitor: No insurance, cost prohibited  --Home Diuretic: None  --Inpatient Diuretic: PO Lasix 40 mg QD    Sudden Cardiac Death Risk Reduction:  --ICD: She is wearing the Life Vest and understands how to use it.  Limited echo to re-check EF can be ordered earliest (mid-Oct) for ICD planning.

## 2024-07-14 NOTE — PROGRESS NOTES
[facility]  Progress Note  Name: Nancy Small  MRN: [id number]  Unit/Bed#: E4 -01 I Date of Admission: 7/5/2024   Date of Service: 7/14/2024 I Hospital Day: 9    Assessment & Plan   * Acute on chronic systolic and diastolic heart failure, HFrEF, LVEF 15%, LVIDd 5.7 cm, etiology is NICM  Compensated today at 153#.  Neurohormonal Blockade:  --Beta Blocker: Decrease Toprol 12.5 mg QD   --ARNi / ACEi / ARB: Lisinopril 2.5 mg QD  --Aldosterone Antagonist: None, consider adding if BP can tolerate.  --SGLT2 Inhibitor: No insurance, cost prohibited  --Home Diuretic: None  --Inpatient Diuretic: PO Lasix 40 mg QD    Sudden Cardiac Death Risk Reduction  --ICD: She is wearing the Life Vest and understands how to use it.    Limited echo to re-check EF can be ordered earliest (mid-Oct) for ICD planning.    Precordial chest pain  This morning had chest pain with tachypnea which resolved on its own  CXR clear  EKG same as prior no ischemic features  Suspect MSK pain due to Life Vest being too tight; asked RN to adjust how tight it is  Ask patient to ambulate to monitor for s/sxs of exertional component  CRP elevated; treat for possible pericarditis with Colchicine 0.6 mg QD x 3 months with goal CRP < 3     Subjective:  Patient reports this morning she felt 3 strong heartbeats followed by sharp left-sided chest pain with radiation to her neck.  Feels like there is a ball in her throat pushing against her when she breathes in deeply or talks.  Patient reports pushing on the left breast makes her pain worse.  This is the first time she has felt pain like this.  The ball in her throat is a chronic problem which has been happening for a year.  Patient reports the pain is subsiding on its own but still present.    Vitals:  Vitals:    07/14/24 0506 07/14/24 0600   Weight: 69.5 kg (153 lb 3.5 oz) 69.5 kg (153 lb 3.5 oz)   ,  Vitals:    07/14/24 0332 07/14/24 0506 07/14/24 0600 07/14/24  0822   BP: 99/59   106/80   BP Location: Left arm   Left arm   Pulse: 87   64   Resp: 16   (!) 40   Temp: (!) 97.3 °F (36.3 °C)      TempSrc: Temporal      SpO2: 94%      Weight:  69.5 kg (153 lb 3.5 oz) 69.5 kg (153 lb 3.5 oz)    Height:         Exam:  Physical Exam  Vitals and nursing note reviewed.   HENT:      Head: Normocephalic and atraumatic.      Nose: Nose normal.      Mouth/Throat:      Mouth: Mucous membranes are moist.   Eyes:      Conjunctiva/sclera: Conjunctivae normal.   Neck:      Vascular: No JVD.   Cardiovascular:      Rate and Rhythm: Regular rhythm. No extrasystoles are present.     Pulses:           Radial pulses are 2+ on the left side.      Heart sounds: S1 normal and S2 normal. No murmur heard.     Comments: Hr approx 65 bpm  Pulmonary:      Effort: Pulmonary effort is normal.      Breath sounds: No wheezing, rhonchi or rales.      Comments: Initially on my encounter she is tachypneic with shallow breaths but by the end of the encounter her breathing has normalized.  Chest:          Comments: Left vest in place  Abdominal:      General: Abdomen is flat.      Tenderness: There is no abdominal tenderness.   Musculoskeletal:         General: No swelling. Normal range of motion.      Cervical back: Normal range of motion and neck supple.        Back:    Skin:     General: Skin is warm and dry.      Findings: No rash.   Neurological:      General: No focal deficit present.      Mental Status: She is alert.   Psychiatric:         Behavior: Behavior normal.      Telemetry:       SR hr averaging 70-84 bpm.    Medications:    Current Facility-Administered Medications:     acetaminophen (TYLENOL) tablet 650 mg, 650 mg, Oral, Q6H PRN, David Phan MD, 650 mg at 07/10/24 2128    aspirin chewable tablet 81 mg, 81 mg, Oral, Daily, Justino Patel MD, 81 mg at 07/14/24 0843    colchicine (COLCRYS) tablet 0.6 mg, 0.6 mg, Oral, Daily, David Phan MD    enoxaparin (LOVENOX) subcutaneous injection 40 mg, 40 mg,  Subcutaneous, Daily, David Phan MD, 40 mg at 07/14/24 0847    furosemide (LASIX) tablet 40 mg, 40 mg, Oral, Daily, David Phan MD, 40 mg at 07/13/24 0851    lisinopril (ZESTRIL) tablet 2.5 mg, 2.5 mg, Oral, Daily, Keven Zhao MD, 2.5 mg at 07/14/24 0843    metoprolol succinate (TOPROL-XL) 24 hr tablet 12.5 mg, 12.5 mg, Oral, BID, Keven Zhao MD, 12.5 mg at 07/14/24 0844    nitroglycerin (NITROSTAT) SL tablet 0.4 mg, 0.4 mg, Sublingual, Q5 Min PRN, Justino Paetl MD    pantoprazole (PROTONIX) EC tablet 40 mg, 40 mg, Oral, Early Morning, David Phan MD, 40 mg at 07/14/24 0510    Labs/Data:        Results from last 7 days   Lab Units 07/14/24 0457 07/13/24  0538 07/12/24  0519   WBC Thousand/uL 8.36 7.92 8.36   HEMOGLOBIN g/dL 15.2 15.8* 15.9*   HEMATOCRIT % 47.3* 48.6* 49.0*   PLATELETS Thousands/uL 199 197 211     Results from last 7 days   Lab Units 07/14/24 0457 07/13/24  0538 07/12/24  0519   POTASSIUM mmol/L 4.0 3.7 4.0   CHLORIDE mmol/L 104 103 102   CO2 mmol/L 28 30 32   BUN mg/dL 18 15 16   CREATININE mg/dL 0.69 0.59* 0.81     Xi Porras PA-C

## 2024-07-15 ENCOUNTER — PATIENT OUTREACH (OUTPATIENT)
Dept: CASE MANAGEMENT | Facility: HOSPITAL | Age: 64
End: 2024-07-15

## 2024-07-15 DIAGNOSIS — I50.43 ACUTE ON CHRONIC COMBINED SYSTOLIC AND DIASTOLIC HEART FAILURE (HCC): Primary | ICD-10-CM

## 2024-07-15 NOTE — PROGRESS NOTES
CMOC attempted outreach regarding needed CHF assessment. CMOC left pt a message to return call.    Will F/U by 7/16

## 2024-07-15 NOTE — PROGRESS NOTES
"Outpatient Care Manager Note: Received in basket notification for referral for Complex/CHF CM. Chart notes reviewed and call made to patient via  [phone]. Reviewed role of RN ZOLTAN and she is agreeable outreach. Patient reports she is feeling ok. She states she is feeling Ok, but worried about being on 2nd floor and feels needs an escalator or a first floor apartment. She states her heart is weak and she \"can't do anything\". '[She is not able to work and can't afford anything I discussed inpatient CM notes with her and that she declined STR and would she like to reconsider and she stated she just wants a first floor apartment where her friends can visit. It was difficult to get straight answers. She states is taking her medication-6 of them. Was not able to review.I did ask her if a CMOC could come out and go over her medication, bring her a pill box and a scale and help her get organized. She is agreeable.  CMOCNadia will see her tomorrow am and will assess home situation further then.    "

## 2024-07-15 NOTE — PROGRESS NOTES
received a referral to complete a heart failure home visit.    OC received in basket message from Andrew Villalobos RN that pt was agreeable for a heart failure home visit.    OC placed outreach call to pt and introduced myself and stated the reason for my call.   OC discussed the home visit and pt was in agreement to go over Heart Failure education.    OC scheduled home visit for tomorrow, Tuesday 7/16.

## 2024-07-16 ENCOUNTER — TELEPHONE (OUTPATIENT)
Dept: FAMILY MEDICINE CLINIC | Facility: CLINIC | Age: 64
End: 2024-07-16

## 2024-07-16 ENCOUNTER — PATIENT OUTREACH (OUTPATIENT)
Dept: CASE MANAGEMENT | Facility: HOSPITAL | Age: 64
End: 2024-07-16

## 2024-07-16 ENCOUNTER — PATIENT OUTREACH (OUTPATIENT)
Dept: FAMILY MEDICINE CLINIC | Facility: CLINIC | Age: 64
End: 2024-07-16

## 2024-07-16 ENCOUNTER — TELEPHONE (OUTPATIENT)
Age: 64
End: 2024-07-16

## 2024-07-16 NOTE — PROGRESS NOTES
"*Completed Home Visit Covid Screening: Yes    HANDOUTS    \"Important activities I can do at home to manage my heart failure\" provided and reviewed with the patient: Yes   Does the patient have their AVS and CHF booklet?  Yes   Does the patient have access to watch the Heart Failure video?  Yes     SIGNS AND SYMPTOMS    Is the patient having any of the following symptoms:   Swelling \"edema\" in legs, feet, ankles, hands, or abdomen/stomach: No, very little swelling around ankels   Clothing, shoes, or jewelry tighter: No    Increase in weight (3 lbs overnight or 5 lbs in a week): No    Increase in weakness: No    Increase in tiredness: No    New cough or unusual cough: No    Dizziness: some dizzines   Harder to breath lying down or need to sleep in a chair or require more pillows to sleep: No    Chest pain: No   CMOC reported symptoms to the RN care manager or the provider's office clinical staff: Yes    FLUIDS    Fluid restrictions reviewed: Yes   Patient knows their fluid restrictions:  Yes   Fluid restrictions are: 64  How does the patient keep track of their fluid intake? log  Patient demonstrated how to measure/track their fluids for the day: Yes     DIET    Who does the food shopping and where do they get their food? The woman whom she rents from   Do they need any food resources? No    If yes, was FindHelp used or a  needed? N/A  Who does the cooking? The woman who she lives with  Sodium restrictions reviewed: Yes   Patient knows their sodium restrictions: No   Sodium restrictions are: 1000  Reviewed food label reading: Yes   Patient could identify foods that are high in salt in their pantry/refrigerator/freezer: Yes     REVIEW HOW TO CHECK WEIGHT    Do they have a working scale? Gave pt scale  Is the scale on a flat, hard surface? Yes   Reviewed to weigh themselves in the morning after getting up and using the bathroom, before eating and drinking, and to wear the same amount of clothing? Yes   Does " the patient keep a log of weights with the date & time? Yes   Reviewed with patient to notify Cardiology office/PCP office with increase in weight of 3 lbs overnight or 5 lbs in a week. Yes   Patient was able to demonstrate how to weigh themselves independently: Yes     MEDICATION    Patient has their medication list: Yes   Did the patient fill their medications after discharge? Yes   How does the patient manage their medication? Takes the prescribed doses  CMOC was able to review medications with RN care manager: Yes     APPOINTMENTS    Do they have a PCP appointment? Yes, describe: Pt will be established with  PCP at [facility] on 7/24  Do they have a cardiology appointment? Yes, describe: 7/22 virtual visit  Do you have any upcoming lab work, testing, etc...? No  How do you get to your appointments, testing, labs, etc...? Currently does not have transportation   If assistance is needed in getting lab work, did you refer to mobile labs? Yes        will assist with Cardiology Virtual appointment on 7/22. [facility] did assist with medical assistance application which is in process.

## 2024-07-16 NOTE — TELEPHONE ENCOUNTER
----- Message from Elana ORTIZ sent at 7/16/2024 11:18 AM EDT -----  Regarding: Needs Lyft  Good morning,  This patient will need Lyft ride for 8:30 appointment and she also has 10 am appointment on 7/24/24  Thank you,  Andrew Villalobos MS, RN Heart Failure Outpatient Care Manager

## 2024-07-16 NOTE — PROGRESS NOTES
Outpatient Care Manager Note: Met with patient and CMOC via Teams. Medications reviewed. Patient is managing medication herself and is taking correctly. CMOC did give patient a medication box and a scale. Her weight is 157#. Patient is lying down in her bed and getting up only with assistance from her friend who owns the house she is living in. She only gets up to go to the bathroom. Her friend is buying her groceries and bringing her meals.   I did reinforce that patient should be up and moving more. CMOC feels patient can ambulate, but believes she her heart is too weak to be up and around. She is wearing her life vest and may be promoting feeling of being sick.  She does not want to go to rehab. CMOC did go over that ambulating is good for her heart and she will need to gradually be able to walk around more each day and get down stairs to go to appointments next week.   Call made to cardiology and virtual appointment scheduled for 7/22/24 at 10:30 am with Milena. Will see if CMOC can help facilitate at that time.  IB message sent to SARAH Mcknight to arrange bulljazmyn cristian for 7/24 appointments.  Will follow up.

## 2024-07-17 ENCOUNTER — TELEMEDICINE (OUTPATIENT)
Dept: CARDIOLOGY CLINIC | Facility: CLINIC | Age: 64
End: 2024-07-17

## 2024-07-17 ENCOUNTER — PATIENT OUTREACH (OUTPATIENT)
Dept: FAMILY MEDICINE CLINIC | Facility: CLINIC | Age: 64
End: 2024-07-17

## 2024-07-17 VITALS — BODY MASS INDEX: 25.78 KG/M2 | WEIGHT: 151 LBS | HEIGHT: 64 IN

## 2024-07-17 DIAGNOSIS — I50.9 ACUTE DECOMPENSATED HEART FAILURE (HCC): ICD-10-CM

## 2024-07-17 PROCEDURE — 99214 OFFICE O/P EST MOD 30 MIN: CPT | Performed by: NURSE PRACTITIONER

## 2024-07-17 RX ORDER — ASPIRIN 81 MG/1
81 TABLET, CHEWABLE ORAL DAILY
Qty: 30 TABLET | Refills: 1 | Status: SHIPPED | OUTPATIENT
Start: 2024-07-17

## 2024-07-17 RX ORDER — PANTOPRAZOLE SODIUM 40 MG/1
40 TABLET, DELAYED RELEASE ORAL
Qty: 30 TABLET | Refills: 1 | Status: SHIPPED | OUTPATIENT
Start: 2024-07-17

## 2024-07-17 RX ORDER — METOPROLOL SUCCINATE 25 MG/1
12.5 TABLET, EXTENDED RELEASE ORAL 2 TIMES DAILY
Qty: 30 TABLET | Refills: 1 | Status: SHIPPED | OUTPATIENT
Start: 2024-07-17

## 2024-07-17 RX ORDER — LISINOPRIL 2.5 MG/1
2.5 TABLET ORAL DAILY
Qty: 30 TABLET | Refills: 1 | Status: SHIPPED | OUTPATIENT
Start: 2024-07-17

## 2024-07-17 RX ORDER — FUROSEMIDE 40 MG/1
40 TABLET ORAL DAILY
Qty: 30 TABLET | Refills: 1 | Status: SHIPPED | OUTPATIENT
Start: 2024-07-17

## 2024-07-17 NOTE — PROGRESS NOTES
"Virtual Regular Visit  Name: Nancy Calderon      : 1960      MRN: [id number]  Encounter Provider: HEATHER Hall  Encounter Date: 2024   [facility]    Verification of patient location:    Patient is located at Home in the following state in which I hold an active license [place]    Assessment & Plan         Encounter provider HEATHER Hall    The patient was identified by name and date of birth. Nancy Calderon was informed that this is a telemedicine visit and that the visit is being conducted through the Epic Embedded platform. She agrees to proceed..  My office door was closed. No one else was in the room.  She acknowledged consent and understanding of privacy and security of the video platform. The patient has agreed to participate and understands they can discontinue the visit at any time.    Patient is aware this is a billable service.     New HFrEF  -etiology NICM. Met SIRS criteria on admit but without clear source of infection. Has a reported history of hypertension, heart stents (last in ?)   -He reports feeling tired and weak.  Is afraid to move much in her home as her \"heart is weak\".  -States she is unable to get to in person visits due to her immobility.  We discussed the importance of physical exam.  -I will reach out to our  and  to see if we can assist with transportation to an in person visit  -She has no way of checking her blood pressure at home but she did weigh herself today while I was on the phone and this was stable.  -She is currently uninsured so unable to optimize GDMT any further  -Refills were sent to her pharmacy  -discharge weight 153 lbs, today at home 151 lbs.     - TTE 2024 showed EF 15%, LVIDd 5.7 cm, grade 2 DD, normal RV size with reduced function, no significant valvular disease, mild to moderate left-sided pleural effusion  - [facility] " "7/10/2024 showed normal coronary angiography, no atherosclerotic lesions    Neurohormonal Blockade:  --Beta-Blocker: metoprolol succinate 12.5 mg daily  --ACEi, ARB or ARNi:lisinopril 2.5 mg daily  --Aldosterone Receptor Blocker:  --Diuretic:lasix 40 mg daily    Sudden Cardiac Death Risk Reduction:  --ICD: EF 15%, Wearing LIfeVest    Electrical Resynchronization:  --Candidacy for BiV device:    Advanced Therapies:    --Inotrope:  --LVAD/Transplant Candidacy:    HTN  Medication non adherence    HPI:   63 y.o. female presented to the ED on 7/5 complaining acute onset chest pain.Also reported shortness of breath and lower extremity edema over the last 2 months. Her past medical history is unclear as she moved here from [place] 5 years ago but she has a reported history of hypertension, heart stents (last in 2011?) and possible CHF. She followed with a cardiologist in the [facility] but has not establish care with a cardiologist since being in the [place]. She has also not been taking her cardiac medications due to insurance problems (Coreg and valsartan?). . Echocardiogram showed a severely reduced EF of 15%. She was diuresed and started on low dose GDMT    7/17/2024 patient is being evaluated today for posthospital follow-up via virtual visit.  Antoine Montano [id number] was present for the entirety of the visit to assist with translation.  Reports feeling very weak and tired since hospitalization.  She is afraid to do anything as she feels her \"heart is too weak \".  She is taking her medications faithfully.  She weighed herself while we are on the phone and this has been stable.  It sounds like she lives on a top floor of the house and is unable to do the stairs.  This is why she was unable to come to an in person visit.  She states that her next office visit with her PCP is on the 24th and that there is an ambulance coming for her in order to get her there.  She says getting in a " "car would be very difficult as she is unable to lift her leg without help.  She is wearing her LifeVest and has had no alarms from this.  She is also not entirely sure how she will get her medications.  He may need these delivered to her house.        Review of Systems   All other systems reviewed and are negative.      Objective       Lab Results   Component Value Date    SODIUM 138 07/14/2024    K 4.0 07/14/2024     07/14/2024    CO2 28 07/14/2024    AGAP 6 07/14/2024    BUN 18 07/14/2024    CREATININE 0.69 07/14/2024    GLUC 119 07/14/2024    CALCIUM 9.6 07/14/2024    AST 30 07/05/2024    ALT 40 07/05/2024    ALKPHOS 94 07/05/2024    TP 7.1 07/05/2024    TBILI 1.34 (H) 07/05/2024    EGFR 92 07/14/2024     Lab Results   Component Value Date    WBC 8.36 07/14/2024    HGB 15.2 07/14/2024    HCT 47.3 (H) 07/14/2024    MCV 94 07/14/2024     07/14/2024     Lab Results   Component Value Date    BNP 2,673 (H) 07/05/2024      No results found for: \"LDLCALC\"  No results found for: \"ZQW7UKMSJRDL\", \"TSH\"  Lab Results   Component Value Date    HGBA1C 7.9 (H) 07/06/2024     Ht 5' 4\" (1.626 m)   BMI 26.30 kg/m²   No home BP cuff available. No video available for physical exam    Visit Time  Total Visit Duration: 20 mins        "

## 2024-07-17 NOTE — PROGRESS NOTES
SARAH STEPHENSON called [facility] to complete assessment for services. SARAH STEPHENSON spoke to [place] and was made aware that due to pt's residency SARAH STEPHENSON will have to wait until pt get's EMA before they will be able to offer any assistance to pt.  SARAH STEPHENSON will send referral to CM for assistance with EMA once pt is established with practice.     SARAH STEPHENSON called [facility] to confirm transport. SARAH STEPHENSON was made aware that due to the number of steps in the home (16) pt cannot be transported via [facility] and will need a BLS transport. SARAH STEPHENSON notified practice administrator of the fact and was made aware that it was okay.     SARAH STEPHENSON called [facility] and was made ware that they where not able to assist with setting up transport as [employer] is not enabled to request outside BLS rides. SARAH STEPHENSON was made aware that they will send the list of outside providers who would be able to assist pt.     SARAH STEPHENSON will continue to be available for any additional needs as requested.

## 2024-07-18 ENCOUNTER — PATIENT OUTREACH (OUTPATIENT)
Dept: CASE MANAGEMENT | Facility: HOSPITAL | Age: 64
End: 2024-07-18

## 2024-07-18 ENCOUNTER — PATIENT OUTREACH (OUTPATIENT)
Dept: FAMILY MEDICINE CLINIC | Facility: CLINIC | Age: 64
End: 2024-07-18

## 2024-07-18 NOTE — PROGRESS NOTES
SARAH STEPHENSON was provided with cost center number for [facility] as well as email with community providers who would be able to assist with transport. SARAH STEPHENSON attempted to contact [facility] and was made aware that they do not do pick ups from pt's homes to appointments.     SARAH STEPHENSON called [facility] [place] and was made aware that they do not have a contact with [facility] and would require a payment up front.     [facility] and was made aware that they would also require up front payment and would not be able to get pt to her appointment on time for the first appointment but would be able to make the second appointment.     [facility] and was made aware that they would have to look and see if they could assist and that they typically require upfront payment but will look to see if they can bill the cost center.    SARAH STEPHENSON notified practice administrator of update. SARAH STEPHENSON will continue to be available for any additional needs as requested.

## 2024-07-18 NOTE — PROGRESS NOTES
was scheduled to assist pt with an virtual appointment with Cardiology on 7/22    OC did receive an in basket from Andrew Villalobos RN that pt did complete the appointment and no assistance was needed.    OC will now close case.

## 2024-07-19 ENCOUNTER — PATIENT OUTREACH (OUTPATIENT)
Dept: CARDIOLOGY CLINIC | Facility: CLINIC | Age: 64
End: 2024-07-19

## 2024-07-19 ENCOUNTER — PATIENT OUTREACH (OUTPATIENT)
Dept: FAMILY MEDICINE CLINIC | Facility: CLINIC | Age: 64
End: 2024-07-19

## 2024-07-19 NOTE — PROGRESS NOTES
SARAH STEPHENSON received teams message from SARAH Reveles requesting to discuss pt's case as she had received a referral to follow up with pt. SARAH STEPHENSON provided update and it was decided that she would try to assist pt with PAP for medications while SARAH STEPHENSON will try to assist with EAP and other needs.     SARAH STEPHENSON completed chart review and noted that pt was able to complete telemedicine appointment with cardiology. SARAH STEPHENSON called pt via  [phone] and was able to speak to pt. SARAH STEPHENSON provided update to pt with difficulty with acquiring transportation pt expressed understanding. SARAH STEPHENSON inquired if pt would be able to do the telemedicine appointment and pt reported that she would be able to complete a telemedicine appointment.     SARAH STEPHENSON dicussed pt's insurance and [facility] referral. SARAH STEPHENSON informed pt about the fact that she would not qualify for assistance at this time due to her immigration status. Pt expressed understanding. SARAH STEPHENSON informed pt that she would need to apply for an EMA in the mean time. SARAH STEPHENSON explained what the EMA process would look like. SARAH STEPHENSON inquired about social support and inquired if pt's sister could provide support and pt reported that her sister could not help her.     Pt inquired about her medications and SARAH STEPHENSON explained that SARAH Reveles will be following up with her for her cardiology meds. SARAH STEPHENSON discussed the importance of getting established with the practice to apply for the EMA and to see if [facility] could assist with a one month supply of meds.     SARAH STEPHENSON  called women's health to see if they would be able to switch her appointment to virtual and was made aware that they would not be able to as she is a new pt and she is having spotting so it would have to be canceled until she is able to come into the office in person.     SARAH STEPHENSON will continue to be available for any additional needs as requested.

## 2024-07-19 NOTE — PROGRESS NOTES
Received new Advanced Heart Failure Social Work referral from SHREYA ROMERO.     This LCSW reviewed pt's electronic chart.   Pt completed telemedicine visit with Advanced Heart Failure SHREYA ROMERO on 7/17/24.[place]13[place] and reportedly has green card. She has not applied yet for EMA (Emergency Medical Assistance) which will only cover hospital and ED visits. She cannot apply for CELENA LOWE until she has [place] residency for 5 years.     CMOC, Lisseth Wilcox attempted patient outreach on 7/15/24 and plans to follow-up.     OP RN Andrew STEPHENSON is following patient and noted on 7/16/24 that CMOC was able to provide pt with a medication organizer and scale.   Pt's friend owns the home where pt resides. Pt unable to pay rent and has no income. Please refer to progress note dated 7/16/24 from OP RN Andrew STEPHENSON.     OP [facility] Mateusz SMITH is also assisting patient.   This LCSW contacted Ms. Arroyo via Teams to collaborate and coordinate care. Ms. Arroyo reported that she received referral to help arrange Lyft ride for patient to become established at [facility] PCP. However pt unable to climb the 16 [address] down from the second floor of her rooming house. Ms. Arroyo has done extensive research in trying to coordinate a BLS because pt unable to climb steps. Ms. Arroyo reported that [facility] won't transport from home to OP appt. Outside [facility] won't provide transport because they don't have contract with  and require credit card payment up front. Ms. Arroyo offered for outside [facility] to bill  cost center but they declined. She has just a couples of [employer] to follow-up with but then resources will be exhausted and pt may need to do virtual visit with [facility] PCP to become established.   Ms. Arroyo stated that  [employer] [employer] will not get involved until pt has EMA (Emergency Medical Assistance). Ms. Arroyo hoping that OC can assist pt with EMA  application.     Ms. Arroyo reported that pt cannot afford her medication and the inpatient hospital supplied one month. This LCSW inquired if [facility] Medication Assistance  will assist with PAPs however Ms. Arroyo stated that pt cannot begin resources through [facility] until pt becomes established with an appointment AND that the specialist does not assist with Cardiology medication.     This LCSW offered to review pt med list and help with PAPs for Cardiology medications. Ms. Arroyo was appreciative and we will continue to closely collaborate so that pt care is streamlined and services are not duplicated.     LCSW reviewed pt's medication list and medications will be most affordable through [facility]: PAPs are unavailable for these medications as they are not the high cost medications.  $8.46 Aspirin 81 mg oral daily  $11.68 (with Good RX card) Colchecine 0.6mg oral daily  $4 Furosemide 40mg oral daily  $4 Lisinopril 2.5 mg oral daily  $9 Metoprolol Succinate 12.5mg oral 2x daily  $5.84 (with Good RX card) Pantoprazole Sodium 40mg oral daily  Approx Total is $42.98    LCSW sent IB mssg of this list to [facility] Mateusz SMITH for her input and suggestions.    LCSW contacted OP  Team Support, Ryann to see if there is any medication assistance that she is aware of for the above medications since PAPs are not available.    OP  Team Support Lead, Ryann suggested [employer] and [employer] [facility].    LCSW called [employer] [phone] and spoke with Myla. The [employer] is non-profit and requires that patients have insurance because [employer] can only help with co-pays after insurance.     LCSW called [facility] [phone] and spoke with Christelle. Christelle began application for patient and verbally received list of pt's medications and doses as listed above in this note. Christelle stated that [facility] will contact patient with a  to report  what assistance (if any) is available for her prescribed medications.     LCSW utilized [employer] , Nadia [phone] to contact patient via phone. First attempt there was no answer. Second attempt, patient answered phone. Introduced self from  Cardiology Office and reason for call. Pt tried to ask where her medications would be sent however LCSW explained we first have to find out if there is assistance for the medications. Pt handed phone to her uncle, Ruben Maciel. Mr. Maciel said he's visiting and has off from work today. He typically works Saturday to Thursday. He stated that pt doesn't have much help. LCSW explained that [facility] will call pt with  to state if there is any assistance to pay for pt's medications because she has no insurance. Provided [facility] Ph# to pt's uncle, Ruebn. Also informed him that this [facility] cost checked pt's medication at [facility] and it would cost a little over $40. However, pt's uncle did not confirm that they could afford the medication. Asked pt's uncle, Ruben if he'd like to review the medication list however he stated that the lady that lives there and knows about pt's medications is currently not at home. Her name is Lou Miller.     Pt's uncle, Ruben was concerned that pt has no insurance.  LCSW stated that pt should apply for full [place] after Sept 19th because she reportedly will have been resident in [place] x 5 years after that date.Uncle, Ruben stated that pt was working until one week ago until pt fainted last Friday and has been able to walk since then.   Pt was hospitalized at [facility] from 7/5/24-7/14/24 and the hospital sent her home with one month supply of medication.  LCSW explained that [facility] Marah was trying to arrange ambulance to OP appt on 7/24. However if that is not possible then appt may need to be virtual.   Explained that next Cardiology appt on 8/1/24 is virtual.   Asked if pt/uncle had any  more questions at this time and they declined.   Routed note to [facility] Marah.

## 2024-07-22 ENCOUNTER — PATIENT OUTREACH (OUTPATIENT)
Dept: FAMILY MEDICINE CLINIC | Facility: CLINIC | Age: 64
End: 2024-07-22

## 2024-07-22 ENCOUNTER — PATIENT OUTREACH (OUTPATIENT)
Dept: CASE MANAGEMENT | Facility: HOSPITAL | Age: 64
End: 2024-07-22

## 2024-07-22 NOTE — PROGRESS NOTES
"Outpatient Care Manager Note: Chart notes reviewed. Patient was referred to OPCM SARAH and spoke with HF MSW and Roxanna SMITH on Friday, 7/19. They are assisting with medication assistance and EMA with plans for CELENA LOWE in September, once patient is here 5 years. Patient will need to be able to get down a flught of stairs to be able to get Lyft for any IP appointments. Until then is having virtual appointments. Has new PCP appointment this Wednesday, 7/24.  Call to patient. She reports is feeling better. Walking more and sitting up in chair. Not attempting steps as yet. She is weighing herself and weight is 151.4# today. Denies SOB and states \"only a little swelling\" in her feet. She has her medication and is following low salt diet and keeping to fluid restriction. She states she is having a virtual appt. With Roxanna.  Secure chat sent to Luann SMITH with Roxanna and she states appointment is not virtual as is first appointment to establish care and needs to be in person. Will see if can reschedule for next week with Lyft ride.  "

## 2024-07-22 NOTE — PROGRESS NOTES
SARAH STEPHENSON received message stating that pt's appointment will not be able to be virtual as it was a new pt appointment.     SARAH STEPHENSON called [facility] to see  if they would be able to assist pt with getting her scheduled as SARAH STEPHENSON does not have access to a credit card. SARAH STEPHENSON was made aware that they are not able to. SARAH STEPHENSON was also made aware that [place] does not have any contact with the community providers so its up to them if they want to bill the cost center or not.     SARAH STEPHENSON spoke to Elana from cardiology she inquired about the virtual appointment and SARAH STEPHENSON provided update. She suggested to have it rescheduled to next week if possible to see if pt regains strength and is able to navigate the steps. SARAH STEPHENSON routed note to the MR for assistance.     SARAH STEPHENSON called [facility] and spoke to Delgado and made a report for assistance due to concerns at this time as SARAH SACHIN has not been able to get assistance for pt to get a ride to her appointment and pt is currently homebound at this time.     SARAH STEPHENSON provided update to  who requested that an email be sent. SARAH STEPHENSON sent email as requested with update.     SARAH STEPHENSON will continue to be available for any additional needs as requested.

## 2024-07-23 ENCOUNTER — TELEPHONE (OUTPATIENT)
Dept: CARDIOLOGY CLINIC | Facility: CLINIC | Age: 64
End: 2024-07-23

## 2024-07-23 ENCOUNTER — PATIENT OUTREACH (OUTPATIENT)
Dept: CARDIOLOGY CLINIC | Facility: CLINIC | Age: 64
End: 2024-07-23

## 2024-07-23 ENCOUNTER — PATIENT OUTREACH (OUTPATIENT)
Dept: FAMILY MEDICINE CLINIC | Facility: CLINIC | Age: 64
End: 2024-07-23

## 2024-07-23 NOTE — TELEPHONE ENCOUNTER
"Chas Montano # [id number]    Spoke with patient    Self-management/education and teach back:  Primary learner: self  Following low sodium diet: yes, states no salt  Following fluid restriction: yes  Hospital discharge weight: 153.3 lbs  Weighing daily: yes            Recording: yes  1st home weight         Weight today: 150.2 lbs (yesterday 151.4 lbs)  Monitoring symptoms: yes  Any current symptoms: little bit of swelling of her ankles, does get SOB with walking, states she does get dizzy when standing, lasts for about 1 minute, feels like she is going to fall. Does not monitor BP at home.   Also feels her \"heart jumping when she takes her medication, strange feeling, but goes away after a bit\"   Patient also complains of pressure inside left chest when breathing, \"feels like something is getting stuck\", this comes and goes.   Knows when to call provider: yes reviewed, 3 lb weight gain overnight, 5 lbs in 1 week, CP, SOB or edema.  Medication reviewed and taking all as prescribed (bring medications to follow up visits): reviewed, metoprolol succinate 25 mg half tablet twice daily, lisinopril 2.5 mg daily, furosemide 40 mg daily  Knows name of diuretic: yes  Any labs/studies needed for follow up: NA  Escalation plan: reviewed  HF education reviewed/reinforced including low sodium diet, 64 oz fluid restriction, activity, symptoms of decompensation and when and who to call.     Care Coordination:  Aware of cardiology follow up appointment: 8/1/23 [facility]  Aware of PCP follow up appointment: did have this week, but needed to cancel, unable to get transportation, needs to be in person as this is to establish care  Transportation: needs transportation, patient still not doing steps  Social Support: friend  Home health care: No  Health literacy: needs reinforcement  Engagement: good  Additional comments: ok for weekly call    Patient also complaining of pain in neck and back when sitting in a chair, but ok " when laying in bed, as she can reposition self on her side if needed.

## 2024-07-23 NOTE — PROGRESS NOTES
OP Advanced Heart Failure LCSW received IB mssg from Angelika ROMERO and OP CARD RN, Lacy Roa. Please refer to note dated 7/23/24 by RN, Lacy Roa.    LCSW left message for [facility] [phone] inquiring if they provide services for no insurance since [facility] requires In-Person Office visit (not virtual) to become an established patient. Await return call.     3:31pm [facility] is now willing to arrange BLS transport for patient to attend [facility] Office Visit on 7/29 to become established per IB message from [facility] Mateusz De La Torre. This LCSW returned IB Message to [facility] Mateusz De La Torre for coordination of care. If pt keeps appt with [facility] on 7/29 and becomes established, then this Advanced HF LCSW will remove self from care plan.

## 2024-07-23 NOTE — PROGRESS NOTES
SARAH STEPHENSON received email from [facility] Manager Nabila notifying SARAH  that SARAH  can contact [facility] for assistance with getting transportation to get pt established with practice.     SARAH STEPHENSON called [facility] and was able to get pt put on the report to get a ride for her appointment on 7/29/24 at 5pm. SARAH STEPHENSON was made aware that they will notify SARAH  when an outside provider has accepted the ride.     SARAH  will continue to be available for any additional needs as requested.

## 2024-07-25 ENCOUNTER — PATIENT OUTREACH (OUTPATIENT)
Dept: CASE MANAGEMENT | Facility: OTHER | Age: 64
End: 2024-07-25

## 2024-07-25 NOTE — PROGRESS NOTES
"Outpatient Care Manager Note: Chart noted reviewed and call patient via  \"Efra\" #[phone] and left message. Sent IB message to BRUNO Kong to see if she can do a call tomorrow to remind patient about Monday planned transportation for her PCP appointment.  Will follow up next week.  "

## 2024-07-26 ENCOUNTER — PATIENT OUTREACH (OUTPATIENT)
Dept: FAMILY MEDICINE CLINIC | Facility: CLINIC | Age: 64
End: 2024-07-26

## 2024-07-26 DIAGNOSIS — Z76.89 ENCOUNTER FOR SOCIAL WORK INTERVENTION: Primary | ICD-10-CM

## 2024-07-26 NOTE — PROGRESS NOTES
SARAH STEPHENSON called [facility] Manager Nabila and provided update regarding pt's case. SARAH STEPHENSON inquired if SARAH STEPHENSON was allowed to send CM referral for assistance as pt was not established with practice at this time and was made aware that SARAH STEPHENSON could send referral.     SARAH STEPHENSON called [facility] and was made aware that pt's ride was picked up by [employer] ([phone]). SARAH STEPHENSON was made aware that pt had a 4:00 PM  and would have a return trip  at 6:00 PM. SARAH STEPHENSON sent email to Practice Admin, SW CM Manager and  providing them with update.     SARAH STEPHENSON called pt via  [phone] to follow up with her and provide update regarding her upcoming appointment on 7/29/24. SARAH STEPHENSON was alble to speak to pt and provided pt with information and informed pt to be ready by 3:30 PM just in case transport came a little earlier. Pt expressed understanding.     SARAH STEPHENSON and pt discussed housing and income. Pt reported that she was concerned about her rent that was coming up. SARAH STEPHENSON discussed applying for housing. Pt reported that she was not sure. SARAH STEPHENSON explained that SARAH  will have to look into eligibility and explained that pt will be on a wait list for 3-5 years. Pt expressed understanding.    SARAH STEPHENSON and pt discussed income. SARAH STEPHENSON inquired if pt was a full time employee or part time and if she was benefit eligible. Pt reported that she was not sure. She shared that she worked for [employer] and her hours are variable. SARAH STEPHENSON inquired if she was employed from an agency or the company directly and she reported that it was the company. SARAH STEPHENSON inquired if pt had applied for FMLA and she reported that she did not know about that and that she was not aware. She reported that was brought to the hospital from work so that her job is aware that she is sick.     SARAH STEPHENSON inquired if pt received any benefits or if she had signed up for short term and long term disability when she got her job. Pt was not sure SARAH STEPHENSON provided  education as to the benefits and education to pt as to applying for FMLA if pt is eligible for the service and seeing if she had signed up for short term disability to replace her income. Pt expressed understanding.     SARAH STEPHENSON will continue to be available for any additional needs as requested.        SARAH STEPHENSON discussed referral with CLARK Olivo and she sent and email to [employer] to confirm that pt can apply for housing. She also sent information for pt to bring in to initiate her EMA.     SARAH STEPHENSON called pt via  [phone] and reviewed documents that pt will need including proof of past 30 days pay stubs for herself and a spouse if she has one. Past 30 days bank statement regardless of how much she has, all pages must be included. Proof of any unpaid medical bills for past 90 days. Proof of green card (front and back).     SARAH STEPHENSON reviewed documents and pt reported that she did not have her paystubs. SARAH STEPHENSON encouraged her to bring everything that she had and she reported that she will bring it to her appointment.     SARAH STEPHENSON will continue to be available for any additional needs as requested.

## 2024-07-29 ENCOUNTER — OFFICE VISIT (OUTPATIENT)
Dept: FAMILY MEDICINE CLINIC | Facility: CLINIC | Age: 64
End: 2024-07-29

## 2024-07-29 ENCOUNTER — TELEPHONE (OUTPATIENT)
Dept: CARDIOLOGY CLINIC | Facility: CLINIC | Age: 64
End: 2024-07-29

## 2024-07-29 VITALS
TEMPERATURE: 98 F | HEART RATE: 87 BPM | BODY MASS INDEX: 25.78 KG/M2 | OXYGEN SATURATION: 98 % | WEIGHT: 151 LBS | DIASTOLIC BLOOD PRESSURE: 76 MMHG | HEIGHT: 64 IN | RESPIRATION RATE: 18 BRPM | SYSTOLIC BLOOD PRESSURE: 112 MMHG

## 2024-07-29 DIAGNOSIS — G89.29 CHRONIC BILATERAL THORACIC BACK PAIN: ICD-10-CM

## 2024-07-29 DIAGNOSIS — Z23 ENCOUNTER FOR IMMUNIZATION: ICD-10-CM

## 2024-07-29 DIAGNOSIS — Z00.00 ANNUAL PHYSICAL EXAM: Primary | ICD-10-CM

## 2024-07-29 DIAGNOSIS — Z12.31 ENCOUNTER FOR SCREENING MAMMOGRAM FOR BREAST CANCER: ICD-10-CM

## 2024-07-29 DIAGNOSIS — M54.6 CHRONIC BILATERAL THORACIC BACK PAIN: ICD-10-CM

## 2024-07-29 DIAGNOSIS — E11.9 TYPE 2 DIABETES MELLITUS WITHOUT COMPLICATION, WITHOUT LONG-TERM CURRENT USE OF INSULIN (HCC): ICD-10-CM

## 2024-07-29 DIAGNOSIS — I50.42 CHRONIC COMBINED SYSTOLIC AND DIASTOLIC CONGESTIVE HEART FAILURE (HCC): ICD-10-CM

## 2024-07-29 PROBLEM — R65.10 SIRS (SYSTEMIC INFLAMMATORY RESPONSE SYNDROME) (HCC): Status: RESOLVED | Noted: 2024-07-05 | Resolved: 2024-07-29

## 2024-07-29 PROBLEM — I16.1 HYPERTENSIVE EMERGENCY: Status: RESOLVED | Noted: 2024-07-05 | Resolved: 2024-07-29

## 2024-07-29 PROBLEM — R07.2 PRECORDIAL CHEST PAIN: Status: RESOLVED | Noted: 2024-07-14 | Resolved: 2024-07-29

## 2024-07-29 PROBLEM — I10 PRIMARY HYPERTENSION: Status: ACTIVE | Noted: 2024-07-29

## 2024-07-29 PROBLEM — K21.9 GASTROESOPHAGEAL REFLUX DISEASE WITHOUT ESOPHAGITIS: Status: ACTIVE | Noted: 2024-07-29

## 2024-07-29 PROCEDURE — 99204 OFFICE O/P NEW MOD 45 MIN: CPT

## 2024-07-29 PROCEDURE — 90471 IMMUNIZATION ADMIN: CPT

## 2024-07-29 PROCEDURE — 99386 PREV VISIT NEW AGE 40-64: CPT

## 2024-07-29 PROCEDURE — 90750 HZV VACC RECOMBINANT IM: CPT

## 2024-07-29 PROCEDURE — 90715 TDAP VACCINE 7 YRS/> IM: CPT

## 2024-07-29 PROCEDURE — 90472 IMMUNIZATION ADMIN EACH ADD: CPT

## 2024-07-29 RX ORDER — BLOOD-GLUCOSE METER
KIT MISCELLANEOUS
Qty: 1 KIT | Refills: 0 | Status: SHIPPED | OUTPATIENT
Start: 2024-07-29

## 2024-07-29 RX ORDER — LANCETS 33 GAUGE
EACH MISCELLANEOUS
Qty: 300 EACH | Refills: 3 | Status: SHIPPED | OUTPATIENT
Start: 2024-07-29

## 2024-07-29 RX ORDER — LIDOCAINE 40 MG/G
CREAM TOPICAL AS NEEDED
Qty: 28 G | Refills: 1 | Status: SHIPPED | OUTPATIENT
Start: 2024-07-29

## 2024-07-29 RX ORDER — ATORVASTATIN CALCIUM 40 MG/1
40 TABLET, FILM COATED ORAL DAILY
Qty: 90 TABLET | Refills: 2 | Status: SHIPPED | OUTPATIENT
Start: 2024-07-29

## 2024-07-29 RX ORDER — BLOOD SUGAR DIAGNOSTIC
STRIP MISCELLANEOUS
Qty: 300 EACH | Refills: 3 | Status: SHIPPED | OUTPATIENT
Start: 2024-07-29

## 2024-07-29 NOTE — PROGRESS NOTES
Adult Annual Physical  Name: Nancy Calderon      : 1960      MRN: [id number]  Encounter Provider: HEATHER Badillo  Encounter Date: 2024   Encounter department: [facility]    Assessment & Plan   1. Annual physical exam  2. Type 2 diabetes mellitus without complication, without long-term current use of insulin (HCC)  Assessment & Plan:    Lab Results   Component Value Date    HGBA1C 7.9 (H) 2024     Start metformin 500 mg daily & lipitor 40 mg daily  Orders:  -     Albumin / creatinine urine ratio; Future  -     metFORMIN (GLUCOPHAGE) 500 mg tablet; Take 1 tablet (500 mg total) by mouth daily with breakfast  -     Ambulatory referral to Diabetic Education - use to refer for diabetes group classes, individual diabetes education, medical nutrition therapy, device training; Future; Expected date: 2024  -     Hemoglobin A1C; Standing  -     Blood Glucose Monitoring Suppl (OneTouch Verio Reflect) w/Device KIT; Check blood sugars three times daily. Please substitute with appropriate alternative as covered by patient's insurance. Dx: E11.65  -     glucose blood (OneTouch Verio) test strip; Check blood sugars three times daily. Please substitute with appropriate alternative as covered by patient's insurance. Dx: E11.65  -     OneTouch Delica Lancets 33G MISC; Check blood sugars three times daily. Please substitute with appropriate alternative as covered by patient's insurance. Dx: E11.65  -     atorvastatin (LIPITOR) 40 mg tablet; Take 1 tablet (40 mg total) by mouth daily  3. Chronic combined systolic and diastolic congestive heart failure (HCC)  Assessment & Plan:  Wt Readings from Last 3 Encounters:   24 68.5 kg (151 lb)   24 68.5 kg (151 lb)   24 69.5 kg (153 lb 3.5 oz)     Continue management as per cardiology. Currently taking lisinopril, metoprolol, lasix, asa, and colcrys for pleuritic pain.        4. Encounter for  screening mammogram for breast cancer  -     Mammo screening bilateral w 3d & cad; Future; Expected date: 07/29/2024  5. Encounter for immunization  -     TDAP VACCINE GREATER THAN OR EQUAL TO 6YO IM  -     Zoster Vaccine Recombinant IM  6. Chronic bilateral thoracic back pain  -     lidocaine (LMX) 4 % cream; Apply topically as needed for mild pain    Immunizations and preventive care screenings were discussed with patient today. Appropriate education was printed on patient's after visit summary.    Counseling:  Alcohol/drug use: discussed moderation in alcohol intake, the recommendations for healthy alcohol use, and avoidance of illicit drug use.  Exercise: the importance of regular exercise/physical activity was discussed. Recommend exercise 3-5 times per week for at least 30 minutes.     BMI Counseling: Body mass index is 25.92 kg/m². The BMI is above normal. Nutrition recommendations include decreasing portion sizes, encouraging healthy choices of fruits and vegetables, decreasing fast food intake, consuming healthier snacks, limiting drinks that contain sugar, moderation in carbohydrate intake, increasing intake of lean protein, reducing intake of saturated and trans fat and reducing intake of cholesterol. Exercise recommendations include moderate physical activity 150 minutes/week. No pharmacotherapy was ordered. Rationale for BMI follow-up plan is due to patient being overweight or obese.       History of Present Illness     Adult Annual Physical:  Patient presents for annual physical. Pt presents today to establish care.  She was recently hospitalized.  Hospital Course:   Nancy Calderon is a 63 y.o. female patient who originally presented to the hospital on 7/5/2024 due to acute CHF, eval by cardiology was treated with nitro drip and Lasix she underwent echocardiogram found to have ejection fraction 15% with grade 2 diastolic dysfunction cardiac cath revealed nonobstructive CAD.  She will be  maintained on furosemide 40 mg daily, lisinopril 2.5 mg daily, metoprolol 12.5 mg twice daily she was also started on colchicine for pleuritic pain today.  She will follow-up with cardiologist outpatient.  Today, she reports that weight has been stable at home. It has been ranging from 149 lbs to 152 lbs. She is compliant with her medications: lisinopril, metoprolol, lasix, colcrys, asa, and protonix.   She reports that she gets palpitations every time she takes protonix but otherwise she is doing okay. She does get SOB and fatigued with minimal exertion. This is her baseline since hospitalization. She has been having back pain since she came out the hospital. Mostly in the area where life vest is compressing her back. She has been following up with cardiology virtually due to transportation insecurity. .     Diet and Physical Activity:  - Diet/Nutrition: well balanced diet and heart healthy (low sodium) diet.  - Exercise: no formal exercise.    Depression Screening:  - PHQ-2 Score: 2    General Health:  - Sleep: 4-6 hours of sleep on average and sleeps poorly.  - Hearing: normal hearing bilateral ears.  - Vision: most recent eye exam > 1 year ago.  - Dental: no dental visits for > 1 year, brushes teeth three times daily and does not floss.    /GYN Health:  - Follows with GYN: no.   - Menopause: postmenopausal.   - History of STDs: no    Review of Systems   Constitutional:  Positive for activity change and fatigue. Negative for chills, diaphoresis, fever and unexpected weight change.   HENT: Negative.  Negative for ear pain and sore throat.    Eyes:  Negative for pain and visual disturbance.   Respiratory:  Positive for shortness of breath. Negative for cough.    Cardiovascular:  Positive for palpitations. Negative for chest pain.   Gastrointestinal: Negative.  Negative for abdominal pain and vomiting.   Endocrine: Negative.    Genitourinary: Negative.  Negative for dysuria and hematuria.   Musculoskeletal:   "Positive for back pain. Negative for arthralgias.   Skin: Negative.  Negative for color change and rash.   Allergic/Immunologic: Negative.    Neurological:  Positive for headaches. Negative for seizures and syncope.   Hematological: Negative.    Psychiatric/Behavioral: Negative.     All other systems reviewed and are negative.        Objective     /76 (BP Location: Right arm, Patient Position: Sitting, Cuff Size: Standard)   Pulse 87   Temp 98 °F (36.7 °C) (Temporal)   Resp 18   Ht 5' 4\" (1.626 m)   Wt 68.5 kg (151 lb)   SpO2 98%   BMI 25.92 kg/m²     Physical Exam  Vitals and nursing note reviewed.   Constitutional:       General: She is not in acute distress.     Appearance: Normal appearance. She is well-developed and overweight.   HENT:      Head: Normocephalic and atraumatic.      Right Ear: External ear normal.      Left Ear: External ear normal.      Nose: Nose normal.   Eyes:      Conjunctiva/sclera: Conjunctivae normal.   Cardiovascular:      Rate and Rhythm: Normal rate and regular rhythm.      Pulses: Normal pulses. no weak pulses.           Dorsalis pedis pulses are 2+ on the right side and 2+ on the left side.        Posterior tibial pulses are 2+ on the right side and 2+ on the left side.      Heart sounds: Normal heart sounds. No murmur heard.  Pulmonary:      Effort: Pulmonary effort is normal. No respiratory distress.      Breath sounds: Normal breath sounds.   Abdominal:      General: Bowel sounds are normal.      Palpations: Abdomen is soft.      Tenderness: There is no abdominal tenderness.   Musculoskeletal:         General: Normal range of motion.      Cervical back: Normal range of motion and neck supple. Tenderness present.      Lumbar back: Tenderness present.      Right lower le+ Edema present.      Left lower le+ Edema present.      Comments: Pt in wheelchair & wearing life vest   Feet:      Right foot:      Skin integrity: No ulcer, skin breakdown, erythema, warmth, " callus or dry skin.      Left foot:      Skin integrity: No ulcer, skin breakdown, erythema, warmth, callus or dry skin.   Skin:     General: Skin is warm and dry.   Neurological:      General: No focal deficit present.      Mental Status: She is alert and oriented to person, place, and time. Mental status is at baseline.   Psychiatric:         Mood and Affect: Mood normal.         Behavior: Behavior normal.         Thought Content: Thought content normal.         Judgment: Judgment normal.     Patient's shoes and socks removed.    Right Foot/Ankle   Right Foot Inspection  Skin Exam: skin normal and skin intact. No dry skin, no warmth, no callus, no erythema, no maceration, no abnormal color, no pre-ulcer, no ulcer and no callus.     Toe Exam: ROM and strength within normal limits.     Sensory   Proprioception: diminished  Monofilament testing: intact    Vascular  Capillary refills: < 3 seconds  The right DP pulse is 2+. The right PT pulse is 2+.     Left Foot/Ankle  Left Foot Inspection  Skin Exam: skin normal and skin intact. No dry skin, no warmth, no erythema, no maceration, normal color, no pre-ulcer, no ulcer and no callus.     Toe Exam: ROM and strength within normal limits.     Sensory   Proprioception: diminished  Monofilament testing: intact    Vascular  Capillary refills: < 3 seconds  The left DP pulse is 2+. The left PT pulse is 2+.     Assign Risk Category  No deformity present  Loss of protective sensation  No weak pulses  Risk: 1

## 2024-07-29 NOTE — TELEPHONE ENCOUNTER
Chas Montano # [id number]    Spoke with patient, states she is doing well.  Weight today 149.6 lbs, discharge weight 153.3 lbs.  Patient states she does get some chest pain on the left side after taking her Protonix, states it only lasts a few minutes. Advised to mention to PCP at visit this afternoon.  Patient continues to take medication as prescribed and has good urine output after diuretic.   Reminded patient of virtual appointment with [facility] 8/1/24 at 0830  Also reminded patient to continue daily weights, call with 3 lb weight gain overnight or 5 lbs in 1 week, continue low salt diet and fluid restriction. Advised to call office with any questions or concerns.

## 2024-07-29 NOTE — ASSESSMENT & PLAN NOTE
Lab Results   Component Value Date    HGBA1C 7.9 (H) 07/06/2024     Start metformin 500 mg daily & lipitor 40 mg daily

## 2024-07-29 NOTE — PATIENT INSTRUCTIONS
"[phone]: mammografia  Patient Education     Routine physical for adults   The Basics   Written by the doctors and editors at [facility]   What is a physical? -- A physical is a routine visit, or \"check-up,\" with your doctor. You might also hear it called a \"wellness visit\" or \"preventive visit.\"  During each visit, the doctor will:   Ask about your physical and mental health   Ask about your habits, behaviors, and lifestyle   Do an exam   Give you vaccines if needed   Talk to you about any medicines you take   Give advice about your health   Answer your questions  Getting regular check-ups is an important part of taking care of your health. It can help your doctor find and treat any problems you have. But it's also important for preventing health problems.  A routine physical is different from a \"sick visit.\" A sick visit is when you see a doctor because of a health concern or problem. Since physicals are scheduled ahead of time, you can think about what you want to ask the doctor.  How often should I get a physical? -- It depends on your age and health. In general, for people age 21 years and older:   If you are younger than 50 years, you might be able to get a physical every 3 years.   If you are 50 years or older, your doctor might recommend a physical every year.  If you have an ongoing health condition, like diabetes or high blood pressure, your doctor will probably want to see you more often.  What happens during a physical? -- In general, each visit will include:   Physical exam - The doctor or nurse will check your height, weight, heart rate, and blood pressure. They will also look at your eyes and ears. They will ask about how you are feeling and whether you have any symptoms that bother you.   Medicines - It's a good idea to bring a list of all the medicines you take to each doctor visit. Your doctor will talk to you about your medicines and answer any questions. Tell them if you are having any side " "effects that bother you. You should also tell them if you are having trouble paying for any of your medicines.   Habits and behaviors - This includes:   Your diet   Your exercise habits   Whether you smoke, drink alcohol, or use drugs   Whether you are sexually active   Whether you feel safe at home  Your doctor will talk to you about things you can do to improve your health and lower your risk of health problems. They will also offer help and support. For example, if you want to quit smoking, they can give you advice and might prescribe medicines. If you want to improve your diet or get more physical activity, they can help you with this, too.   Lab tests, if needed - The tests you get will depend on your age and situation. For example, your doctor might want to check your:   Cholesterol   Blood sugar   Iron level   Vaccines - The recommended vaccines will depend on your age, health, and what vaccines you already had. Vaccines are very important because they can prevent certain serious or deadly infections.   Discussion of screening - \"Screening\" means checking for diseases or other health problems before they cause symptoms. Your doctor can recommend screening based on your age, risk, and preferences. This might include tests to check for:   Cancer, such as breast, prostate, cervical, ovarian, colorectal, prostate, lung, or skin cancer   Sexually transmitted infections, such as chlamydia and gonorrhea   Mental health conditions like depression and anxiety  Your doctor will talk to you about the different types of screening tests. They can help you decide which screenings to have. They can also explain what the results might mean.   Answering questions - The physical is a good time to ask the doctor or nurse questions about your health. If needed, they can refer you to other doctors or specialists, too.  Adults older than 65 years often need other care, too. As you get older, your doctor will talk to you " about:   How to prevent falling at home   Hearing or vision tests   Memory testing   How to take your medicines safely   Making sure that you have the help and support you need at home  All topics are updated as new evidence becomes available and our peer review process is complete.  This topic retrieved from [employer] on: May 02, 2024.  Topic 368820 Version 1.0  Release: 32.4.3 - C32.122  © 2024 UpToDate, Inc. and/or its affiliates. All rights reserved.  Consumer Information Use and Disclaimer   Disclaimer: This generalized information is a limited summary of diagnosis, treatment, and/or medication information. It is not meant to be comprehensive and should be used as a tool to help the user understand and/or assess potential diagnostic and treatment options. It does NOT include all information about conditions, treatments, medications, side effects, or risks that may apply to a specific patient. It is not intended to be medical advice or a substitute for the medical advice, diagnosis, or treatment of a health care provider based on the health care provider's examination and assessment of a patient's specific and unique circumstances. Patients must speak with a health care provider for complete information about their health, medical questions, and treatment options, including any risks or benefits regarding use of medications. This information does not endorse any treatments or medications as safe, effective, or approved for treating a specific patient. UpToDate, Inc. and its affiliates disclaim any warranty or liability relating to this information or the use thereof.The use of this information is governed by the Terms of Use, available at https://www.wolterskluwer.com/en/know/clinical-effectiveness-terms. 2024© UpToDate, Inc. and its affiliates and/or licensors. All rights reserved.  Copyright   © 2024 UpToDate, Inc. and/or its affiliates. All rights reserved.

## 2024-07-30 ENCOUNTER — PATIENT OUTREACH (OUTPATIENT)
Dept: FAMILY MEDICINE CLINIC | Facility: CLINIC | Age: 64
End: 2024-07-30

## 2024-07-30 PROBLEM — G89.29 CHRONIC BILATERAL THORACIC BACK PAIN: Status: ACTIVE | Noted: 2024-07-30

## 2024-07-30 PROBLEM — M54.6 CHRONIC BILATERAL THORACIC BACK PAIN: Status: ACTIVE | Noted: 2024-07-30

## 2024-07-30 NOTE — ASSESSMENT & PLAN NOTE
Wt Readings from Last 3 Encounters:   07/29/24 68.5 kg (151 lb)   07/17/24 68.5 kg (151 lb)   07/14/24 69.5 kg (153 lb 3.5 oz)     Continue management as per cardiology. Currently taking lisinopril, metoprolol, lasix, asa, and colcrys for pleuritic pain.

## 2024-07-30 NOTE — PROGRESS NOTES
Outgoing Call:  7/30/2024    CMOC called Pt to discuss referral received from Mateusz SMITH, informing Pt is interested in applying for EMA and housing. CMOC introduced self/role and reason for call. Pt agreed to services and requested a call back tomorrow as she is feeling very exhausted at the time of call. CMOC agreed and will complete EMA application with Pt tomorrow.     Next outreach is scheduled for 7/31/2024 at 10 AM via phone.

## 2024-07-31 ENCOUNTER — PATIENT OUTREACH (OUTPATIENT)
Dept: FAMILY MEDICINE CLINIC | Facility: CLINIC | Age: 64
End: 2024-07-31

## 2024-07-31 ENCOUNTER — PATIENT OUTREACH (OUTPATIENT)
Dept: CASE MANAGEMENT | Facility: OTHER | Age: 64
End: 2024-07-31

## 2024-07-31 PROBLEM — I50.22 CHRONIC HFREF (HEART FAILURE WITH REDUCED EJECTION FRACTION) (HCC): Chronic | Status: ACTIVE | Noted: 2024-07-05

## 2024-07-31 PROBLEM — I42.8 NONISCHEMIC CARDIOMYOPATHY (HCC): Chronic | Status: ACTIVE | Noted: 2024-07-10

## 2024-07-31 PROBLEM — I10 PRIMARY HYPERTENSION: Chronic | Status: ACTIVE | Noted: 2024-07-29

## 2024-07-31 NOTE — PROGRESS NOTES
Outpatient Care Manager Note:  Chart notes reviewed and call made to patient via  # [phone]. She reports still feeling weak and left leg hurts and has difficulty moving. She feels her heart pounding if she tries to do too much and feels sob. She is still not able to get up and down the stairs to get out of the house. Her weight has been stable 151# psdt few days and only a little ankle swelling,  She reports has not received medication from her last PCP appointment and needs refills on her other medicine- she was given a 30 day supply of medicine when she left hospital on 7/14 including furosemide, metoprolol succinate, lisinopril and aspirin.  [facility] and let her know that she will need to pay.  I will send message to [facility] Mackenzie to see if can assist with cost as patient states cannot afford.   Will continue to follow.

## 2024-07-31 NOTE — PROGRESS NOTES
Outgoing Call:  7/31/2024    [facility] called Pt and completed an MA application via [employer] with information provided by Pt. Application took longer than normal to complete as Pt was not able to identify her employer and difficulties in answering other questions. Pt is aware DPW can take up to 30 days to make a decision. Pt will also need to provide supporting documents for this application of which she states she gave to SARAH. [facility] to reach out to Mateusz ESCALANTE     Next outreach is scheduled for 8/7/2024.

## 2024-07-31 NOTE — PROGRESS NOTES
General Cardiology Outpatient Visit    Nancy Calderon 63 y.o. female   MRN: [id number]  Encounter: [id number]    Assessment:  Patient Active Problem List    Diagnosis Date Noted    Chronic bilateral thoracic back pain 07/30/2024    Type 2 diabetes mellitus without complication, without long-term current use of insulin (HCC) 07/29/2024    Primary hypertension 07/29/2024    Gastroesophageal reflux disease without esophagitis 07/29/2024    Nonischemic cardiomyopathy (HCC) 07/10/2024    Does not have health insurance 07/07/2024    Chronic HFrEF (heart failure with reduced ejection fraction) (HCC) 07/05/2024     Telemedicine Consent:  Patient: Nancy Calderon  Provider located at   [facility] [address] [address]    The patient was identified by name and date of birth. Nancy Calderon was informed that this is a telemedicine visit and that the visit is being conducted through Telephone. My office door was closed. No one else was in the room.  She acknowledged consent and understanding of privacy and security of the platform. The patient has agreed to participate and understands they can discontinue the visit at any time. Patient is aware this is a billable service. I have spent a total time of 30 minutes in caring for this patient on the day of the visit/encounter including Instructions for management, Documenting in the medical record, and Obtaining or reviewing history  .    Today's Plan:  Reporting occasional FRIEDMAN but without rapid weight gain.  Advised okay to take an additional 20 mg of Lasix should this symptom worsen or weights begin to trend up.  Addition of HF GDMT limited as vital signs and post-hospital blood work are not available today  Patient reports having about 10 days of medications left, and believes that she will not be able to afford refills for medications. Urgent message sent to  "social work/care coordination team nurse to ask for assistance to ensure patient can continue on current medical therapies.    Plan:  Chronic HFrEF; LVEF 15%   Etiology: nonischemic.    TTE 07/05/2024: LVEF 15%. LVIDd 5.7 cm. Grade 2 DD. Normal RV size.    [facility] 07/10/2024: \"normal coronary angiography.\"   Weight of 151 lbs on 07/17 (at home). Today, weighs 151 lbs (at home).    Most recent BMP from 07/14/2024: sodium 138; potassium 4.0; BUN 18; creatinine 0.69; eGFR 92.     Pharmacotherapies / Neurohormonal Blockade:  --Beta Blocker: metoprolol succinate 12.5 mg q12 hours.   --ARNi / ACEi / ARB: lisinopril 2.5 mg daily.   --Aldosterone Antagonist: No.   --SGLT2 Inhibitor: No.   --Diuretic: Lasix 40 mg daily.     Sudden Cardiac Death Risk Reduction:  --LVEF 15%.     Electrical Resynchronization:  --Candidacy for BiV device: narrow QRS.     Advanced Therapies: Will continue to monitor.    Hypertension   No BP reading provided by patient.    Continue on medications as above.     Ambulatory dysfunction  History of poor adherence to prescribed medications    HPI:   Nancy Calderon is a 63-year-old woman with a PMH as above who is being contacted for follow-up.    07/17/2024 with TH: \"patient is being evaluated today for posthospital follow-up via virtual visit. Antoine Montano [id number] was present for the entirety of the visit to assist with translation. Reports feeling very weak and tired since hospitalization. She is afraid to do anything as she feels her \"heart is too weak \". She is taking her medications faithfully. She weighed herself while we are on the phone and this has been stable. It sounds like she lives on a top floor of the house and is unable to do the stairs. This is why she was unable to come to an in person visit. She states that her next office visit with her PCP is on the 24th and that there is an ambulance coming for her in order to get her there. She says getting in a car would " "be very difficult as she is unable to lift her leg without help. She is wearing her LifeVest and has had no alarms from this. She is also not entirely sure how she will get her medications. He may need these delivered to her house.\"    08/01/2024: Patient contacted for follow-up. Interpretor Maxim (ID # [id number]) assisted during visit. Feeling okay today. This morning when ambulating in home did note mild FRIEDMAN. Denies lightheadedness, dizziness, chest pain, SOB at rest, LE swelling, abdominal bloating, PND, and orthopnea. Watching sodium intake more closely. Is completing daily weights; denies rapid weight gains. Reports many financial stressors and that when her medications run out in about 10 days that she will likely not be able to afford any refills.    Past Medical History:   Diagnosis Date    Chronic HFrEF (heart failure with reduced ejection fraction) ([facility]) 07/2024    History of nonadherence to medical treatment     Hypertension     Nonischemic cardiomyopathy ([facility]) 07/2024       Review of Systems   Constitutional:  Positive for appetite change (decreased). Negative for activity change and unexpected weight change.   Respiratory:  Positive for shortness of breath (with exertion this AM). Negative for chest tightness.    Cardiovascular:  Negative for chest pain, palpitations and leg swelling.   Gastrointestinal:  Negative for abdominal distention and abdominal pain.   Genitourinary:  Negative for dysuria.   Musculoskeletal: Negative.    Skin: Negative.    Neurological:  Positive for weakness and headaches. Negative for dizziness and light-headedness.   Psychiatric/Behavioral:  Negative for confusion and sleep disturbance. The patient is not nervous/anxious.      No Known Allergies      Current Outpatient Medications:     aspirin 81 mg chewable tablet, Chew 1 tablet (81 mg total) daily, Disp: 30 tablet, Rfl: 1    atorvastatin (LIPITOR) 40 mg tablet, Take 1 tablet (40 mg total) by mouth daily, Disp: 90 tablet, " Rfl: 2    colchicine (COLCRYS) 0.6 mg tablet, Take 1 tablet (0.6 mg total) by mouth daily, Disp: 30 tablet, Rfl: 1    furosemide (LASIX) 40 mg tablet, Take 1 tablet (40 mg total) by mouth daily, Disp: 30 tablet, Rfl: 1    lidocaine (LMX) 4 % cream, Apply topically as needed for mild pain, Disp: 28 g, Rfl: 1    lisinopril (ZESTRIL) 2.5 mg tablet, Take 1 tablet (2.5 mg total) by mouth daily, Disp: 30 tablet, Rfl: 1    metFORMIN (GLUCOPHAGE) 500 mg tablet, Take 1 tablet (500 mg total) by mouth daily with breakfast, Disp: 90 tablet, Rfl: 0    metoprolol succinate (TOPROL-XL) 25 mg 24 hr tablet, Take 0.5 tablets (12.5 mg total) by mouth 2 (two) times a day, Disp: 30 tablet, Rfl: 1    Blood Glucose Monitoring Suppl (OneTouch Verio Reflect) w/Device KIT, Check blood sugars three times daily. Please substitute with appropriate alternative as covered by patient's insurance. Dx: E11.65 (Patient not taking: Reported on 8/1/2024), Disp: 1 kit, Rfl: 0    glucose blood (OneTouch Verio) test strip, Check blood sugars three times daily. Please substitute with appropriate alternative as covered by patient's insurance. Dx: E11.65 (Patient not taking: Reported on 8/1/2024), Disp: 300 each, Rfl: 3    OneTouch Delica Lancets 33G MISC, Check blood sugars three times daily. Please substitute with appropriate alternative as covered by patient's insurance. Dx: E11.65 (Patient not taking: Reported on 8/1/2024), Disp: 300 each, Rfl: 3    Social History     Socioeconomic History    Marital status: Single     Spouse name: Not on file    Number of children: Not on file    Years of education: Not on file    Highest education level: Not on file   Occupational History    Not on file   Tobacco Use    Smoking status: Never    Smokeless tobacco: Never   Vaping Use    Vaping status: Never Used   Substance and Sexual Activity    Alcohol use: Never    Drug use: Never    Sexual activity: Not on file   Other Topics Concern    Not on file   Social History  Narrative    Not on file     Social Determinants of Health     Financial Resource Strain: High Risk (7/29/2024)    Overall Financial Resource Strain (CARDIA)     Difficulty of Paying Living Expenses: Hard   Food Insecurity: Food Insecurity Present (7/9/2024)    Hunger Vital Sign     Worried About Running Out of Food in the Last Year: Sometimes true     Ran Out of Food in the Last Year: Sometimes true   Transportation Needs: No Transportation Needs (7/9/2024)    PRAPARE - Transportation     Lack of Transportation (Medical): No     Lack of Transportation (Non-Medical): No   Physical Activity: Not on file   Stress: Not on file   Social Connections: Not on file   Intimate Partner Violence: Not on file   Housing Stability: High Risk (7/9/2024)    Housing Stability Vital Sign     Unable to Pay for Housing in the Last Year: Yes     Number of Times Moved in the Last Year: 1     Homeless in the Last Year: No     No family history on file.    Vitals:   Weight 68.5 kg (151 lb).    Wt Readings from Last 10 Encounters:   08/01/24 68.5 kg (151 lb)   07/29/24 68.5 kg (151 lb)   07/17/24 68.5 kg (151 lb)   07/14/24 69.5 kg (153 lb 3.5 oz)   07/05/24 83.2 kg (183 lb 6.8 oz)     Vitals:    08/01/24 0848   Weight: 68.5 kg (151 lb)     Labs & Results:  Lab Results   Component Value Date    WBC 8.36 07/14/2024    HGB 15.2 07/14/2024    HCT 47.3 (H) 07/14/2024    MCV 94 07/14/2024     07/14/2024     Lab Results   Component Value Date    SODIUM 138 07/14/2024    K 4.0 07/14/2024     07/14/2024    CO2 28 07/14/2024    BUN 18 07/14/2024    CREATININE 0.69 07/14/2024    GLUC 119 07/14/2024    CALCIUM 9.6 07/14/2024     Lab Results   Component Value Date    INR 1.04 07/10/2024    PROTIME 13.8 07/10/2024     Lab Results   Component Value Date    BNP 2,673 (H) 07/05/2024      Alisia Loredo PA-C

## 2024-08-01 ENCOUNTER — TELEMEDICINE (OUTPATIENT)
Dept: CARDIOLOGY CLINIC | Facility: CLINIC | Age: 64
End: 2024-08-01

## 2024-08-01 ENCOUNTER — TELEPHONE (OUTPATIENT)
Dept: CARDIOLOGY CLINIC | Facility: CLINIC | Age: 64
End: 2024-08-01

## 2024-08-01 ENCOUNTER — PATIENT OUTREACH (OUTPATIENT)
Dept: CASE MANAGEMENT | Facility: OTHER | Age: 64
End: 2024-08-01

## 2024-08-01 VITALS — BODY MASS INDEX: 25.92 KG/M2 | WEIGHT: 151 LBS

## 2024-08-01 DIAGNOSIS — I10 PRIMARY HYPERTENSION: Chronic | ICD-10-CM

## 2024-08-01 DIAGNOSIS — I50.22 CHRONIC HFREF (HEART FAILURE WITH REDUCED EJECTION FRACTION) (HCC): Primary | ICD-10-CM

## 2024-08-01 DIAGNOSIS — I42.8 NONISCHEMIC CARDIOMYOPATHY (HCC): Chronic | ICD-10-CM

## 2024-08-01 PROCEDURE — 99213 OFFICE O/P EST LOW 20 MIN: CPT | Performed by: PHYSICIAN ASSISTANT

## 2024-08-01 NOTE — TELEPHONE ENCOUNTER
----- Message from Alisia Loredo PA-C sent at 8/1/2024  9:24 AM EDT -----  Edgardo Gomez,    Would you be able to do me a favor for Luxembourger-speaking patient? I had telemedicine visit with her just now and she seemed to have hung up prematurely. Would you mind checking on her to make sure she is okay?    Would also asked that you relay that if her shortness of breath continues that it is okay for her to take an additional half tablet of her Lasix tomorrow to see if that helps resolve that symptom.    You can also let her know that I reached out to her social work team to see if we can coordinate a way for her to get refills of medications that she can afford before she runs out of them.    Thanks!  MJ

## 2024-08-01 NOTE — PROGRESS NOTES
Outpatient Care Manager Note: Received IB message from Cardiology PA, Alisia Loredo to assist with obtaining medication for patient who cannot afford. IB message sent to CMOC, Mackenzie Olivo with [facility] [place] to look into assisting patient. She did make referral to Alison Higgins at [facility] who assists with medication assistance. Mackenzie will also collaborate with Luann SMITH to see if any funds available to help patient get 30 day supply until her emergency MA is approved or denied.  Will continue to follow.

## 2024-08-01 NOTE — TELEPHONE ENCOUNTER
Spoke with Stateless speaking pt. Advised pt of Alisia's message.     Per pt she had a headache yesterday that lasted all day and today she tried to get out of bed and walk, she felt a burning sensation in her chest and short of breath, her legs got weak she had to sit down right away. I went over her medications and instructed pt to take and additional half a tablet of lasix tomorrow. Pt verbally understood.

## 2024-08-02 ENCOUNTER — TELEPHONE (OUTPATIENT)
Dept: INTERNAL MEDICINE CLINIC | Facility: OTHER | Age: 64
End: 2024-08-02

## 2024-08-02 ENCOUNTER — PATIENT OUTREACH (OUTPATIENT)
Dept: FAMILY MEDICINE CLINIC | Facility: CLINIC | Age: 64
End: 2024-08-02

## 2024-08-02 NOTE — PROGRESS NOTES
SARAH STEPHENSON received in-basket message from cardiology requesting assistance with getting pt assistance with medication as she only has 10 days worth of medications.     SARAH STEPHENSON called  pharmacy and was made aware that the total cost will be $177.72. SARAH STEPHENSON sent email to [facility] requesting assistance.     SARAH STEPHENSON will continue to be available for any additional needs as requested.

## 2024-08-02 NOTE — TELEPHONE ENCOUNTER
Spoke to patient. She states its difficult to get to the office do to difficulty ambulating and needing assistance from an ambulance for transportation.   I advised pt I will mail her some education forms to her address on file. Patient verbalized information.

## 2024-08-05 ENCOUNTER — TELEPHONE (OUTPATIENT)
Dept: CARDIOLOGY CLINIC | Facility: CLINIC | Age: 64
End: 2024-08-05

## 2024-08-05 ENCOUNTER — PATIENT OUTREACH (OUTPATIENT)
Dept: FAMILY MEDICINE CLINIC | Facility: CLINIC | Age: 64
End: 2024-08-05

## 2024-08-05 DIAGNOSIS — I50.22 CHRONIC HFREF (HEART FAILURE WITH REDUCED EJECTION FRACTION) (HCC): Primary | Chronic | ICD-10-CM

## 2024-08-05 NOTE — TELEPHONE ENCOUNTER
Chas Montano # [id number]    Spoke with patient, states she is very health and is thankful to God and us.   Patients weight this morning is 145.6 lbs and   States she has no pain sitting in the chair, is walking in her apartment, but does feel like she is suffocating and has SOB when walking. Feels like she has some tightness in her chest when walking.    Continues to take medications as prescribed, but states she will be out in one week. States someone did contact her but unsure who that was. Advised I will reach out to  to call her with update.     Patient continues to have good urination after taking diuretic.  States she is losing weight because she is just eating fruit, salads and no salt.   She is drinking 32 oz per day.    Patient concerned about the SOB when walking and states that her mouth is dry, her voice is hoarse and is losing her voice.     Advised will discuss with Alisia and call with any recommendations.

## 2024-08-05 NOTE — PROGRESS NOTES
Edgardo Sofia, I spoke with patient for HF follow up, patient concerned that she only has 1 weeks worth of medication at home and does not want to run out.   Can you please reach out to patient?  Thank you.

## 2024-08-05 NOTE — PROGRESS NOTES
SARAH SACHIN received message from VENKATESH Roa from cardiology informing SARAH STEPHENSON that pt stated that she only has a week supply of medications. SARAH STEPHENSON provided update on status of medication assistance application.     SARAH SACHIN received email from Val Marc that pt has been approved for medication assistance.     SARAH STEPHENSON was also made aware by her provider that pt was denied by [facility] for OP SN/PT and OT as pt did not have insurance coverage. SARAH STEPHENSON reached out to [facility] to see if pt could receive assistance as she had EMA application pending. SARAH SACHIN later spoke to Rachele Mendieta and Dana and was made aware that at this time they would not be able to assist pt as she has been able to ambulate in her apartment and pt has medical needs being met from virtual visits. SARAH SACHIN was encouraged to have them reassess if pt's needs where changed.     SARAH STEPHENSON called pt and spoke to her via  [id number]. SARAH SACHIN inquired as to how pt was doing and she reported that she was doing okay. SARAH SACHIN provided update as to her medications and pt expressed appreciation. SARAH STEPHENSON informed pt that this was a one time assistance and pt would have to figure out a way to cover the $178 if her insurance does not kick in time. Pt expressed understanding. SARAH SACHIN inquired if there was someone to help her with getting the medications and pt reported that she was hoping SARAH STEPHENSON could have it delivered to her home.     SARAH STEPHNESON informed pt that SARAH STEPHENSON was unable to deliver the medications and pt would have to pick it up or have someone pick it up for her. Pt reported that she would reach out to a coworker to see if they can help her. SARAH STEPHENSON provided pharmacy address and phone number to pt. SARAH SACHIN dicussed the paperwork needed for her EMA application and pt reported that she was going to reach out to her coworker to see if she could assist her in getting the copies needed for the application. SARAH STEPHENSON informed her of the time sensitive nature of the application. Pt expressed  understanding.     Pt shared that she does not have any food or money to pay her rent. SARAH SACHIN inquired about her landlord. Pt stated that her landlord was just vacationing and is gone. SARAH STEPHENSON asked if her landlord lives in the home and pt stated that she does not. SARAH STEPHENSON inquired about pt's sister and if she is able to help her and she reported that she is not able to help her as she is also sick as well. SARAH STEPHENSON inquired about her dtr in [place]. Pt reported that she cannot help her because she is in [place] and SARAH STEPHENSON asked if her dtr would be able to come and stay with her for a while and she stated that she would have to ask her if she could come and help.     SARAH STEPHENSON asked pt if there was any other family or support and pt reported that she has a brother but he lives in  and is on vacation at the moment.     SARAH STEPHENSON informed pt that SARAH STEPHENSON does not have any rental assistance programs that would be able to assist pt and if she thinks the landlord would evict her. Pt reported that she was not aware. SARAH STEPHENSON inquired if she could stay with her sister and stated that the house was too small and her sister is sick so she does not think it would work.       SARAH STEPHENSON later called  [employer] and spoke to Kayla Mejia ([phone]) to follow up on referral that was placed. SARAH STEPHENSON was made aware that they called pt to follow up with transportation assistance but as pt requires BLS they only have private pay options to offer pt and she refused.     SARAH STEPHENSON explained pt's situation and inquired about assistance for food and was made aware that after speaking to her supervisor they would be able to assist with MOW. SARAH STEPHENSON inquired if they have any other services they would be able to assist pt with as she is isolated at this time and is in need of support and she reported that she would have to look into it more. She stated that SARAH STEPHENSON should follow up with pt to see if she would be interested in MOW and let her know.     SARAH STEPHENSON will continue to be  available for any additional needs as requested.

## 2024-08-06 ENCOUNTER — PATIENT OUTREACH (OUTPATIENT)
Dept: CARDIOLOGY CLINIC | Facility: CLINIC | Age: 64
End: 2024-08-06

## 2024-08-06 ENCOUNTER — PATIENT OUTREACH (OUTPATIENT)
Dept: FAMILY MEDICINE CLINIC | Facility: CLINIC | Age: 64
End: 2024-08-06

## 2024-08-06 ENCOUNTER — PATIENT OUTREACH (OUTPATIENT)
Dept: CASE MANAGEMENT | Facility: OTHER | Age: 64
End: 2024-08-06

## 2024-08-06 NOTE — PROGRESS NOTES
Received IB Group Mssg upon return from Out of Office. Alisia SHIPLEY addressed IB Group Mssg to [facility] Mateusz SMITH and OP RN Andrew STEPHENSON that pt requires medication refills.     OP Advanced HF LCSW reviewed electronic chart. This LCSW previously researched any assistance for pt's medication list - Please refer to progress note dated 7/19/24 and 7/23/24. Pt and her uncle, Ruben were informed to follow up with [facility] for medication assistance.     Patient kept her New Patient appt at [facility] so she is now established with [facility] Team. CMOC, Mackenzie Snyder is also working with patient in addition to OP [facility] Mateusz SMITH.   Ms. Arroyo was able to contact [facility]) for a ONE TIME approval of a month supply of medication. Ms. Arroyo informed pt that pt will need to have someone  her medication from the pharmacy. Also pt needs to gather paperwork to complete her [place] Medicaid application otherwise it will be declined.   Pt has family in the [place] - brother is on vacation. Pt will ask daughter in  if she can come stay with patient.     Pt unable to work at this time due to SOB and weakness in Left Leg. She's also reportedly unable to climb her stairs at home so needs BLS transport to leave the house which she cannot afford.   OP [facility] , Ms. Mateusz Arroyo has coordinated services with [employer] (Kayla Mejia [phone] ext [phone]). They only have private pay options for BLS and pt declined. [facility] did offer Meals on Wheels to patient.     This OP Advanced Heart Failure LCSW sent message to OP [facility] Mateusz SMITH to collaborate about pt's case.   This LCSW will remove self from Care Team in order to streamline services since pt has become established with [facility].     OP Advanced Heart Failure LCSW received IB Mssg from OP CARD Alisia SHIPLEY  asking if pt could get BLS to next OP CARD Office Visit. This LCSW returned IB Mssg to Alisia SHIPLEY explaining the situation.     1:05pm Received Incoming Teams phone call from OP Mateusz MÉNDEZ. Collaborated about options and resources regarding pt's situation. [facility] will continue to offer virtual appts to patient now that she has become established with their services. Patient has not allowed Ms. Cruz MÉDNEZ to discuss situation with pt's family so pt has limited herself and her support system. Patient will need to provide the paperwork required by [place] Medicaid and then can apply for Full [place] MA in September. Removed self from Care Team and resolved CARD Care Management Episode. Pt is followed by [facility].

## 2024-08-06 NOTE — PROGRESS NOTES
SARAH STEPHENSON was made aware that pt had stated that she had followed up ion her medications but was told it was not available. SARAH STEPHENSON called pt's pharmacy and was made aware that they did not receive confirmation. SARAH STEPEHNSON forwarded the email of confirmation and was made aware that they received the email.     SARAH STEPHENSON later spoke to Elana from Cardiology and provided update on the case. SARAH STEPHENSON also spoke to SARAH Reveles and provided update as to how to acquire S transport and provide update on pt's case.     SARAH STEPHENSON called pt via  [phone] and spoke to pt. SARAH STEPHENSON updated pt that her medications should be available for . SARAH STEPHENSON inquired if pt was interested in MOW and pt expressed interest. SARAH STEPHENSON provided pt the number for  who could assist.     SARAH STEPHENSON and pt discussed documents for applying for EMA. Pt reported that she reached out to her job but she does not know when they will provide her with pay stubs. SARAH STEPHENSON inquired if her friend would be able to drop off bank statements and she reported that she did not know if they could do that. SARAH STEPHENSON inquired if she could take pictures and email them to SARAH STEPHENSON and she reported that she was not sure and would have to see what she could do. SARAH STEPHENSON reminded pt of the time the documents would be needed by. Pt expressed understanding.     SARAH STEPHENSON will continue to be available for any additional needs as requested.

## 2024-08-06 NOTE — TELEPHONE ENCOUNTER
"08/01/24 68.5 kg (151 lb)   07/29/24 68.5 kg (151 lb)   07/17/24 68.5 kg (151 lb)   07/14/24 69.5 kg (153 lb 3.5 oz)   07/05/24 83.2 kg (183 lb 6.8 oz)     Chas Montano # [id number]    Spoke with patient, asked if she was having any dizziness or lightheadedness. Patient continues to complain of \"suffocation feeling\" when she walks. Again asked if she has any dizziness  or lightheadedness, states she does have some dizziness but it is going away.  States her weight this morning was 151 lbs. I did verify that her weight was 145.6 lbs yesterday. Verified that she is weighting herself the same time the same way every morning. States she did drink a lot of water yesterday because she was feeling very hot, like she was \"burning on the inside\"  States she did have some swelling of her ankles yesterday, but they are better today.  Patient again mentioned that she will be out of medications by this weekend. Advised that she had a discussion with Luann yesterday about this. States she called the pharmacy and they do not have the medications. Advised that she does have refills remaining, that I will reach out to Luann again.   "

## 2024-08-06 NOTE — TELEPHONE ENCOUNTER
Discussed with Luann, states pharmacy did not have coupon, they have it now and patient can  her medications.    Chas Montano #[id number]    Attempted to contact patient, left message that medications are available she can pick them up at pharmacy.

## 2024-08-06 NOTE — PROGRESS NOTES
"Outpatient Care Manager Note: Chart notes reviewed and [facility] to review status of financial assistance of medication which patient will have paid for 1 month. Luann states she as well as CMOC from [facility] have stressed that patient needs to get supporting documents from her previous work within 2 weeks in order for her EMA to be processed and be able to get ongoing medication and doctor visits paid for. MOW approved for patient if she accepts.  Patient does not qualify for any in home therapy to evaluate and treat her ambulatory issues which include leg pain and overall weakness. She has expressed anxiety about ambulating and stated previously she was told not to walk to much because \"my heart is weak and I have a machine on my chest\" (life vest). I have reinforced several times that walking in her room would be beneficial and not harmful for her heart.   Will continue to follow.    "

## 2024-08-07 ENCOUNTER — PATIENT OUTREACH (OUTPATIENT)
Dept: FAMILY MEDICINE CLINIC | Facility: CLINIC | Age: 64
End: 2024-08-07

## 2024-08-07 ENCOUNTER — PATIENT OUTREACH (OUTPATIENT)
Dept: CASE MANAGEMENT | Facility: OTHER | Age: 64
End: 2024-08-07

## 2024-08-07 NOTE — PROGRESS NOTES
Outpatient Care Manager Note: Call made to patient via . Patient states she is walking a little more down a briggs several times/day. But there is not a lot of space to walk.  She states does not feel she could make it up the stairs and is afraid of falling down the stairs.  Legs still sore L>R and left leg feels heavy- hard to lift. She also has pain I her back. She is wearing the life vest. She denies SOB. She is weighing and weight today is 151.2#- running about the same every day. She is taking her medication- only has 2 more days left. She has no one to  the medicine- she tried an old coworker, but she works the hours that the pharmacy is open.   Call made to pharmacy and confirmed medication is paid for and ready for . Sent in basket message to see if CMOC could  and deliver her medication.

## 2024-08-07 NOTE — PROGRESS NOTES
SARAH STEPHENSON spoke to pt's provider to discuss pt's complaint about the pain in her leg. SARAH STEPHENSON was made aware that she would have to see the pt to be able to assess her needs.    SARAH STEPHENSON called pt's nurse Elana to follow up with her and see if she would be able to assist and come up with a plan to assist pt with getting access to care. It was decided that SARAH STEPHENSON will try to get pt an appointment with PCP and CMOC will meet with pt at home for assistance with virtual visit.     SARAH STEPHENSON called CMIHSAN Snyder to discuss pt's needs and it was discussed that they would try to see if pt could get an appointment with PCP and she would meet with pt at home to assist her with a virtual appointment.     SARAH STEPHENSON routed note to MR requesting an appointment. SARAH STEPHENSON called  [facility] and left a message for Kayal requesting assistance with getting pt established with meals on wheels.     SARAH STEPHENSON will continue to be available for any additional needs as requested.

## 2024-08-07 NOTE — PROGRESS NOTES
Outgoing Calls:  8/7/2024    CM called Pt to discuss status of bank statements and recent pay stubs needed to complete EMA application. Pt states she requested statements from her employer and bank and both have declined to provide her with statement and pay stubs. CMOC offered to assist in calling and Pt agreed. [facility] first called Pt's manager, Rehana at [employer] and she informed Pt needs to call HR at [phone]. CMOC called with Pt and left detailed VM left for Shayy Alfonso to return call. CMOC then called Dave Yang with Pt and requested most recent bank statement. After a 45 minute call with Dave Yang Pt was still unable to get her bank statement mailed to her as she was not providing the bank rep the information being requested. Address did not match and Pt was unable to provide her bank account number. Pt was asked many security questions at which she failed to respond to. Bank rep informed Pt should call back when she is able to provide them security information. Pt then asked [facility] to call again on her own. CMOC informed she is not able to as she is not the account miller. CM also encouraged Pt to seek assistance from friends or family members as she will be denied EMA without the documents. Pt states she is unable to do anything since she is stuck in bed. CMOC informed Pt she should move around and take walks in her home since according to her RN, this is safe. CMOC reminded Pt she will need to do her part in all of this if she wants to receive EMA and other programs. Pt expressed understanding and agreed to follow up with her employer and the bank to request statements. CLARK to d/u.     Next outreach is scheduled for 8/14/2024.

## 2024-08-07 NOTE — PROGRESS NOTES
Outpatient Care Manager Note: Received call from ZOLTAN Kong to collaborate on addressing  barriers to getting to appointments. Plan is to schedule a PCP virtual visit and see if CMOC can go to home to assist with visit. Patient has stated multiple times that she is too weak and has leg pain that prevents her from getting up and down stairs. Unfortunately she does not qualify for any in home therapy at this time. Her EMA is pending and patient still needs to submit paperwork. In mid-September she may be able to apply for regular MA.

## 2024-08-08 ENCOUNTER — PATIENT OUTREACH (OUTPATIENT)
Dept: FAMILY MEDICINE CLINIC | Facility: CLINIC | Age: 64
End: 2024-08-08

## 2024-08-08 ENCOUNTER — TELEMEDICINE (OUTPATIENT)
Dept: FAMILY MEDICINE CLINIC | Facility: CLINIC | Age: 64
End: 2024-08-08

## 2024-08-08 DIAGNOSIS — R53.1 LEFT-SIDED WEAKNESS: Primary | ICD-10-CM

## 2024-08-08 PROCEDURE — 99442 PR PHYS/QHP TELEPHONE EVALUATION 11-20 MIN: CPT | Performed by: FAMILY MEDICINE

## 2024-08-08 NOTE — ASSESSMENT & PLAN NOTE
Progressive worsening left-sided upper and lower extremity weakness  -Evaluation limited due to nature of virtual visit  -Recommend ER evaluation to rule out stroke  -Patient verbalizes understanding

## 2024-08-08 NOTE — PROGRESS NOTES
IB / Outgoing Call:  8/8/2024    CMOC received an IB from RN, Elana ORTIZ, informing Pt's medications have been filled and are ready for  at  Pharmacy however Pt informed her she is unable to  medication. CMOC called Pt to discuss and Pt informed she will have her landlord/friend take her to the pharmacy today before she goes to work. CMOC also inquired about the bank statement she needs to request from her bank to complete her EMA application. Pt states she did not work on this again yesterday and is waiting on a friend who is visiting her today to assist with the call to the bank. CMOC to f/u.    Next outreach is scheduled for 8/14/2024.

## 2024-08-08 NOTE — PROGRESS NOTES
Virtual Brief Visit  Name: Nancy Calderon      : 1960      MRN: [id number]  Encounter Provider: Ector Alexandre MD  Encounter Date: 2024   Encounter department: [facility]    This Visit is being completed by telephone. The Patient is located at Home and in the following state in which I hold an active license [place]    The patient was identified by name and date of birth. Nancy Calderon was informed that this is a telemedicine visit and that the visit is being conducted through Telephone.  My office door was closed. No one else was in the room.  She acknowledged consent and understanding of privacy and security of the video platform. The patient has agreed to participate and understands they can discontinue the visit at any time.    Patient is aware this is a billable service.         Assessment & Plan   1. Left-sided weakness  Assessment & Plan:  Progressive worsening left-sided upper and lower extremity weakness  -Evaluation limited due to nature of virtual visit  -Recommend ER evaluation to rule out stroke  -Patient verbalizes understanding         History of Present Illness   [employer]  ID [id number]  63-year-old female with a history of type 2 diabetes, hypertension, and cardiomyopathy who presents with left-sided upper and lower extremity weakness.  Patient is a very poor historian.  Patient states symptoms are ongoing for a couple of weeks but has gotten progressively worse a few days ago.  She denies any significant swelling or pain in her lower extremity.  Patient states that she has been dragging her left leg and cannot move it.  She also states that she cannot move her upper extremity.  She denies lower extremity edema.  She is experiencing difficulty ambulating due to her weakness.  She does not have enough support at home.  She has been getting assistance from social workers.        Visit Time  Total Visit  Duration: 15 minutes

## 2024-08-09 ENCOUNTER — PATIENT OUTREACH (OUTPATIENT)
Dept: CASE MANAGEMENT | Facility: OTHER | Age: 64
End: 2024-08-09

## 2024-08-09 NOTE — PROGRESS NOTES
"Covering RN calling patient to follow up and ensure medications are in the home.  Chart review completed.  Noted telemedicine visit yesterday with PCP for left sided weakness.  Phone call made to patient.    Patient informs me she is feeling \"a bit better today \"and she is \"taking it easy\".  She states \"I know if I start to feel worse I should call 911\".  She informs me that her friend picked up her medications, but that aspirin 81 mg and colchicine were missing.  Per chart review noted colchicine script had been sent to [facility] in [place].   Phone call made to  pharmacy.  Spoke with Jeet  Meds with be available for  by Monday.  Phone call made to patient again and made her aware of the above.  Patient appreciative.  Patient denies any other needs at present time.  IB Message sent to OP RN SACHIN Huitron for case communication.  "

## 2024-08-14 ENCOUNTER — PATIENT OUTREACH (OUTPATIENT)
Dept: FAMILY MEDICINE CLINIC | Facility: CLINIC | Age: 64
End: 2024-08-14

## 2024-08-14 NOTE — PROGRESS NOTES
Outgoing Call:  8/14/2024    [facility] called Pt to discuss status of bank statement and letter from employer needed to complete her EMA application. Pt informs she already submitted it and should be completed. CMOC asked who she sent the documents to. Pt then informed she sent it to [facility] via [employer]. CMOC informed she does not use this kirstie and nothing was received from Pt. Pt then informed her nephew did send it and also via email. CMOC again informed she is not in receipt of any documents from Pt. Pt then informed she will reach out to her nephew and ask where he sent documents to. Pt then asked why [facility] needs these documents. CMOC reminded Pt of EMA application completed and informed without said documents she will be denied. Pt expressed understanding and informed her nephew will call [facility] [facility] today.     Next outreach is scheduled for 8/21/2024.

## 2024-08-15 ENCOUNTER — PATIENT OUTREACH (OUTPATIENT)
Dept: CASE MANAGEMENT | Facility: OTHER | Age: 64
End: 2024-08-15

## 2024-08-15 ENCOUNTER — PATIENT OUTREACH (OUTPATIENT)
Dept: FAMILY MEDICINE CLINIC | Facility: CLINIC | Age: 64
End: 2024-08-15

## 2024-08-15 NOTE — PROGRESS NOTES
"Outpatient Care Manager Note: Chart notes reviewed and call made to patient. She states she is doing a little better. Still feels \"suffocated\" (? SOB) when she walks. States her left leg and whole side feels heavy and this causing trouble to ambulate. She denies edema and states her weights are stable- 152.4#. She is wearing a life vest which she states is causing her back pains. She did have a virtual visit with provider from \[facility]\"" with regards to her left leg problems last week. He recommended she go the ED to rule out stroke.  Questioned patient about this and she stated he only told her to go to ED if something changed or got worse. States she can move all her extremities, but moving left side more slowly due to her heart problems. States thinks the medicine is helping her. I did encourage her to go to ED with signs of increased left side weakness, visual or speech problems. She states her nephew is getting necessary paperwork for EMA. We discussed once she has we can try and help get physical therapy services in the home as well.  "

## 2024-08-15 NOTE — PROGRESS NOTES
Incoming Call:  8/15/2024    [facility] received a call from Pt's nephew, Lamberto. He informed Pt asked him to call and ask about documents needed to complete EMA application. [facility] informed Pt needs to provide recent bank statement and letter from employer informing when she was last paid and if they will be paying her while on leave from work. He expressed understanding. [facility] text him HR's number for Pt as she did not have this information and [facility]'s email to forward all information.     Next outreach is scheduled for 8/21/2024.

## 2024-08-19 ENCOUNTER — PATIENT OUTREACH (OUTPATIENT)
Dept: FAMILY MEDICINE CLINIC | Facility: CLINIC | Age: 64
End: 2024-08-19

## 2024-08-19 NOTE — PROGRESS NOTES
SARAH STEPHENSON received voicemail from LC Worker Silvia Mejia. SARAH CM received call and during follow up regarding meals on wheels referral it was noted that they had received an IEB referral from CELENA SAVAGE and pt has h home visit scheduled on August 23, 2024. SARAH STEPHENSON inquired if pt was eligible as SARAH SACHIN was previously notified by the [facility] that pt was not eligible for waiver services as she is not yet at her 5 years. SARAH STEPHENSON was made aware that they are thinking that IEB will be approving it as they got the referral from IEB.     SARAH STEPHENSON will continue to be available for any additional needs as requested.

## 2024-08-21 ENCOUNTER — PATIENT OUTREACH (OUTPATIENT)
Dept: FAMILY MEDICINE CLINIC | Facility: CLINIC | Age: 64
End: 2024-08-21

## 2024-08-21 NOTE — PROGRESS NOTES
Outgoing Calls:  8/21/2024    [facility] called Pt's nephew, Ashok per her request to check on status of her documents needed to complete EMA application. VM left. [facility] then called Pt and she placed her brother on the call. His name is also Ashok Ayala and states he can be reached at [phone]. He informed he has been trying to assist Pt in requesting bank statement and letter from employer however has been unsuccessful. He states Pt cannot leave the home as she was told by her PCP she is not to walk or leave her bed other than using the restroom. [facility] informed per RN, Elana ORTIZ, it is safe for Pt to move around and do some walking. Ashok informs Pt has several people that have been able to assist Pt with daily needs. Pt is able to receive assistance from her brother, Ashok, her nephew Ashok, a friend and her landlord who is also a friend and looks after her. Pt has informed [facility] several times she does not have assistance however Pt is usually accompanied by someone when [facility] calls her. [facility] encouraged Pt to continue using her support system. Pt and her brother informed they will continue to work on gathering documents needed.     Next outreach is scheduled for 8/28/2024.

## 2024-08-22 ENCOUNTER — PATIENT OUTREACH (OUTPATIENT)
Dept: FAMILY MEDICINE CLINIC | Facility: CLINIC | Age: 64
End: 2024-08-22

## 2024-08-22 ENCOUNTER — PATIENT OUTREACH (OUTPATIENT)
Dept: CASE MANAGEMENT | Facility: OTHER | Age: 64
End: 2024-08-22

## 2024-08-22 NOTE — PROGRESS NOTES
Outpatient Care Manager Note: Call made to patient via  # [phone]. Patient states she is feeling better. States her walking and breathing is better, but still getting SOB when tries to come up the stairs. We discussed trying 2 steps down and up when her family is there and then down 3 and up and so on to build up stamina and to take breaks.   She denies edema and weights are stable. She is 151.8# today.   She states she (and her family) are still having trouble getting paperwork from employer. MSW and CMOC have been in touch with patient's brother and nephew who are assisting. Patient states she has not heard from MOW either.  Attempted to call brother to reinforce patient need to be active within her limitations with goal of getting down and up stairs to facilitate transportation for medical care, but no answer.  Will follow up.

## 2024-08-22 NOTE — PROGRESS NOTES
Incoming Text:  8/22/2024    [facility] received a text message from Pt's brother, Ashok, with recent bank statement. Pt will need to provide letter form her employer to complete EMA application.     Next outreach is scheduled for 8/28/2024.

## 2024-08-23 ENCOUNTER — TELEPHONE (OUTPATIENT)
Dept: FAMILY MEDICINE CLINIC | Facility: CLINIC | Age: 64
End: 2024-08-23

## 2024-08-23 ENCOUNTER — PATIENT OUTREACH (OUTPATIENT)
Dept: FAMILY MEDICINE CLINIC | Facility: CLINIC | Age: 64
End: 2024-08-23

## 2024-08-23 NOTE — TELEPHONE ENCOUNTER
Pt called nurse line requesting letter made by PCP on 07/29/24 to be mailed to her house. Letter has been mailed.

## 2024-08-23 NOTE — PROGRESS NOTES
Web:  8/23/2024    [facility] scanned bank statement and ID to [employer] via [employer] for EMA application. Waiting on employer letter to complete application.     Next outreach is scheduled for 8/28/2024.

## 2024-08-26 ENCOUNTER — PATIENT OUTREACH (OUTPATIENT)
Dept: FAMILY MEDICINE CLINIC | Facility: CLINIC | Age: 64
End: 2024-08-26

## 2024-08-26 NOTE — PROGRESS NOTES
Outgoing Calls:  8/26/2024    [facility] received a message from [facility], Ashley JEAN, informing she was contacted by [facility] [employer] [facility] CW, Veronica who inquired about Pt. [facility] called Veronica and she states she completed an assessment in the home for waiver services and Pt will be medically approved. She was not aware Pt is still not a permanent resident for five years as of date. Five year bar must be met for DPW to approve financial assistance for waiver. [facility] informed Pt has been working on waiver with someone else and [facility] has been working on getting EMA approved for Pt however Pt has been slow in providing documents being requested by DPW. She states she has placed a referral for Pt to be assigned to a [facility] CW to assist Pt with waiver, MOW, and SNAP applications. She informed she needs to know when Pt will meet her five year residency. [facility] did get this information from Pt who states she will meet her five year bar on 9/13/2024. Once this has been met Pt will be eligible for said benefits.     [facility] called Pt and informed her of conversation with [facility] CW. Pt expressed understanding. Pt then states her brother should be able to obtain letter from her employer by end of day. [facility] did call Pt's brother and left detailed VM requesting a call in return.     Next outreach is scheduled for 8/28/204.

## 2024-08-28 ENCOUNTER — PATIENT OUTREACH (OUTPATIENT)
Dept: FAMILY MEDICINE CLINIC | Facility: CLINIC | Age: 64
End: 2024-08-28

## 2024-08-28 NOTE — PROGRESS NOTES
Outgoing Call:  8/28/2024    [facility] called Pt to discuss status of letter needed from her employer. Pt states CMOC should call her brother as he was in charge of requesting the document. OC called Pt's brother, Ashok and he informs he was told he would receive the letter today via email but has not received it. CMOC encouraged him to call and ask if they could resend or fax to CM. He informed he will call and keep CMOC updated.     Next outreach is scheduled for 9/4/2024.

## 2024-08-29 ENCOUNTER — TELEPHONE (OUTPATIENT)
Dept: FAMILY MEDICINE CLINIC | Facility: CLINIC | Age: 64
End: 2024-08-29

## 2024-08-29 ENCOUNTER — PATIENT OUTREACH (OUTPATIENT)
Dept: FAMILY MEDICINE CLINIC | Facility: CLINIC | Age: 64
End: 2024-08-29

## 2024-08-29 NOTE — PROGRESS NOTES
Outgoing Call:  8/29/2024    CMOC received an email from Pt's brother, Ashok, with copy of letter from employer needed to complete EMA application. CMOC scanned to [employer] via [employer]. CMOC checked on status of EMA application and status states Pt was denied EMA. CMOC called DPW to inquire and was placed on hold 34 minutes before receiving assistance. CMOC was informed by Mrs. Breaux and she stated Pt was denied due to being over the income limit. CMOC informed Pt has not been employed since 7/3/2024. Mrs. Breaux informed when Pt applied in July, DPW counted income from June and Pt was over income limit. She informed Pt can reapply now that she is unemployed. [facility] will call Pt tomorrow and complete a new application.     Next outreach is scheduled for 8/30/2024.

## 2024-08-29 NOTE — TELEPHONE ENCOUNTER
[place] [employer] form received on 8/29/24  to be completed by PCP. Copy made and placed in PCP folder.    Forms to be delivered to PCP mailbox at assigned time.

## 2024-08-30 ENCOUNTER — PATIENT OUTREACH (OUTPATIENT)
Dept: FAMILY MEDICINE CLINIC | Facility: CLINIC | Age: 64
End: 2024-08-30

## 2024-08-30 NOTE — PROGRESS NOTES
Outgoing Call:  8/30/2024    CMOC called Veronica CW at [facility], to discuss status of referral to MOW and CM services. She informed Pt has been picked up by [employer] CW, Lauren Anna, just yesterday but may be a week or so before she reaches out to Pt. CMOC to f/u on referral for waiver, MOW, and assistance with SNAP benefits. Veronica did inform Pt was referred to MOW and Pt should receive a call within two weeks. CMOC to f/u. CMOC discussed denial for JB with Mateusz SMITH and decided not to reapply for EMA since Pt has an active waiver application in place which may be approved within two weeks. If approved Pt will have full MA. Mateusz gallegos f/u with [employer] to see if they will pay for another month's supply of medication.     Next outreach is scheduled for 9/3/2024.

## 2024-09-03 ENCOUNTER — PATIENT OUTREACH (OUTPATIENT)
Dept: CASE MANAGEMENT | Facility: OTHER | Age: 64
End: 2024-09-03

## 2024-09-03 ENCOUNTER — PATIENT OUTREACH (OUTPATIENT)
Dept: FAMILY MEDICINE CLINIC | Facility: CLINIC | Age: 64
End: 2024-09-03

## 2024-09-03 ENCOUNTER — TELEPHONE (OUTPATIENT)
Age: 64
End: 2024-09-03

## 2024-09-03 NOTE — PROGRESS NOTES
"Outpatient Care Manager Note: Call made to patient via . She reports she is concerned that \"something is wrong with her heart\" the life vest may not be working right as alarming in am when she has the hiccups and sometimes her \"left arm hurts too\". She denies SOB unless exerting herself by climbing stairs and only \"very little\" edema to left leg (?chronic). She denies chest pain or palpitations. Her weights have been stable over past several weeks 151-153# today.   Patient is being followed by SW for assistance with MA (currently pending) and obtaining medication at no cost.  Patient states she has 7 days of medication left. Will route note to SW.  [facility] (has had 2 virtual) to establish with Advanced HF team (NICM with EF 15%). Patient will need Lyft ride. Office will call patient.   Will follow up.  "

## 2024-09-03 NOTE — TELEPHONE ENCOUNTER
Caller: Andrew Villalobos (med care team)    Doctor: Herbert    Reason for call: Per Andrew Villalobos on pt's care team, pt needs to be seen in office and absolutely needs a LYFT ride. Pt is scheduled Friday, 9/6 at 3 pm. Please call pt and notify her of pickup time. Will need an , Czech speaking.    Call back#: [phone]

## 2024-09-03 NOTE — PROGRESS NOTES
SARAH STEPHENSON was previously made aware by [facility] that pt SARAH STEPHENSON called pt via  [phone] to follow up on which medications are in need of a refill.     SARAH SACHIN was able to speak to pt and she reported that she only had 7 days worth of medications left. Pt reported that she needed refill of her metoprolol succinate, furosemide, colchicine, lisinopril and aspirin.  SARAH STEPHENSON explained that SARAH STEPHENSON would request assistance from [facility] but SARAH STEPHENSON could not guarantee assistance. SARAH STEPHENSON explained that pt would need to seek assistance from family and friends to see if they could pay out of pocket for her medications. SARAH STEPHENSON also explained that pt would need to apply for MA in order to get ongoing coverage. SARAH STEPHENSON explained that there may also be a gap in getting coverage so pt will need to be prepared to pay out of pocket. Pt expressed understanding.     SARAH STEPHENSON called [facility] pharmacy and was made aware that the cost of the medications would be $33.02. SARAH STEPHENSON was made aware that pt did not have an updated script for colchicine. SARAH STEPHENSON sent message to Elana requesting assistance and was made aware that pt did not need a renewal as it was only for a short duration. SARAH STEPHENSON sent an email to [facility] requesting assistance to cover the cost of medication.     SARAH STEPHENSON will continue to be available for any additional needs as requested.

## 2024-09-03 NOTE — PROGRESS NOTES
Epic Chat:  9/3/2024    [facility] sent Epic Chat message to Mateusz SMITH reminding her of request for financial assistance by [employer] to pay for Pt's medication this month.     Next outreach is scheduled for 9/10/2024.

## 2024-09-04 ENCOUNTER — PATIENT OUTREACH (OUTPATIENT)
Dept: FAMILY MEDICINE CLINIC | Facility: CLINIC | Age: 64
End: 2024-09-04

## 2024-09-04 NOTE — PROGRESS NOTES
SARAH STEPHENSON was made aware by  from [employer] that pt's medications have been approved. SARAH STEPHENSON called pt via  [phone] to provide update.     SARAH STEPHENSON was able to speak to pt and informed her of the cost of the medication and that it was available for . SARAH STEPHENSON also discussed notified pt that SARAH STEPHENSON was made aware that pt will no longer be on her colchicine anymore. SARAH STEPHENSON encouraged pt to follow up with her doctor at her upcoming appointment if she has any  questions. Pt reported that she was not aware of any appointment SARAH STEPHENSON informed her that she has an appointment on 9/6/24. SARAH STEPHENSON provided pt with address and phone number and encouraged her to call and confirm the appointment and inquire if they could assist her with transportation as SARAH STEPHENSON noted that the appointment was in person. Pt expressed understanding.     SARAH STEPHENSON also provided her with the number for the pharmacy to follow up with her medications. Pt expressed understanding and appreciation. SARAH STEPHENSON will continue to be available for any additional needs as requested.

## 2024-09-05 ENCOUNTER — TELEPHONE (OUTPATIENT)
Age: 64
End: 2024-09-05

## 2024-09-05 NOTE — TELEPHONE ENCOUNTER
Caller: Nancy Mcaiel    Doctor: Herbert    Reason for call: Pt asking that the  knock on her door tomorrow 9/6/24 and help her since she has SOB.  She will need help climbing her stairs on her return home, as well    Call back#: [phone]

## 2024-09-05 NOTE — PROGRESS NOTES
Heart Failure Outpatient Progress Note - Nancy Calderon 64 y.o. female MRN: [id number]    @ Encounter: [id number]      Assessment/Plan:    Patient Active Problem List    Diagnosis Date Noted    Left-sided weakness 08/08/2024    Chronic bilateral thoracic back pain 07/30/2024    Type 2 diabetes mellitus without complication, without long-term current use of insulin ([facility]) 07/29/2024    Primary hypertension 07/29/2024    Gastroesophageal reflux disease without esophagitis 07/29/2024    Nonischemic cardiomyopathy (HCC) 07/10/2024    Does not have health insurance 07/07/2024    Chronic HFrEF (heart failure with reduced ejection fraction) ([facility]) 07/05/2024       # Chronic HFrEF, Stage C  Etiology: NICM; hx of HTN    Weight:  BNP:  7/5/24: 2673    Studies- personally reviewed by myself  EKG- SR, TWI laterally    C 7/10/24: no CAD    Echo 7/5/24  LVEF: 15%  LVIDd: 5.7 cm  RV: normal  Other: mild to moderate left sided pleural effusion    Neurohormonal Blockade:  --Beta-Blocker: metoprolol succinate 12.5 mg daily  --ACEi, ARB or ARNi: lisinopril 2.5 mg     (or SVR reduction)  --Aldosterone Receptor Blocker:  --SGLT2-I:   --Diuretic: lasix 40 mg daily    Sudden Cardiac Death Risk Reduction:  --ICD: LifeVest    Electrical Resynchronization:  --narrow QRS    Advanced Therapies (If appropriate):  --Inotrope:  --LVAD/Transplant Candidacy:    # HTN  # hyperlipidemia  # DM2- HgA1C 7.9% on 7/6/24  # Hx of medication non adherence  # no health insurance    TODAY'S PLAN:  F/u echo in October on GDMT  I can't write oxygen over phone and no insurance and undocumented so unable to write for it  Social work has been working on trying to help her  Will refill her meds  Continue current GDMT, no health insurance  --2g sodium diet  - Daily weights    HPI:      65 yo following up for HFrEF. She has moved here from  5 years ago with reported hx of HTN and possible coronary stents? Reported by her in 2011 and possible hx of  CHF. She did follow with a cardiolgoist in [place]. She was not on her prescribed coreg and valsartan due to insurance issue.     Admission in July showed EF: 15%, LHC did not show any obstructive CAD. Started on GDMT  Seen in follow up by APs.     Interval History:  She is requesting oxygen use  She is unable to get down stairs from appt to get to appts  She thinks her LifeVest was alarming    Review of Systems   Constitutional:  Positive for fatigue.   Respiratory:  Positive for shortness of breath.    Cardiovascular:  Positive for chest pain and palpitations.       Past Medical History:   Diagnosis Date    Chronic HFrEF (heart failure with reduced ejection fraction) ([facility]) 07/2024    History of nonadherence to medical treatment     Hypertension     Nonischemic cardiomyopathy ([facility]) 07/2024         No Known Allergies  .    Current Outpatient Medications:     aspirin 81 mg chewable tablet, Chew 1 tablet (81 mg total) daily, Disp: 30 tablet, Rfl: 1    atorvastatin (LIPITOR) 40 mg tablet, Take 1 tablet (40 mg total) by mouth daily, Disp: 90 tablet, Rfl: 2    Blood Glucose Monitoring Suppl (OneTouch Verio Reflect) w/Device KIT, Check blood sugars three times daily. Please substitute with appropriate alternative as covered by patient's insurance. Dx: E11.65 (Patient not taking: Reported on 8/1/2024), Disp: 1 kit, Rfl: 0    colchicine (COLCRYS) 0.6 mg tablet, Take 1 tablet (0.6 mg total) by mouth daily, Disp: 30 tablet, Rfl: 1    furosemide (LASIX) 40 mg tablet, Take 1 tablet (40 mg total) by mouth daily, Disp: 30 tablet, Rfl: 1    glucose blood (OneTouch Verio) test strip, Check blood sugars three times daily. Please substitute with appropriate alternative as covered by patient's insurance. Dx: E11.65 (Patient not taking: Reported on 8/1/2024), Disp: 300 each, Rfl: 3    lidocaine (LMX) 4 % cream, Apply topically as needed for mild pain, Disp: 28 g, Rfl: 1    lisinopril (ZESTRIL) 2.5 mg tablet, Take 1 tablet (2.5 mg total) by  mouth daily, Disp: 30 tablet, Rfl: 1    metFORMIN (GLUCOPHAGE) 500 mg tablet, Take 1 tablet (500 mg total) by mouth daily with breakfast, Disp: 90 tablet, Rfl: 0    metoprolol succinate (TOPROL-XL) 25 mg 24 hr tablet, Take 0.5 tablets (12.5 mg total) by mouth 2 (two) times a day, Disp: 30 tablet, Rfl: 1    OneTouch Delica Lancets 33G MISC, Check blood sugars three times daily. Please substitute with appropriate alternative as covered by patient's insurance. Dx: E11.65 (Patient not taking: Reported on 8/1/2024), Disp: 300 each, Rfl: 3    Social History     Socioeconomic History    Marital status: Single     Spouse name: Not on file    Number of children: Not on file    Years of education: Not on file    Highest education level: Not on file   Occupational History    Not on file   Tobacco Use    Smoking status: Never    Smokeless tobacco: Never   Vaping Use    Vaping status: Never Used   Substance and Sexual Activity    Alcohol use: Never    Drug use: Never    Sexual activity: Not on file   Other Topics Concern    Not on file   Social History Narrative    Not on file     Social Determinants of Health     Financial Resource Strain: High Risk (7/29/2024)    Overall Financial Resource Strain (CARDIA)     Difficulty of Paying Living Expenses: Hard   Food Insecurity: Food Insecurity Present (7/9/2024)    Hunger Vital Sign     Worried About Running Out of Food in the Last Year: Sometimes true     Ran Out of Food in the Last Year: Sometimes true   Transportation Needs: No Transportation Needs (7/9/2024)    PRAPARE - Transportation     Lack of Transportation (Medical): No     Lack of Transportation (Non-Medical): No   Physical Activity: Not on file   Stress: Not on file   Social Connections: Not on file   Intimate Partner Violence: Not on file   Housing Stability: High Risk (7/9/2024)    Housing Stability Vital Sign     Unable to Pay for Housing in the Last Year: Yes     Number of Times Moved in the Last Year: 1     Homeless  "in the Last Year: No       No family history on file.    Physical Exam:    Vitals:     Physical Exam    Labs & Results:    Lab Results   Component Value Date    SODIUM 138 07/14/2024    K 4.0 07/14/2024     07/14/2024    CO2 28 07/14/2024    BUN 18 07/14/2024    CREATININE 0.69 07/14/2024    GLUC 119 07/14/2024    CALCIUM 9.6 07/14/2024     Lab Results   Component Value Date    WBC 8.36 07/14/2024    HGB 15.2 07/14/2024    HCT 47.3 (H) 07/14/2024    MCV 94 07/14/2024     07/14/2024     No results found for: \"NTBNP\"   No results found for: \"CHOLESTEROL\"  No results found for: \"HDL\"  No results found for: \"TRIG\"  No results found for: \"NONHDLC\"    EKG personally reviewed by Tae Burris DO.     Counseling / Coordination of Care  I have spent a total time of 25 minutes on 09/05/24 in caring for this patient including Diagnostic results, Prognosis, Instructions for management, Patient and family education, Risk factor reductions, Documenting in the medical record, Reviewing / ordering tests, medicine, procedures  , and Obtaining or reviewing history  .      Thank you for the opportunity to participate in the care of this patient.    TAE BURRIS D.O.  DIRECTOR OF HEART FAILURE/ PULMONARY HYPERTENSION  MEDICAL DIRECTOR OF LVAD PROGRAM  [facility]        "

## 2024-09-06 ENCOUNTER — TELEPHONE (OUTPATIENT)
Dept: CARDIOLOGY CLINIC | Facility: CLINIC | Age: 64
End: 2024-09-06

## 2024-09-06 ENCOUNTER — PATIENT OUTREACH (OUTPATIENT)
Dept: FAMILY MEDICINE CLINIC | Facility: CLINIC | Age: 64
End: 2024-09-06

## 2024-09-06 ENCOUNTER — TELEMEDICINE (OUTPATIENT)
Dept: CARDIOLOGY CLINIC | Facility: CLINIC | Age: 64
End: 2024-09-06

## 2024-09-06 ENCOUNTER — PATIENT OUTREACH (OUTPATIENT)
Dept: CARDIOLOGY CLINIC | Facility: CLINIC | Age: 64
End: 2024-09-06

## 2024-09-06 VITALS — WEIGHT: 153 LBS | BODY MASS INDEX: 26.26 KG/M2

## 2024-09-06 DIAGNOSIS — I50.22 CHRONIC HFREF (HEART FAILURE WITH REDUCED EJECTION FRACTION) (HCC): Chronic | ICD-10-CM

## 2024-09-06 DIAGNOSIS — I10 PRIMARY HYPERTENSION: Primary | Chronic | ICD-10-CM

## 2024-09-06 DIAGNOSIS — E11.9 TYPE 2 DIABETES MELLITUS WITHOUT COMPLICATION, WITHOUT LONG-TERM CURRENT USE OF INSULIN (HCC): ICD-10-CM

## 2024-09-06 DIAGNOSIS — I42.8 NONISCHEMIC CARDIOMYOPATHY (HCC): Chronic | ICD-10-CM

## 2024-09-06 DIAGNOSIS — I50.9 ACUTE DECOMPENSATED HEART FAILURE (HCC): ICD-10-CM

## 2024-09-06 PROCEDURE — 99213 OFFICE O/P EST LOW 20 MIN: CPT | Performed by: INTERNAL MEDICINE

## 2024-09-06 RX ORDER — FUROSEMIDE 40 MG
40 TABLET ORAL DAILY
Qty: 30 TABLET | Refills: 4 | Status: SHIPPED | OUTPATIENT
Start: 2024-09-06

## 2024-09-06 RX ORDER — LISINOPRIL 2.5 MG/1
2.5 TABLET ORAL DAILY
Qty: 30 TABLET | Refills: 3 | Status: SHIPPED | OUTPATIENT
Start: 2024-09-06

## 2024-09-06 RX ORDER — ASPIRIN 81 MG/1
81 TABLET, CHEWABLE ORAL DAILY
Qty: 30 TABLET | Refills: 3 | Status: SHIPPED | OUTPATIENT
Start: 2024-09-06

## 2024-09-06 RX ORDER — ATORVASTATIN CALCIUM 40 MG/1
40 TABLET, FILM COATED ORAL DAILY
Qty: 90 TABLET | Refills: 2 | Status: SHIPPED | OUTPATIENT
Start: 2024-09-06

## 2024-09-06 RX ORDER — METOPROLOL SUCCINATE 25 MG/1
12.5 TABLET, EXTENDED RELEASE ORAL 2 TIMES DAILY
Qty: 30 TABLET | Refills: 5 | Status: SHIPPED | OUTPATIENT
Start: 2024-09-06

## 2024-09-06 NOTE — PROGRESS NOTES
Incoming / Outgoing Calls:  9/6/2024    [facility] received a call from Pt's brother, Ashok. He informed Pt received a letter stating her income was too high and denied EMA. OC explained how DPW came to that determination. He requested CMOC request an appeal. CMOC informed she cannot appeal as Pt is over the income limit and that at best Pt can reapply, however, Pt has an active waiver application pending with DPW which also serves as an MA application if Pt is approved. Jaquantomi insisted DPW has incorrect information about Pt's income and CMOC reminded him pay stubs were provided by Pt's employer. He then states he dropped off a form for PCP to complete and would like to know status. He is unsure if letter is from DPW or IEB. [facility] did check with  and staff informed HEATHER, Olivia CORNEJO, has the form but she is OoO today and has ten days to complete form. [facility] did call Ashok back and informed him of this. He requested the process be expedited and OC again informed of policy. He expressed understanding.     Next outreach is scheduled for 9/10/2024.

## 2024-09-06 NOTE — PROGRESS NOTES
SARAH STEPHENSON completed case handoff report to SARAH Winn.     SARAH STEPHENSON provided update on pt's condition. SARAH STEPHENSON notified her that pt was recently approved for Bridging the Gap fund to cover the cost of her medications. SARAH STEPHENSON had previously made pt aware that she will have a possible gap in her coverage and will need to save to cover the cost. The total cost of her medications was around $145.     SARAH STEPHENSON made SARAH Winn aware that pt will reach her five year residency on 9/13/2024 and will need to apply for Medical Assistance in order to get insurance coverage.     SARAH STEPHENSON routed note to SARAH Winn and CLARK Snyder who will continue to follow pt.

## 2024-09-06 NOTE — TELEPHONE ENCOUNTER
Spoke to pt she is unable to climb stairs from 2nd floor with out assistance wanted for LYFT but drivers do not assist pts climb stairs appt changed to a virtual used and interpretor

## 2024-09-06 NOTE — PROGRESS NOTES
Received Epic Chat text from Advanced Heart Failure , Lauren Coombs that Dr. Godinez would like to see patient in-person at future appts. Lauren Coombs explained that Rogerio was scheduled for patient to see Dr. Godinez today. However, patient called CARD Office yesterday and said she needs assistance up and down her stairs due to SOB which Lyft will not provide. So Dr. Godinez's appt today was changed to Virtual.     OP Advanced Heart Failure LCSW communicated via Epic Chat with Ms. Coombs to provide update regarding the conversations this LCSW has had with [facility] .  Patient is followed closely and extensively by [facility]  and OC. Please also see this LCSW progress notes dated 7/19/24, 7/23/24, 8/6/24.    Summary:  Pt has no insurance and is undocumented. She moved [place] the [place] from [place] on 9[place]13/19. She will reach her 5 year residency in the [place] on 9/13/24. [facility] has already assisted pt with PA Waiver aide application which is pending. If this is approved, then pt will have full PA MA.   This is important because NO transportation service other than BLS stretcher will help patient get up and down the steps at her apartment.   Patient cannot afford private pay for BLS. This is why she is currently doing virtual appointments.   [facility] that it is up to [facility] if they want to Kati a BLS to OP appts prior to PA MA approval.     Per Electronic chart review: Pt's NephewAshok [phone] has been in contact with CMOC, Mackenzie Olivo as they tried to help patient gather bank statements and letter from pt's employer for purpose of EMA (Emergency Medical Assistance). Pt's last day of employment was July 3rd 2024 when she fainted.  Hospitalized at [facility] 7/5/24-7/14/24.  Pt was denied for EMA because the [facility] included pt's paystubs from June 2024 - over income. Pt would have to reapply EMA  "however PA Waiver application is already pending.     CMOC has also been in contact with pt's Brother - also named Ashok Ayala phone# [phone].   Pt receives some assistance from nephew, brother, friend and pt's landlord who is also a friend.   Also, when this LCSW spoke with patient on 7/19/24 utilizing [employer] , pt put her \"uncle\" on the phone and his name was Ruben Maciel. Ruben works Saturday - Thursday and visits patient on his day off.    OP RN SACHIN, [facility] has provided Outreach Calls to patient and also helped with a plan to increase patient's endurance on the steps with brother or nephew assistance.     [facility] has also been in contact with [facility]  at [facility], Lauren Anna. Pt will receive help applying for MOW and [employer] [place].    CMOC and [facility]  have also communicated with [facility] ([facility]) to [facility] patient's medications. [facility]  received 1 x approval from  to [facility] patient's medications.     [facility] updated Dr. Godinez via phone prior to virtual appt today.   Routed note to [facility] , Luann Arroyo and CMOC, Mackenzie Olivo.    Question- Can pt's nephew, brother or uncle help her on the stairs when she has to go to appt?          "

## 2024-09-10 ENCOUNTER — PATIENT OUTREACH (OUTPATIENT)
Dept: FAMILY MEDICINE CLINIC | Facility: CLINIC | Age: 64
End: 2024-09-10

## 2024-09-10 NOTE — PROGRESS NOTES
Outgoing Call:  9/10/2024    [facility] completed a chart review and noticed IEB form was completed and faxed yesterday according to notes from Cathy ELIAS and Myla DENISE [facility] called Pt's brother, Ashok and provided an update. [facility] explained what to expect moving forward and encouraged him to have Pt f/u as she is working on waiver with someone else. He expressed understanding. He also informed he provided documents to [employer] just yesterday for ED hospital bills. OC encouraged him to continue working with them until resolved/approved. He asked [facility] if Pt would qualify for unemployment benefits. [facility] informed she would not be able to apply on behalf of Pt as that is something Pt would need to complete and that typically Pt would need to be able to work in order to qualify. [facility] encouraged him to have Pt call [facility] and ask any other questions that she may have. He asked if Pt would qualify for SSI/SSD. [facility] informed she would also have to call [facility] and schedule an interview to apply. [facility] provided numbers for him to f/u with. He thanked [facility] for the call. OC to f/u.     Next outreach is scheduled for 9/18/2024.

## 2024-09-11 DIAGNOSIS — I50.9 ACUTE DECOMPENSATED HEART FAILURE (HCC): ICD-10-CM

## 2024-09-11 RX ORDER — COLCHICINE 0.6 MG/1
0.6 TABLET ORAL DAILY
Qty: 30 TABLET | Refills: 5 | Status: SHIPPED | OUTPATIENT
Start: 2024-09-11

## 2024-09-11 NOTE — TELEPHONE ENCOUNTER
Reason for call:   [x] Refill   [] Prior Auth  [x] Other: Patient states she did not receive this script with her other meds. Has 1 pill left.    Office:   [] PCP/Provider -   [x] Specialty/Provider - Tae Godinez DO     Medication: colchicine (COLCRYS) 0.6 mg tablet     Dose/Frequency: 0.6mg    Quantity: 30    Pharmacy:  [facility]. - [address] [[id number]]     Does the patient have enough for 3 days?   [] Yes   [x] No - Send as HP to POD

## 2024-09-20 ENCOUNTER — PATIENT OUTREACH (OUTPATIENT)
Dept: FAMILY MEDICINE CLINIC | Facility: CLINIC | Age: 64
End: 2024-09-20

## 2024-09-20 ENCOUNTER — PATIENT OUTREACH (OUTPATIENT)
Dept: CASE MANAGEMENT | Facility: OTHER | Age: 64
End: 2024-09-20

## 2024-09-20 NOTE — PROGRESS NOTES
Outpatient Care Manager Note: Chart notes reviewed and call made to patent and left message via  #[phone]. Patient now out >60 days post hospital stay for HF.   Will close Complex CHF episode. Patient still being followed by Radha SMITH and CMOC for insurance and waiver services and will update them.

## 2024-09-20 NOTE — PROGRESS NOTES
Outgoing Calls:  9/20/2024    CMOC called Mercandace at [facility] to check in status of CM services for Pt. She informed she was out of the office but asked for CMOC to call [facility] directly. CMOC called [employer] to check on status of meals services. VM left. CMOC called [facility] and left a detailed VM for Lauren Rodriguez who is CW assigned to Pt. CMOC called Pt's brother to check on status of waiver application. VM left.     Next outreach is scheduled for 9/27/2024.

## 2024-09-23 ENCOUNTER — PATIENT OUTREACH (OUTPATIENT)
Dept: FAMILY MEDICINE CLINIC | Facility: CLINIC | Age: 64
End: 2024-09-23

## 2024-09-23 NOTE — PROGRESS NOTES
Incoming / Outgoing Calls:  9/23/2024    CMOC received a call from LAQUITA Aldana at [facility], and she informed that she met with Pt on 8/27/24 and completed an evaluation for MOW. She informed she followed up with [facility] this morning and was informed that they assigned Pt to a Hungarian speaking CW and have fallen behind however they did call Pt this morning and left her a detailed VM requesting a call in return. She informed she will provide CMOC with updates as they come up.     CMOC called DPW to request a reconsideration for denial of MA and SNAP benefits now that Pt is a [facility] Resident. CMOC was placed on hold 31 minutes prior to receiving assistance. Ticket number is [id number]. CMOC was informed she will receive a call back from LAQUITA for further information.     CMOC did receive a call from Guerda CEOLHO, My, and she informed CMOC she received some documents from Pt however is still in need of 2019 taxes, bank statements for March and Sept of 2019, all pay stubs for July and August, and medical bills for past three months. Once received she will process application for MA and waiver and will also consider SNAP benefits. Documents due no later than 10/05/24.     CMOC called Pt's brother, Ashok to inform of this. He was discouraged and informed he gave all that they requested. CMOC informed of the urgency of documents needed and informed Pt will be denied SNAP, MA, and waiver at which point CMOC will not be able to assist further. He expressed understanding and stated that he will work on gathering documents. He agreed to contact [facility] once he drops off forms at [facility].     Next outreach is scheduled for 9/30/2024.       DISPLAY PLAN FREE TEXT

## 2024-09-30 ENCOUNTER — PATIENT OUTREACH (OUTPATIENT)
Dept: FAMILY MEDICINE CLINIC | Facility: CLINIC | Age: 64
End: 2024-09-30

## 2024-09-30 NOTE — PROGRESS NOTES
Outgoing Calls:  9/30/2024    CMOC called Pt's brother, Ashok, to discuss status of documents still being requested by DPW to complete SNAP, MA, and waiver application. Ashok informed CMOC he did drop off documents at local DPW office this morning. CMOC called DPW My [place] and left detailed VM requesting a call in return. CMOC received a call from My shortly after informing Pt was approved for waiver, MA, and SNAP benefits as of today. Pt approved for $292 a month in SNAP benefits. $446 was deposited today and an EBT card was mailed out. Pt's recipient ID number is [id number]. Pt will receive a call from a her new  from [facility] to schedule home visit and determine how any waiver hours she will be approved for. CMOC informed both Pt and her brother Ashok of this. Both asked for rental assistance. CMOC informed there is currently no funding and also Pt would need to have an income to apply of which she does not have. Both expressed understanding.     Next outreach is scheduled for 10/7/2024.

## 2024-10-01 ENCOUNTER — PATIENT OUTREACH (OUTPATIENT)
Dept: CARDIOLOGY CLINIC | Facility: CLINIC | Age: 64
End: 2024-10-01

## 2024-10-01 NOTE — PROGRESS NOTES
One Time Outreach note. Patient has active OP  at [facility].     Received IB mssg from CMOC, Mackenzie Snyder that pt was approved full PA MA ID#[id number]. Ms. Snyder asked if someone could please update patient's financial status in the electronic record.     This OP Advanced Heart Failure LCSW offered to contact  Financial Services [phone] to provide the PA MA ID#.   [employer] is able to see that pt chose an [facility] Health Choice Plan.  Financial Services requested ID# and effective date for this specific plan. LCSW sent IB Mssg to inform CMOC, Ms. Snyder of this. Also encouraged that pt should ask  Office to scan her ins card into the electronic chart when she receives card in the mail.

## 2024-10-07 ENCOUNTER — PATIENT OUTREACH (OUTPATIENT)
Dept: FAMILY MEDICINE CLINIC | Facility: CLINIC | Age: 64
End: 2024-10-07

## 2024-10-07 NOTE — PROGRESS NOTES
Outgoing Call:  10/7/2024    [facility] called Pt to check on status of waiver services. VM left requesting a call in return.     Next outreach is scheduled for 10/14/2024.

## 2024-10-14 ENCOUNTER — PATIENT OUTREACH (OUTPATIENT)
Dept: FAMILY MEDICINE CLINIC | Facility: CLINIC | Age: 64
End: 2024-10-14

## 2024-10-14 NOTE — PROGRESS NOTES
Outgoing Call:  10/14/2024    CMOC called Pt's brother, Ashok, to check on status of waiver services. VM left. CMOC called Pt to discuss as well.   Pt answered call and informed she started receiving waiver services as of last week. Pt states she was approved for 41 hours a week. Pt was upset and states she needs 24 care. CMOC informed this is not something she would be approved for with her current condition. Pt states she does not have the name of her . A agency is [employer]. Pt states she has someone who comes to care for her daily for 8 hours a day during the work week. Pt states she does not have money for food. CMOC reminded her she was approved for SNAP and MOW. Pt states she has not received her SNAP card and MOW starts delivering meals tomorrow. CMOC provided Pt with DPW number and encouraged her to call them tomorrow and order another card. CMOC had already ordered the card when first approved. Pt states she received her health insurance card. Pt informed she has transportation services through her waiver program. Pt plans on using it this week for upcoming appointment. Pt states she is interested in housing however does not have an income. Pt is not eligible for SSD as she has not earned her 40 credits. Pt will attempt to apply for SSI. Pt also not eligible for UC since she is unemployed due to physical health reasons. Pt states she does not intend on returning to work. CMOC informed once her financial situation changes she can always apply for housing. Pt is aware this referral will be closed as this CMOC had applied for MA, SNAP, and MOW and Pt approved for all services. CMOC assisted Pt in seeking and [facility] agency to begin waiver services and Pt is now enrolled. Pt is aware this referral will be closed.     No further outreach is scheduled.

## 2024-10-16 ENCOUNTER — PATIENT OUTREACH (OUTPATIENT)
Dept: FAMILY MEDICINE CLINIC | Facility: CLINIC | Age: 64
End: 2024-10-16

## 2024-10-16 NOTE — PROGRESS NOTES
SARAH STEPHENSON received IB message message from CLARK Mann, regarding the status of the MA, SNAP, and Waiver Services. Pt was approved for 41 hours of Home Health Services, MA, SNAP and MOW. This case was transferred to SARAH STEPHENSON from SARAH Kong. SARAH Kong and CLARK Mann has been working with this pt for few months. Pt is aware this referral would be closed as pt's needs has been met. No further outreach.

## 2024-11-06 NOTE — PROGRESS NOTES
Heart Failure Outpatient Progress Note - Nancy Calderon 64 y.o. female MRN: [id number]    @ Encounter: [id number]      Assessment/Plan:    Patient Active Problem List    Diagnosis Date Noted    Left-sided weakness 08/08/2024    Chronic bilateral thoracic back pain 07/30/2024    Type 2 diabetes mellitus without complication, without long-term current use of insulin ([facility]) 07/29/2024    Primary hypertension 07/29/2024    Gastroesophageal reflux disease without esophagitis 07/29/2024    Nonischemic cardiomyopathy (HCC) 07/10/2024    Does not have health insurance 07/07/2024    Chronic HFrEF (heart failure with reduced ejection fraction) ([facility]) 07/05/2024     # Chronic HFrEF, Stage C  Etiology: NICM; hx of HTN     Weight: 153 lbs  BNP:  7/5/24: 2673     Studies- personally reviewed by myself  EKG- SR, TWI laterally     LHC 7/10/24: no CAD     Echo 7/5/24  LVEF: 15%  LVIDd: 5.7 cm  RV: normal  Other: mild to moderate left sided pleural effusion     Neurohormonal Blockade:  --Beta-Blocker: metoprolol succinate 125 mg BID  --ACEi, ARB or ARNi: lisinopril 5 mg     (or SVR reduction)  --Aldosterone Receptor Blocker:  --SGLT2-I:   --Diuretic: lasix 40 mg daily     Sudden Cardiac Death Risk Reduction:  --ICD: LifeVest     Electrical Resynchronization:  --narrow QRS     Advanced Therapies (If appropriate):  --Inotrope:  --LVAD/Transplant Candidacy:     # HTN  # hyperlipidemia  # DM2- HgA1C 7.9% on 7/6/24  # Hx of medication non adherence  # no health insurance     TODAY'S PLAN:  F/u echo in October on GDMT- not done, urged to get done  Social work has been working on trying to help her  Will refill her meds- change to Toprol 25 mg BID and lisinopril 5 mg daily  Continue current GDMT, no health insurance    Needs workup of left shoulder pain, will start with plain imaging- defer to primary doc if want MRI, et  --2g sodium diet  - Daily weights     HPI:      63 yo following up for HFrEF. She has moved here from   5 years ago with reported hx of HTN and possible coronary stents? Reported by her in 2011 and possible hx of CHF. She did follow with a cardiolgoist in [place]. She was not on her prescribed coreg and valsartan due to insurance issue.     Admission in July showed EF: 15%, LHC did not show any obstructive CAD. Started on GDMT  Seen in follow up by APs.      Interval History:  Echo not done  Main complaint is left shoulder pain on movement, cannot move  Needs imaging  Review of Systems   Constitutional:  Negative for activity change, appetite change, fatigue and unexpected weight change.   HENT:  Negative for congestion and nosebleeds.    Eyes: Negative.    Respiratory:  Negative for cough, chest tightness and shortness of breath.    Cardiovascular:  Negative for chest pain, palpitations and leg swelling.   Gastrointestinal:  Negative for abdominal distention.   Endocrine: Negative.    Genitourinary: Negative.    Musculoskeletal: Negative.    Skin: Negative.    Neurological:  Negative for dizziness, syncope and weakness.   Hematological: Negative.    Psychiatric/Behavioral: Negative.         Past Medical History:   Diagnosis Date    Chronic HFrEF (heart failure with reduced ejection fraction) ([facility]) 07/2024    History of nonadherence to medical treatment     Hypertension     Nonischemic cardiomyopathy ([facility]) 07/2024         No Known Allergies  .    Current Outpatient Medications:     aspirin 81 mg chewable tablet, Chew 1 tablet (81 mg total) daily, Disp: 30 tablet, Rfl: 3    atorvastatin (LIPITOR) 40 mg tablet, Take 1 tablet (40 mg total) by mouth daily, Disp: 90 tablet, Rfl: 2    Blood Glucose Monitoring Suppl (OneTouch Verio Reflect) w/Device KIT, Check blood sugars three times daily. Please substitute with appropriate alternative as covered by patient's insurance. Dx: E11.65, Disp: 1 kit, Rfl: 0    colchicine (COLCRYS) 0.6 mg tablet, Take 1 tablet (0.6 mg total) by mouth daily, Disp: 30 tablet, Rfl: 5    furosemide  (LASIX) 40 mg tablet, Take 1 tablet (40 mg total) by mouth daily, Disp: 30 tablet, Rfl: 4    glucose blood (OneTouch Verio) test strip, Check blood sugars three times daily. Please substitute with appropriate alternative as covered by patient's insurance. Dx: E11.65, Disp: 300 each, Rfl: 3    lidocaine (LMX) 4 % cream, Apply topically as needed for mild pain, Disp: 28 g, Rfl: 1    lisinopril (ZESTRIL) 2.5 mg tablet, Take 1 tablet (2.5 mg total) by mouth daily, Disp: 30 tablet, Rfl: 3    metFORMIN (GLUCOPHAGE) 500 mg tablet, Take 1 tablet (500 mg total) by mouth daily with breakfast, Disp: 90 tablet, Rfl: 0    metoprolol succinate (TOPROL-XL) 25 mg 24 hr tablet, Take 0.5 tablets (12.5 mg total) by mouth 2 (two) times a day, Disp: 30 tablet, Rfl: 5    OneTouch Delica Lancets 33G MISC, Check blood sugars three times daily. Please substitute with appropriate alternative as covered by patient's insurance. Dx: E11.65, Disp: 300 each, Rfl: 3    Social History     Socioeconomic History    Marital status: Single     Spouse name: Not on file    Number of children: Not on file    Years of education: Not on file    Highest education level: Not on file   Occupational History    Not on file   Tobacco Use    Smoking status: Never    Smokeless tobacco: Never   Vaping Use    Vaping status: Never Used   Substance and Sexual Activity    Alcohol use: Never    Drug use: Never    Sexual activity: Not on file   Other Topics Concern    Not on file   Social History Narrative    Not on file     Social Determinants of Health     Financial Resource Strain: High Risk (7/29/2024)    Overall Financial Resource Strain (CARDIA)     Difficulty of Paying Living Expenses: Hard   Food Insecurity: Food Insecurity Present (7/9/2024)    Nursing - Inadequate Food Risk Classification     Worried About Running Out of Food in the Last Year: Sometimes true     Ran Out of Food in the Last Year: Sometimes true     Ran Out of Food in the Last Year: Not on file    Transportation Needs: No Transportation Needs (7/9/2024)    PRAPARE - Transportation     Lack of Transportation (Medical): No     Lack of Transportation (Non-Medical): No   Physical Activity: Not on file   Stress: Not on file   Social Connections: Not on file   Intimate Partner Violence: Not on file   Housing Stability: High Risk (7/9/2024)    Housing Stability Vital Sign     Unable to Pay for Housing in the Last Year: Yes     Number of Times Moved in the Last Year: 1     Homeless in the Last Year: No       No family history on file.    Physical Exam:    Vitals:     Physical Exam    Labs & Results:    Lab Results   Component Value Date    SODIUM 138 07/14/2024    K 4.0 07/14/2024     07/14/2024    CO2 28 07/14/2024    BUN 18 07/14/2024    CREATININE 0.69 07/14/2024    GLUC 119 07/14/2024    CALCIUM 9.6 07/14/2024     Lab Results   Component Value Date    WBC 8.36 07/14/2024    HGB 15.2 07/14/2024    HCT 47.3 (H) 07/14/2024    MCV 94 07/14/2024     07/14/2024         EKG personally reviewed by Tae Burris DO.     Counseling / Coordination of Care  I have spent a total time of 35 minutes on 11/08/24 in caring for this patient including Diagnostic results, Risks and benefits of tx options, Instructions for management, Importance of tx compliance, Impressions, Documenting in the medical record, and Reviewing / ordering tests, medicine, procedures  .      Thank you for the opportunity to participate in the care of this patient.    TAE BURRIS D.O.  DIRECTOR OF HEART FAILURE/ PULMONARY HYPERTENSION  MEDICAL DIRECTOR OF LVAD PROGRAM  [facility]

## 2024-11-07 ENCOUNTER — TELEPHONE (OUTPATIENT)
Dept: CARDIOLOGY CLINIC | Facility: CLINIC | Age: 64
End: 2024-11-07

## 2024-11-07 NOTE — TELEPHONE ENCOUNTER
Used Azeri interpretor pt is unable to get down the stairs to get to any appts,asked pt if she has family she will contact her brother and I will call pt back later today for out come if she will be able to come for in person appt also pt did not get echo

## 2024-11-08 ENCOUNTER — OFFICE VISIT (OUTPATIENT)
Dept: CARDIOLOGY CLINIC | Facility: CLINIC | Age: 64
End: 2024-11-08
Payer: COMMERCIAL

## 2024-11-08 VITALS
WEIGHT: 153 LBS | DIASTOLIC BLOOD PRESSURE: 64 MMHG | HEART RATE: 106 BPM | OXYGEN SATURATION: 97 % | HEIGHT: 64 IN | BODY MASS INDEX: 26.12 KG/M2 | SYSTOLIC BLOOD PRESSURE: 120 MMHG

## 2024-11-08 DIAGNOSIS — I10 PRIMARY HYPERTENSION: Chronic | ICD-10-CM

## 2024-11-08 DIAGNOSIS — I50.9 ACUTE DECOMPENSATED HEART FAILURE (HCC): ICD-10-CM

## 2024-11-08 DIAGNOSIS — E11.9 TYPE 2 DIABETES MELLITUS WITHOUT COMPLICATION, WITHOUT LONG-TERM CURRENT USE OF INSULIN (HCC): ICD-10-CM

## 2024-11-08 DIAGNOSIS — I42.8 NONISCHEMIC CARDIOMYOPATHY (HCC): Primary | Chronic | ICD-10-CM

## 2024-11-08 DIAGNOSIS — I50.22 CHRONIC HFREF (HEART FAILURE WITH REDUCED EJECTION FRACTION) (HCC): Chronic | ICD-10-CM

## 2024-11-08 DIAGNOSIS — M25.512 ACUTE PAIN OF LEFT SHOULDER: ICD-10-CM

## 2024-11-08 DIAGNOSIS — R26.2 AMBULATORY DYSFUNCTION: ICD-10-CM

## 2024-11-08 PROCEDURE — 99214 OFFICE O/P EST MOD 30 MIN: CPT | Performed by: INTERNAL MEDICINE

## 2024-11-08 RX ORDER — LISINOPRIL 5 MG/1
5 TABLET ORAL DAILY
Qty: 90 TABLET | Refills: 2 | Status: SHIPPED | OUTPATIENT
Start: 2024-11-08

## 2024-11-08 RX ORDER — ATORVASTATIN CALCIUM 40 MG/1
40 TABLET, FILM COATED ORAL DAILY
Qty: 90 TABLET | Refills: 2 | Status: SHIPPED | OUTPATIENT
Start: 2024-11-08

## 2024-11-08 RX ORDER — METOPROLOL SUCCINATE 25 MG/1
25 TABLET, EXTENDED RELEASE ORAL 2 TIMES DAILY
Qty: 180 TABLET | Refills: 2 | Status: SHIPPED | OUTPATIENT
Start: 2024-11-08

## 2024-11-08 RX ORDER — MEDICAL SUPPLY, MISCELLANEOUS
EACH MISCELLANEOUS DAILY
Qty: 1 EACH | Refills: 0 | Status: SHIPPED | OUTPATIENT
Start: 2024-11-08

## 2024-11-08 RX ORDER — FUROSEMIDE 40 MG/1
40 TABLET ORAL DAILY
Qty: 90 TABLET | Refills: 2 | Status: SHIPPED | OUTPATIENT
Start: 2024-11-08

## 2024-11-08 NOTE — LETTER
November 8, 2024     HEATHER Badillo  [address] [address]    Patient: Nancy Calderon   YOB: 1960   Date of Visit: 11/8/2024       Dear Dr. Mckee:    Thank you for referring Nancy Maciel to me for evaluation. Below are my notes for this consultation.    If you have questions, please do not hesitate to call me. I look forward to following your patient along with you.         Sincerely,        Tae Godinez DO        CC: No Recipients    Tae Godinez DO  11/8/2024 11:40 AM  Sign when Signing Visit    Heart Failure Outpatient Progress Note - Nancy Calderon 64 y.o. female MRN: [id number]    @ Encounter: [id number]      Assessment/Plan:    Patient Active Problem List    Diagnosis Date Noted   • Left-sided weakness 08/08/2024   • Chronic bilateral thoracic back pain 07/30/2024   • Type 2 diabetes mellitus without complication, without long-term current use of insulin ([facility]) 07/29/2024   • Primary hypertension 07/29/2024   • Gastroesophageal reflux disease without esophagitis 07/29/2024   • Nonischemic cardiomyopathy ([facility]) 07/10/2024   • Does not have health insurance 07/07/2024   • Chronic HFrEF (heart failure with reduced ejection fraction) ([facility]) 07/05/2024     # Chronic HFrEF, Stage C  Etiology: NICM; hx of HTN     Weight: 153 lbs  BNP:  7/5/24: 2673     Studies- personally reviewed by myself  EKG- SR, TWI laterally     LHC 7/10/24: no CAD     Echo 7/5/24  LVEF: 15%  LVIDd: 5.7 cm  RV: normal  Other: mild to moderate left sided pleural effusion     Neurohormonal Blockade:  --Beta-Blocker: metoprolol succinate 125 mg BID  --ACEi, ARB or ARNi: lisinopril 5 mg     (or SVR reduction)  --Aldosterone Receptor Blocker:  --SGLT2-I:   --Diuretic: lasix 40 mg daily     Sudden Cardiac Death Risk Reduction:  --ICD: LifeVest     Electrical Resynchronization:  --narrow QRS     Advanced Therapies (If appropriate):  --Inotrope:  --LVAD/Transplant Candidacy:      # HTN  # hyperlipidemia  # DM2- HgA1C 7.9% on 7/6/24  # Hx of medication non adherence  # no health insurance     TODAY'S PLAN:  F/u echo in October on GDMT- not done, urged to get done  Social work has been working on trying to help her  Will refill her meds- change to Toprol 25 mg BID and lisinopril 5 mg daily  Continue current GDMT, no health insurance    Needs workup of left shoulder pain, will start with plain imaging- defer to primary doc if want MRI, et  --2g sodium diet  - Daily weights     HPI:      63 yo following up for HFrEF. She has moved here from  5 years ago with reported hx of HTN and possible coronary stents? Reported by her in 2011 and possible hx of CHF. She did follow with a cardiolgoist in . She was not on her prescribed coreg and valsartan due to insurance issue.     Admission in July showed EF: 15%, LHC did not show any obstructive CAD. Started on GDMT  Seen in follow up by APs.      Interval History:  Echo not done  Main complaint is left shoulder pain on movement, cannot move  Needs imaging  Review of Systems   Constitutional:  Negative for activity change, appetite change, fatigue and unexpected weight change.   HENT:  Negative for congestion and nosebleeds.    Eyes: Negative.    Respiratory:  Negative for cough, chest tightness and shortness of breath.    Cardiovascular:  Negative for chest pain, palpitations and leg swelling.   Gastrointestinal:  Negative for abdominal distention.   Endocrine: Negative.    Genitourinary: Negative.    Musculoskeletal: Negative.    Skin: Negative.    Neurological:  Negative for dizziness, syncope and weakness.   Hematological: Negative.    Psychiatric/Behavioral: Negative.         Past Medical History:   Diagnosis Date   • Chronic HFrEF (heart failure with reduced ejection fraction) (HCC) 07/2024   • History of nonadherence to medical treatment    • Hypertension    • Nonischemic cardiomyopathy (HCC) 07/2024         No Known Allergies  .    Current  Outpatient Medications:   •  aspirin 81 mg chewable tablet, Chew 1 tablet (81 mg total) daily, Disp: 30 tablet, Rfl: 3  •  atorvastatin (LIPITOR) 40 mg tablet, Take 1 tablet (40 mg total) by mouth daily, Disp: 90 tablet, Rfl: 2  •  Blood Glucose Monitoring Suppl (OneTouch Verio Reflect) w/Device KIT, Check blood sugars three times daily. Please substitute with appropriate alternative as covered by patient's insurance. Dx: E11.65, Disp: 1 kit, Rfl: 0  •  colchicine (COLCRYS) 0.6 mg tablet, Take 1 tablet (0.6 mg total) by mouth daily, Disp: 30 tablet, Rfl: 5  •  furosemide (LASIX) 40 mg tablet, Take 1 tablet (40 mg total) by mouth daily, Disp: 30 tablet, Rfl: 4  •  glucose blood (OneTouch Verio) test strip, Check blood sugars three times daily. Please substitute with appropriate alternative as covered by patient's insurance. Dx: E11.65, Disp: 300 each, Rfl: 3  •  lidocaine (LMX) 4 % cream, Apply topically as needed for mild pain, Disp: 28 g, Rfl: 1  •  lisinopril (ZESTRIL) 2.5 mg tablet, Take 1 tablet (2.5 mg total) by mouth daily, Disp: 30 tablet, Rfl: 3  •  metFORMIN (GLUCOPHAGE) 500 mg tablet, Take 1 tablet (500 mg total) by mouth daily with breakfast, Disp: 90 tablet, Rfl: 0  •  metoprolol succinate (TOPROL-XL) 25 mg 24 hr tablet, Take 0.5 tablets (12.5 mg total) by mouth 2 (two) times a day, Disp: 30 tablet, Rfl: 5  •  OneTouch Delica Lancets 33G MISC, Check blood sugars three times daily. Please substitute with appropriate alternative as covered by patient's insurance. Dx: E11.65, Disp: 300 each, Rfl: 3    Social History     Socioeconomic History   • Marital status: Single     Spouse name: Not on file   • Number of children: Not on file   • Years of education: Not on file   • Highest education level: Not on file   Occupational History   • Not on file   Tobacco Use   • Smoking status: Never   • Smokeless tobacco: Never   Vaping Use   • Vaping status: Never Used   Substance and Sexual Activity   • Alcohol use:  "Never   • Drug use: Never   • Sexual activity: Not on file   Other Topics Concern   • Not on file   Social History Narrative   • Not on file     Social Determinants of Health     Financial Resource Strain: High Risk (7/29/2024)    Overall Financial Resource Strain (CARDIA)    • Difficulty of Paying Living Expenses: Hard   Food Insecurity: Food Insecurity Present (7/9/2024)    Nursing - Inadequate Food Risk Classification    • Worried About Running Out of Food in the Last Year: Sometimes true    • Ran Out of Food in the Last Year: Sometimes true    • Ran Out of Food in the Last Year: Not on file   Transportation Needs: No Transportation Needs (7/9/2024)    PRAPARE - Transportation    • Lack of Transportation (Medical): No    • Lack of Transportation (Non-Medical): No   Physical Activity: Not on file   Stress: Not on file   Social Connections: Not on file   Intimate Partner Violence: Not on file   Housing Stability: High Risk (7/9/2024)    Housing Stability Vital Sign    • Unable to Pay for Housing in the Last Year: Yes    • Number of Times Moved in the Last Year: 1    • Homeless in the Last Year: No       No family history on file.    Physical Exam:    Vitals:     Physical Exam    Labs & Results:    Lab Results   Component Value Date    SODIUM 138 07/14/2024    K 4.0 07/14/2024     07/14/2024    CO2 28 07/14/2024    BUN 18 07/14/2024    CREATININE 0.69 07/14/2024    GLUC 119 07/14/2024    CALCIUM 9.6 07/14/2024     Lab Results   Component Value Date    WBC 8.36 07/14/2024    HGB 15.2 07/14/2024    HCT 47.3 (H) 07/14/2024    MCV 94 07/14/2024     07/14/2024     No results found for: \"NTBNP\"   No results found for: \"CHOLESTEROL\"  No results found for: \"HDL\"  No results found for: \"TRIG\"  No results found for: \"NONHDLC\"    EKG personally reviewed by Tae Godinez DO.     Counseling / Coordination of Care  I have spent a total time of *** minutes on 11/08/24 in caring for this patient including {AMB " Counseling Topics:[id number]}.      Thank you for the opportunity to participate in the care of this patient.    SERGEY BURRIS D.O.  DIRECTOR OF HEART FAILURE/ PULMONARY HYPERTENSION  MEDICAL DIRECTOR OF LVAD PROGRAM  [facility]

## 2024-11-15 ENCOUNTER — HOSPITAL ENCOUNTER (OUTPATIENT)
Dept: NON INVASIVE DIAGNOSTICS | Facility: HOSPITAL | Age: 64
Discharge: HOME | End: 2024-11-15
Payer: COMMERCIAL

## 2024-11-15 ENCOUNTER — HOSPITAL ENCOUNTER (OUTPATIENT)
Dept: RADIOLOGY | Facility: HOSPITAL | Age: 64
End: 2024-11-15
Payer: COMMERCIAL

## 2024-11-15 VITALS
HEART RATE: 74 BPM | DIASTOLIC BLOOD PRESSURE: 64 MMHG | BODY MASS INDEX: 26.12 KG/M2 | WEIGHT: 153 LBS | HEIGHT: 64 IN | SYSTOLIC BLOOD PRESSURE: 120 MMHG

## 2024-11-15 DIAGNOSIS — I50.22 CHRONIC HFREF (HEART FAILURE WITH REDUCED EJECTION FRACTION) (HCC): Chronic | ICD-10-CM

## 2024-11-15 DIAGNOSIS — M25.512 ACUTE PAIN OF LEFT SHOULDER: ICD-10-CM

## 2024-11-15 LAB
AORTIC ROOT: 2.9 CM
APICAL FOUR CHAMBER EJECTION FRACTION: 55 %
BSA FOR ECHO PROCEDURE: 1.75 M2
E WAVE DECELERATION TIME: 263 MS
E/A RATIO: 0.64
FRACTIONAL SHORTENING: 30 (ref 28–44)
INTERVENTRICULAR SEPTUM IN DIASTOLE (PARASTERNAL SHORT AXIS VIEW): 0.7 CM
INTERVENTRICULAR SEPTUM: 0.7 CM (ref 0.6–1.1)
LAAS-AP2: 13.1 CM2
LAAS-AP4: 13.6 CM2
LEFT ATRIUM SIZE: 3.6 CM
LEFT ATRIUM VOLUME (MOD BIPLANE): 33 ML
LEFT ATRIUM VOLUME INDEX (MOD BIPLANE): 18.9 ML/M2
LEFT INTERNAL DIMENSION IN SYSTOLE: 3.5 CM (ref 2.1–4)
LEFT VENTRICULAR INTERNAL DIMENSION IN DIASTOLE: 5 CM (ref 3.5–6)
LEFT VENTRICULAR POSTERIOR WALL IN END DIASTOLE: 0.6 CM
LEFT VENTRICULAR STROKE VOLUME: 68 ML
LVSV (TEICH): 68 ML
MV E'TISSUE VEL-SEP: 5 CM/S
MV PEAK A VEL: 0.75 M/S
MV PEAK E VEL: 48 CM/S
MV STENOSIS PRESSURE HALF TIME: 76 MS
MV VALVE AREA P 1/2 METHOD: 2.89
RIGHT ATRIUM AREA SYSTOLE A4C: 12.6 CM2
RIGHT VENTRICLE ID DIMENSION: 3 CM
SL CV LEFT ATRIUM LENGTH A2C: 4.8 CM
SL CV LV EF: 55
SL CV PED ECHO LEFT VENTRICLE DIASTOLIC VOLUME (MOD BIPLANE) 2D: 119 ML
SL CV PED ECHO LEFT VENTRICLE SYSTOLIC VOLUME (MOD BIPLANE) 2D: 52 ML
TRICUSPID ANNULAR PLANE SYSTOLIC EXCURSION: 2.1 CM

## 2024-11-15 PROCEDURE — 73030 X-RAY EXAM OF SHOULDER: CPT

## 2024-11-15 PROCEDURE — 93306 TTE W/DOPPLER COMPLETE: CPT

## 2024-11-15 PROCEDURE — 93306 TTE W/DOPPLER COMPLETE: CPT | Performed by: STUDENT IN AN ORGANIZED HEALTH CARE EDUCATION/TRAINING PROGRAM

## 2024-11-17 ENCOUNTER — RESULTS FOLLOW-UP (OUTPATIENT)
Dept: CARDIOLOGY CLINIC | Facility: CLINIC | Age: 64
End: 2024-11-17

## 2024-11-18 ENCOUNTER — OFFICE VISIT (OUTPATIENT)
Dept: FAMILY MEDICINE CLINIC | Facility: CLINIC | Age: 64
End: 2024-11-18

## 2024-11-18 VITALS
BODY MASS INDEX: 26.29 KG/M2 | WEIGHT: 154 LBS | RESPIRATION RATE: 18 BRPM | DIASTOLIC BLOOD PRESSURE: 64 MMHG | HEIGHT: 64 IN | HEART RATE: 81 BPM | TEMPERATURE: 98.1 F | OXYGEN SATURATION: 97 % | SYSTOLIC BLOOD PRESSURE: 116 MMHG

## 2024-11-18 DIAGNOSIS — M25.512 CHRONIC LEFT SHOULDER PAIN: ICD-10-CM

## 2024-11-18 DIAGNOSIS — E11.9 TYPE 2 DIABETES MELLITUS WITHOUT COMPLICATION, WITHOUT LONG-TERM CURRENT USE OF INSULIN (HCC): Primary | ICD-10-CM

## 2024-11-18 DIAGNOSIS — I50.22 CHRONIC HFREF (HEART FAILURE WITH REDUCED EJECTION FRACTION) (HCC): Chronic | ICD-10-CM

## 2024-11-18 DIAGNOSIS — G89.29 CHRONIC LEFT SHOULDER PAIN: ICD-10-CM

## 2024-11-18 DIAGNOSIS — I50.9 ACUTE DECOMPENSATED HEART FAILURE (HCC): ICD-10-CM

## 2024-11-18 LAB — SL AMB POCT HEMOGLOBIN AIC: 6.3 (ref ?–6.5)

## 2024-11-18 PROCEDURE — 83036 HEMOGLOBIN GLYCOSYLATED A1C: CPT

## 2024-11-18 PROCEDURE — 99214 OFFICE O/P EST MOD 30 MIN: CPT

## 2024-11-18 RX ORDER — ASPIRIN 81 MG/1
81 TABLET, CHEWABLE ORAL DAILY
Qty: 90 TABLET | Refills: 1 | Status: SHIPPED | OUTPATIENT
Start: 2024-11-18

## 2024-11-18 RX ORDER — SENNOSIDES 8.6 MG
650 CAPSULE ORAL EVERY 8 HOURS PRN
Qty: 90 TABLET | Refills: 0 | Status: SHIPPED | OUTPATIENT
Start: 2024-11-18

## 2024-11-18 NOTE — ASSESSMENT & PLAN NOTE
Lab Results   Component Value Date    HGBA1C 6.3 11/18/2024       Orders:    POCT hemoglobin A1c    metFORMIN (GLUCOPHAGE) 500 mg tablet; Take 1 tablet (500 mg total) by mouth daily with breakfast    Ambulatory Referral to Social Work Care Management Program; Future

## 2024-11-18 NOTE — ASSESSMENT & PLAN NOTE
Wt Readings from Last 3 Encounters:   11/18/24 69.9 kg (154 lb)   11/15/24 69.4 kg (153 lb)   11/08/24 69.4 kg (153 lb)               Orders:    aspirin 81 mg chewable tablet; Chew 1 tablet (81 mg total) daily    Ambulatory Referral to Social Work Care Management Program; Future

## 2024-11-18 NOTE — PROGRESS NOTES
Name: Nancy Calderon      : 1960      MRN: [id number]  Encounter Provider: HEATHER Badillo  Encounter Date: 2024   Encounter department: [facility]  :  Assessment & Plan  Type 2 diabetes mellitus without complication, without long-term current use of insulin (HCC)    Lab Results   Component Value Date    HGBA1C 6.3 2024       Orders:    POCT hemoglobin A1c    metFORMIN (GLUCOPHAGE) 500 mg tablet; Take 1 tablet (500 mg total) by mouth daily with breakfast    Ambulatory Referral to Social Work Care Management Program; Future    Chronic left shoulder pain    Orders:    Ambulatory Referral to Orthopedic Surgery; Future    acetaminophen (TYLENOL) 650 mg CR tablet; Take 1 tablet (650 mg total) by mouth every 8 (eight) hours as needed for mild pain    Acute decompensated heart failure (HCC)  Wt Readings from Last 3 Encounters:   24 69.9 kg (154 lb)   11/15/24 69.4 kg (153 lb)   24 69.4 kg (153 lb)               Orders:    aspirin 81 mg chewable tablet; Chew 1 tablet (81 mg total) daily    Ambulatory Referral to Social Work Care Management Program; Future    Chronic HFrEF (heart failure with reduced ejection fraction) (HCC)  Wt Readings from Last 3 Encounters:   24 69.9 kg (154 lb)   11/15/24 69.4 kg (153 lb)   24 69.4 kg (153 lb)   Continue management as per cardiology.                         History of Present Illness     Nancy Calderon is a 64 y.o. female  has a past medical history of Chronic HFrEF (heart failure with reduced ejection fraction) (HCC), History of nonadherence to medical treatment, Hypertension, and Nonischemic cardiomyopathy (HCC).  has a past surgical history that includes Cardiac catheterization (Left, 7/10/2024).    She presents today with her brother for a DM follow-up. She reports compliance with atorvastatin & metformin. BGs are in the low 100s at home. She She reports that since  "being dx with CHF, she has had a cough and excess sputum production. She is compliant with cardiac medications and follows with cardiology. She had and left shoulder XR done this past month for chronic left shoulder pain. 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 Probable chondrocalcinosis left glenohumeral joint.     Narrowing of the subacromial space, compatible with rotator cuff deficiency and/or tearing.     Consider further evaluation with noncontrast MRI left shoulder    She wears a life vest.       Review of Systems       Objective   /64 (BP Location: Left arm, Patient Position: Sitting, Cuff Size: Standard)   Pulse 81   Temp 98.1 °F (36.7 °C) (Temporal)   Resp 18   Ht 5' 4\" (1.626 m)   Wt 69.9 kg (154 lb)   SpO2 97%   BMI 26.43 kg/m²      Physical Exam  Vitals and nursing note reviewed.   Constitutional:       General: She is not in acute distress.     Appearance: Normal appearance. She is well-developed.   HENT:      Head: Normocephalic and atraumatic.      Right Ear: External ear normal.      Left Ear: External ear normal.      Nose: Nose normal.   Eyes:      Conjunctiva/sclera: Conjunctivae normal.   Cardiovascular:      Rate and Rhythm: Normal rate and regular rhythm.      Pulses: Normal pulses.      Heart sounds: Normal heart sounds. No murmur heard.     Comments: Life vest in place.  Pulmonary:      Effort: Pulmonary effort is normal. No respiratory distress.      Breath sounds: Normal breath sounds.   Abdominal:      General: Bowel sounds are normal.      Palpations: Abdomen is soft.      Tenderness: There is no abdominal tenderness.   Musculoskeletal:         General: No swelling. Normal range of motion.      Cervical back: Normal range of motion and neck supple.   Skin:     General: Skin is warm and dry.      Capillary Refill: Capillary refill takes less than 2 seconds.   Neurological:      General: No focal deficit " present.      Mental Status: She is alert and oriented to person, place, and time. Mental status is at baseline.   Psychiatric:         Mood and Affect: Mood normal.         Behavior: Behavior normal.         Thought Content: Thought content normal.         Judgment: Judgment normal.

## 2024-11-18 NOTE — ASSESSMENT & PLAN NOTE
Wt Readings from Last 3 Encounters:   11/18/24 69.9 kg (154 lb)   11/15/24 69.4 kg (153 lb)   11/08/24 69.4 kg (153 lb)   Continue management as per cardiology.

## 2024-11-19 ENCOUNTER — OFFICE VISIT (OUTPATIENT)
Dept: OBGYN CLINIC | Facility: CLINIC | Age: 64
End: 2024-11-19

## 2024-11-19 VITALS
BODY MASS INDEX: 26.26 KG/M2 | DIASTOLIC BLOOD PRESSURE: 62 MMHG | WEIGHT: 153 LBS | HEART RATE: 82 BPM | SYSTOLIC BLOOD PRESSURE: 95 MMHG

## 2024-11-19 DIAGNOSIS — Z12.4 SCREENING FOR CERVICAL CANCER: ICD-10-CM

## 2024-11-19 DIAGNOSIS — Z11.51 SCREENING FOR HPV (HUMAN PAPILLOMAVIRUS): ICD-10-CM

## 2024-11-19 DIAGNOSIS — Z11.3 SCREEN FOR STD (SEXUALLY TRANSMITTED DISEASE): ICD-10-CM

## 2024-11-19 DIAGNOSIS — Z59.819 HOUSING INSTABILITY: ICD-10-CM

## 2024-11-19 DIAGNOSIS — N95.0 POSTMENOPAUSAL BLEEDING: Primary | ICD-10-CM

## 2024-11-19 PROCEDURE — 99203 OFFICE O/P NEW LOW 30 MIN: CPT | Performed by: NURSE PRACTITIONER

## 2024-11-19 PROCEDURE — 87591 N.GONORRHOEAE DNA AMP PROB: CPT | Performed by: NURSE PRACTITIONER

## 2024-11-19 PROCEDURE — G0145 SCR C/V CYTO,THINLAYER,RESCR: HCPCS | Performed by: NURSE PRACTITIONER

## 2024-11-19 PROCEDURE — G0476 HPV COMBO ASSAY CA SCREEN: HCPCS | Performed by: NURSE PRACTITIONER

## 2024-11-19 PROCEDURE — 87491 CHLMYD TRACH DNA AMP PROBE: CPT | Performed by: NURSE PRACTITIONER

## 2024-11-19 SDOH — ECONOMIC STABILITY - HOUSING INSECURITY: HOUSING INSTABILITY UNSPECIFIED: Z59.819

## 2024-11-19 NOTE — PATIENT INSTRUCTIONS
Bob por claros confianza en nuestro equipo.   Le agradecemos y agradecemos mckenzie comentarios.   Si recibe john paul encuesta nuestra, tómese unos momentos para informarnos cómo estamos.   Sinceramente,  HEATHER Newell

## 2024-11-19 NOTE — PROGRESS NOTES
PROBLEM GYNECOLOGICAL VISIT    Nancy Calderon is a 64 y.o. female who presents today with complaint of postmenopausal bleeding.  Her general medical history has been reviewed and she reports it as follows.  She is a poor historian.  She reports in  she had a vaginal hysterectomy for bladder prolapse.  However, her cervix is intact.    Past Medical History:   Diagnosis Date    Abnormal Pap smear of cervix     Diabetes mellitus (HCC)     Heart failure (HCC)     Hyperlipidemia     Hypertension     Nonischemic cardiomyopathy (HCC)      Past Surgical History:   Procedure Laterality Date    CARDIAC CATHETERIZATION Left 7/10/2024    Procedure: Cardiac Left Heart Cath;  Surgeon: Justino Patel MD;  Location: [facility];  Service: Cardiology     OB History          5    Para   4    Term   4       0    AB   1    Living   4         SAB   1    IAB   0    Ectopic   0    Multiple   0    Live Births   4               Social History     Tobacco Use    Smoking status: Never    Smokeless tobacco: Never   Vaping Use    Vaping status: Never Used   Substance Use Topics    Alcohol use: Never    Drug use: Never     Social History     Substance and Sexual Activity   Sexual Activity Not Currently    Partners: Male    Birth control/protection: Post-menopausal       Current Outpatient Medications   Medication Instructions    acetaminophen (TYLENOL) 650 mg, Oral, Every 8 hours PRN    aspirin 81 mg, Oral, Daily    atorvastatin (LIPITOR) 40 mg, Oral, Daily    Blood Glucose Monitoring Suppl (OneTouch Verio Reflect) w/Device KIT Check blood sugars three times daily. Please substitute with appropriate alternative as covered by patient's insurance. Dx: E11.65    Blood Pressure Monitoring (B-D ASSURE BPM/DELUXE ARM CUFF) MISC Does not apply, Daily    colchicine (COLCRYS) 0.6 mg, Oral, Daily    furosemide (LASIX) 40 mg, Oral, Daily    glucose blood (OneTouch Verio) test strip Check blood sugars three times daily.  Please substitute with appropriate alternative as covered by patient's insurance. Dx: E11.65    lidocaine (LMX) 4 % cream Topical, As needed    lisinopril (ZESTRIL) 5 mg, Oral, Daily    metFORMIN (GLUCOPHAGE) 500 mg, Oral, Daily with breakfast    metoprolol succinate (TOPROL-XL) 25 mg, Oral, 2 times daily    OneTouch Delica Lancets 33G MISC Check blood sugars three times daily. Please substitute with appropriate alternative as covered by patient's insurance. Dx: E11.65       History of Present Illness:   Reports last menses was approximately 15 years ago.  Denies any vaginal bleeding until 9 months ago and she has been experiencing vaginal spotting off/on more than 50% of days.  Denies pelvic pain.  Reports pink-tinged vaginal discharge off/on.  Denies vaginal itching/odor.  States she has not been sexually active since 2008.  Reports last pap smear was performed in 2011.    Review of Systems:  Review of Systems   Constitutional: Negative.    Gastrointestinal: Negative.    Genitourinary:  Positive for vaginal bleeding and vaginal discharge. Negative for pelvic pain.       Physical Exam:  BP 95/62 (BP Location: Right arm, Patient Position: Sitting, Cuff Size: Standard)   Pulse 82   Wt 69.4 kg (153 lb)   BMI 26.26 kg/m²   Physical Exam  Constitutional:       General: She is not in acute distress.  Genitourinary:      Vulva exam comments: Normal.      No vaginal discharge or erythema.      Moderate vaginal atrophy present.     No cervical motion tenderness, lesion or polyp.      Cervical exam comments: Normal.      Uterus is not tender.   Abdominal:      Palpations: Abdomen is soft.      Tenderness: There is no abdominal tenderness.   Neurological:      Mental Status: She is alert.   Skin:     General: Skin is warm and dry.   Vitals reviewed.       Assessment:   1. Postmenopausal bleeding.   2. Cervical CA screening.    Plan:   1. Cultures ordered: GC/CT.   2. Imaging ordered: pelvic ultrasound.   3. Pap smear  performed.   4. Return to office 1 week after ultrasound performed to review results with MD and for possible EMB.   5. Patient's depression screening was assessed with a PHQ-2 score of 0. Clinically patient does not have depression. No treatment is required.

## 2024-11-19 NOTE — LETTER
2024    To Nancy HaslettLaisha Ayala Raheem  : 1960      Esta carta es para informarle que mckenzie resultados recientes de la prueba de Papanicolaou fueron revisados por mí y son NORMALES.  Nos vemos en 1 año para claros examen anual.    HEATHER Newell

## 2024-11-19 NOTE — LETTER
2024    To Nancy WhiteriverLaisha Ayala Maciel  : 1960      Esta carta es para informarle que mckenzie CULTURAS recientes para la gonorrea y la clamidia fueron revisadas por mí y son NORMALES.  Comuníquese con la oficina para john paul néstor si tiene alguna inquietud adicional.    HEATHER Newell

## 2024-11-20 ENCOUNTER — PATIENT OUTREACH (OUTPATIENT)
Dept: FAMILY MEDICINE CLINIC | Facility: CLINIC | Age: 64
End: 2024-11-20

## 2024-11-20 DIAGNOSIS — Z59.819 HOUSING INSECURITY: Primary | ICD-10-CM

## 2024-11-20 LAB
HPV HR 12 DNA CVX QL NAA+PROBE: NEGATIVE
HPV16 DNA CVX QL NAA+PROBE: NEGATIVE
HPV18 DNA CVX QL NAA+PROBE: NEGATIVE

## 2024-11-20 SDOH — ECONOMIC STABILITY - HOUSING INSECURITY: HOUSING INSTABILITY UNSPECIFIED: Z59.819

## 2024-11-20 NOTE — PROGRESS NOTES
SARAH STEPHENSON did receive a new referral from provider regarding patient with at risk responses for financial resource strain, transportation needs, utilities, housing stability, and/or food insecurity (SDOH). Pt wants to apply for disability.     After chart review SARAH STEPHENSON placed a call to Pt to assist her as needed. SARAH STEPHENSON introduced herself, her role, and explained reason for call in Russian. Pt seems understanding and expressed her current feelings.    Pt stated that she has a care giver, receives SNAP benefits, and MA. She lives alone, and would like to apply for [employer] service and housing. In addition, Pt expressed that she wants to apply for disability since can not work. SARAH STEPHENSON informed Pt that she should go to the Social Security Office to start the process.     SARAH STEPHENSON informed Pt that SARAH STEPHENSON will place a referral to [facility] to assist her in applying for the services mentioned above. SARAH STEPHENSON made Pt aware that once she submit housing application she will be in a wait list, also [facility] will assist her in Russian. Pt seems understanding.    SARAH STEPHENSON inquired Pt if there is any other social needs that she needs help with. Pt denied other social need at this time. SARAH STEPHENSON informed Pt that she can reach out the SARAH STEPHENSON for further assistance Monday through Friday within office hours. Pt seems understanding and thankful for the SARAH STEPHENSON support.    SARAH STEPHENSON is remain available for further assistance as needed.  SARAH STEPHENSON will continue to follow-up.

## 2024-11-21 LAB
C TRACH DNA SPEC QL NAA+PROBE: NEGATIVE
N GONORRHOEA DNA SPEC QL NAA+PROBE: NEGATIVE

## 2024-11-22 LAB
LAB AP GYN PRIMARY INTERPRETATION: NORMAL
Lab: NORMAL

## 2024-11-25 ENCOUNTER — HOSPITAL ENCOUNTER (OUTPATIENT)
Dept: ULTRASOUND IMAGING | Facility: HOSPITAL | Age: 64
Discharge: HOME | End: 2024-11-25
Attending: NURSE PRACTITIONER
Payer: COMMERCIAL

## 2024-11-25 DIAGNOSIS — N95.0 POSTMENOPAUSAL BLEEDING: ICD-10-CM

## 2024-11-25 PROCEDURE — 76830 TRANSVAGINAL US NON-OB: CPT

## 2024-11-25 PROCEDURE — 76856 US EXAM PELVIC COMPLETE: CPT

## 2024-11-26 ENCOUNTER — OFFICE VISIT (OUTPATIENT)
Dept: OBGYN CLINIC | Facility: CLINIC | Age: 64
End: 2024-11-26

## 2024-11-26 ENCOUNTER — PATIENT OUTREACH (OUTPATIENT)
Dept: FAMILY MEDICINE CLINIC | Facility: CLINIC | Age: 64
End: 2024-11-26

## 2024-11-26 ENCOUNTER — PATIENT OUTREACH (OUTPATIENT)
Dept: OBGYN CLINIC | Facility: CLINIC | Age: 64
End: 2024-11-26

## 2024-11-26 VITALS
WEIGHT: 158 LBS | BODY MASS INDEX: 27.12 KG/M2 | SYSTOLIC BLOOD PRESSURE: 102 MMHG | HEART RATE: 86 BPM | DIASTOLIC BLOOD PRESSURE: 67 MMHG

## 2024-11-26 DIAGNOSIS — R93.89 ABNORMAL ULTRASOUND OF PELVIS: ICD-10-CM

## 2024-11-26 DIAGNOSIS — Z71.2 ENCOUNTER TO DISCUSS TEST RESULTS: Primary | ICD-10-CM

## 2024-11-26 PROCEDURE — 99212 OFFICE O/P EST SF 10 MIN: CPT | Performed by: NURSE PRACTITIONER

## 2024-11-26 NOTE — PROGRESS NOTES
Covering SW received referral for housing instability. SARAH CM noted in chart that SW CM for PCP office is addressing housing needs.    SARAH CM consulted with SW Reyes and confirmed above. This SW will close referral and remain available for psychosocial support as needed.

## 2024-11-26 NOTE — PROGRESS NOTES
Nancy Michela Raheem presents today to review results of pelvic ultrasound performed yesterday.  Explained to her that she never had a hysterectomy (which she had reported to me previously).  Also reviewed with her that the ultrasound is very suspicious for endometrial cancer but will need confirmation with biopsy.  Referral to GYN/ONC ordered for consultation, biopsy, and management.  Patient verbalizes understanding and states that she had been worried that her bleeding might be from cancer.

## 2024-11-26 NOTE — PROGRESS NOTES
Outgoing Call  11/26/2024    [facility] called Nancy Ayala on this day regarding new referral received from Rafaelina Reyes,SW to assist with Senior Housing applications.    Nancy's phone is busy at this time. [facility] was unable to contact Nancy.    [facility] will continue to follow up.    Next outreach was scheduled on 11/26/2024.

## 2024-11-29 ENCOUNTER — DOCUMENTATION (OUTPATIENT)
Dept: HEMATOLOGY ONCOLOGY | Facility: CLINIC | Age: 64
End: 2024-11-29

## 2024-11-29 NOTE — PROGRESS NOTES
Chart rvw'd on 2024      Referred by:  Francesca ROMERO    Diagnosis:  Abnormal US of the pelvis    Imagin2024 US pelvis complete w transvaginal-  1.  Markedly thickened and heterogeneous endometrium with multiple foci of arterial vascularity suspicious for malignancy. Endometrial biopsy recommended.     2.  Small subserosal fibroid.     3.  Right ovary not visualized.      Pathology:  N/A      Surgery:  N/A      Post surgical pathology:  N/A    Pt referred from obgyn to gyn onc for suspicion for endometrial cancer based on thickening of the endometrium, to discuss EMB and surgical management.

## 2024-12-02 ENCOUNTER — PATIENT OUTREACH (OUTPATIENT)
Dept: FAMILY MEDICINE CLINIC | Facility: CLINIC | Age: 64
End: 2024-12-02

## 2024-12-02 NOTE — PROGRESS NOTES
Outgoing Call  12/02/2024    [facility] called Nancy on this day regarding Senior Housing applications.    Nancy did not answer at this time. [facility] left voicemail to please call back.    [facility] will continue to follow up.

## 2024-12-05 PROBLEM — N95.0 POST-MENOPAUSAL BLEEDING: Status: ACTIVE | Noted: 2024-12-05

## 2024-12-05 NOTE — ASSESSMENT & PLAN NOTE
- We discussed differential dx of postmenopausal bleeding including benign, premalignant, and malignant conditions. We discussed that the most common sources of PMB are endometrial atrophy and endometrial polyps but her thickened EMS and duration of PMB is suspicious for an endometrial malignancy or premalignancy  - EmBx obtained, will follow-up results    We discussed that if cancer is diagnosed, we would likely obtain further imaging based on histology. We discussed that the the diagnosis and usual initial management of early stage endometrial carcinoma, including hysterectomy and bilateral salpingo-oophorectomy, and lymph node evaluation. We discussed that given her cardiac conditions, full cardiac evaluation and clearance would need to be obtained prior to surgery and may not be possible in her case. We discussed that in women who are not candidates for surgery due to medical comorbidities, hormonal treatment or radiation can be considered based on histology and suspected stage. We discussed that in patients with advanced endometrial cancer, treatment may also involve chemotherapy and/or radiation.    RTC in 2 weeks for treatment planning.    Orders:    Tissue Exam

## 2024-12-05 NOTE — PROGRESS NOTES
Name: Nancy Calderon      : 1960      MRN: [id number]  Encounter Provider: Amadna Grayson MD  Encounter Date: 2024   Encounter department: [facility]  :  Assessment & Plan  Post-menopausal bleeding  - We discussed differential dx of postmenopausal bleeding including benign, premalignant, and malignant conditions. We discussed that the most common sources of PMB are endometrial atrophy and endometrial polyps but her thickened EMS and duration of PMB is suspicious for an endometrial malignancy or premalignancy  - EmBx obtained, will follow-up results    We discussed that if cancer is diagnosed, we would likely obtain further imaging based on histology. We discussed that the the diagnosis and usual initial management of early stage endometrial carcinoma, including hysterectomy and bilateral salpingo-oophorectomy, and lymph node evaluation. We discussed that given her cardiac conditions, full cardiac evaluation and clearance would need to be obtained prior to surgery and may not be possible in her case. We discussed that in women who are not candidates for surgery due to medical comorbidities, hormonal treatment or radiation can be considered based on histology and suspected stage. We discussed that in patients with advanced endometrial cancer, treatment may also involve chemotherapy and/or radiation.    RTC in 2 weeks for treatment planning.    Orders:    Tissue Exam    Abnormal ultrasound of pelvis    Orders:    Ambulatory Referral to Gynecologic Oncology    Vaginal discharge  Thin yellow discharge noted on exam. Not sexually active for many years. Will empirically treat for BV with flagyl x7 days.    Orders:    metroNIDAZOLE (FLAGYL) 500 mg tablet; Take 1 tablet (500 mg total) by mouth every 8 (eight) hours for 7 days        History of Present Illness      Jesus #[id number] used during encounter for Greek  "translation.    Reason for Visit / CC:   Post-menopausal bleeding  Nancy Calderon is a 64 y.o. female  with PMH chronic stage C HFrEF (24 LVEF 15%, on metoprolol, lisinopril, lasix, LifeVest, ?prior cardiac stents), HTN, non-ischemic cardiomyopathy, DM (metformin) presenting with post-menopausal bleeding and thickened EMS on pelvic US.     She originally presented with post-menopausal bleeding starting 2 years ago. She was seen by Francesca Ramirez and had a pelvic US on  demonstrating a 2.5cm EMS with multiple foci of arterial vascularity.    She reports bleeding nearly for 2 years, sometimes daily, sometimes stops for a week or so; on heaviest days she changes a pad on average 3x per day. She stopped bleeding yesterday but currently reports a yellow discharge. She also reports pelvic cramping and vaginal pain x 2 years. She reports some urinary discomfort. She denies nausea/emesis, unanticipated weight loss. No changes in bowel habits.    Menopause age 44-45. Not sexually active for many years    PSH: bladder surgery to \"lift\" bladder due to prolapse  BMI: 27    Screening:  - Cervix: 2024: NILM/HRHPV neg  - Breast: no prior mammogram, ordered  - CRC: no prior colonoscopy          Review of Systems   Constitutional:  Negative for chills and fever.   HENT:  Negative for ear pain and sore throat.    Eyes:  Negative for pain and visual disturbance.   Respiratory:  Negative for cough and shortness of breath.    Cardiovascular:  Negative for chest pain and palpitations.   Gastrointestinal:  Positive for abdominal pain, constipation and diarrhea. Negative for vomiting.   Genitourinary:  Positive for pelvic pain, vaginal bleeding and vaginal discharge. Negative for dysuria and hematuria.   Musculoskeletal:  Negative for arthralgias and back pain.   Skin:  Negative for color change and rash.   Neurological:  Negative for seizures and syncope.   All other systems reviewed and are negative.   A " complete review of systems is negative other than that noted above in the HPI.  Past Medical History   Past Medical History:   Diagnosis Date    Diabetes mellitus (HCC)     Fibroid     Heart failure (HCC)     Hyperlipidemia     Hypertension     Nonischemic cardiomyopathy (HCC)      Past Surgical History:   Procedure Laterality Date    BLADDER NECK SUSPENSION      CARDIAC CATHETERIZATION Left 07/10/2024    Procedure: Cardiac Left Heart Cath;  Surgeon: Justino Patel MD;  Location: [facility];  Service: Cardiology    SPINAL FIXATION SURGERY      L3-L5 fusion     Family History   Problem Relation Age of Onset    Cancer Paternal Aunt         vaginal    Vaginal cancer Paternal Aunt     Cancer Paternal Grandmother         vaginal    Vaginal cancer Paternal Grandmother     Breast cancer Neg Hx     Colon cancer Neg Hx     Ovarian cancer Neg Hx     Endometrial cancer Neg Hx       reports that she has never smoked. She has never used smokeless tobacco. She reports that she does not drink alcohol and does not use drugs.  Current Outpatient Medications on File Prior to Visit   Medication Sig Dispense Refill    acetaminophen (TYLENOL) 650 mg CR tablet Take 1 tablet (650 mg total) by mouth every 8 (eight) hours as needed for mild pain 90 tablet 0    aspirin 81 mg chewable tablet Chew 1 tablet (81 mg total) daily 90 tablet 1    atorvastatin (LIPITOR) 40 mg tablet Take 1 tablet (40 mg total) by mouth daily 90 tablet 2    Blood Glucose Monitoring Suppl (OneTouch Verio Reflect) w/Device KIT Check blood sugars three times daily. Please substitute with appropriate alternative as covered by patient's insurance. Dx: E11.65 1 kit 0    Blood Pressure Monitoring (B-D ASSURE BPM/DELUXE ARM CUFF) MISC Use in the morning 1 each 0    colchicine (COLCRYS) 0.6 mg tablet Take 1 tablet (0.6 mg total) by mouth daily 30 tablet 5    furosemide (LASIX) 40 mg tablet Take 1 tablet (40 mg total) by mouth daily 90 tablet 2    glucose blood (OneTouch  Verio) test strip Check blood sugars three times daily. Please substitute with appropriate alternative as covered by patient's insurance. Dx: E11.65 300 each 3    lidocaine (LMX) 4 % cream Apply topically as needed for mild pain 28 g 1    lisinopril (ZESTRIL) 5 mg tablet Take 1 tablet (5 mg total) by mouth daily 90 tablet 2    metFORMIN (GLUCOPHAGE) 500 mg tablet Take 1 tablet (500 mg total) by mouth daily with breakfast 90 tablet 1    metoprolol succinate (TOPROL-XL) 25 mg 24 hr tablet Take 1 tablet (25 mg total) by mouth 2 (two) times a day 180 tablet 2    OneTouch Delica Lancets 33G MISC Check blood sugars three times daily. Please substitute with appropriate alternative as covered by patient's insurance. Dx: E11.65 300 each 3     No current facility-administered medications on file prior to visit.   No Known Allergies      Objective   /82 (BP Location: Left arm, Patient Position: Sitting, Cuff Size: Standard)   Pulse 77   Temp 98.2 °F (36.8 °C) (Temporal)   SpO2 95%     There is no height or weight on file to calculate BMI.  ECOG   2  Physical Exam  Vitals reviewed. Exam conducted with a chaperone present.   Constitutional:       General: She is not in acute distress.     Appearance: Normal appearance. She is not ill-appearing.      Comments: Life vest in place   HENT:      Head: Normocephalic and atraumatic.      Mouth/Throat:      Mouth: Mucous membranes are moist.   Eyes:      General:         Right eye: No discharge.         Left eye: No discharge.      Conjunctiva/sclera: Conjunctivae normal.   Cardiovascular:      Rate and Rhythm: Normal rate.   Pulmonary:      Effort: Pulmonary effort is normal.      Breath sounds: No stridor. No wheezing or rales.   Abdominal:      Palpations: Abdomen is soft. There is no mass.      Tenderness: There is no abdominal tenderness.      Hernia: No hernia is present.   Genitourinary:     Comments: The external female genitalia is normal. The bartholin's, uretheral  "and skenes glands are normal. The urethral meatus is normal (midline with no lesions). Anus without fissure or lesion. Speculum exam reveals a grossly normal vagina with thin yellow discharge. Cervix normal appearing with no masses, lesions,discharge or active bleeding. No significant cystocele or rectocele noted. Bimanual exam notes a 10-12wk sized uterus, no adnexal masses masses or fullness. Bladder is without fullness, mass or tenderness.    Endometrial biopsy performed, see separate procedure note for details.  Musculoskeletal:      Right lower leg: No edema.      Left lower leg: No edema.   Skin:     General: Skin is warm and dry.      Coloration: Skin is not jaundiced.      Findings: No rash.   Neurological:      General: No focal deficit present.      Mental Status: She is alert and oriented to person, place, and time.      Cranial Nerves: No cranial nerve deficit.      Sensory: No sensory deficit.      Motor: No weakness.      Gait: Gait normal.   Psychiatric:         Mood and Affect: Mood normal.         Behavior: Behavior normal.         Thought Content: Thought content normal.         Judgment: Judgment normal.          Labs: I have reviewed pertinent labs.   No results found for: \"\"  Lab Results   Component Value Date    K 4.0 07/14/2024     07/14/2024    CO2 28 07/14/2024    BUN 18 07/14/2024    CREATININE 0.69 07/14/2024    CALCIUM 9.6 07/14/2024    AST 30 07/05/2024    ALT 40 07/05/2024    ALKPHOS 94 07/05/2024    EGFR 92 07/14/2024     Lab Results   Component Value Date    WBC 8.36 07/14/2024    HGB 15.2 07/14/2024    HCT 47.3 (H) 07/14/2024    MCV 94 07/14/2024     07/14/2024     Lab Results   Component Value Date    NEUTROABS 5.00 07/14/2024        Trend:  No results found for: \"\"    Radiology Results Review: I personally reviewed the following image studies in PACS and associated radiology reports: Ultrasound(s). My interpretation of the radiology images/reports is: " diffusely thickened and heterogenous EMS, agree with radiology report.      Endometrial biopsy    Date/Time: 12/6/2024 11:15 AM    Performed by: Amanda Grayson MD  Authorized by: Amanda Grayson MD  [facility] Protocol:  Procedure performed by: (Amanda Mckeon)  Consent: Verbal consent obtained.  Consent given by: patient    Indication:     Indications: Post-menopausal bleeding      Progression of post-menopausal bleeding:  Waxing and waning  Procedure:     Procedure: endometrial biopsy with Pipelle      A bivalve speculum was placed in the vagina: yes      A paracervical block was performed: no      An intracervical block was performed: no      Specimen collected: specimen collected and sent to pathology    Findings:     Uterus size:  9-10 weeks  Comments:     Procedure comments:  Speculum placed. Endometrial biopsy pipelle attempted to be placed without success. Single-tooth tenaculum placed on anterior lip of cervix and endometrial biopsy pipelle able to be placed into endometrial cavity. 2 passes performed with good blood and tissue specimen obtained. Tenaculum removed, site hemostatic. Speculum removed.    Amanda Grayson MD, MPH  Division of Gynecologic Oncology  12/06/24 3:27 PM

## 2024-12-06 ENCOUNTER — OFFICE VISIT (OUTPATIENT)
Dept: GYNECOLOGIC ONCOLOGY | Facility: CLINIC | Age: 64
End: 2024-12-06
Payer: COMMERCIAL

## 2024-12-06 VITALS
HEART RATE: 77 BPM | OXYGEN SATURATION: 95 % | SYSTOLIC BLOOD PRESSURE: 120 MMHG | DIASTOLIC BLOOD PRESSURE: 82 MMHG | TEMPERATURE: 98.2 F

## 2024-12-06 DIAGNOSIS — R93.89 ABNORMAL ULTRASOUND OF PELVIS: ICD-10-CM

## 2024-12-06 DIAGNOSIS — N89.8 VAGINAL DISCHARGE: ICD-10-CM

## 2024-12-06 DIAGNOSIS — N95.0 POST-MENOPAUSAL BLEEDING: Primary | ICD-10-CM

## 2024-12-06 PROCEDURE — 88305 TISSUE EXAM BY PATHOLOGIST: CPT | Performed by: PATHOLOGY

## 2024-12-06 PROCEDURE — 58100 BIOPSY OF UTERUS LINING: CPT | Performed by: OBSTETRICS & GYNECOLOGY

## 2024-12-06 PROCEDURE — 88342 IMHCHEM/IMCYTCHM 1ST ANTB: CPT | Performed by: PATHOLOGY

## 2024-12-06 PROCEDURE — 99244 OFF/OP CNSLTJ NEW/EST MOD 40: CPT | Performed by: OBSTETRICS & GYNECOLOGY

## 2024-12-06 PROCEDURE — 88361 TUMOR IMMUNOHISTOCHEM/COMPUT: CPT | Performed by: PATHOLOGY

## 2024-12-06 PROCEDURE — 88341 IMHCHEM/IMCYTCHM EA ADD ANTB: CPT | Performed by: PATHOLOGY

## 2024-12-06 RX ORDER — METRONIDAZOLE 500 MG/1
500 TABLET ORAL EVERY 8 HOURS SCHEDULED
Qty: 21 TABLET | Refills: 0 | Status: SHIPPED | OUTPATIENT
Start: 2024-12-06 | End: 2024-12-13

## 2024-12-10 ENCOUNTER — PATIENT OUTREACH (OUTPATIENT)
Dept: FAMILY MEDICINE CLINIC | Facility: CLINIC | Age: 64
End: 2024-12-10

## 2024-12-10 ENCOUNTER — RESULTS FOLLOW-UP (OUTPATIENT)
Dept: GYNECOLOGIC ONCOLOGY | Facility: CLINIC | Age: 64
End: 2024-12-10

## 2024-12-10 DIAGNOSIS — N95.0 POST-MENOPAUSAL BLEEDING: Primary | ICD-10-CM

## 2024-12-10 DIAGNOSIS — C54.1 ENDOMETRIAL CANCER (HCC): ICD-10-CM

## 2024-12-10 NOTE — TELEPHONE ENCOUNTER
Called patient with  to review endometrial biopsy results.    Reviewed biopsy demonstrating adenocarcinoma. Recommend CT CAP to see whether disease is advanced or localized. We reviewed that recommended treatment will depend on imaging results and input from her cardiologist given her HFrEF.    Next appt scheduled 12/17.    Amanda Grayson MD, MPH  Division of Gynecologic Oncology  12/10/24 1:11 PM

## 2024-12-10 NOTE — PROGRESS NOTES
Outgoing Call  12/10/2024    [facility] called Nancy Ayala on this day regarding Senior Housing.    Nancy stated that she does not have any income at this time. Nancy does not qualifies for SSI Benefits due she is not an [place] citizen.    [facility] explained Nancy that Senior Housing applications can not be completed at this time due no income. Nancy expressed understanding.    Nancy explained that she needs to pay her rent. [facility] explained Nancy that is no resources available at this time to assist with rent payments.    Nancy stated that she is getting food stamps and home care services. Also friends and family in the past help her to pay rent.    [facility] explained that this referral will be closed on my behalf today 12/10/2024. Nancy expressed understanding.    This referral will be closed on my behalf today.    No follow up was scheduled at this time.

## 2024-12-11 ENCOUNTER — TELEPHONE (OUTPATIENT)
Dept: GYNECOLOGIC ONCOLOGY | Facility: CLINIC | Age: 64
End: 2024-12-11

## 2024-12-11 NOTE — TELEPHONE ENCOUNTER
Called with the use of  # [id number] to inform the patient of the CT SCAN Appointment. Patient was told of the appointment date, time and location. Patient was instructed to go this week to get bloodwork drawn for her ct scan. Patient stated she understood.

## 2024-12-12 ENCOUNTER — APPOINTMENT (OUTPATIENT)
Dept: LAB | Facility: HOSPITAL | Age: 64
End: 2024-12-12
Payer: COMMERCIAL

## 2024-12-12 DIAGNOSIS — E11.9 TYPE 2 DIABETES MELLITUS WITHOUT COMPLICATION, WITHOUT LONG-TERM CURRENT USE OF INSULIN (HCC): ICD-10-CM

## 2024-12-12 DIAGNOSIS — C54.1 ENDOMETRIAL CANCER (HCC): ICD-10-CM

## 2024-12-12 LAB
ALBUMIN SERPL BCG-MCNC: 4.5 G/DL (ref 3.5–5)
ALP SERPL-CCNC: 63 U/L (ref 34–104)
ALT SERPL W P-5'-P-CCNC: 53 U/L (ref 7–52)
ANION GAP SERPL CALCULATED.3IONS-SCNC: 8 MMOL/L (ref 4–13)
AST SERPL W P-5'-P-CCNC: 31 U/L (ref 13–39)
BILIRUB SERPL-MCNC: 1.02 MG/DL (ref 0.2–1)
BUN SERPL-MCNC: 23 MG/DL (ref 5–25)
CALCIUM SERPL-MCNC: 9.6 MG/DL (ref 8.4–10.2)
CHLORIDE SERPL-SCNC: 101 MMOL/L (ref 96–108)
CO2 SERPL-SCNC: 28 MMOL/L (ref 21–32)
CREAT SERPL-MCNC: 0.76 MG/DL (ref 0.6–1.3)
ERYTHROCYTE [DISTWIDTH] IN BLOOD BY AUTOMATED COUNT: 12.3 % (ref 11.6–15.1)
EST. AVERAGE GLUCOSE BLD GHB EST-MCNC: 128 MG/DL
GFR SERPL CREATININE-BSD FRML MDRD: 83 ML/MIN/1.73SQ M
GLUCOSE P FAST SERPL-MCNC: 116 MG/DL (ref 65–99)
HBA1C MFR BLD: 6.1 %
HCT VFR BLD AUTO: 39.1 % (ref 34.8–46.1)
HGB BLD-MCNC: 13 G/DL (ref 11.5–15.4)
MCH RBC QN AUTO: 32.2 PG (ref 26.8–34.3)
MCHC RBC AUTO-ENTMCNC: 33.2 G/DL (ref 31.4–37.4)
MCV RBC AUTO: 97 FL (ref 82–98)
PLATELET # BLD AUTO: 210 THOUSANDS/UL (ref 149–390)
PMV BLD AUTO: 11.5 FL (ref 8.9–12.7)
POTASSIUM SERPL-SCNC: 3.9 MMOL/L (ref 3.5–5.3)
PROT SERPL-MCNC: 8 G/DL (ref 6.4–8.4)
RBC # BLD AUTO: 4.04 MILLION/UL (ref 3.81–5.12)
SODIUM SERPL-SCNC: 137 MMOL/L (ref 135–147)
WBC # BLD AUTO: 8.32 THOUSAND/UL (ref 4.31–10.16)

## 2024-12-12 PROCEDURE — 36415 COLL VENOUS BLD VENIPUNCTURE: CPT

## 2024-12-12 PROCEDURE — 88342 IMHCHEM/IMCYTCHM 1ST ANTB: CPT | Performed by: PATHOLOGY

## 2024-12-12 PROCEDURE — 82570 ASSAY OF URINE CREATININE: CPT

## 2024-12-12 PROCEDURE — 83036 HEMOGLOBIN GLYCOSYLATED A1C: CPT

## 2024-12-12 PROCEDURE — 88305 TISSUE EXAM BY PATHOLOGIST: CPT | Performed by: PATHOLOGY

## 2024-12-12 PROCEDURE — 82043 UR ALBUMIN QUANTITATIVE: CPT

## 2024-12-12 PROCEDURE — 80053 COMPREHEN METABOLIC PANEL: CPT

## 2024-12-12 PROCEDURE — 88341 IMHCHEM/IMCYTCHM EA ADD ANTB: CPT | Performed by: PATHOLOGY

## 2024-12-12 PROCEDURE — 85027 COMPLETE CBC AUTOMATED: CPT

## 2024-12-13 LAB
CREAT UR-MCNC: 73.5 MG/DL
MICROALBUMIN UR-MCNC: 91.8 MG/L
MICROALBUMIN/CREAT 24H UR: 125 MG/G CREATININE (ref 0–30)

## 2024-12-16 ENCOUNTER — HOSPITAL ENCOUNTER (OUTPATIENT)
Dept: CT IMAGING | Facility: HOSPITAL | Age: 64
Discharge: HOME | End: 2024-12-16
Attending: OBSTETRICS & GYNECOLOGY
Payer: COMMERCIAL

## 2024-12-16 DIAGNOSIS — C54.1 ENDOMETRIAL CANCER (HCC): ICD-10-CM

## 2024-12-16 PROCEDURE — 74177 CT ABD & PELVIS W/CONTRAST: CPT

## 2024-12-16 PROCEDURE — 71260 CT THORAX DX C+: CPT

## 2024-12-16 RX ADMIN — IOHEXOL 90 ML: 350 INJECTION, SOLUTION INTRAVENOUS at 12:56

## 2024-12-17 ENCOUNTER — TELEPHONE (OUTPATIENT)
Age: 64
End: 2024-12-17

## 2024-12-17 ENCOUNTER — OFFICE VISIT (OUTPATIENT)
Dept: GYNECOLOGIC ONCOLOGY | Facility: CLINIC | Age: 64
End: 2024-12-17
Payer: COMMERCIAL

## 2024-12-17 VITALS
BODY MASS INDEX: 26.61 KG/M2 | DIASTOLIC BLOOD PRESSURE: 84 MMHG | RESPIRATION RATE: 16 BRPM | WEIGHT: 155 LBS | SYSTOLIC BLOOD PRESSURE: 126 MMHG | OXYGEN SATURATION: 98 % | HEART RATE: 78 BPM

## 2024-12-17 DIAGNOSIS — C54.1 ENDOMETRIAL CANCER (HCC): Primary | ICD-10-CM

## 2024-12-17 DIAGNOSIS — I50.22 CHRONIC HFREF (HEART FAILURE WITH REDUCED EJECTION FRACTION) (HCC): Chronic | ICD-10-CM

## 2024-12-17 PROCEDURE — 99214 OFFICE O/P EST MOD 30 MIN: CPT | Performed by: OBSTETRICS & GYNECOLOGY

## 2024-12-17 RX ORDER — TRAMADOL HYDROCHLORIDE 50 MG/1
50 TABLET ORAL EVERY 6 HOURS PRN
Qty: 20 TABLET | Refills: 0 | Status: SHIPPED | OUTPATIENT
Start: 2024-12-17

## 2024-12-17 RX ORDER — CHLORHEXIDINE GLUCONATE ORAL RINSE 1.2 MG/ML
15 SOLUTION DENTAL ONCE
OUTPATIENT
Start: 2024-12-17 | End: 2024-12-17

## 2024-12-17 RX ORDER — GABAPENTIN 100 MG/1
100 CAPSULE ORAL ONCE
OUTPATIENT
Start: 2024-12-17 | End: 2024-12-17

## 2024-12-17 RX ORDER — LIDOCAINE HYDROCHLORIDE 10 MG/ML
0.5 INJECTION, SOLUTION EPIDURAL; INFILTRATION; INTRACAUDAL; PERINEURAL ONCE AS NEEDED
OUTPATIENT
Start: 2024-12-17

## 2024-12-17 RX ORDER — ACETAMINOPHEN 325 MG/1
975 TABLET ORAL ONCE
OUTPATIENT
Start: 2024-12-17 | End: 2024-12-17

## 2024-12-17 NOTE — ASSESSMENT & PLAN NOTE
We discussed the diagnosis and usual initial management of endometrial carcinoma, including hysterectomy and bilateral salpingo-oophorectomy, and lymph node evaluation. We discussed that on her CT scan, she has retroperitoneal and pelvic lymphadenopathy suspicious for tyler disease involvement and I would recommend full pelvic and para-aortic lymphadenectomy.    I discussed the differences between laparotomy and laparoscopic surgery. We discussed the advantage of a laparoscopic method in terms of reduced postoperative pain, hospital length of stay, and functional recovery. She understands that the risks of major complications are approximately 5% and include but are not limited to hemorrhage requiring transfusion, intestinal/urinary tract injury, wound healing impairment, infection, thrombo-embolic complications, cardio-pulmonary and renal complications.     She understands that she is at increased risk of surgical and anesthetic complications based on her history of HRrEF and will require cardiac clearance prior to surgery.    She also understands the possibility of conversion to laparotomy for issues related to safety or findings suggesting more advanced disease than expected where the surgery could not be technically completed laparoscopically and that from a technical standpoint the risk of conversion would be somewhat greater than average given possible lymph node involvement.    The patient desires to proceed with EUA, total laparoscopic hysterectomy, bilateral salpingo-oophorectomy, pelvic and para-aortic lymphadenectomy, and omental biopsy.    We discussed that with high grade carcinoma and likely tyler involvement, she will likely require adjuvant treatment with radiation or chemotherapy following surgery.     Plan:  - Signed surgical consents for RATLH, BSO, pelvic and para-aortic lymphadenectomy, omental biopsy  - Signed hysterectomy consents  - I have discussed her case with her cardiologist Dr. Godinez  who believes she is a candidate for surgery given her improved LVEF on TTE from 11/15. She will see him for formal cardiology clearance  - Obtain preop Ca-125, coagulation studies, T&S, EKG, urine culture    Orders:    ; Future    Urine culture; Future    Type and screen; Future    APTT; Future    Protime-INR; Future    EKG 12 lead; Future    Case request operating room: HYSTERECTOMY LAPAROSCOPIC TOTAL (LTH) W/ ROBOTICS, OMENTECTOMY LAPAROSCOPIC W ROBOT, ROBOTIC DISSECTION/STAGING LYMPH NODE LAPAROSCOPIC PELVIS/ABDOMEN, SALPINGO-OOPHORECTOMY,LAPAROSCOPIC W ROBOT; Standing    traMADol (Ultram) 50 mg tablet; Take 1 tablet (50 mg total) by mouth every 6 (six) hours as needed for moderate pain

## 2024-12-17 NOTE — PROGRESS NOTES
Name: Nancy Calderon      : 1960      MRN: [id number]  Encounter Provider: Amanda Grayson MD  Encounter Date: 2024   Encounter department: [facility]  :  Assessment & Plan  Endometrial cancer (HCC)  We discussed the diagnosis and usual initial management of endometrial carcinoma, including hysterectomy and bilateral salpingo-oophorectomy, and lymph node evaluation. We discussed that on her CT scan, she has retroperitoneal and pelvic lymphadenopathy suspicious for tyler disease involvement and I would recommend full pelvic and para-aortic lymphadenectomy.    I discussed the differences between laparotomy and laparoscopic surgery. We discussed the advantage of a laparoscopic method in terms of reduced postoperative pain, hospital length of stay, and functional recovery. She understands that the risks of major complications are approximately 5% and include but are not limited to hemorrhage requiring transfusion, intestinal/urinary tract injury, wound healing impairment, infection, thrombo-embolic complications, cardio-pulmonary and renal complications.     She understands that she is at increased risk of surgical and anesthetic complications based on her history of HRrEF and will require cardiac clearance prior to surgery.    She also understands the possibility of conversion to laparotomy for issues related to safety or findings suggesting more advanced disease than expected where the surgery could not be technically completed laparoscopically and that from a technical standpoint the risk of conversion would be somewhat greater than average given possible lymph node involvement.    The patient desires to proceed with EUA, total laparoscopic hysterectomy, bilateral salpingo-oophorectomy, pelvic and para-aortic lymphadenectomy, and omental biopsy.    We discussed that with high grade carcinoma and likely tyler involvement, she will likely require  adjuvant treatment with radiation or chemotherapy following surgery.     Plan:  - Signed surgical consents for RATLH, BSO, pelvic and para-aortic lymphadenectomy, omental biopsy  - Signed hysterectomy consents  - I have discussed her case with her cardiologist Dr. Godinez who believes she is a candidate for surgery given her improved LVEF on TTE from 11/15. She will see him for formal cardiology clearance  - Obtain preop Ca-125, coagulation studies, T&S, EKG, urine culture    Orders:    ; Future    Urine culture; Future    Type and screen; Future    APTT; Future    Protime-INR; Future    EKG 12 lead; Future    Case request operating room: HYSTERECTOMY LAPAROSCOPIC TOTAL (LTH) W/ ROBOTICS, OMENTECTOMY LAPAROSCOPIC W ROBOT, ROBOTIC DISSECTION/STAGING LYMPH NODE LAPAROSCOPIC PELVIS/ABDOMEN, SALPINGO-OOPHORECTOMY,LAPAROSCOPIC W ROBOT; Standing    traMADol (Ultram) 50 mg tablet; Take 1 tablet (50 mg total) by mouth every 6 (six) hours as needed for moderate pain    Chronic HFrEF (heart failure with reduced ejection fraction) (HCC)  Wt Readings from Last 3 Encounters:   24 70.3 kg (155 lb)   24 71.7 kg (158 lb)   24 69.4 kg (153 lb)       - 11/15 TTE with LVEF 55% (from 15% in 2024), grade 1 diastolic dysfunction, normal RV size/function  - I have discussed her case with her cardiologist Dr. Godinez who believes she is a candidate for surgery given her improved LVEF on TTE from 11/15. She will see him for formal cardiology clearance prior to surgery             History of Present Illness   Reason for Visit / CC:   Treatment planning    Nancy Calderon is a 64 y.o. 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    She was seen in clinic on  for  PMB and had an endometrial biopsy demonstrating high-grade carcinoma. She underwent CT CAP on 12/16 which demonstrated a thickened, heterogeneously endometrium and enlarged bilateral iliac and retroperitoneal lymphadenopathy.    She continues to report vaginal bleeding, vaginal discharge, and urinary symptoms. Some pelvic pain. No new chest pain, shortness of breath, dyspnea on exertion.          Review of Systems   Constitutional:  Negative for chills and fever.   HENT:  Negative for ear pain and sore throat.    Eyes:  Negative for pain and visual disturbance.   Respiratory:  Negative for cough and shortness of breath.    Cardiovascular:  Negative for chest pain and palpitations.   Gastrointestinal:  Positive for abdominal pain, constipation and diarrhea. Negative for vomiting.   Genitourinary:  Positive for pelvic pain, vaginal bleeding and vaginal discharge. Negative for dysuria and hematuria.   Musculoskeletal:  Negative for arthralgias and back pain.   Skin:  Negative for color change and rash.   Neurological:  Negative for seizures and syncope.   All other systems reviewed and are negative.   A complete review of systems is negative other than that noted above in the HPI.  Past Medical History   Past Medical History:   Diagnosis Date    Diabetes mellitus (HCC)     Fibroid     Heart failure (HCC)     Hyperlipidemia     Hypertension     Nonischemic cardiomyopathy (HCC)      Past Surgical History:   Procedure Laterality Date    BLADDER NECK SUSPENSION      CARDIAC CATHETERIZATION Left 07/10/2024    Procedure: Cardiac Left Heart Cath;  Surgeon: Justino Patel MD;  Location: [facility];  Service: Cardiology    SPINAL FIXATION SURGERY      L3-L5 fusion     Family History   Problem Relation Age of Onset    Cancer Paternal Aunt         vaginal    Vaginal cancer Paternal Aunt     Cancer Paternal Grandmother         vaginal    Vaginal cancer Paternal Grandmother     Breast cancer Neg Hx     Colon cancer Neg Hx      Ovarian cancer Neg Hx     Endometrial cancer Neg Hx       reports that she has never smoked. She has never used smokeless tobacco. She reports that she does not drink alcohol and does not use drugs.  Current Outpatient Medications on File Prior to Visit   Medication Sig Dispense Refill    acetaminophen (TYLENOL) 650 mg CR tablet Take 1 tablet (650 mg total) by mouth every 8 (eight) hours as needed for mild pain 90 tablet 0    aspirin 81 mg chewable tablet Chew 1 tablet (81 mg total) daily 90 tablet 1    atorvastatin (LIPITOR) 40 mg tablet Take 1 tablet (40 mg total) by mouth daily 90 tablet 2    Blood Glucose Monitoring Suppl (OneTouch Verio Reflect) w/Device KIT Check blood sugars three times daily. Please substitute with appropriate alternative as covered by patient's insurance. Dx: E11.65 1 kit 0    Blood Pressure Monitoring (B-D ASSURE BPM/DELUXE ARM CUFF) MISC Use in the morning 1 each 0    colchicine (COLCRYS) 0.6 mg tablet Take 1 tablet (0.6 mg total) by mouth daily 30 tablet 5    furosemide (LASIX) 40 mg tablet Take 1 tablet (40 mg total) by mouth daily 90 tablet 2    glucose blood (OneTouch Verio) test strip Check blood sugars three times daily. Please substitute with appropriate alternative as covered by patient's insurance. Dx: E11.65 300 each 3    lidocaine (LMX) 4 % cream Apply topically as needed for mild pain 28 g 1    lisinopril (ZESTRIL) 5 mg tablet Take 1 tablet (5 mg total) by mouth daily 90 tablet 2    metFORMIN (GLUCOPHAGE) 500 mg tablet Take 1 tablet (500 mg total) by mouth daily with breakfast 90 tablet 1    metoprolol succinate (TOPROL-XL) 25 mg 24 hr tablet Take 1 tablet (25 mg total) by mouth 2 (two) times a day 180 tablet 2    OneTouch Delica Lancets 33G MISC Check blood sugars three times daily. Please substitute with appropriate alternative as covered by patient's insurance. Dx: E11.65 300 each 3     No current facility-administered medications on file prior to visit.   No Known  "Allergies      Objective   /84 (Patient Position: Sitting, Cuff Size: Standard)   Pulse 78   Resp 16   Wt 70.3 kg (155 lb)   SpO2 98%   BMI 26.61 kg/m²     Body mass index is 26.61 kg/m².  ECOG ECOG Performance Status: 1 - Restricted in physically strenuous activity but ambulatory and able to carry out work of a light or sedentary nature, e.g., light house work, office work   Physical Exam  Vitals reviewed. Exam conducted with a chaperone present.   Constitutional:       General: She is not in acute distress.     Appearance: Normal appearance. She is not ill-appearing.      Comments: Life vest in place   HENT:      Head: Normocephalic and atraumatic.      Mouth/Throat:      Mouth: Mucous membranes are moist.   Eyes:      General:         Right eye: No discharge.         Left eye: No discharge.      Conjunctiva/sclera: Conjunctivae normal.   Cardiovascular:      Rate and Rhythm: Normal rate.   Pulmonary:      Effort: Pulmonary effort is normal.      Breath sounds: No stridor. No wheezing or rales.   Abdominal:      Palpations: Abdomen is soft. There is no mass.      Tenderness: There is no abdominal tenderness.      Hernia: No hernia is present.   Genitourinary:     Comments: See prior note from 12/6  Musculoskeletal:      Right lower leg: No edema.      Left lower leg: No edema.   Skin:     General: Skin is warm and dry.      Coloration: Skin is not jaundiced.      Findings: No rash.   Neurological:      General: No focal deficit present.      Mental Status: She is alert and oriented to person, place, and time.      Cranial Nerves: No cranial nerve deficit.      Sensory: No sensory deficit.      Motor: No weakness.      Gait: Gait normal.   Psychiatric:         Mood and Affect: Mood normal.         Behavior: Behavior normal.         Thought Content: Thought content normal.         Judgment: Judgment normal.          Labs: I have reviewed pertinent labs.   No results found for: \"\"  Lab Results " "  Component Value Date/Time    Potassium 3.9 12/12/2024 10:20 AM    Chloride 101 12/12/2024 10:20 AM    CO2 28 12/12/2024 10:20 AM    BUN 23 12/12/2024 10:20 AM    Creatinine 0.76 12/12/2024 10:20 AM    Glucose, Fasting 116 (H) 12/12/2024 10:20 AM    Calcium 9.6 12/12/2024 10:20 AM    AST 31 12/12/2024 10:20 AM    ALT 53 (H) 12/12/2024 10:20 AM    Alkaline Phosphatase 63 12/12/2024 10:20 AM    eGFR 83 12/12/2024 10:20 AM     Lab Results   Component Value Date/Time    WBC 8.32 12/12/2024 10:20 AM    Hemoglobin 13.0 12/12/2024 10:20 AM    Hematocrit 39.1 12/12/2024 10:20 AM    MCV 97 12/12/2024 10:20 AM    Platelets 210 12/12/2024 10:20 AM     Lab Results   Component Value Date/Time    Absolute Neutrophils 5.00 07/14/2024 04:57 AM        Trend:  No results found for: \"\"    Radiology Results Review: I personally reviewed the following image studies in PACS and associated radiology reports: CT chest and CT abdomen/pelvis. My interpretation of the radiology images/reports is: Suspicious retroperitoneal and pelvic lymphadenopathy. ~9cm uterus with diffuse endometrial thickening.  Other Study Results Review : Pathology reports reviewed.  A.  Endometrium (biopsy):     - High-grade endometrial adenocarcinoma with necrosis, most compatible with serous carcinoma.          Administrative Statements   I have spent a total time of 35 minutes in caring for this patient on the day of the visit/encounter including Diagnostic results, Prognosis, Risks and benefits of tx options, Instructions for management, Reviewing / ordering tests, medicine, procedures  , and Communicating with other healthcare professionals .     Amanda Grayson MD, MPH  Division of Gynecologic Oncology  12/17/24 4:19 PM    "

## 2024-12-17 NOTE — TELEPHONE ENCOUNTER
Received a phone call from Naty from the reading room with significant findings on CT scan of C/A/P from 12/16...    IMPRESSION:     1.  Markedly thickened, heterogeneously enhancing endometrium compatible with known malignancy.  2.  Mild bilateral iliac chain and retroperitoneal lymphadenopathy, suspicious for metastases.  3.  No findings of metastatic disease in the chest.

## 2024-12-17 NOTE — ASSESSMENT & PLAN NOTE
Wt Readings from Last 3 Encounters:   12/17/24 70.3 kg (155 lb)   11/26/24 71.7 kg (158 lb)   11/19/24 69.4 kg (153 lb)       - 11/15 TTE with LVEF 55% (from 15% in 7/2024), grade 1 diastolic dysfunction, normal RV size/function  - I have discussed her case with her cardiologist Dr. Godinez who believes she is a candidate for surgery given her improved LVEF on TTE from 11/15. She will see him for formal cardiology clearance prior to surgery

## 2024-12-18 ENCOUNTER — RESULTS FOLLOW-UP (OUTPATIENT)
Dept: FAMILY MEDICINE CLINIC | Facility: CLINIC | Age: 64
End: 2024-12-18

## 2024-12-18 ENCOUNTER — APPOINTMENT (OUTPATIENT)
Dept: LAB | Facility: HOSPITAL | Age: 64
End: 2024-12-18
Payer: COMMERCIAL

## 2024-12-18 ENCOUNTER — LAB REQUISITION (OUTPATIENT)
Dept: LAB | Facility: HOSPITAL | Age: 64
End: 2024-12-18
Payer: COMMERCIAL

## 2024-12-18 ENCOUNTER — OFFICE VISIT (OUTPATIENT)
Dept: CARDIOLOGY CLINIC | Facility: CLINIC | Age: 64
End: 2024-12-18
Payer: COMMERCIAL

## 2024-12-18 ENCOUNTER — PATIENT OUTREACH (OUTPATIENT)
Dept: HEMATOLOGY ONCOLOGY | Facility: CLINIC | Age: 64
End: 2024-12-18

## 2024-12-18 VITALS
HEART RATE: 80 BPM | WEIGHT: 155 LBS | DIASTOLIC BLOOD PRESSURE: 62 MMHG | BODY MASS INDEX: 26.61 KG/M2 | SYSTOLIC BLOOD PRESSURE: 100 MMHG

## 2024-12-18 DIAGNOSIS — I10 PRIMARY HYPERTENSION: Chronic | ICD-10-CM

## 2024-12-18 DIAGNOSIS — C54.1 MALIGNANT NEOPLASM OF ENDOMETRIUM (HCC): ICD-10-CM

## 2024-12-18 DIAGNOSIS — I50.9 ACUTE DECOMPENSATED HEART FAILURE (HCC): ICD-10-CM

## 2024-12-18 DIAGNOSIS — C54.1 ENDOMETRIAL CANCER (HCC): Primary | ICD-10-CM

## 2024-12-18 DIAGNOSIS — E78.5 DYSLIPIDEMIA: ICD-10-CM

## 2024-12-18 DIAGNOSIS — I50.22 CHRONIC HFREF (HEART FAILURE WITH REDUCED EJECTION FRACTION) (HCC): Primary | Chronic | ICD-10-CM

## 2024-12-18 DIAGNOSIS — I42.8 NONISCHEMIC CARDIOMYOPATHY (HCC): Chronic | ICD-10-CM

## 2024-12-18 DIAGNOSIS — E11.9 TYPE 2 DIABETES MELLITUS WITHOUT COMPLICATION, WITHOUT LONG-TERM CURRENT USE OF INSULIN (HCC): Primary | ICD-10-CM

## 2024-12-18 LAB
ABO GROUP BLD: NORMAL
APTT PPP: 27 SECONDS (ref 23–34)
BLD GP AB SCN SERPL QL: NEGATIVE
CANCER AG125 SERPL-ACNC: 7.3 U/ML (ref 0–35)
INR PPP: 0.91 (ref 0.85–1.19)
PROTHROMBIN TIME: 12.5 SECONDS (ref 12.3–15)
RH BLD: POSITIVE
SPECIMEN EXPIRATION DATE: NORMAL

## 2024-12-18 PROCEDURE — 86900 BLOOD TYPING SEROLOGIC ABO: CPT | Performed by: OBSTETRICS & GYNECOLOGY

## 2024-12-18 PROCEDURE — 36415 COLL VENOUS BLD VENIPUNCTURE: CPT

## 2024-12-18 PROCEDURE — 87086 URINE CULTURE/COLONY COUNT: CPT

## 2024-12-18 PROCEDURE — 85610 PROTHROMBIN TIME: CPT

## 2024-12-18 PROCEDURE — 99214 OFFICE O/P EST MOD 30 MIN: CPT | Performed by: INTERNAL MEDICINE

## 2024-12-18 PROCEDURE — 85730 THROMBOPLASTIN TIME PARTIAL: CPT

## 2024-12-18 PROCEDURE — 86304 IMMUNOASSAY TUMOR CA 125: CPT

## 2024-12-18 PROCEDURE — 86850 RBC ANTIBODY SCREEN: CPT | Performed by: OBSTETRICS & GYNECOLOGY

## 2024-12-18 PROCEDURE — 93000 ELECTROCARDIOGRAM COMPLETE: CPT | Performed by: INTERNAL MEDICINE

## 2024-12-18 PROCEDURE — 86901 BLOOD TYPING SEROLOGIC RH(D): CPT | Performed by: OBSTETRICS & GYNECOLOGY

## 2024-12-18 RX ORDER — METOPROLOL SUCCINATE 25 MG/1
25 TABLET, EXTENDED RELEASE ORAL 2 TIMES DAILY
Qty: 180 TABLET | Refills: 2 | Status: SHIPPED | OUTPATIENT
Start: 2024-12-18

## 2024-12-18 RX ORDER — FUROSEMIDE 20 MG/1
20 TABLET ORAL DAILY
Qty: 30 TABLET | Refills: 4 | Status: SHIPPED | OUTPATIENT
Start: 2024-12-18

## 2024-12-18 RX ORDER — LOSARTAN POTASSIUM 25 MG/1
25 TABLET ORAL DAILY
Qty: 30 TABLET | Refills: 3 | Status: SHIPPED | OUTPATIENT
Start: 2024-12-18

## 2024-12-18 NOTE — PATIENT INSTRUCTIONS
Decrease lasix to 20 mg daily    Stop lisinopril, start losartan 25 mg daily    Continue metoprolol

## 2024-12-18 NOTE — PROGRESS NOTES
Heart Failure Outpatient Progress Note - Nancy Calderon 64 y.o. female MRN: [id number]    @ Encounter: [id number]      Assessment/Plan:    Patient Active Problem List    Diagnosis Date Noted    Endometrial cancer (HCC) 12/05/2024    Ambulatory dysfunction 11/08/2024    Acute pain of left shoulder 11/08/2024    Left-sided weakness 08/08/2024    Chronic bilateral thoracic back pain 07/30/2024    Type 2 diabetes mellitus without complication, without long-term current use of insulin (HCC) 07/29/2024    Primary hypertension 07/29/2024    Gastroesophageal reflux disease without esophagitis 07/29/2024    Nonischemic cardiomyopathy ([facility]) 07/10/2024    Does not have health insurance 07/07/2024    Chronic HFrEF (heart failure with reduced ejection fraction) ([facility]) 07/05/2024     # Chronic HFimpEF, Stage C  Etiology: NICM; hx of HTN     Weight: 153 lbs  BNP:  7/5/24: 2673     Studies- personally reviewed by myself  EKG- SR, TWI laterally    Echo 11/15/24:  LVEF: 55%  RV: normal    LHC 7/10/24: no CAD     Echo 7/5/24  LVEF: 15%  LVIDd: 5.7 cm  RV: normal  Other: mild to moderate left sided pleural effusion     Neurohormonal Blockade:  --Beta-Blocker: metoprolol succinate 25 mg BID  --ACEi, ARB or ARNi: start losartan 25 mg daily  Cough from lisinopril     (or SVR reduction)  --Aldosterone Receptor Blocker:  --SGLT2-I:   --Diuretic: lasix 40 mg daily     Sudden Cardiac Death Risk Reduction:  --ICD: EF improved     Electrical Resynchronization:  --narrow QRS     Advanced Therapies (If appropriate):  --Inotrope:  --LVAD/Transplant Candidacy:     # HTN  # hyperlipidemia  # DM2- HgA1C 7.9% on 7/6/24  # Hx of medication non adherence  # no health insurance     TODAY'S PLAN:  Preop Clearance for hysterectomy: acceptable risk for hysterectomy from CV standpoing  EF improved to normal on 11/15 echo, cut back lasix  Sent back LifeVest  Continue Toprol 25 mg BID   Cough from lisinopril, change to losartan 25 mg  daily  Decrease lasix to 20 mg daily  Continue current GDMT, no health insurance    Needs workup of left shoulder pain, will start with plain imaging- defer to primary doc if want MRI, et  --2g sodium diet  - Daily weights     HPI:      63 yo following up for HFrEF. She has moved here from  5 years ago with reported hx of HTN and possible coronary stents? Reported by her in 2011 and possible hx of CHF. She did follow with a cardiolgoist in . She was not on her prescribed coreg and valsartan due to insurance issue.     Admission in July showed EF: 15%, LHC did not show any obstructive CAD. Started on GDMT  Seen in follow up by APs.      Interval History:  Echo 11/15/24:  LVEF: 55%  RV: normal    Cough likely from ACE- I  Review of Systems   Constitutional:  Negative for activity change, appetite change, fatigue and unexpected weight change.   HENT:  Negative for congestion and nosebleeds.    Eyes: Negative.    Respiratory:  Negative for cough, chest tightness and shortness of breath.    Cardiovascular:  Negative for chest pain, palpitations and leg swelling.   Gastrointestinal:  Negative for abdominal distention.   Endocrine: Negative.    Genitourinary: Negative.    Musculoskeletal: Negative.    Skin: Negative.    Neurological:  Negative for dizziness, syncope and weakness.   Hematological: Negative.    Psychiatric/Behavioral: Negative.         Past Medical History:   Diagnosis Date    Diabetes mellitus (HCC)     Fibroid     Heart failure (HCC)     Hyperlipidemia     Hypertension     Nonischemic cardiomyopathy (HCC)          No Known Allergies  .    Current Outpatient Medications:     acetaminophen (TYLENOL) 650 mg CR tablet, Take 1 tablet (650 mg total) by mouth every 8 (eight) hours as needed for mild pain, Disp: 90 tablet, Rfl: 0    aspirin 81 mg chewable tablet, Chew 1 tablet (81 mg total) daily, Disp: 90 tablet, Rfl: 1    atorvastatin (LIPITOR) 40 mg tablet, Take 1 tablet (40 mg total) by mouth daily, Disp:  90 tablet, Rfl: 2    Blood Glucose Monitoring Suppl (OneTouch Verio Reflect) w/Device KIT, Check blood sugars three times daily. Please substitute with appropriate alternative as covered by patient's insurance. Dx: E11.65, Disp: 1 kit, Rfl: 0    Blood Pressure Monitoring (B-D ASSURE BPM/DELUXE ARM CUFF) MISC, Use in the morning, Disp: 1 each, Rfl: 0    colchicine (COLCRYS) 0.6 mg tablet, Take 1 tablet (0.6 mg total) by mouth daily, Disp: 30 tablet, Rfl: 5    furosemide (LASIX) 40 mg tablet, Take 1 tablet (40 mg total) by mouth daily, Disp: 90 tablet, Rfl: 2    glucose blood (OneTouch Verio) test strip, Check blood sugars three times daily. Please substitute with appropriate alternative as covered by patient's insurance. Dx: E11.65, Disp: 300 each, Rfl: 3    lidocaine (LMX) 4 % cream, Apply topically as needed for mild pain, Disp: 28 g, Rfl: 1    lisinopril (ZESTRIL) 5 mg tablet, Take 1 tablet (5 mg total) by mouth daily, Disp: 90 tablet, Rfl: 2    metFORMIN (GLUCOPHAGE) 500 mg tablet, Take 1 tablet (500 mg total) by mouth daily with breakfast, Disp: 90 tablet, Rfl: 1    metoprolol succinate (TOPROL-XL) 25 mg 24 hr tablet, Take 1 tablet (25 mg total) by mouth 2 (two) times a day, Disp: 180 tablet, Rfl: 2    OneTouch Delica Lancets 33G MISC, Check blood sugars three times daily. Please substitute with appropriate alternative as covered by patient's insurance. Dx: E11.65, Disp: 300 each, Rfl: 3    traMADol (Ultram) 50 mg tablet, Take 1 tablet (50 mg total) by mouth every 6 (six) hours as needed for moderate pain, Disp: 20 tablet, Rfl: 0    Social History     Socioeconomic History    Marital status: Single     Spouse name: Not on file    Number of children: Not on file    Years of education: Not on file    Highest education level: Not on file   Occupational History    Not on file   Tobacco Use    Smoking status: Never    Smokeless tobacco: Never   Vaping Use    Vaping status: Never Used   Substance and Sexual Activity     Alcohol use: Never    Drug use: Never    Sexual activity: Not Currently     Partners: Male     Birth control/protection: Post-menopausal   Other Topics Concern    Not on file   Social History Narrative    Not on file     Social Drivers of Health     Financial Resource Strain: Low Risk  (11/19/2024)    Overall Financial Resource Strain (CARDIA)     Difficulty of Paying Living Expenses: Not hard at all   Food Insecurity: No Food Insecurity (11/19/2024)    Hunger Vital Sign     Worried About Running Out of Food in the Last Year: Never true     Ran Out of Food in the Last Year: Never true   Transportation Needs: No Transportation Needs (11/19/2024)    PRAPARE - Transportation     Lack of Transportation (Medical): No     Lack of Transportation (Non-Medical): No   Physical Activity: Not on file   Stress: Not on file   Social Connections: Not on file   Intimate Partner Violence: Not on file   Housing Stability: High Risk (11/19/2024)    Housing Stability Vital Sign     Unable to Pay for Housing in the Last Year: No     Number of Times Moved in the Last Year: 3     Homeless in the Last Year: No       Family History   Problem Relation Age of Onset    Cancer Paternal Aunt         vaginal    Vaginal cancer Paternal Aunt     Cancer Paternal Grandmother         vaginal    Vaginal cancer Paternal Grandmother     Breast cancer Neg Hx     Colon cancer Neg Hx     Ovarian cancer Neg Hx     Endometrial cancer Neg Hx        Physical Exam:    Vitals:     Physical Exam    Labs & Results:    Lab Results   Component Value Date    SODIUM 137 12/12/2024    K 3.9 12/12/2024     12/12/2024    CO2 28 12/12/2024    BUN 23 12/12/2024    CREATININE 0.76 12/12/2024    GLUC 119 07/14/2024    CALCIUM 9.6 12/12/2024     Lab Results   Component Value Date    WBC 8.32 12/12/2024    HGB 13.0 12/12/2024    HCT 39.1 12/12/2024    MCV 97 12/12/2024     12/12/2024         EKG personally reviewed by Tae Godinez DO.     Counseling /  Coordination of Care  I have spent a total time of 35 minutes on 12/18/24 in caring for this patient including Diagnostic results, Risks and benefits of tx options, Instructions for management, Importance of tx compliance, Impressions, Documenting in the medical record, and Reviewing / ordering tests, medicine, procedures  .      Thank you for the opportunity to participate in the care of this patient.    SERGEY BURRIS D.O.  DIRECTOR OF HEART FAILURE/ PULMONARY HYPERTENSION  MEDICAL DIRECTOR OF LVAD PROGRAM  [facility]

## 2024-12-18 NOTE — PROGRESS NOTES
I reached out to Nancy  to complete the Distress Thermometer, review for any barriers to care and to offer any supportive services that may be needed. I left VM with the reason for my call including my direct phone number .

## 2024-12-19 LAB — BACTERIA UR CULT: NORMAL

## 2024-12-20 ENCOUNTER — TELEPHONE (OUTPATIENT)
Dept: FAMILY MEDICINE CLINIC | Facility: CLINIC | Age: 64
End: 2024-12-20

## 2024-12-20 ENCOUNTER — PATIENT OUTREACH (OUTPATIENT)
Dept: HEMATOLOGY ONCOLOGY | Facility: CLINIC | Age: 64
End: 2024-12-20

## 2024-12-20 ENCOUNTER — TELEPHONE (OUTPATIENT)
Dept: OTHER | Facility: HOSPITAL | Age: 64
End: 2024-12-20

## 2024-12-20 ENCOUNTER — TELEPHONE (OUTPATIENT)
Dept: CARDIOLOGY CLINIC | Facility: CLINIC | Age: 64
End: 2024-12-20

## 2024-12-20 ENCOUNTER — PATIENT OUTREACH (OUTPATIENT)
Dept: CASE MANAGEMENT | Facility: OTHER | Age: 64
End: 2024-12-20

## 2024-12-20 DIAGNOSIS — C54.1 ENDOMETRIAL CANCER (HCC): Primary | ICD-10-CM

## 2024-12-20 NOTE — TELEPHONE ENCOUNTER
Reason for call:   [] Refill   [x] Prior Auth  [] Other:     Office:   [] PCP/Provider -   [x] Specialty/Provider - [facility],      Medication: losartan (COZAAR) 25 mg tablet     Dose/Frequency: Take 1 tablet (25 mg total) by mouth daily     Quantity: 30 tablet     Pharmacy: [place] [id number]  Phone: [phone]  Fax: [phone]     Does the patient have enough for 3 days?   [] Yes   [x] No - Send as HP to POD

## 2024-12-20 NOTE — TELEPHONE ENCOUNTER
Patients brother called the RX Refill Line. Message is being forwarded to the office.     Patients brother is requesting a message be sent to the office regarding patients medication. He states her losartan (COZAAR) 25 mg tablet needs a prior authorization. Would like to know if the patient can take lisinopril (ZESTRIL) 2.5 mg tablet (no longer on medication list) until prior authorization is approved.     Please contact patient brother at [phone]

## 2024-12-20 NOTE — PROGRESS NOTES
I reached out and spoke with Nancy using  [phone] now that consults have been completed with the oncology teams to review for any barriers to care and offer supportive services as needed. Distress Thermometer completed at this time. Patient scored 7/10. Referral to  placed..  She stated she has to move soon, but can not afford a new place. She has not been working and finances are hard.  I reviewed and updated the members assigned to the care team in [facility].   She knows the members of the care team as well as how and when to contact them with any needs.   She verbalizes managing the schedules well.   She is currently struggling with transportation. Transportation options were reviewed. Patient provided transportation assistance with [employer] I will get her established after her upcoming surgery .    She denies any uncontrolled symptoms. Discussed role of Palliative Care in symptom and side effect management. Declined referral at this time.  She states that she is eating and drinking as per usual with no unintentional weight loss.     Patient does not smoke.   She states she is well supported by family and friends.  Community support groups discussed including the Cancer Support Community of the [facility]. Patient declined information at this time.   She feels she has adequate insurance coverage and denies any financial concerns at this time.     Based on individual needs I will follow up in about 4 weeks. I have provided my direct contact information and welcome them to contact me if needs as discussed above change. She was appreciative for the call.

## 2024-12-20 NOTE — TELEPHONE ENCOUNTER
Pt called into office and is asking if she should be taking both of the following medications, pt stated she is currently only taking the metformin but she is not sure if she should take both. Please advise.       Empagliflozin (JARDIANCE) 10 MG TABS tablet      metFORMIN (GLUCOPHAGE) 500 mg tablet

## 2024-12-30 ENCOUNTER — PATIENT OUTREACH (OUTPATIENT)
Dept: CASE MANAGEMENT | Facility: OTHER | Age: 64
End: 2024-12-30

## 2024-12-30 NOTE — PROGRESS NOTES
Biopsychosocial and Barriers Assessment    Type of Cancer: Endometrial Cancer  Treatment plan: surgery scheduled 1/16/25  Noted barriers to care: financial distress, language barrier  Cultural/Congregational concerns: none  Hair Loss/ Wig resources needed: N/A at this time    DT completed: 12/20/24  DT score: 7/10  Issues noted: pain, sleep, fatigue, worry/anxiety, taking care of myself    Marital status/Lives with: alone  Pt's support system: brother, sister, and daughter  Mental Health history: none  Substance Abuse: none    Employment/income source: Currently no income-was unable to work due to acute decompensated HF requiring Life Vest. Was working in a factory packaging clothing  Concerns with bills (treatment vs household): YES-no income to pay rent or utilities  Noted issues with home: none    Narrative note:  OSW placed outreach call to Sra. Jamie Maciel. Utilized [employer]  #[id number]. Introduced self and role of OSW team. Noted HF LCSW and [facility] outpatient CM team were extensively involved due to her lack of health insurance at that time. Pt now has App[employer] and SNAP benefits. Sra. Jamie Maciel unfortunately can not work her job as a , and has no income. She called Social Security, however was told she does not have enough working credits. Her family and friends have been paying her rent. She is very worried this help will not last much longer. Her plan was to return to work, after cardiac clearance from her provider, however she is now dx with endometrial cancer and requires surgery and possibly chemo/radiation treatment afterwards. OSW inquired if she is able to stay with family or friends, however she stated she can't. The reasons were not immediately given. OSW explained there are no immediate resources for rent coverage, especially if there is no long term plan in place. She understood same. OSW offered to look into any other possible resources and she stated she is very  open to anything that is available.  OSW completed a search in FindHelp and printed a list out in Swiss. [employer] Resources is included in that list. OSW also printed out information for the Programas en Espanol through [facility] where she can connect with a Swiss speaking mentor. Also included in the packet of resources the Legal Resource booklet in Swiss through [employer].   Will plan on outreach again to Fern. Jamie Maciel to confirm she receives this information through her mail.

## 2024-12-30 NOTE — LETTER
December 30, 2024       Patient: Nancy Calderon   YOB: 1960   Date of Visit: 12/30/2024       Dear Kalie Maciel,    Thank you for speaking with me today. I have included some resources for possible assistance, as well as a booklet from The [employer] and information from [facility]. I will be reaching out to you again in about 1 week to follow up.         Sincerely,        Amanda Smith, LCSW, OSW-C  [phone]  Cancer Care

## 2025-01-06 ENCOUNTER — TELEPHONE (OUTPATIENT)
Dept: FAMILY MEDICINE CLINIC | Facility: CLINIC | Age: 65
End: 2025-01-06

## 2025-01-06 NOTE — TELEPHONE ENCOUNTER
Patient was on our waiting list for home visit, her name has come up at the top of the list   I called her to inform her about our home visit program. I discussed the the services we offer and the frequency of our visits. She verbalized understanding and states again she would love to join   I have placed this patient on the home visit roster for her next in-home visit on Tuesday, 02/04/2025 at 2:00 p.m. with Eleazar Noriega and Leela   Will ask staff to schedule.

## 2025-01-07 ENCOUNTER — ANESTHESIA EVENT (OUTPATIENT)
Dept: PERIOP | Facility: HOSPITAL | Age: 65
End: 2025-01-07
Payer: COMMERCIAL

## 2025-01-07 NOTE — PRE-PROCEDURE INSTRUCTIONS
Pre-Surgery Instructions:   Medication Instructions    aspirin 81 mg chewable tablet Last dose 1-9-25 per MD    atorvastatin (LIPITOR) 40 mg tablet Take day of surgery.    colchicine (COLCRYS) 0.6 mg tablet Take day of surgery.    Empagliflozin (JARDIANCE) 10 MG TABS tablet Last dose 1-12-25 per protocol    lidocaine (LMX) 4 % cream Hold day of surgery.    losartan (COZAAR) 25 mg tablet Hold day of surgery.    metFORMIN (GLUCOPHAGE) 500 mg tablet Hold day of surgery.    metoprolol succinate (TOPROL-XL) 25 mg 24 hr tablet Take day of surgery.    traMADol (Ultram) 50 mg tablet Uses PRN- OK to take day of surgery   Medication instructions for day surgery reviewed. Please use only a sip of water to take your instructed medications. Avoid all over the counter vitamins, supplements and NSAIDS for one week prior to surgery per anesthesia guidelines. Tylenol is ok to take as needed.     You will receive a call one business day prior to surgery with an arrival time and hospital directions. If your surgery is scheduled on a Monday, the hospital will be calling you on the Friday prior to your surgery. If you have not heard from anyone by 8pm, please call the hospital supervisor through the hospital  at [phone]  or [place] [phone]).    Do not eat or drink anything after midnight the night before your surgery, including candy, mints, lifesavers, or chewing gum. Do not drink alcohol 24hrs before your surgery. Try not to smoke at least 24hrs before your surgery.       Follow the pre surgery showering instructions as listed in the “My Surgical Experience Booklet” or otherwise provided by your surgeon's office. Do not use a blade to shave the surgical area 1 week before surgery. It is okay to use a clean electric clippers up to 24 hours before surgery. Do not apply any lotions, creams, including makeup, cologne, deodorant, or perfumes after showering on the day of your surgery. Do not use dry shampoo, hair spray,  hair gel, or any type of hair products.     No contact lenses, eye make-up, or artificial eyelashes. Remove nail polish, including gel polish, and any artificial, gel, or acrylic nails if possible. Remove all jewelry including rings and body piercing jewelry.     Wear causal clothing that is easy to take on and off. Consider your type of surgery.    Keep any valuables, jewelry, piercings at home. Please bring any specially ordered equipment (sling, braces) if indicated.    Arrange for a responsible person to drive you to and from the hospital on the day of your surgery. Please confirm the visitor policy for the day of your procedure when you receive your phone call with an arrival time.     Call the surgeon's office with any new illnesses, exposures, or additional questions prior to surgery.    Please reference your “My Surgical Experience Booklet” for additional information to prepare for your upcoming surgery.

## 2025-01-09 ENCOUNTER — PATIENT OUTREACH (OUTPATIENT)
Dept: CASE MANAGEMENT | Facility: OTHER | Age: 65
End: 2025-01-09

## 2025-01-09 NOTE — PROGRESS NOTES
OSW placed call to Sra. Jamie Maciel today. OSW utilized  #[id number]. Nancy confirmed she received this writer's packet of information from last week, however has to review the contents. The information was sent in Estonian, and this writer asked if she had any questions about understanding the information. She stated she didn't at this time. Her family and Sikhism helped gather money for her rent for January, however she has a $343.91 electric bill. Her brother is going to call the electric company and ask why the amount is so high. OSW explained that electric bill budget program information was included in the packet of information sent to her. In the meantime, offered to request [employer] for the balance. Provided this writer's email address, however this was very confusing to the pt due to the language barrier. [employer]  attempted to assist, however pt had difficulty. She asked if this writer could contact her brother Owen directly and provided phone number. Explained this can be done and call ended.    Phoned brotherOwen and explained above. Sent test email to his personal email address ([email or web address]) so he can send a screenshot of the electric bill. He had many questions about her upcoming surgery and if she can have a visiting nurse. OSW explained any DC needs will be determined by her provider, and that OSW is unsure if she will be admitted or if this is a same-day surgery. He understood same. Owen stated he works and has 4 children so he is limited with how much time and care he can give Nancy. He tries to support her as best as he can, but has his own responsibilities to manage as well. Await copy of electric bill, and will continue to follow.

## 2025-01-15 ENCOUNTER — TELEPHONE (OUTPATIENT)
Age: 65
End: 2025-01-15

## 2025-01-15 NOTE — TELEPHONE ENCOUNTER
Received a phone call from patient's brother, Owen with a .  Luis Armando had some questions regarding patient's surgery that is scheduled for tomorrow.  All questions were answered to Luis Armando's satisfaction.

## 2025-01-16 ENCOUNTER — PATIENT OUTREACH (OUTPATIENT)
Dept: FAMILY MEDICINE CLINIC | Facility: CLINIC | Age: 65
End: 2025-01-16

## 2025-01-16 ENCOUNTER — ANESTHESIA (OUTPATIENT)
Dept: PERIOP | Facility: HOSPITAL | Age: 65
End: 2025-01-16
Payer: COMMERCIAL

## 2025-01-16 ENCOUNTER — HOSPITAL ENCOUNTER (OUTPATIENT)
Facility: HOSPITAL | Age: 65
Discharge: HOME | End: 2025-01-18
Attending: OBSTETRICS & GYNECOLOGY | Admitting: OBSTETRICS & GYNECOLOGY
Payer: COMMERCIAL

## 2025-01-16 DIAGNOSIS — Z90.710 STATUS POST HYSTERECTOMY: Primary | ICD-10-CM

## 2025-01-16 DIAGNOSIS — C54.1 ENDOMETRIAL CANCER (HCC): ICD-10-CM

## 2025-01-16 LAB
ABO GROUP BLD: NORMAL
BLD GP AB SCN SERPL QL: NEGATIVE
GLUCOSE SERPL-MCNC: 114 MG/DL (ref 65–140)
GLUCOSE SERPL-MCNC: 129 MG/DL (ref 65–140)
GLUCOSE SERPL-MCNC: 129 MG/DL (ref 65–140)
GLUCOSE SERPL-MCNC: 169 MG/DL (ref 65–140)
RH BLD: POSITIVE
SPECIMEN EXPIRATION DATE: NORMAL

## 2025-01-16 PROCEDURE — 88305 TISSUE EXAM BY PATHOLOGIST: CPT | Performed by: STUDENT IN AN ORGANIZED HEALTH CARE EDUCATION/TRAINING PROGRAM

## 2025-01-16 PROCEDURE — 88341 IMHCHEM/IMCYTCHM EA ADD ANTB: CPT | Performed by: STUDENT IN AN ORGANIZED HEALTH CARE EDUCATION/TRAINING PROGRAM

## 2025-01-16 PROCEDURE — 82948 REAGENT STRIP/BLOOD GLUCOSE: CPT

## 2025-01-16 PROCEDURE — 88342 IMHCHEM/IMCYTCHM 1ST ANTB: CPT | Performed by: STUDENT IN AN ORGANIZED HEALTH CARE EDUCATION/TRAINING PROGRAM

## 2025-01-16 PROCEDURE — NC001 PR NO CHARGE: Performed by: OBSTETRICS & GYNECOLOGY

## 2025-01-16 PROCEDURE — 86900 BLOOD TYPING SEROLOGIC ABO: CPT | Performed by: OBSTETRICS & GYNECOLOGY

## 2025-01-16 PROCEDURE — S2900 ROBOTIC SURGICAL SYSTEM: HCPCS | Performed by: OBSTETRICS & GYNECOLOGY

## 2025-01-16 PROCEDURE — 38572 LAPAROSCOPY LYMPHADENECTOMY: CPT | Performed by: OBSTETRICS & GYNECOLOGY

## 2025-01-16 PROCEDURE — 86850 RBC ANTIBODY SCREEN: CPT | Performed by: OBSTETRICS & GYNECOLOGY

## 2025-01-16 PROCEDURE — 58571 TLH W/T/O 250 G OR LESS: CPT | Performed by: OBSTETRICS & GYNECOLOGY

## 2025-01-16 PROCEDURE — NC001 PR NO CHARGE: Performed by: PHYSICIAN ASSISTANT

## 2025-01-16 PROCEDURE — 86901 BLOOD TYPING SEROLOGIC RH(D): CPT | Performed by: OBSTETRICS & GYNECOLOGY

## 2025-01-16 PROCEDURE — 88309 TISSUE EXAM BY PATHOLOGIST: CPT | Performed by: STUDENT IN AN ORGANIZED HEALTH CARE EDUCATION/TRAINING PROGRAM

## 2025-01-16 PROCEDURE — 88307 TISSUE EXAM BY PATHOLOGIST: CPT | Performed by: STUDENT IN AN ORGANIZED HEALTH CARE EDUCATION/TRAINING PROGRAM

## 2025-01-16 PROCEDURE — 88112 CYTOPATH CELL ENHANCE TECH: CPT | Performed by: STUDENT IN AN ORGANIZED HEALTH CARE EDUCATION/TRAINING PROGRAM

## 2025-01-16 RX ORDER — LIDOCAINE HYDROCHLORIDE 10 MG/ML
INJECTION, SOLUTION EPIDURAL; INFILTRATION; INTRACAUDAL; PERINEURAL AS NEEDED
Status: DISCONTINUED | OUTPATIENT
Start: 2025-01-16 | End: 2025-01-16

## 2025-01-16 RX ORDER — NEOSTIGMINE METHYLSULFATE 1 MG/ML
INJECTION INTRAVENOUS AS NEEDED
Status: DISCONTINUED | OUTPATIENT
Start: 2025-01-16 | End: 2025-01-16

## 2025-01-16 RX ORDER — PROPOFOL 10 MG/ML
INJECTION, EMULSION INTRAVENOUS AS NEEDED
Status: DISCONTINUED | OUTPATIENT
Start: 2025-01-16 | End: 2025-01-16

## 2025-01-16 RX ORDER — ONDANSETRON 2 MG/ML
4 INJECTION INTRAMUSCULAR; INTRAVENOUS ONCE AS NEEDED
Status: DISCONTINUED | OUTPATIENT
Start: 2025-01-16 | End: 2025-01-17

## 2025-01-16 RX ORDER — ACETAMINOPHEN 325 MG/1
975 TABLET ORAL EVERY 6 HOURS SCHEDULED
Status: DISCONTINUED | OUTPATIENT
Start: 2025-01-16 | End: 2025-01-18 | Stop reason: HOSPADM

## 2025-01-16 RX ORDER — OXYCODONE HYDROCHLORIDE 10 MG/1
10 TABLET ORAL EVERY 4 HOURS PRN
Refills: 0 | Status: DISCONTINUED | OUTPATIENT
Start: 2025-01-16 | End: 2025-01-18 | Stop reason: HOSPADM

## 2025-01-16 RX ORDER — CEFAZOLIN SODIUM 1 G/3ML
INJECTION, POWDER, FOR SOLUTION INTRAMUSCULAR; INTRAVENOUS AS NEEDED
Status: DISCONTINUED | OUTPATIENT
Start: 2025-01-16 | End: 2025-01-16

## 2025-01-16 RX ORDER — HYDROMORPHONE HCL/PF 1 MG/ML
SYRINGE (ML) INJECTION AS NEEDED
Status: DISCONTINUED | OUTPATIENT
Start: 2025-01-16 | End: 2025-01-16

## 2025-01-16 RX ORDER — FENTANYL CITRATE/PF 50 MCG/ML
50 SYRINGE (ML) INJECTION
Status: DISCONTINUED | OUTPATIENT
Start: 2025-01-16 | End: 2025-01-18 | Stop reason: HOSPADM

## 2025-01-16 RX ORDER — ENOXAPARIN SODIUM 100 MG/ML
40 INJECTION SUBCUTANEOUS DAILY
Status: DISCONTINUED | OUTPATIENT
Start: 2025-01-17 | End: 2025-01-18 | Stop reason: HOSPADM

## 2025-01-16 RX ORDER — ACETAMINOPHEN 325 MG/1
975 TABLET ORAL ONCE
Status: COMPLETED | OUTPATIENT
Start: 2025-01-16 | End: 2025-01-16

## 2025-01-16 RX ORDER — OXYCODONE HYDROCHLORIDE 5 MG/1
5 TABLET ORAL EVERY 4 HOURS PRN
Refills: 0 | Status: DISCONTINUED | OUTPATIENT
Start: 2025-01-16 | End: 2025-01-18 | Stop reason: HOSPADM

## 2025-01-16 RX ORDER — INSULIN LISPRO 100 [IU]/ML
1-5 INJECTION, SOLUTION INTRAVENOUS; SUBCUTANEOUS
Status: DISCONTINUED | OUTPATIENT
Start: 2025-01-16 | End: 2025-01-18 | Stop reason: HOSPADM

## 2025-01-16 RX ORDER — FENTANYL CITRATE 50 UG/ML
INJECTION, SOLUTION INTRAMUSCULAR; INTRAVENOUS AS NEEDED
Status: DISCONTINUED | OUTPATIENT
Start: 2025-01-16 | End: 2025-01-16

## 2025-01-16 RX ORDER — ROCURONIUM BROMIDE 10 MG/ML
INJECTION, SOLUTION INTRAVENOUS AS NEEDED
Status: DISCONTINUED | OUTPATIENT
Start: 2025-01-16 | End: 2025-01-16

## 2025-01-16 RX ORDER — METRONIDAZOLE 500 MG/100ML
500 INJECTION, SOLUTION INTRAVENOUS ONCE
Status: COMPLETED | OUTPATIENT
Start: 2025-01-16 | End: 2025-01-16

## 2025-01-16 RX ORDER — CEFAZOLIN SODIUM 2 G/50ML
2000 SOLUTION INTRAVENOUS ONCE
Status: COMPLETED | OUTPATIENT
Start: 2025-01-16 | End: 2025-01-16

## 2025-01-16 RX ORDER — SODIUM CHLORIDE, SODIUM LACTATE, POTASSIUM CHLORIDE, CALCIUM CHLORIDE 600; 310; 30; 20 MG/100ML; MG/100ML; MG/100ML; MG/100ML
INJECTION, SOLUTION INTRAVENOUS CONTINUOUS PRN
Status: DISCONTINUED | OUTPATIENT
Start: 2025-01-16 | End: 2025-01-16

## 2025-01-16 RX ORDER — METOPROLOL SUCCINATE 25 MG/1
25 TABLET, EXTENDED RELEASE ORAL 2 TIMES DAILY
Status: DISCONTINUED | OUTPATIENT
Start: 2025-01-16 | End: 2025-01-18 | Stop reason: HOSPADM

## 2025-01-16 RX ORDER — IBUPROFEN 400 MG/1
600 TABLET, FILM COATED ORAL EVERY 6 HOURS SCHEDULED
Status: DISCONTINUED | OUTPATIENT
Start: 2025-01-16 | End: 2025-01-18 | Stop reason: HOSPADM

## 2025-01-16 RX ORDER — GLYCOPYRROLATE 0.2 MG/ML
INJECTION INTRAMUSCULAR; INTRAVENOUS AS NEEDED
Status: DISCONTINUED | OUTPATIENT
Start: 2025-01-16 | End: 2025-01-16

## 2025-01-16 RX ORDER — SODIUM CHLORIDE 9 MG/ML
INJECTION, SOLUTION INTRAVENOUS CONTINUOUS PRN
Status: DISCONTINUED | OUTPATIENT
Start: 2025-01-16 | End: 2025-01-16

## 2025-01-16 RX ORDER — CHLORHEXIDINE GLUCONATE ORAL RINSE 1.2 MG/ML
15 SOLUTION DENTAL ONCE
Status: COMPLETED | OUTPATIENT
Start: 2025-01-16 | End: 2025-01-16

## 2025-01-16 RX ORDER — ONDANSETRON 2 MG/ML
4 INJECTION INTRAMUSCULAR; INTRAVENOUS EVERY 6 HOURS PRN
Status: DISCONTINUED | OUTPATIENT
Start: 2025-01-16 | End: 2025-01-18 | Stop reason: HOSPADM

## 2025-01-16 RX ORDER — ONDANSETRON 2 MG/ML
INJECTION INTRAMUSCULAR; INTRAVENOUS AS NEEDED
Status: DISCONTINUED | OUTPATIENT
Start: 2025-01-16 | End: 2025-01-16

## 2025-01-16 RX ORDER — LIDOCAINE HYDROCHLORIDE 10 MG/ML
0.5 INJECTION, SOLUTION EPIDURAL; INFILTRATION; INTRACAUDAL; PERINEURAL ONCE AS NEEDED
Status: DISCONTINUED | OUTPATIENT
Start: 2025-01-16 | End: 2025-01-16 | Stop reason: HOSPADM

## 2025-01-16 RX ORDER — HYDROMORPHONE HCL/PF 1 MG/ML
0.5 SYRINGE (ML) INJECTION
Status: COMPLETED | OUTPATIENT
Start: 2025-01-16 | End: 2025-01-16

## 2025-01-16 RX ORDER — BUPIVACAINE HYDROCHLORIDE 2.5 MG/ML
INJECTION, SOLUTION EPIDURAL; INFILTRATION; INTRACAUDAL AS NEEDED
Status: DISCONTINUED | OUTPATIENT
Start: 2025-01-16 | End: 2025-01-16 | Stop reason: HOSPADM

## 2025-01-16 RX ORDER — GABAPENTIN 100 MG/1
100 CAPSULE ORAL ONCE
Status: COMPLETED | OUTPATIENT
Start: 2025-01-16 | End: 2025-01-16

## 2025-01-16 RX ORDER — MAGNESIUM HYDROXIDE 1200 MG/15ML
LIQUID ORAL AS NEEDED
Status: DISCONTINUED | OUTPATIENT
Start: 2025-01-16 | End: 2025-01-16 | Stop reason: HOSPADM

## 2025-01-16 RX ADMIN — HYDROMORPHONE HYDROCHLORIDE 0.5 MG: 1 INJECTION, SOLUTION INTRAMUSCULAR; INTRAVENOUS; SUBCUTANEOUS at 15:01

## 2025-01-16 RX ADMIN — CHLORHEXIDINE GLUCONATE 0.12% ORAL RINSE 15 ML: 1.2 LIQUID ORAL at 06:25

## 2025-01-16 RX ADMIN — SODIUM CHLORIDE, SODIUM LACTATE, POTASSIUM CHLORIDE, AND CALCIUM CHLORIDE: .6; .31; .03; .02 INJECTION, SOLUTION INTRAVENOUS at 07:30

## 2025-01-16 RX ADMIN — ROCURONIUM 10 MG: 50 INJECTION, SOLUTION INTRAVENOUS at 08:55

## 2025-01-16 RX ADMIN — HYDROMORPHONE HYDROCHLORIDE 0.25 MG: 1 INJECTION, SOLUTION INTRAMUSCULAR; INTRAVENOUS; SUBCUTANEOUS at 08:28

## 2025-01-16 RX ADMIN — HYDROMORPHONE HYDROCHLORIDE 0.5 MG: 1 INJECTION, SOLUTION INTRAMUSCULAR; INTRAVENOUS; SUBCUTANEOUS at 13:58

## 2025-01-16 RX ADMIN — LIDOCAINE HYDROCHLORIDE 50 MG: 10 INJECTION, SOLUTION EPIDURAL; INFILTRATION; INTRACAUDAL; PERINEURAL at 07:36

## 2025-01-16 RX ADMIN — PROPOFOL 150 MG: 10 INJECTION, EMULSION INTRAVENOUS at 07:36

## 2025-01-16 RX ADMIN — PHENYLEPHRINE HYDROCHLORIDE 20 MCG/MIN: 10 INJECTION INTRAVENOUS at 07:55

## 2025-01-16 RX ADMIN — ONDANSETRON 4 MG: 2 INJECTION INTRAMUSCULAR; INTRAVENOUS at 09:25

## 2025-01-16 RX ADMIN — METRONIDAZOLE: 500 INJECTION, SOLUTION INTRAVENOUS at 07:48

## 2025-01-16 RX ADMIN — NEOSTIGMINE METHYLSULFATE 3 MG: 1 INJECTION INTRAVENOUS at 13:03

## 2025-01-16 RX ADMIN — METOPROLOL SUCCINATE 25 MG: 25 TABLET, EXTENDED RELEASE ORAL at 18:44

## 2025-01-16 RX ADMIN — HYDROMORPHONE HYDROCHLORIDE 0.5 MG: 1 INJECTION, SOLUTION INTRAMUSCULAR; INTRAVENOUS; SUBCUTANEOUS at 10:09

## 2025-01-16 RX ADMIN — SODIUM CHLORIDE: 0.9 INJECTION, SOLUTION INTRAVENOUS at 07:40

## 2025-01-16 RX ADMIN — CEFAZOLIN 2000 MG: 1 INJECTION, POWDER, FOR SOLUTION INTRAMUSCULAR; INTRAVENOUS at 11:41

## 2025-01-16 RX ADMIN — SODIUM CHLORIDE: 0.9 INJECTION, SOLUTION INTRAVENOUS at 09:32

## 2025-01-16 RX ADMIN — HYDROMORPHONE HYDROCHLORIDE 0.25 MG: 1 INJECTION, SOLUTION INTRAMUSCULAR; INTRAVENOUS; SUBCUTANEOUS at 08:36

## 2025-01-16 RX ADMIN — IBUPROFEN 600 MG: 400 TABLET, FILM COATED ORAL at 18:45

## 2025-01-16 RX ADMIN — GLYCOPYRROLATE 0.4 MG: 0.2 INJECTION, SOLUTION INTRAMUSCULAR; INTRAVENOUS at 13:03

## 2025-01-16 RX ADMIN — ROCURONIUM 10 MG: 50 INJECTION, SOLUTION INTRAVENOUS at 09:44

## 2025-01-16 RX ADMIN — FENTANYL CITRATE 100 MCG: 50 INJECTION INTRAMUSCULAR; INTRAVENOUS at 07:36

## 2025-01-16 RX ADMIN — ROCURONIUM 10 MG: 50 INJECTION, SOLUTION INTRAVENOUS at 11:45

## 2025-01-16 RX ADMIN — PHENYLEPHRINE HYDROCHLORIDE 100 MCG: 10 INJECTION INTRAVENOUS at 07:58

## 2025-01-16 RX ADMIN — SODIUM CHLORIDE, SODIUM LACTATE, POTASSIUM CHLORIDE, AND CALCIUM CHLORIDE: .6; .31; .03; .02 INJECTION, SOLUTION INTRAVENOUS at 08:45

## 2025-01-16 RX ADMIN — ACETAMINOPHEN 975 MG: 325 TABLET, FILM COATED ORAL at 06:24

## 2025-01-16 RX ADMIN — ROCURONIUM 50 MG: 50 INJECTION, SOLUTION INTRAVENOUS at 07:36

## 2025-01-16 RX ADMIN — ROCURONIUM 10 MG: 50 INJECTION, SOLUTION INTRAVENOUS at 12:13

## 2025-01-16 RX ADMIN — CEFAZOLIN SODIUM 2000 MG: 2 SOLUTION INTRAVENOUS at 07:38

## 2025-01-16 RX ADMIN — ACETAMINOPHEN 975 MG: 325 TABLET, FILM COATED ORAL at 18:45

## 2025-01-16 RX ADMIN — GABAPENTIN 100 MG: 100 CAPSULE ORAL at 06:24

## 2025-01-16 RX ADMIN — SODIUM CHLORIDE, SODIUM LACTATE, POTASSIUM CHLORIDE, AND CALCIUM CHLORIDE: .6; .31; .03; .02 INJECTION, SOLUTION INTRAVENOUS at 12:59

## 2025-01-16 RX ADMIN — FENTANYL CITRATE 50 MCG: 50 INJECTION INTRAMUSCULAR; INTRAVENOUS at 12:13

## 2025-01-16 NOTE — ANESTHESIA POSTPROCEDURE EVALUATION
Post-Op Assessment Note    CV Status:  Stable    Pain management: satisfactory to patient       Mental Status:  Alert and awake   Hydration Status:  Euvolemic   PONV Controlled:  Controlled   Airway Patency:  Patent     Post Op Vitals Reviewed: Yes    No anethesia notable event occurred.    Staff: CRNA           Last Filed PACU Vitals:  Vitals Value Taken Time   Temp 98.5 °F (36.9 °C) 01/16/25 1328   Pulse 94 01/16/25 1328   /77 01/16/25 1328   Resp 14 01/16/25 1328   SpO2 99 % 01/16/25 1328   Vitals shown include unfiled device data.

## 2025-01-16 NOTE — ASSESSMENT & PLAN NOTE
"See problem for \"Chronic HRrEF (heart failure with reduced ejection fraction) ([facility])\"  "

## 2025-01-16 NOTE — ASSESSMENT & PLAN NOTE
"See problem for \"Chronic HRrEF (heart failure with reduced ejection fraction) ([facility])\"   "

## 2025-01-16 NOTE — OP NOTE
OPERATIVE REPORT  PATIENT NAME: Nancy Calderon    :  1960  MRN: [id number]  Pt Location: [facility] OR ROOM 15    SURGERY DATE: 2025    Surgeons and Role:     * Amanda Grayson MD - Primary     * Annie Cantu PA-C - Assisting     * Wanda Romayne Stengel Hohenshilt, PA-C - Assisting     * Aaron Fong MD - Assisting     * Hallie Parsons MD - Assisting    Preop Diagnosis:  Endometrial cancer (HCC) [C54.1]    Post-Op Diagnosis Codes:     * Endometrial cancer (HCC) [C54.1]    Procedure(s):  ROBOTIC ASSISTED TOTAL LAPAROSCOPIC HYSTERECTOMY. BILATERAL SALPINGO-OOPHERECTOMY. PELVIC AND PARA-AORTIC LYMPHADENECTOMY. OMENTAL BIOPSY. EXAM UNDER ANESTHESIA. CYSTOSCOPY    Specimen(s):  ID Type Source Tests Collected by Time Destination   1 : PELVIC WASHINGS Washing Pelvic Washing NON-GYNECOLOGIC CYTOLOGY Amanda Grayson MD 2025 0820    2 : UTERUS, CERVIX, BILATERAL FALLOPIAN TUBES AND OVARIES Tissue Uterus w/Bilateral Ovaries and Fallopian Tubes TISSUE EXAM Amanda Grayson MD 2025 0946    3 : LEFT PELVIC LYMPH NODE Tissue Lymph Node TISSUE EXAM Amanda Grayson MD 2025 1043    4 : RIGHT PELVIC LYMPH NODE Tissue Lymph Node TISSUE EXAM Amanda Grayson MD 2025 1107    5 : RIGHT PARA-AORTIC LYMPH NODE Tissue Lymph Node TISSUE EXAM Amanda Grayson MD 2025 1213    6 : LEFT PARA-AORTIC LYMPH NODE Tissue Lymph Node TISSUE EXAM Amanda Grayson MD 2025 1213    7 : OMETNAL BIOPSY Tissue Omentum TISSUE EXAM Amanda Grayson MD 2025 1226        Estimated Blood Loss:   250 mL    Drains:  [REMOVED] Urethral Catheter Double-lumen 16 Fr. (Removed)   Number of days: 0       Anesthesia Type:   General    Operative Indications:  Endometrial cancer (HCC) [C54.1]  65yo  female with biopsy-proven high grade endometrial adenocarcinoma and preoperative CT CAP with enlarged bilateral iliac chain and  retroperitoneal lymph nodes.    Operative Findings:  - Adhesions in the upper abdomen between the omentum, anterior abdominal wall, and right liver. This required lysis of adhesions prior to the robotic portion of the case being able to be safely started. This also precluded visualization of the bilateral diaphragms and liver surface  - No evidence of metastatic disease spread on stomach edge, peritoneal surfaces, omentum, or visualized bowels  - Uterus sounded to 9cm. Cervix normal appearing. The uterine fundus was boggy appearing with tumor spillage from the cervix during specimen removal. No evidence of serosal disease spread.  - Normal appearing bilateral fallopian tubes and ovaries  - Multiple enlarged, adhesive, and abnormal appearing bilateral pelvic and right para-aortic lymph nodes suspicious for metastatic disease spread  - Cystoscopy: A full bladder survey revealed a bubble at the dome, no evidence of bladder injury or suture in the bladder, and brisk ureteral jets from bilateral ureteral orifices.    Complications:   None      Procedure and Technique:  The patient was brought to the Operating Room where general anesthesia was induced. The abdomen, vagina and perineum were prepped and draped. A Linn catheter was placed in a sterile fashion. Atraumatic vaginal retractors were placed to identify the cervix and complete the examination. The cervix was grasped with a single tooth tenaculum. The cervix was easily dilated and the uterus sounded to 9cm. A medium Vcare uterine manipulator was placed in the uterine cavity without difficulty and the vaginal balloon inflated.     An incision was made at Hogan's point 3cm inferior to the costal margin in the mid-clavicular line and a 5mm trocar inserted on low carbon dioxide flow under direct visualization. The peritoneal cavity was then insufflated with carbon dioxide on high flow to maintain a pressure of 15 mm Hg throughout the case. An abdominopelvic survey was  completed with the above findings. An incision was made 25 cm above the pubic symphysis, through which the midline robotic trocar was introduced into the abdominal cavity. Additional robotic ports were placed under direct visualization. To allow for port placement, adhesions in the upper abdomen were taken down with the laparoscopic scissors, taking care to avoid injury. The 5mm trocar at Hogan's point was replaced by an 8mm AirSeal. With the patient in steep Trendelenburg, the robot was docked to the robotic ports and the instruments were placed under direct visualization.    With the uterus on upward traction, the bilateral round ligaments were cauterized and incised. The peritoneum was incised in planes lateral and parallel to the ovarian vessels on both sides.  The ureters were then identified retroperitoneally, coursing well posterior to the ovarian vessels. The ovarian vessels were coagulated and divided proximally using bipolar cautery. The posterior peritoneum was dropped to the level of the green Vcare ring. The vesico-uterine peritoneum was incised, and the bladder dissected from the cervix and upper vagina in a meticulous fashion to the level of the ring. The uterine vessels were then skeletonized bilaterally and coagulated at the level of the green VCare ring using bipolar current, then divided. With the bladder mobilized anteriorly and the rectum well away posteriorly, using the monopolar device, an incision was made in the anterior upper vagina at the level of the cervicovaginal junction.  The incision was continued circumferentially around the upper vagina, controlling upper vaginal blood supply laterally using the biopolar. This allowed completely amputation of the specimen. During transvaginal removal, the cervix auto-amputated from the uterus and was removed. The remaining specimen including the uterus and bilateral fallopian tubes and ovaries were then placed in a bag transvaginally and removed  en bloc transvaginally. A laparotomy sponge was placed in the vagina for vaginal occlusion.    We then began the pelvic lymphadenectomy which was performed identically bilaterally. The superior vesical arteries were identified and the pararectal and paravaginal spaces were opened. The lymph node dissection was performed starting mid-way up the common iliac arteries proximally to the circumflex vein superiorly and from the genitofemoral nerve laterally to the superior vesical artery medially, with the obturator nerve as the dorsal boundary. The lymph nodes from the common, external, internal, and obturator chains were removed without incident using cautery for dissection. Special care was taken to identify and preserve the ureter, pelvic vasculature, genitofemoral and obturator nerves, and superior vesical artery. The lymph nodes on each side were separately placed into bags and removed through the vagina. We then proceeded with the para-aortic lymphadenectomy. The pelvic peritoneum was incised along the right common iliac artery and followed up the aorta to the inferior mesenteric artery. The ureters were identified and retracted out of the operative field. The para-aortic lymph node dissection was performed with cautery from the NANCY superiorly and the psoas muscles laterally to the common iliac lymph nodes that remained after the pelvic lymph node dissection. Medium clips were used to aid in hemostasis during dissection along the IVC. Both the right and left aortic lymph nodes and presacral lymph nodes were sampled without incident taking care to protect the ureters and vasculature. The lymph nodes on each side were separately placed into bags and removed through the vagina.     An omental biopsy was performed using bipolar and monopolar cautery, taking care to avoid bowel injury. This was removed through the vagina.    The upper vagina was then closed laparoscopically with Stratafix suture, taking care to avoid  bladder injury. The sponge was removed from the vagina and a digital exam confirmed complete closure of the vaginal cuff. Surgicel was placed on the vaginal cuff, retroperitoneal areas, and para-aortic spaces with adequate hemostasis.    The robotic instruments were removed, and the robot undocked. The laparoscopic skin incisions were irrigated and made hemostatic using electrocoagulation. They were closed with subcuticular stitches of 4-0 monocryl.     The Linn catheter was removed and a cystoscopy was performed. 200cc of normal saline was instilled into the bladder and the 0 degree camera attached to a rigid cystoscope and inserted into the bladder. A full bladder survey revealed a bubble at the dome, no evidence of bladder injury or suture in the bladder, and brisk ureteral jets from bilateral ureteral orifices.    All sponge, needle and instrument counts were correct x2.  Anesthesia was reversed and the patient was taken to the PACU in a stable condition.       I was present for the entire procedure. and A physician assistant was required during the procedure for retraction, tissue handling, dissection and suturing.    Patient Disposition:  PACU       SIGNATURE: Amanda Grayson MD  DATE: January 16, 2025  TIME: 1:28 PM

## 2025-01-16 NOTE — QUICK NOTE
Post Operative Note - GYN ONCSurgery   Nancy Calderon 64 y.o. female MRN: [id number]  Unit/Bed#: [facility] 907-01 Encounter: [id number]    Assessment:  This is a 64-year-old Trinidadian-speaking female with endometrial carcinoma who underwent a robotically assisted total hysterectomy, BSO, pelvic and para-aortic lymphadenectomy, omental biopsy and cystoscopy earlier today.  Patient does have a history of diabetes mellitus, hypertension, hyperlipidemia, and nonischemic cardiomyopathy.  Postoperatively patient is doing well.  She is having abdominal discomfort as expected.  However, she denies chest pain and shortness of breath.    Plan:  -Tylenol, Motrin, oxycodone as needed for pain  -SSI, glucophage for diabetic control  -Lovenox starting tomorrow  -Toprol for hypertension  -AM labs  -Diabetic diet as tolerated  -Incentive spirometry  -SCDs for DT prophylaxis  -Will hold IV fluids secondary to CHF history  -Zofran as needed for nausea        Subjective/Objective     Subjective: This 64-year-old female underwent a robotically assisted laparoscopic hysterectomy, BSO, pelvic and periaortic  lymphadenectomy, omental biopsy and cystoscopy earlier today.  At this time patient is only complaining of abdominal discomfort as expected.  She denies nausea, vomiting, pain, shortness of breath, fever and chills.    Objective: 64-year-old female in no acute distress.  She appears comfortable she is laying in bed.    Blood pressure 123/81, pulse 71, temperature 97.8 °F (36.6 °C), resp. rate 16, height 5' (1.524 m), weight 70.3 kg (155 lb), SpO2 96%.,Body mass index is 30.27 kg/m².      Intake/Output Summary (Last 24 hours) at 1/16/2025 1622  Last data filed at 1/16/2025 1332  Gross per 24 hour   Intake 3800 ml   Output 650 ml   Net 3150 ml       Invasive Devices       Peripheral Intravenous Line  Duration             Peripheral IV 01/16/25 Left Wrist <1 day    Peripheral IV 01/16/25 Right Hand <1 day              Arterial  Line  Duration             Arterial Line 01/16/25 <1 day                    Physical Exam: General appearance: WDWN alert oriented and cooperative, history obtained from her sister-in-law  Lungs: clear to auscultation bilaterally, without rales, rhonchi, or wheezes  Heart: regular rate and rhythm, S1, S2 normal, no murmur  Abdomen: soft, appropriately tender; non distended : All 5 port incisions are clean and dry and intact  Extremities: without cyanosis, clubbing, or edema, motor/sensation grossly intact          Lab, Imaging and other studies:  Admission on 01/16/2025   Component Date Value    ABO Grouping 01/16/2025 A     Rh Factor 01/16/2025 Positive     Antibody Screen 01/16/2025 Negative     Specimen Expiration Date 01/16/2025 20250119     POC Glucose 01/16/2025 129     POC Glucose 01/16/2025 129      VTE Pharmacologic Prophylaxis: Enoxaparin (Lovenox) starting tomorrow a.m.  VTE Mechanical Prophylaxis: sequential compression device    Chacha Ledesma PA-C     This text is generated with voice recognition software. There may be translation, syntax,  or grammatical errors. If you have any questions, please contact the dictating provider.

## 2025-01-16 NOTE — ASSESSMENT & PLAN NOTE
65yo  female with bopsy-proven high grade endometrial adenocarcinoma, intact MSI testing, presenting for surgical management with EUA, RA-TLH, BSO, pelvic and para-aortic lymphadenectomy, omental biopsy, and AOIP.    Plan:  NPO  CHG wipes  IV Ancef 2g, IV Flagyl 500mg  PO Tylenol 975mg, PO gabapentin 100mg  To OR for above procedure

## 2025-01-16 NOTE — ANESTHESIA PROCEDURE NOTES
Arterial Line Insertion    Performed by: Melinda Eid CRNA  Authorized by: John Castellon MD  Indications: hemodynamic monitoring

## 2025-01-16 NOTE — ASSESSMENT & PLAN NOTE
Wt Readings from Last 3 Encounters:   01/16/25 70.3 kg (155 lb)   12/18/24 70.3 kg (155 lb)   12/17/24 70.3 kg (155 lb)     History of chronic HRrEF managed on PO aspirin, atorvastatin, fursoemide, losartan, and metoprolol  Last Echo 11/2024 with LVEF 55%, grade 1 diastolic dysfunction  Improved from prior EF 15%  Continue to monitor

## 2025-01-16 NOTE — PROGRESS NOTES
SARAH STEPHENSON did receive an ADT notification stating that Pt was at ED due to Admitting Diagnosis: Endometrial cancer. Per chart this seems that [facility] closed this referral due to Senior housing applications could not be completed due to no income. Pt is aware that the referral was closed. After chart review SARAH CM is closing this referral and is remain available for further assistance as needed.

## 2025-01-16 NOTE — ANESTHESIA POSTPROCEDURE EVALUATION
Post-Op Assessment Note    Last Filed PACU Vitals:  Vitals Value Taken Time   Temp 97.8 °F (36.6 °C) 01/16/25 1541   Pulse 76 01/16/25 1549   /81 01/16/25 1541   Resp 16 01/16/25 1541   SpO2 98 % 01/16/25 1549   Vitals shown include unfiled device data.    Modified Maribel:     Vitals Value Taken Time   Activity 2 01/16/25 1400   Respiration 2 01/16/25 1400   Circulation 2 01/16/25 1400   Consciousness 2 01/16/25 1400   Oxygen Saturation 1 01/16/25 1400     Modified Maribel Score: 9

## 2025-01-16 NOTE — ASSESSMENT & PLAN NOTE
Lab Results   Component Value Date    HGBA1C 6.1 (H) 12/12/2024   History of T2DM managed on Jardiance 10mg qd and Metformin 500mg qd  Hgb A1C stable at 6.1%  Monitor glycemic control intraop

## 2025-01-16 NOTE — ANESTHESIA PREPROCEDURE EVALUATION
Procedure:  ROBOTIC ASSISTED TOTAL LAPAROSCOPIC HYSTERECTOMY, BILATERAL SALPINGO-OOPHERECTOMY, PELVIC AND PARA-AORTIC LYMPHADENECTOMY, OMENTAL BIOPSY, EXAM UNDER ANESTHESIA  AND ALL OTHER INDICATED PROCEDURES (Abdomen)    Relevant Problems   CARDIO   (+) Primary hypertension      ENDO   (+) Type 2 diabetes mellitus without complication, without long-term current use of insulin (HCC)      GI/HEPATIC   (+) Gastroesophageal reflux disease without esophagitis      GYN   (+) Endometrial cancer (HCC)      MUSCULOSKELETAL   (+) Chronic bilateral thoracic back pain      NEURO/PSYCH   (+) Chronic bilateral thoracic back pain        Physical Exam    Airway    Mallampati score: III  TM Distance: >3 FB  Neck ROM: full     Dental   No notable dental hx     Cardiovascular  Cardiovascular exam normal    Pulmonary  Pulmonary exam normal     Other Findings  post-pubertal.      Anesthesia Plan  ASA Score- 3     Anesthesia Type- general with ASA Monitors.         Additional Monitors: arterial line.    Airway Plan: ETT.           Plan Factors-Exercise tolerance (METS): >4 METS.    Chart reviewed. EKG reviewed.  Existing labs reviewed. Patient summary reviewed.    Patient is not a current smoker.              Induction- intravenous.    Postoperative Plan- Plan for postoperative opioid use.         Informed Consent- Anesthetic plan and risks discussed with patient.  I personally reviewed this patient with the CRNA. Discussed and agreed on the Anesthesia Plan with the CRNA..      NPO Status:  Vitals Value Taken Time   Date of last liquid 01/15/25 01/16/25 0619   Time of last liquid 2200 01/16/25 0619   Date of last solid 01/15/25 01/16/25 0619   Time of last solid 2200 01/16/25 0619

## 2025-01-16 NOTE — H&P
"For questions/concerns on this patient, please reach out to the following:  [facility]-OB GYN-GynOnc- Resident/AP      H&P Exam - Gynecology Oncology  Nancy Calderon 64 y.o. female MRN: [id number]  Unit/Bed#:  Encounter: [id number]        Assessment & Plan  Endometrial cancer (HCC)  65yo  female with bopsy-proven high grade endometrial adenocarcinoma, intact MSI testing, presenting for surgical management with EUA, RA-TLH, BSO, pelvic and para-aortic lymphadenectomy, omental biopsy, and AOIP.    Plan:  NPO  CHG wipes  IV Ancef 2g, IV Flagyl 500mg  PO Tylenol 975mg, PO gabapentin 100mg  To OR for above procedure  Chronic HFrEF (heart failure with reduced ejection fraction) (HCC)  Wt Readings from Last 3 Encounters:   24 70.3 kg (155 lb)   24 70.3 kg (155 lb)   24 71.7 kg (158 lb)     History of chronic HRrEF managed on PO aspirin, atorvastatin, fursoemide, losartan, and metoprolol  Last Echo 2024 with LVEF 55%, grade 1 diastolic dysfunction  Improved from prior EF 15%  Continue to monitor    Type 2 diabetes mellitus without complication, without long-term current use of insulin (HCC)    Lab Results   Component Value Date    HGBA1C 6.1 (H) 2024   History of T2DM managed on Jardiance 10mg qd and Metformin 500mg qd  Hgb A1C stable at 6.1%  Monitor glycemic control intraop  Nonischemic cardiomyopathy (HCC)  See problem for \"Chronic HRrEF (heart failure with reduced ejection fraction) (HCC)\"             History of Present Illness     HPI:  Nancy Calderon is a 64 y.o. female who presents for above procedure in the setting of endometrial adenocarcinoma. Patient last seen on 24 where procedure was reviewed in detail including expectations for postoperative recovery and return of pathology. All questions were answered after appropriate counseling. Preoperative  wnl at 7.3. ***    Oncology History    No history exists.       Review of Systems    Historical " "Information   Past Medical History:   Diagnosis Date    Diabetes mellitus (HCC)     Fibroid     Heart failure (HCC)     Hyperlipidemia     Hypertension     Myocardial infarction (HCC) 2024    Nonischemic cardiomyopathy (HCC)      Past Surgical History:   Procedure Laterality Date    BLADDER NECK SUSPENSION      CARDIAC CATHETERIZATION Left 07/10/2024    Procedure: Cardiac Left Heart Cath;  Surgeon: Justino Patel MD;  Location: [facility];  Service: Cardiology    SPINAL FIXATION SURGERY      L3-L5 fusion     OB History    Para Term  AB Living   5 4 4 0 1 4   SAB IAB Ectopic Multiple Live Births   1 0 0 0 4      # Outcome Date GA Lbr Chauncey/2nd Weight Sex Type Anes PTL Lv   5 SAB            4 Term            3 Term            2 Term            1 Term              Family History   Problem Relation Age of Onset    Cancer Paternal Aunt         vaginal    Vaginal cancer Paternal Aunt     Cancer Paternal Grandmother         vaginal    Vaginal cancer Paternal Grandmother     Breast cancer Neg Hx     Colon cancer Neg Hx     Ovarian cancer Neg Hx     Endometrial cancer Neg Hx      Social History   Social History     Substance and Sexual Activity   Alcohol Use Never     Social History     Substance and Sexual Activity   Drug Use Never     Social History     Tobacco Use   Smoking Status Never   Smokeless Tobacco Never       Meds/Allergies   No medications prior to admission.  No Known Allergies    Objective     Ht 5' 4\" (1.626 m)   Wt 70.3 kg (155 lb)   BMI 26.61 kg/m²     No intake/output data recorded.  No intake/output data recorded.    Lab Results   Component Value Date    WBC 8.32 2024    HGB 13.0 2024    HCT 39.1 2024    MCV 97 2024     2024       Lab Results   Component Value Date    CALCIUM 9.6 2024    K 3.9 2024    CO2 28 2024     2024    BUN 23 2024    CREATININE 0.76 2024       Physical Exam    Imaging: {Results " "Review Statement:57041::\"No pertinent imaging studies reviewed.\"}  EKG, Pathology, and Other Studies: {Results Review Statement:70860::\"No pertinent imaging studies reviewed.\"}      Code Status: Prior    Aaron Fong MD  1/15/2025  9:22 PM    "

## 2025-01-16 NOTE — DISCHARGE INSTRUCTIONS
[facility] - Gynecologic Oncology  Jason Grayson, Matilde, Elba, Lisa, and Andrés  [phone]    Hysterectomy Discharge Instructions    WHAT YOU NEED TO KNOW:   A hysterectomy is surgery to remove your uterus. Your ovaries, fallopian tubes, cervix, or part of your vagina may also need to be removed. The organs and tissue that will be removed depends on your medical condition.  After a hysterectomy, you will not be able to become pregnant.  If your ovaries are removed, you will go through menopause if you have not already.    DISCHARGE INSTRUCTIONS:   Contact your doctor at the number above if:   You have a fever over 101o.  You have nausea or are vomiting that does not improve after a light meal.   Your pain is getting worse, even after you take medicine.   You feel pain or burning when you urinate, or you have trouble urinating.   You have pus or a foul-smelling odor coming from your vagina.    Your wound is red, swollen, or draining pus.  You see new or an increased amount of bright red blood coming from your vagina or your incisions.   You have questions or concerns about your condition or care.    Seek care immediately:   Your arm or leg feels warm, tender, and painful. It may look swollen and red.  You have increasing abdominal or pelvic pain.   You have heavy vaginal bleeding that fills 1 or more sanitary pads in 1 hour.    Call 911 for any of the following:   You feel lightheaded, short of breath, and have chest pain.   You cough up blood.    Medicines: You may need any of the following:  Prescription medicine may be given. You may receive a prescription for pain medication or be advised to use over the counter (OTC) pain medication such as acetaminophen (Tylenol) or ibuprofen (Advil, Motrin). Ask your healthcare provider how to take this medicine safely.  NSAIDs , such as ibuprofen, help decrease swelling, pain, and fever. NSAIDs can cause stomach bleeding or kidney  problems in certain people. If you take blood thinner medicine, always ask your healthcare provider if NSAIDs are safe for you. Always read the medicine label and follow directions.   Stool softeners help treat or prevent constipation.    Take your medicine as directed. Contact your healthcare provider if you think your medicine is not helping or if you have side effects. Tell him or her if you are allergic to any medicine. Keep a list of the medicines, vitamins, and herbs you take. Include the amounts, and when and why you take them. Bring the list or the pill bottles to follow-up visits. Carry your medicine list with you in case of an emergency.    Activity:   Rest as needed. Get up and move around as directed to help prevent blood clots. Start with short walks and slowly increase the distance every day. Limit the number of times you climb stairs to 2 times each day for the first week. Plan most of your daily activities on one level of your home.      Do not lift objects heavier than 10 pounds for 6 weeks. Avoid strenuous activity for 2 weeks.      Do not strain during bowel movements. High-fiber foods and extra liquids can help you prevent constipation. Examples of high-fiber foods are fruit and bran. Prune juice and water are good liquids to drink.      Do not have sex, use tampons, or douche for up to 8 weeks.     You may shower as soon as the day after surgery.  Tub baths can be taken starting 2 weeks after surgery.Do not go into pools or hot tubs until cleared by your doctor.      Ask when it is safe for you to drive. It is generally safe to drive after 2 weeks and when no longer taking prescription pain medication.    Ask when you may return to work and to other regular activities.    Wound care: Care for your abdominal incisions as directed. Carefully wash around the wound with soap and water. If you have Hibiclens or medicated soap that you were instructed to use before surgery, you may use that to wash  with for up to 2 days after surgery.  If not, any mild non-scented, non-abrasive soap is safe.  It is okay to let the soap and water run over your incision. Do not scrub your incision. Dry the area and put on new, clean bandages as directed. Change your bandages when they get wet or dirty. If you have strips of medical tape, let them fall off on their own. It may take 7 to 14 days for them to fall off. Check your incision every day for redness, swelling, or pus.   Deep breathing: Take deep breaths and cough 10 times each hour. This will decrease your risk for a lung infection. Take a deep breath and hold it for as long as you can. Let the air out and then cough strongly. Deep breaths help open your airway. You may be given an incentive spirometer to help you take deep breaths. Put the plastic piece in your mouth and take a slow, deep breath, then let the air out and cough. Repeat these steps 10 times every hour.   Get support: This surgery may be life-changing for you and your family. You will no longer be able to get pregnant. Sudden changes in the levels of your hormones may occur and cause mood swings and depression. You may feel angry, sad, or frightened, or cry frequently and unexpectedly. These feelings are normal. Talk to your healthcare provider about where you can get support. You can also ask if hormone replacement medicine is right for you.   Follow up with your healthcare provider or gynecologist as directed: You may need to return to have stitches removed, and for other tests. Write down your questions so you remember to ask them during your visits.

## 2025-01-16 NOTE — H&P
"For questions/concerns on this patient, please reach out to the following:  [facility]-OB GYN-GynOnc- Resident/AP      H&P Exam - Gynecology Oncology  Nancy Calderon 64 y.o. female MRN: [id number]  Unit/Bed#: [facility] Encounter: [id number]        Assessment & Plan  Endometrial cancer (HCC)  63yo  female with bopsy-proven high grade endometrial adenocarcinoma, intact MSI testing, presenting for surgical management with EUA, RA-TLH, BSO, pelvic and para-aortic lymphadenectomy, omental biopsy, and AOIP.     Plan:  NPO  CHG wipes  IV Ancef 2g, IV Flagyl 500mg  PO Tylenol 975mg, PO gabapentin 100mg  To OR for above procedure  Chronic HFrEF (heart failure with reduced ejection fraction) (HCC)  Wt Readings from Last 3 Encounters:   25 70.3 kg (155 lb)   24 70.3 kg (155 lb)   24 70.3 kg (155 lb)     History of chronic HRrEF managed on PO aspirin, atorvastatin, fursoemide, losartan, and metoprolol  Last Echo 2024 with LVEF 55%, grade 1 diastolic dysfunction  Improved from prior EF 15%  Continue to monitor  Type 2 diabetes mellitus without complication, without long-term current use of insulin (HCC)    Lab Results   Component Value Date    HGBA1C 6.1 (H) 2024   History of T2DM managed on Jardiance 10mg qd and Metformin 500mg qd  Hgb A1C stable at 6.1%  Monitor glycemic control intraop  Nonischemic cardiomyopathy (HCC)  See problem for \"Chronic HRrEF (heart failure with reduced ejection fraction) (HCC)\"           History of Present Illness     HPI:  Nancy Calderon is a 64 y.o. female who presents for above procedure in the setting of endometrial adenocarcinoma. Patient last seen on 24 where procedure was reviewed in detail including expectations for postoperative recovery and return of pathology. All questions were answered after appropriate counseling. Preoperative  wnl at 7.3.     Today, patient reports continued vaginal bleeding. Otherwise feels " well.    Oncology History    No history exists.       Review of Systems   Constitutional:  Negative for activity change and unexpected weight change.   HENT: Negative.     Eyes: Negative.    Respiratory: Negative.  Negative for shortness of breath.    Cardiovascular: Negative.  Negative for chest pain.   Gastrointestinal:  Negative for abdominal distention and abdominal pain.   Endocrine: Negative.    Genitourinary:  Positive for vaginal bleeding. Negative for pelvic pain.   Musculoskeletal: Negative.    Skin: Negative.    Allergic/Immunologic: Negative.    Neurological: Negative.    Hematological: Negative.    Psychiatric/Behavioral: Negative.         Historical Information   Past Medical History:   Diagnosis Date    Diabetes mellitus (HCC)     Fibroid     Heart failure (HCC)     Hyperlipidemia     Hypertension     Myocardial infarction (HCC) 2024    Nonischemic cardiomyopathy (HCC)      Past Surgical History:   Procedure Laterality Date    BLADDER NECK SUSPENSION      CARDIAC CATHETERIZATION Left 07/10/2024    Procedure: Cardiac Left Heart Cath;  Surgeon: Justino Patel MD;  Location: [facility];  Service: Cardiology    SPINAL FIXATION SURGERY      L3-L5 fusion     OB History    Para Term  AB Living   5 4 4 0 1 4   SAB IAB Ectopic Multiple Live Births   1 0 0 0 4      # Outcome Date GA Lbr Chauncey/2nd Weight Sex Type Anes PTL Lv   5 SAB            4 Term            3 Term            2 Term            1 Term              Family History   Problem Relation Age of Onset    Cancer Paternal Aunt         vaginal    Vaginal cancer Paternal Aunt     Cancer Paternal Grandmother         vaginal    Vaginal cancer Paternal Grandmother     Breast cancer Neg Hx     Colon cancer Neg Hx     Ovarian cancer Neg Hx     Endometrial cancer Neg Hx      Social History   Social History     Substance and Sexual Activity   Alcohol Use Never     Social History     Substance and Sexual Activity   Drug Use Never      Social History     Tobacco Use   Smoking Status Never   Smokeless Tobacco Never       Meds/Allergies     Medications Prior to Admission:     aspirin 81 mg chewable tablet    atorvastatin (LIPITOR) 40 mg tablet    colchicine (COLCRYS) 0.6 mg tablet    Empagliflozin (JARDIANCE) 10 MG TABS tablet    lidocaine (LMX) 4 % cream    losartan (COZAAR) 25 mg tablet    metFORMIN (GLUCOPHAGE) 500 mg tablet    metoprolol succinate (TOPROL-XL) 25 mg 24 hr tablet    traMADol (Ultram) 50 mg tablet    Blood Glucose Monitoring Suppl (OneTouch Verio Reflect) w/Device KIT    Blood Pressure Monitoring (B-D ASSURE BPM/DELUXE ARM CUFF) MISC    furosemide (LASIX) 20 mg tablet    glucose blood (OneTouch Verio) test strip    OneTouch Delica Lancets 33G MISC  No Known Allergies    Objective     /95 (BP Location: Left arm)   Pulse 80   Temp 98.5 °F (36.9 °C) (Temporal)   Resp 18   Ht 5' (1.524 m)   Wt 70.3 kg (155 lb)   SpO2 95%   BMI 30.27 kg/m²     No intake/output data recorded.  No intake/output data recorded.    Lab Results   Component Value Date    WBC 8.32 12/12/2024    HGB 13.0 12/12/2024    HCT 39.1 12/12/2024    MCV 97 12/12/2024     12/12/2024       Lab Results   Component Value Date    CALCIUM 9.6 12/12/2024    K 3.9 12/12/2024    CO2 28 12/12/2024     12/12/2024    BUN 23 12/12/2024    CREATININE 0.76 12/12/2024       Physical Exam    Imaging: Results Review Statement: I personally reviewed the following image studies in PACS and associated radiology reports: CT abdomen/pelvis. My interpretation of the radiology images/reports is: Enlarged heterogeneous uterus, enlarged bilateral retroperitoneal lymph nodes.  EKG, Pathology, and Other Studies: Results Review Statement: No pertinent imaging studies reviewed.    See cardiology evaluation.      Code Status: Prior    Amanda Grayson MD  1/16/2025  6:57 AM

## 2025-01-16 NOTE — ASSESSMENT & PLAN NOTE
Wt Readings from Last 3 Encounters:   12/18/24 70.3 kg (155 lb)   12/17/24 70.3 kg (155 lb)   11/26/24 71.7 kg (158 lb)     History of chronic HRrEF managed on PO aspirin, atorvastatin, fursoemide, losartan, and metoprolol  Last Echo 11/2024 with LVEF 55%, grade 1 diastolic dysfunction  Improved from prior EF 15%  Continue to monitor

## 2025-01-16 NOTE — ANESTHESIA PROCEDURE NOTES
Arterial Line Insertion    Performed by: Jing Salas CRNA  Authorized by: John Castellon MD

## 2025-01-16 NOTE — INTERVAL H&P NOTE
H&P reviewed. After examining the patient I find no changes in the patients condition since the H&P had been written.    Vitals:    01/16/25 0611   BP: 157/95   Pulse: 80   Resp: 18   Temp: 98.5 °F (36.9 °C)   SpO2: 95%

## 2025-01-17 LAB
ANION GAP SERPL CALCULATED.3IONS-SCNC: 3 MMOL/L (ref 4–13)
BASOPHILS # BLD AUTO: 0.02 THOUSANDS/ΜL (ref 0–0.1)
BASOPHILS NFR BLD AUTO: 0 % (ref 0–1)
BUN SERPL-MCNC: 13 MG/DL (ref 5–25)
CALCIUM SERPL-MCNC: 8 MG/DL (ref 8.4–10.2)
CHLORIDE SERPL-SCNC: 107 MMOL/L (ref 96–108)
CO2 SERPL-SCNC: 29 MMOL/L (ref 21–32)
CREAT SERPL-MCNC: 0.67 MG/DL (ref 0.6–1.3)
EOSINOPHIL # BLD AUTO: 0.15 THOUSAND/ΜL (ref 0–0.61)
EOSINOPHIL NFR BLD AUTO: 2 % (ref 0–6)
ERYTHROCYTE [DISTWIDTH] IN BLOOD BY AUTOMATED COUNT: 12.2 % (ref 11.6–15.1)
ERYTHROCYTE [DISTWIDTH] IN BLOOD BY AUTOMATED COUNT: 12.2 % (ref 11.6–15.1)
GFR SERPL CREATININE-BSD FRML MDRD: 93 ML/MIN/1.73SQ M
GLUCOSE P FAST SERPL-MCNC: 107 MG/DL (ref 65–99)
GLUCOSE SERPL-MCNC: 106 MG/DL (ref 65–140)
GLUCOSE SERPL-MCNC: 107 MG/DL (ref 65–140)
GLUCOSE SERPL-MCNC: 108 MG/DL (ref 65–140)
GLUCOSE SERPL-MCNC: 115 MG/DL (ref 65–140)
GLUCOSE SERPL-MCNC: 151 MG/DL (ref 65–140)
HCT VFR BLD AUTO: 33.7 % (ref 34.8–46.1)
HCT VFR BLD AUTO: 34.8 % (ref 34.8–46.1)
HGB BLD-MCNC: 10.9 G/DL (ref 11.5–15.4)
HGB BLD-MCNC: 11.4 G/DL (ref 11.5–15.4)
IMM GRANULOCYTES # BLD AUTO: 0.03 THOUSAND/UL (ref 0–0.2)
IMM GRANULOCYTES NFR BLD AUTO: 0 % (ref 0–2)
LYMPHOCYTES # BLD AUTO: 1.81 THOUSANDS/ΜL (ref 0.6–4.47)
LYMPHOCYTES NFR BLD AUTO: 23 % (ref 14–44)
MAGNESIUM SERPL-MCNC: 1.8 MG/DL (ref 1.9–2.7)
MCH RBC QN AUTO: 31.5 PG (ref 26.8–34.3)
MCH RBC QN AUTO: 31.8 PG (ref 26.8–34.3)
MCHC RBC AUTO-ENTMCNC: 32.3 G/DL (ref 31.4–37.4)
MCHC RBC AUTO-ENTMCNC: 32.8 G/DL (ref 31.4–37.4)
MCV RBC AUTO: 97 FL (ref 82–98)
MCV RBC AUTO: 97 FL (ref 82–98)
MONOCYTES # BLD AUTO: 0.44 THOUSAND/ΜL (ref 0.17–1.22)
MONOCYTES NFR BLD AUTO: 6 % (ref 4–12)
NEUTROPHILS # BLD AUTO: 5.44 THOUSANDS/ΜL (ref 1.85–7.62)
NEUTS SEG NFR BLD AUTO: 69 % (ref 43–75)
NRBC BLD AUTO-RTO: 0 /100 WBCS
PLATELET # BLD AUTO: 160 THOUSANDS/UL (ref 149–390)
PLATELET # BLD AUTO: 178 THOUSANDS/UL (ref 149–390)
PMV BLD AUTO: 11.5 FL (ref 8.9–12.7)
PMV BLD AUTO: 11.9 FL (ref 8.9–12.7)
POTASSIUM SERPL-SCNC: 3.8 MMOL/L (ref 3.5–5.3)
RBC # BLD AUTO: 3.46 MILLION/UL (ref 3.81–5.12)
RBC # BLD AUTO: 3.58 MILLION/UL (ref 3.81–5.12)
SODIUM SERPL-SCNC: 139 MMOL/L (ref 135–147)
WBC # BLD AUTO: 7.45 THOUSAND/UL (ref 4.31–10.16)
WBC # BLD AUTO: 7.89 THOUSAND/UL (ref 4.31–10.16)

## 2025-01-17 PROCEDURE — 80048 BASIC METABOLIC PNL TOTAL CA: CPT

## 2025-01-17 PROCEDURE — 82948 REAGENT STRIP/BLOOD GLUCOSE: CPT

## 2025-01-17 PROCEDURE — 99024 POSTOP FOLLOW-UP VISIT: CPT | Performed by: OBSTETRICS & GYNECOLOGY

## 2025-01-17 PROCEDURE — 83735 ASSAY OF MAGNESIUM: CPT

## 2025-01-17 PROCEDURE — 85025 COMPLETE CBC W/AUTO DIFF WBC: CPT

## 2025-01-17 PROCEDURE — 85027 COMPLETE CBC AUTOMATED: CPT

## 2025-01-17 RX ORDER — MAGNESIUM SULFATE HEPTAHYDRATE 40 MG/ML
2 INJECTION, SOLUTION INTRAVENOUS ONCE
Status: COMPLETED | OUTPATIENT
Start: 2025-01-17 | End: 2025-01-17

## 2025-01-17 RX ADMIN — MAGNESIUM SULFATE HEPTAHYDRATE 2 G: 40 INJECTION, SOLUTION INTRAVENOUS at 08:29

## 2025-01-17 RX ADMIN — ACETAMINOPHEN 975 MG: 325 TABLET, FILM COATED ORAL at 18:47

## 2025-01-17 RX ADMIN — METFORMIN HYDROCHLORIDE 500 MG: 500 TABLET ORAL at 08:29

## 2025-01-17 RX ADMIN — ACETAMINOPHEN 975 MG: 325 TABLET, FILM COATED ORAL at 12:01

## 2025-01-17 RX ADMIN — IBUPROFEN 600 MG: 400 TABLET, FILM COATED ORAL at 05:08

## 2025-01-17 RX ADMIN — ENOXAPARIN SODIUM 40 MG: 40 INJECTION SUBCUTANEOUS at 08:29

## 2025-01-17 RX ADMIN — IBUPROFEN 600 MG: 400 TABLET, FILM COATED ORAL at 12:03

## 2025-01-17 RX ADMIN — OXYCODONE HYDROCHLORIDE 5 MG: 5 TABLET ORAL at 18:03

## 2025-01-17 RX ADMIN — ACETAMINOPHEN 975 MG: 325 TABLET, FILM COATED ORAL at 00:04

## 2025-01-17 RX ADMIN — IBUPROFEN 600 MG: 400 TABLET, FILM COATED ORAL at 00:04

## 2025-01-17 RX ADMIN — OXYCODONE HYDROCHLORIDE 5 MG: 5 TABLET ORAL at 22:25

## 2025-01-17 RX ADMIN — IBUPROFEN 600 MG: 400 TABLET, FILM COATED ORAL at 17:56

## 2025-01-17 RX ADMIN — OXYCODONE HYDROCHLORIDE 5 MG: 5 TABLET ORAL at 08:58

## 2025-01-17 RX ADMIN — ACETAMINOPHEN 975 MG: 325 TABLET, FILM COATED ORAL at 05:08

## 2025-01-17 NOTE — ASSESSMENT & PLAN NOTE
"See plan under \"Chronic HFrEF(heart failure with reduced ejection fraction) ([facility])\"  "

## 2025-01-17 NOTE — PROGRESS NOTES
"For questions/concerns on this patient, please reach out to the following:  [facility]-GynOnc- Resident/AP  Gyn Oncology Progress note   Nancy Calderon 64 y.o. female MRN: [id number]  [facility] Encounter: [id number]    Assessment/Plan:    64 y.o. POD# 1 from  TL, BSO, pelvic and para-aortic lymphadenectomy, omental biopsy, and cystoscopy in setting of biopsy proven high grade endometrial adenocarcinoma, intact MSI testing.     Assessment & Plan  Endometrial cancer (HCC)  S/p procedure stated above  FEN: Diabetic diet, no IVF, zofran PRN, monitor and replete electrolytes as needed  Pain: tylenol/motrin scheduled, fentanyl prn, Hanane/5/10 prn  : adequate UOP  DVT ppx: SCDs, Lovenox 40mg qd ordered to start this morning   Hgb 13.0 >10.9  Chronic HFrEF (heart failure with reduced ejection fraction) ([facility])  Wt Readings from Last 3 Encounters:   01/16/25 70.3 kg (155 lb)   12/18/24 70.3 kg (155 lb)   12/17/24 70.3 kg (155 lb)     History of chronic HRrEF managed on home regimen of PO aspirin, atorvastatin, fursoemide, losartan, and metoprolol -> home metoprolol ordered  Last Echo 11/2024 with LVEF 55%, grade 1 diastolic dysfunction  Improved from prior EF 15%  Avoid postoperative fluids  Type 2 diabetes mellitus without complication, without long-term current use of insulin ([facility])    Lab Results   Component Value Date    HGBA1C 6.1 (H) 12/12/2024   Diabetic diet  Metformin +SSI ordered  Nonischemic cardiomyopathy (HCC)  See plan under \"Chronic HFrEF(heart failure with reduced ejection fraction) ([facility])\"         Subjective:    Nancy Calderon has no current complaints and reports she is feeling much better than yesterday.  Overnight events: none. Pain is well controlled with scheduled motrin/tylenol. She reports a cough that she has had for a while. When she coughs, she has abdominal pain.  Patient is currently voiding.  She is ambulating.  Patient is currently passing flatus and has had " no bowel movement. She is tolerating PO, and denies nausea or vomiting. Patient denies fever, chills, chest pain, shortness of breath, or calf tenderness. Reports both of her legs feel heavy this morning. Movement intact.     Objective:  BP 91/55   Pulse 75   Temp 98.3 °F (36.8 °C) (Oral)   Resp 16   Ht 5' (1.524 m)   Wt 70.3 kg (155 lb)   SpO2 99%   BMI 30.27 kg/m²     I/O last 3 completed shifts:  In: 3800 [I.V.:3700; IV Piggyback:100]  Out: 650 [Urine:300; Blood:350]  I/O this shift:  In: -   Out: 1000 [Urine:1000]    Lab Results   Component Value Date    WBC 7.45 01/17/2025    HGB 10.9 (L) 01/17/2025    HCT 33.7 (L) 01/17/2025    MCV 97 01/17/2025     01/17/2025       Lab Results   Component Value Date    CALCIUM 9.6 12/12/2024    K 3.9 12/12/2024    CO2 28 12/12/2024     12/12/2024    BUN 23 12/12/2024    CREATININE 0.76 12/12/2024           Physical Exam  Vitals reviewed.   Constitutional:       General: She is not in acute distress.  HENT:      Head: Normocephalic.      Nose: Nose normal.      Mouth/Throat:      Pharynx: Oropharynx is clear.   Eyes:      Conjunctiva/sclera: Conjunctivae normal.   Cardiovascular:      Rate and Rhythm: Normal rate and regular rhythm.      Heart sounds: Normal heart sounds.   Pulmonary:      Effort: Pulmonary effort is normal. No respiratory distress.      Breath sounds: Normal breath sounds.   Abdominal:      General: There is no distension.      Palpations: Abdomen is soft.      Comments: Laparoscopic port sites C/D/I  Soft, appropriately tender   Musculoskeletal:         General: No tenderness.      Right lower leg: No edema.      Left lower leg: No edema.   Skin:     General: Skin is warm and dry.      Coloration: Skin is not jaundiced.   Neurological:      Mental Status: She is alert.   Psychiatric:         Mood and Affect: Mood normal.         Behavior: Behavior normal.           Amberly Akbar MD  1/17/2025  6:25 AM

## 2025-01-17 NOTE — ASSESSMENT & PLAN NOTE
S/p procedure stated above  FEN: Diabetic diet, no IVF, zofran PRN, monitor and replete electrolytes as needed  Pain: tylenol/motrin scheduled, fentanyl prn, Hanane/5/10 prn  : adequate UOP  DVT ppx: SCDs, Lovenox 40mg qd ordered to start this morning   Hgb 13.0 >10.9

## 2025-01-17 NOTE — ASSESSMENT & PLAN NOTE
Lab Results   Component Value Date    HGBA1C 6.1 (H) 12/12/2024   Diabetic diet  Metformin +SSI ordered

## 2025-01-17 NOTE — ASSESSMENT & PLAN NOTE
Wt Readings from Last 3 Encounters:   01/16/25 70.3 kg (155 lb)   12/18/24 70.3 kg (155 lb)   12/17/24 70.3 kg (155 lb)     History of chronic HRrEF managed on home regimen of PO aspirin, atorvastatin, fursoemide, losartan, and metoprolol -> home metoprolol ordered  Last Echo 11/2024 with LVEF 55%, grade 1 diastolic dysfunction  Improved from prior EF 15%  Avoid postoperative fluids

## 2025-01-18 VITALS
HEART RATE: 72 BPM | RESPIRATION RATE: 16 BRPM | OXYGEN SATURATION: 93 % | TEMPERATURE: 98.1 F | WEIGHT: 176.15 LBS | SYSTOLIC BLOOD PRESSURE: 116 MMHG | DIASTOLIC BLOOD PRESSURE: 70 MMHG | BODY MASS INDEX: 34.58 KG/M2 | HEIGHT: 60 IN

## 2025-01-18 PROBLEM — Z90.710 STATUS POST HYSTERECTOMY: Status: ACTIVE | Noted: 2025-01-18

## 2025-01-18 LAB
ANION GAP SERPL CALCULATED.3IONS-SCNC: 7 MMOL/L (ref 4–13)
BUN SERPL-MCNC: 12 MG/DL (ref 5–25)
CALCIUM SERPL-MCNC: 8.5 MG/DL (ref 8.4–10.2)
CHLORIDE SERPL-SCNC: 104 MMOL/L (ref 96–108)
CO2 SERPL-SCNC: 28 MMOL/L (ref 21–32)
CREAT SERPL-MCNC: 0.55 MG/DL (ref 0.6–1.3)
ERYTHROCYTE [DISTWIDTH] IN BLOOD BY AUTOMATED COUNT: 12.1 % (ref 11.6–15.1)
GFR SERPL CREATININE-BSD FRML MDRD: 99 ML/MIN/1.73SQ M
GLUCOSE SERPL-MCNC: 160 MG/DL (ref 65–140)
GLUCOSE SERPL-MCNC: 90 MG/DL (ref 65–140)
GLUCOSE SERPL-MCNC: 97 MG/DL (ref 65–140)
HCT VFR BLD AUTO: 35.7 % (ref 34.8–46.1)
HGB BLD-MCNC: 11.4 G/DL (ref 11.5–15.4)
MAGNESIUM SERPL-MCNC: 2.1 MG/DL (ref 1.9–2.7)
MCH RBC QN AUTO: 31.4 PG (ref 26.8–34.3)
MCHC RBC AUTO-ENTMCNC: 31.9 G/DL (ref 31.4–37.4)
MCV RBC AUTO: 98 FL (ref 82–98)
PLATELET # BLD AUTO: 173 THOUSANDS/UL (ref 149–390)
PMV BLD AUTO: 11.7 FL (ref 8.9–12.7)
POTASSIUM SERPL-SCNC: 4.1 MMOL/L (ref 3.5–5.3)
RBC # BLD AUTO: 3.63 MILLION/UL (ref 3.81–5.12)
SODIUM SERPL-SCNC: 139 MMOL/L (ref 135–147)
WBC # BLD AUTO: 7.71 THOUSAND/UL (ref 4.31–10.16)

## 2025-01-18 PROCEDURE — NC001 PR NO CHARGE: Performed by: OBSTETRICS & GYNECOLOGY

## 2025-01-18 PROCEDURE — 85027 COMPLETE CBC AUTOMATED: CPT

## 2025-01-18 PROCEDURE — 99024 POSTOP FOLLOW-UP VISIT: CPT | Performed by: OBSTETRICS & GYNECOLOGY

## 2025-01-18 PROCEDURE — 82948 REAGENT STRIP/BLOOD GLUCOSE: CPT

## 2025-01-18 PROCEDURE — 80048 BASIC METABOLIC PNL TOTAL CA: CPT

## 2025-01-18 PROCEDURE — 83735 ASSAY OF MAGNESIUM: CPT

## 2025-01-18 RX ORDER — OXYCODONE HYDROCHLORIDE 5 MG/1
5 TABLET ORAL EVERY 6 HOURS PRN
Qty: 16 TABLET | Refills: 0 | Status: SHIPPED | OUTPATIENT
Start: 2025-01-18 | End: 2025-01-22

## 2025-01-18 RX ORDER — IBUPROFEN 600 MG/1
600 TABLET, FILM COATED ORAL EVERY 8 HOURS SCHEDULED
Start: 2025-01-18 | End: 2025-01-25

## 2025-01-18 RX ORDER — OXYCODONE HYDROCHLORIDE 5 MG/1
5 TABLET ORAL EVERY 6 HOURS PRN
Qty: 16 TABLET | Refills: 0 | Status: SHIPPED | OUTPATIENT
Start: 2025-01-18 | End: 2025-01-18

## 2025-01-18 RX ORDER — ACETAMINOPHEN 325 MG/1
975 TABLET ORAL EVERY 8 HOURS SCHEDULED
Start: 2025-01-18 | End: 2025-01-25

## 2025-01-18 RX ADMIN — METOPROLOL SUCCINATE 25 MG: 25 TABLET, EXTENDED RELEASE ORAL at 08:37

## 2025-01-18 RX ADMIN — IBUPROFEN 600 MG: 400 TABLET, FILM COATED ORAL at 05:31

## 2025-01-18 RX ADMIN — METFORMIN HYDROCHLORIDE 500 MG: 500 TABLET ORAL at 08:37

## 2025-01-18 RX ADMIN — ENOXAPARIN SODIUM 40 MG: 40 INJECTION SUBCUTANEOUS at 08:37

## 2025-01-18 RX ADMIN — ACETAMINOPHEN 975 MG: 325 TABLET, FILM COATED ORAL at 05:31

## 2025-01-18 RX ADMIN — IBUPROFEN 600 MG: 400 TABLET, FILM COATED ORAL at 12:06

## 2025-01-18 RX ADMIN — ACETAMINOPHEN 975 MG: 325 TABLET, FILM COATED ORAL at 12:06

## 2025-01-18 NOTE — PLAN OF CARE
Problem: Prexisting or High Potential for Compromised Skin Integrity  Goal: Skin integrity is maintained or improved  Description: INTERVENTIONS:  - Identify patients at risk for skin breakdown  - Assess and monitor skin integrity  - Assess and monitor nutrition and hydration status  - Monitor labs   - Assess for incontinence   - Turn and reposition patient  - Assist with mobility/ambulation  - Relieve pressure over bony prominences  - Avoid friction and shearing  - Provide appropriate hygiene as needed including keeping skin clean and dry  - Evaluate need for skin moisturizer/barrier cream  - Collaborate with interdisciplinary team   - Patient/family teaching  - Consider wound care consult   1/17/2025 1952 by Sri Summers RN  Outcome: Progressing  1/17/2025 1952 by Sri Summers RN  Outcome: Progressing     Problem: PAIN - ADULT  Goal: Verbalizes/displays adequate comfort level or baseline comfort level  Description: Interventions:  - Encourage patient to monitor pain and request assistance  - Assess pain using appropriate pain scale  - Administer analgesics based on type and severity of pain and evaluate response  - Implement non-pharmacological measures as appropriate and evaluate response  - Consider cultural and social influences on pain and pain management  - Notify physician/advanced practitioner if interventions unsuccessful or patient reports new pain  Outcome: Progressing     Problem: INFECTION - ADULT  Goal: Absence or prevention of progression during hospitalization  Description: INTERVENTIONS:  - Assess and monitor for signs and symptoms of infection  - Monitor lab/diagnostic results  - Monitor all insertion sites, i.e. indwelling lines, tubes, and drains  - Monitor endotracheal if appropriate and nasal secretions for changes in amount and color  - [place] appropriate cooling/warming therapies per order  - Administer medications as ordered  - Instruct and encourage patient and  family to use good hand hygiene technique  - Identify and instruct in appropriate isolation precautions for identified infection/condition  Outcome: Progressing  Goal: Absence of fever/infection during neutropenic period  Description: INTERVENTIONS:  - Monitor WBC    Outcome: Progressing     Problem: SAFETY ADULT  Goal: Patient will remain free of falls  Description: INTERVENTIONS:  - Educate patient/family on patient safety including physical limitations  - Instruct patient to call for assistance with activity   - Consult OT/PT to assist with strengthening/mobility   - Keep Call bell within reach  - Keep bed low and locked with side rails adjusted as appropriate  - Keep care items and personal belongings within reach  - Initiate and maintain comfort rounds  - Make Fall Risk Sign visible to staff  - Offer Toileting every  Hours, in advance of need  - Initiate/Maintain bed alarm  - Obtain necessary fall risk management equipment:   - Apply yellow socks and bracelet for high fall risk patients  - Consider moving patient to room near nurses station  Outcome: Progressing  Goal: Maintain or return to baseline ADL function  Description: INTERVENTIONS:  -  Assess patient's ability to carry out ADLs; assess patient's baseline for ADL function and identify physical deficits which impact ability to perform ADLs (bathing, care of mouth/teeth, toileting, grooming, dressing, etc.)  - Assess/evaluate cause of self-care deficits   - Assess range of motion  - Assess patient's mobility; develop plan if impaired  - Assess patient's need for assistive devices and provide as appropriate  - Encourage maximum independence but intervene and supervise when necessary  - Involve family in performance of ADLs  - Assess for home care needs following discharge   - Consider OT consult to assist with ADL evaluation and planning for discharge  - Provide patient education as appropriate  Outcome: Progressing  Goal: Maintains/Returns to pre  admission functional level  Description: INTERVENTIONS:  - Perform AM-PAC 6 Click Basic Mobility/ Daily Activity assessment daily.  - Set and communicate daily mobility goal to care team and patient/family/caregiver.   - Collaborate with rehabilitation services on mobility goals if consulted  - Perform Range of Motion  times a day.  - Reposition patient every  hours.  - Dangle patient  times a day  - Stand patient  times a day  - Ambulate patient  times a day  - Out of bed to chair  times a day   - Out of bed for meals  times a day  - Out of bed for toileting  - Record patient progress and toleration of activity level   Outcome: Progressing     Problem: DISCHARGE PLANNING  Goal: Discharge to home or other facility with appropriate resources  Description: INTERVENTIONS:  - Identify barriers to discharge w/patient and caregiver  - Arrange for needed discharge resources and transportation as appropriate  - Identify discharge learning needs (meds, wound care, etc.)  - Arrange for interpretive services to assist at discharge as needed  - Refer to Case Management Department for coordinating discharge planning if the patient needs post-hospital services based on physician/advanced practitioner order or complex needs related to functional status, cognitive ability, or social support system  Outcome: Progressing     Problem: Knowledge Deficit  Goal: Patient/family/caregiver demonstrates understanding of disease process, treatment plan, medications, and discharge instructions  Description: Complete learning assessment and assess knowledge base.  Interventions:  - Provide teaching at level of understanding  - Provide teaching via preferred learning methods  Outcome: Progressing

## 2025-01-18 NOTE — ASSESSMENT & PLAN NOTE
Lab Results   Component Value Date    HGBA1C 6.1 (H) 12/12/2024   History of T2DM managed on Jardiance 10mg qd and Metformin 500mg qd  Hgb A1C stable at 6.1%    Plan:   Continue SSI and metformin inpatient and hold Jardiance( restart at discharge)

## 2025-01-18 NOTE — PLAN OF CARE
Problem: Prexisting or High Potential for Compromised Skin Integrity  Goal: Skin integrity is maintained or improved  Description: INTERVENTIONS:  - Identify patients at risk for skin breakdown  - Assess and monitor skin integrity  - Assess and monitor nutrition and hydration status  - Monitor labs   - Assess for incontinence   - Turn and reposition patient  - Assist with mobility/ambulation  - Relieve pressure over bony prominences  - Avoid friction and shearing  - Provide appropriate hygiene as needed including keeping skin clean and dry  - Evaluate need for skin moisturizer/barrier cream  - Collaborate with interdisciplinary team   - Patient/family teaching  - Consider wound care consult   Outcome: Adequate for Discharge     Problem: PAIN - ADULT  Goal: Verbalizes/displays adequate comfort level or baseline comfort level  Description: Interventions:  - Encourage patient to monitor pain and request assistance  - Assess pain using appropriate pain scale  - Administer analgesics based on type and severity of pain and evaluate response  - Implement non-pharmacological measures as appropriate and evaluate response  - Consider cultural and social influences on pain and pain management  - Notify physician/advanced practitioner if interventions unsuccessful or patient reports new pain  Outcome: Adequate for Discharge     Problem: INFECTION - ADULT  Goal: Absence or prevention of progression during hospitalization  Description: INTERVENTIONS:  - Assess and monitor for signs and symptoms of infection  - Monitor lab/diagnostic results  - Monitor all insertion sites, i.e. indwelling lines, tubes, and drains  - Monitor endotracheal if appropriate and nasal secretions for changes in amount and color  - [place] appropriate cooling/warming therapies per order  - Administer medications as ordered  - Instruct and encourage patient and family to use good hand hygiene technique  - Identify and instruct in appropriate isolation  precautions for identified infection/condition  Outcome: Adequate for Discharge  Goal: Absence of fever/infection during neutropenic period  Description: INTERVENTIONS:  - Monitor WBC    Outcome: Adequate for Discharge     Problem: SAFETY ADULT  Goal: Patient will remain free of falls  Description: INTERVENTIONS:  - Educate patient/family on patient safety including physical limitations  - Instruct patient to call for assistance with activity   - Consult OT/PT to assist with strengthening/mobility   - Keep Call bell within reach  - Keep bed low and locked with side rails adjusted as appropriate  - Keep care items and personal belongings within reach  - Initiate and maintain comfort rounds  - Make Fall Risk Sign visible to staff  - Offer Toileting every  Hours, in advance of need  - Initiate/Maintain bed/ chair alarm  - Obtain necessary fall risk management equipment:   - Apply yellow socks and bracelet for high fall risk patients  - Consider moving patient to room near nurses station  Outcome: Adequate for Discharge  Goal: Maintain or return to baseline ADL function  Description: INTERVENTIONS:  -  Assess patient's ability to carry out ADLs; assess patient's baseline for ADL function and identify physical deficits which impact ability to perform ADLs (bathing, care of mouth/teeth, toileting, grooming, dressing, etc.)  - Assess/evaluate cause of self-care deficits   - Assess range of motion  - Assess patient's mobility; develop plan if impaired  - Assess patient's need for assistive devices and provide as appropriate  - Encourage maximum independence but intervene and supervise when necessary  - Involve family in performance of ADLs  - Assess for home care needs following discharge   - Consider OT consult to assist with ADL evaluation and planning for discharge  - Provide patient education as appropriate  Outcome: Adequate for Discharge  Goal: Maintains/Returns to pre admission functional level  Description:  INTERVENTIONS:  - Perform AM-PAC 6 Click Basic Mobility/ Daily Activity assessment daily.  - Set and communicate daily mobility goal to care team and patient/family/caregiver.   - Collaborate with rehabilitation services on mobility goals if consulted  - Perform Range of Motion  times a day.  - Reposition patient every  hours.  - Dangle patient  times a day  - Stand patient  times a day  - Ambulate patient  times a day  - Out of bed to chair  times a day   - Out of bed for meals times a day  - Out of bed for toileting  - Record patient progress and toleration of activity level   Outcome: Adequate for Discharge     Problem: DISCHARGE PLANNING  Goal: Discharge to home or other facility with appropriate resources  Description: INTERVENTIONS:  - Identify barriers to discharge w/patient and caregiver  - Arrange for needed discharge resources and transportation as appropriate  - Identify discharge learning needs (meds, wound care, etc.)  - Arrange for interpretive services to assist at discharge as needed  - Refer to Case Management Department for coordinating discharge planning if the patient needs post-hospital services based on physician/advanced practitioner order or complex needs related to functional status, cognitive ability, or social support system  Outcome: Adequate for Discharge     Problem: Knowledge Deficit  Goal: Patient/family/caregiver demonstrates understanding of disease process, treatment plan, medications, and discharge instructions  Description: Complete learning assessment and assess knowledge base.  Interventions:  - Provide teaching at level of understanding  - Provide teaching via preferred learning methods  Outcome: Adequate for Discharge

## 2025-01-18 NOTE — ASSESSMENT & PLAN NOTE
Wt Readings from Last 3 Encounters:   01/18/25 79.9 kg (176 lb 2.4 oz)   12/18/24 70.3 kg (155 lb)   12/17/24 70.3 kg (155 lb)     History of chronic HRrEF managed on PO aspirin, atorvastatin, fursoemide, losartan, and metoprolol  Last Echo 11/2024 with LVEF 55%, grade 1 diastolic dysfunction  Improved from prior EF 15%    Plan:   Continue to monitor symptoms and vitals

## 2025-01-18 NOTE — ASSESSMENT & PLAN NOTE
Ambulatory dysfunction at baseline prior to surgery   Initial difficulty with OOB in the immediate post-op period  01/18/24; pt reports ambulating to and from restroom with a walker

## 2025-01-18 NOTE — ASSESSMENT & PLAN NOTE
65yo  female with bopsy-proven high grade endometrial adenocarcinoma, intact MSI testing now S/P RA-TLH, BSO, pelvic and para-aortic lymphadenectomy, omental biopsy, and AOIP.

## 2025-01-18 NOTE — ASSESSMENT & PLAN NOTE
S/P RA-TLH, BSO, pelvic and para-aortic lymphadenectomy, omental biopsy, and AOIPd due to history of biopsy proven high grade endometrial adenocarcinoma     Plan:  FEN: Regular, zofran PRN  Pain: tylenol/motrin adri and Oxycodone 5/10mg  : S/P dougherty, passed void trial  DVT ppx: SCDs,Lovenox 40mg qd  Dispo: Encouraged OOB this AM, has not passed flatus but otherwise meeting all post-op milestones, consider discharge home today

## 2025-01-18 NOTE — DISCHARGE SUMMARY
Discharge Summary - GYN Oncology   Name: Nancy Calderon 64 y.o. female I MRN: [id number]  Unit/Bed#: [facility] 907-01 I Date of Admission: 1/16/2025   Date of Service: 1/18/2025 I Hospital Day: 0        Discharge Summary - Gynecologic Oncology  Nancy Calderon 64 y.o. female MRN: [id number]  Unit/Bed#: [facility]07-01 Encounter: [id number]    Admission Date: 1/16/2025   Discharge Date: 01/18/25      Admitting Attending Physician: Dr. Grayson  Discharging Attending Physician: Dr. Gonzalez     Consulting Physician(s): None    Admitting Diagnosis:   Endometrial cancer (HCC) [C54.1]    Discharge Diagnosis: Endometrial Cancer    Procedures Performed: Robot-Assisted Total Laparoscopic Hysterectomy, Bilateral salpingo-oopherectomy and omental biopsy, and cystoscopy    Hospital Course:  Nancy Maciel is a 65 yo with biopsy proven high grade endometrial cancer who presented on day of admission for the above mentioned procedure.  She was admitted for pain control and postoperative management.  Her procedure was uncomplicated.  The patient was continued on home metformin and supplemental Insulin sliding scale was initiated for management of type 2 diabetes Mellitis. Home metoprolol was continued for Chronic HFrEF. Jardiance and Losartan were held in the immediate post-op period.      The patient's hospital course was complicated by patients difficulty with ambulating, which resolved on POD #2 as the patient was able to ambulate with a walker. Her pain was well controlled.  Her Hb fell from 13 preoperatively to 11.4 postoperatively.  Her [place] was removed on POD # 1  and she was able to void spontaneously.  She was tolerating PO.    The patient was discharged home on POD 1 in stable condition.  She was given prescriptions for Oxicodon for pain control.  She was asked to follow up with Dr. Grayson in 2 weeks for a postoperative appointment.     Lab Results:   Lab Results   Component Value Date     WBC 7.71 01/18/2025    HGB 11.4 (L) 01/18/2025    HCT 35.7 01/18/2025    MCV 98 01/18/2025     01/18/2025     Lab Results   Component Value Date    CALCIUM 8.5 01/18/2025    K 4.1 01/18/2025    CO2 28 01/18/2025     01/18/2025    BUN 12 01/18/2025    CREATININE 0.55 (L) 01/18/2025     Lab Results   Component Value Date/Time    POCGLU 160 (H) 01/18/2025 11:19 AM    POCGLU 90 01/18/2025 07:45 AM    POCGLU 106 01/17/2025 09:02 PM    POCGLU 108 01/17/2025 03:58 PM    POCGLU 115 01/17/2025 11:22 AM     Lab Results   Component Value Date    PTT 27 12/18/2024     Lab Results   Component Value Date    INR 0.91 12/18/2024    INR 1.04 07/10/2024    PROTIME 12.5 12/18/2024    PROTIME 13.8 07/10/2024       Pathology: Pending     Imaging: None from this admission      Condition at Discharge: stable     Discharge Medications: See after visit summary for reconciled discharge medications provided to patient and family.      Discharge instructions/Information to patient and family: See after visit summary for information provided to patient and family.      Provisions for Follow-Up Care: See after visit summary for information related to follow-up care and any pertinent home health orders.      Disposition: Home    Planned Readmission: No    Code Status: Level I     Discharge Statement   I spent 30 minutes discharging the patient. This time was spent on the day of discharge. I had direct contact with the patient on the day of discharge. Additional documentation is required if more than 30 minutes were spent on discharge.     Franchesca Medina MD  OBGYN PGY3

## 2025-01-18 NOTE — ASSESSMENT & PLAN NOTE
"See problem for \"Chronic HRrEF (heart failure with reduced ejection fraction) ([facility])\"   "

## 2025-01-18 NOTE — PROGRESS NOTES
"For questions/concerns on this patient, please reach out to the following:  [facility]-GynOnc- Resident/AP  Gyn Oncology Progress note   Nancy Calderon 64 y.o. female MRN: [id number]  Unit/Bed#: PPHP 907-01 Encounter: [id number]    Assessment/Plan:    64 y.o. POD# 2 from RA TLH, BSO, pelvic and para-aortic lymphadenectomy, omental biopsy, and cystoscopy in setting of biopsy proven high grade endometrial adenocarcinoma, intact MSI testing. Stable    Assessment & Plan  Endometrial cancer (HCC)  63yo  female with bopsy-proven high grade endometrial adenocarcinoma, intact MSI testing now S/P RA-TLH, BSO, pelvic and para-aortic lymphadenectomy, omental biopsy, and AOIP.       Chronic HFrEF (heart failure with reduced ejection fraction) ([facility])  Wt Readings from Last 3 Encounters:   25 79.9 kg (176 lb 2.4 oz)   24 70.3 kg (155 lb)   24 70.3 kg (155 lb)     History of chronic HRrEF managed on PO aspirin, atorvastatin, fursoemide, losartan, and metoprolol  Last Echo 2024 with LVEF 55%, grade 1 diastolic dysfunction  Improved from prior EF 15%    Plan:   Continue to monitor symptoms and vitals  Type 2 diabetes mellitus without complication, without long-term current use of insulin ([facility])    Lab Results   Component Value Date    HGBA1C 6.1 (H) 2024   History of T2DM managed on Jardiance 10mg qd and Metformin 500mg qd  Hgb A1C stable at 6.1%    Plan:   Continue SSI and metformin inpatient and hold Jardiance( restart at discharge)  Nonischemic cardiomyopathy (HCC)  See problem for \"Chronic HRrEF (heart failure with reduced ejection fraction) ([facility])\"   Ambulatory dysfunction  Ambulatory dysfunction at baseline prior to surgery   Initial difficulty with OOB in the immediate post-op period  24; pt reports ambulating to and from restroom with a walker  Primary hypertension  Continue home metoprolol   Hold losartan and ASA, to be resumed at discharge  Status post RA- Total Laparoscopic  " hysterectomy, bilateral salpingoopherectomy, pelvic and para-aortic lymphadenectomy, omental biopsy  S/P RA-TLH, BSO, pelvic and para-aortic lymphadenectomy, omental biopsy, and AOIPd due to history of biopsy proven high grade endometrial adenocarcinoma     Plan:  FEN: Regular, zofran PRN  Pain: tylenol/motrin adri and Oxycodone 5/10mg  : S/P dougherty, passed void trial  DVT ppx: SCDs,Lovenox 40mg qd  Dispo: Encouraged OOB this AM, has not passed flatus but otherwise meeting all post-op milestones, consider discharge home today         Subjective:    Nancy Calderon reports pain is well controlled overnight. Pain is well controlled with scheduled motrin/tylenol.  Patient is currently voiding.  She is ambulating more now than she did yesterday.  Patient denies passing flatus and has had no bowel movement. She is tolerating PO, and denies nausea or vomiting. Patient denies fever, chills, chest pain, shortness of breath, or calf tenderness. Reports both of her legs feel heavy this morning. Movement intact.     Objective:  /70   Pulse 72   Temp 98.1 °F (36.7 °C)   Resp 16   Ht 5' (1.524 m)   Wt 79.9 kg (176 lb 2.4 oz)   SpO2 93%   BMI 34.40 kg/m²     I/O last 3 completed shifts:  In: 390 [P.O.:390]  Out: 3000 [Urine:3000]  No intake/output data recorded.    Lab Results   Component Value Date    WBC 7.71 01/18/2025    HGB 11.4 (L) 01/18/2025    HCT 35.7 01/18/2025    MCV 98 01/18/2025     01/18/2025       Lab Results   Component Value Date    CALCIUM 8.5 01/18/2025    K 4.1 01/18/2025    CO2 28 01/18/2025     01/18/2025    BUN 12 01/18/2025    CREATININE 0.55 (L) 01/18/2025           Physical Exam  Vitals reviewed.   Constitutional:       General: She is not in acute distress.     Appearance: She is obese.   HENT:      Head: Normocephalic.      Nose: Nose normal.      Mouth/Throat:      Pharynx: Oropharynx is clear.   Eyes:      Conjunctiva/sclera: Conjunctivae normal.    Cardiovascular:      Rate and Rhythm: Normal rate and regular rhythm.      Heart sounds: Normal heart sounds.   Pulmonary:      Effort: Pulmonary effort is normal. No respiratory distress.      Breath sounds: Normal breath sounds.   Abdominal:      General: There is no distension.      Palpations: Abdomen is soft.      Comments: Laparoscopic port sites C/D/I  Soft, appropriately tender   Musculoskeletal:         General: No tenderness.      Right lower leg: No edema.      Left lower leg: No edema.   Skin:     General: Skin is warm and dry.      Coloration: Skin is not jaundiced.   Neurological:      Mental Status: She is alert.   Psychiatric:         Mood and Affect: Mood normal.         Behavior: Behavior normal.           Franchesca Medina MD  1/18/2025  8:35 AM

## 2025-01-23 ENCOUNTER — PATIENT OUTREACH (OUTPATIENT)
Dept: CASE MANAGEMENT | Facility: OTHER | Age: 65
End: 2025-01-23

## 2025-01-23 PROCEDURE — 88309 TISSUE EXAM BY PATHOLOGIST: CPT | Performed by: STUDENT IN AN ORGANIZED HEALTH CARE EDUCATION/TRAINING PROGRAM

## 2025-01-23 PROCEDURE — 88307 TISSUE EXAM BY PATHOLOGIST: CPT | Performed by: STUDENT IN AN ORGANIZED HEALTH CARE EDUCATION/TRAINING PROGRAM

## 2025-01-23 PROCEDURE — 88341 IMHCHEM/IMCYTCHM EA ADD ANTB: CPT | Performed by: STUDENT IN AN ORGANIZED HEALTH CARE EDUCATION/TRAINING PROGRAM

## 2025-01-23 PROCEDURE — 88305 TISSUE EXAM BY PATHOLOGIST: CPT | Performed by: STUDENT IN AN ORGANIZED HEALTH CARE EDUCATION/TRAINING PROGRAM

## 2025-01-23 PROCEDURE — 88112 CYTOPATH CELL ENHANCE TECH: CPT | Performed by: STUDENT IN AN ORGANIZED HEALTH CARE EDUCATION/TRAINING PROGRAM

## 2025-01-23 PROCEDURE — 88342 IMHCHEM/IMCYTCHM 1ST ANTB: CPT | Performed by: STUDENT IN AN ORGANIZED HEALTH CARE EDUCATION/TRAINING PROGRAM

## 2025-01-23 NOTE — PROGRESS NOTES
"OSW placed call to pt's brother, Owen today. Explained this writer never received electric bill from previous discussion. He asked if the bill could be faxed, and OSW provided fax number. He stated Nancy is experiencing a lot of pain and her \"legs feel cold.\" He asked \"how many days will she have pain?\" OSW explained these questions are for the gyn-onc team and offered to send them a message. He is agreeable and appreciative.  It appears Nancy has a home health aide from [facility] [employer], however her brother asked about getting increased hours. OSW explained he will have to call her . He thought this writer handles her aide hours, however understood then that this writer is a  from [facility]. He apologized stating he is overwhelmed with trying to keep things straight. OSW offered to call again and once electric bill request is finalized will let him know that as well.    In-basket sent to gyn-onc AP team. Will follow.  "

## 2025-01-24 ENCOUNTER — NURSE TRIAGE (OUTPATIENT)
Age: 65
End: 2025-01-24

## 2025-01-24 ENCOUNTER — DOCUMENTATION (OUTPATIENT)
Dept: HEMATOLOGY ONCOLOGY | Facility: CLINIC | Age: 65
End: 2025-01-24

## 2025-01-24 NOTE — TELEPHONE ENCOUNTER
"DESCRIPTION (describe complaint in short phrase)   pain/pressure with voiding and with having BM    SEVERITY (mild, moderate, severe) moderate    ONSET (of THIS SPECIFIC complaint) few days ago    CAUSE (what does the patient think is causing this)   unsure    MEDICATIONS (any changes in meds or doses?, Take any meds for relief?) Pt taking pain med unsure what it was not able to understand with  Pt stated pain med.(Pt does have Tramadol listed on file.)    Advised pt to try Motrin 600mg every 6-8 hrs as needed with food to help with the pelvic inflammation that may be causing the discomfort when voiding and having BM.Informed pt that this is part of the healing process and will improve with time.    I believe pt understood,wanted to ask pt a few more questions but she disconnected the call with the  and the call was ended.    OTHER SYMPTOMS (yes or no- if no, just write that. If yes, write the symptoms c/o) Not able to ask pt d/c call    Pt has POV sched for 2/4.    Reason for Disposition   Caller has NON-URGENT question and triager unable to answer question    Answer Assessment - Initial Assessment Questions  1. SYMPTOM: \"What's the main symptom you're concerned about?\" (e.g., pain, fever, vomiting)      Pt calling having pain /pressure with voiding and having BM    2. ONSET: \"When did Pressure  start?\"      Few days ago    3. SURGERY: \"What surgery did you have?\"      RA TLH BSO lymphadenectomy omental bx    4. DATE of SURGERY: \"When was the surgery?\"       1/16    5. ANESTHESIA: \"What type of anesthesia did you have?\" (e.g., general, spinal, epidural, local)      General    6. DRAINS: \"Were any drains place in or around the wound?\" (e.g., Hemovac, Stan-Aquino, Penrose)      No    7. PAIN: \"Is there any pain?\" If Yes, ask: \"How bad is it?\"  (Scale 1-10; or mild, moderate, severe)      Yes with voiding and having BM \"pulling feeling\"    8. FEVER: \"Do you have a fever?\" If Yes, ask: \"What is " "your temperature, how was it measured, and when did it start?\"      No    9. VOMITING: \"Is there any vomiting?\" If Yes, ask: \"How many times?\"      No    10. BLEEDING: \"Is there any bleeding?\" If Yes, ask: \"How much?\" and \"Where?\"        Yes vagina not heavy Unable to further ask pt disconnected the call with     11. OTHER SYMPTOMS: \"Do you have any other symptoms?\" (e.g., drainage from wound, painful urination, constipation)        Unable to ask pt disconnected the call with the .    Protocols used: Post-Op Symptoms and Questions-Adult-[place]    "

## 2025-01-24 NOTE — TELEPHONE ENCOUNTER
Call placed to follow up on any further questions (using a [place] speaking ) however was unable to connect with patient as VM was not set up, no message was ableto be left at this time.

## 2025-01-24 NOTE — TELEPHONE ENCOUNTER
Outreach made to patient's brother after receiving message from SARAH. Brother notes patient is overall doing well. Pain is managed with OTC pain management. No nausea/vomiting, afebrile.

## 2025-01-24 NOTE — PROGRESS NOTES
In-basket message received from Dr. Grayson to add patient to the gyn MDCC on 2/3/2025. Chart reviewed and prep completed.

## 2025-01-27 ENCOUNTER — PATIENT OUTREACH (OUTPATIENT)
Dept: CASE MANAGEMENT | Facility: OTHER | Age: 65
End: 2025-01-27

## 2025-01-27 NOTE — PROGRESS NOTES
OSW received incoming fax from pt's brother, which consists of [employer] electric bill. Sent request to team for [employer]. Await request.

## 2025-01-31 ENCOUNTER — TELEPHONE (OUTPATIENT)
Dept: GYNECOLOGIC ONCOLOGY | Facility: CLINIC | Age: 65
End: 2025-01-31

## 2025-01-31 ENCOUNTER — PATIENT OUTREACH (OUTPATIENT)
Dept: CASE MANAGEMENT | Facility: OTHER | Age: 65
End: 2025-01-31

## 2025-01-31 NOTE — PROGRESS NOTES
OSW called pt's brother, Owen to confirm [employer] bill was received and sent to team for Comp[employer]ion Fund help. Request was approved, and explained to Owen the check will [facility] drawn [facility] and [facility] within the next 7-10 days. He was very appreciative of assistance. OSW strongly encouraged him to complete [employer] application, and he stated he will do this as soon as he can.  Owen stated Nancy is having a lot of belly pain and has not had a BM in 4-5 days. Offered to send out a message to gyn-onc team and he is very agreeable. In-basket sent to gyn-onc AP team.    OSW will continue to follow.

## 2025-01-31 NOTE — TELEPHONE ENCOUNTER
Reached out to Owen. Recommended Colace TID + Miralax daily. If no effect in 24-48 hours, would recommend mineral oil 30cc TID.     Patient has appt scheduled 2/4 and will keep this OV.

## 2025-01-31 NOTE — TELEPHONE ENCOUNTER
----- Message from Amanda SALGADO sent at 2025  9:44 AM EST -----  Regardin-5 days constipation  Good Morning,  I just spoke with Owen pt's brother about the compassion fund request I'm working on for Nancy. He stated she has not had a BM for 4-5 days and crying with belly pain. Any help is appreciated!  Thanks,  Amanda

## 2025-02-03 ENCOUNTER — TELEPHONE (OUTPATIENT)
Dept: FAMILY MEDICINE CLINIC | Facility: CLINIC | Age: 65
End: 2025-02-03

## 2025-02-03 ENCOUNTER — DOCUMENTATION (OUTPATIENT)
Dept: GYNECOLOGIC ONCOLOGY | Facility: CLINIC | Age: 65
End: 2025-02-03

## 2025-02-03 NOTE — TELEPHONE ENCOUNTER
Patient amendable to home visit appointment tomorrow ~noon prior to her scheduled gyn-onc appointment at 1:45 pm.

## 2025-02-03 NOTE — PROGRESS NOTES
Multidisciplinary Gynecologic Oncology Tumor Case Review       Physician Recommended Plan     Nancy Calderon is a 64 y.o. 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 The group recommended to consider treatment with systemic chemotherapy with immunotherapy +/- EBRT and consider genetics consultation.     Follow-up appointment with Dr. Grayson on 2/4/25.     NCCN guidelines were available for review.     The final treatment plan will be left to the discretion of the patient and the treating physician.       DISCLAIMERS:    TO THE TREATING PHYSICIAN:  This conference is a meeting of clinicians from various specialty areas who evaluate and discuss patients for whom a multidisciplinary treatment approach is being considered. Please note that the above opinion was a consensus of the conference attendees and is intended only to assist in quality care of the discussed patient.  The responsibility for follow up on the input given during the conference, along with any final decisions regarding plan of care, is that of the patient and the patient's provider. Accordingly, appointments have only been recommended based on this information and have NOT been scheduled unless otherwise noted.      TO THE PATIENT:  This summary is a brief record of major aspects of your cancer treatment. You may choose to share a copy with any of your doctors or nurses. However, this is not a detailed or comprehensive record of your care.

## 2025-02-04 ENCOUNTER — HOSPITAL ENCOUNTER (OUTPATIENT)
Dept: CT IMAGING | Facility: HOSPITAL | Age: 65
Discharge: HOME | End: 2025-02-04
Attending: OBSTETRICS & GYNECOLOGY
Payer: COMMERCIAL

## 2025-02-04 ENCOUNTER — APPOINTMENT (OUTPATIENT)
Dept: LAB | Facility: CLINIC | Age: 65
End: 2025-02-04
Payer: COMMERCIAL

## 2025-02-04 ENCOUNTER — RESULTS FOLLOW-UP (OUTPATIENT)
Dept: GYNECOLOGIC ONCOLOGY | Facility: CLINIC | Age: 65
End: 2025-02-04

## 2025-02-04 ENCOUNTER — IN HOME VISIT (OUTPATIENT)
Dept: FAMILY MEDICINE CLINIC | Facility: CLINIC | Age: 65
End: 2025-02-04

## 2025-02-04 ENCOUNTER — OFFICE VISIT (OUTPATIENT)
Dept: GYNECOLOGIC ONCOLOGY | Facility: CLINIC | Age: 65
End: 2025-02-04
Payer: COMMERCIAL

## 2025-02-04 VITALS
HEART RATE: 105 BPM | WEIGHT: 158 LBS | DIASTOLIC BLOOD PRESSURE: 90 MMHG | HEIGHT: 60 IN | SYSTOLIC BLOOD PRESSURE: 120 MMHG | BODY MASS INDEX: 31.02 KG/M2 | OXYGEN SATURATION: 97 %

## 2025-02-04 VITALS
HEART RATE: 93 BPM | OXYGEN SATURATION: 95 % | DIASTOLIC BLOOD PRESSURE: 75 MMHG | SYSTOLIC BLOOD PRESSURE: 135 MMHG | TEMPERATURE: 96.3 F | RESPIRATION RATE: 20 BRPM

## 2025-02-04 DIAGNOSIS — R53.1 LEFT-SIDED WEAKNESS: ICD-10-CM

## 2025-02-04 DIAGNOSIS — C54.1 ENDOMETRIAL CANCER (HCC): ICD-10-CM

## 2025-02-04 DIAGNOSIS — K21.9 GASTROESOPHAGEAL REFLUX DISEASE WITHOUT ESOPHAGITIS: ICD-10-CM

## 2025-02-04 DIAGNOSIS — I50.22 CHRONIC HFREF (HEART FAILURE WITH REDUCED EJECTION FRACTION) (HCC): Chronic | ICD-10-CM

## 2025-02-04 DIAGNOSIS — C54.1 ENDOMETRIAL CANCER (HCC): Primary | ICD-10-CM

## 2025-02-04 DIAGNOSIS — R26.2 AMBULATORY DYSFUNCTION: ICD-10-CM

## 2025-02-04 DIAGNOSIS — Z90.710 STATUS POST HYSTERECTOMY: Primary | ICD-10-CM

## 2025-02-04 DIAGNOSIS — Z90.710 STATUS POST HYSTERECTOMY: ICD-10-CM

## 2025-02-04 DIAGNOSIS — E11.9 TYPE 2 DIABETES MELLITUS WITHOUT COMPLICATION, WITHOUT LONG-TERM CURRENT USE OF INSULIN (HCC): ICD-10-CM

## 2025-02-04 DIAGNOSIS — I10 PRIMARY HYPERTENSION: Chronic | ICD-10-CM

## 2025-02-04 LAB
ALBUMIN SERPL BCG-MCNC: 4.6 G/DL (ref 3.5–5)
ALP SERPL-CCNC: 80 U/L (ref 34–104)
ALT SERPL W P-5'-P-CCNC: 70 U/L (ref 7–52)
AMYLASE SERPL-CCNC: 46 IU/L (ref 29–103)
ANION GAP SERPL CALCULATED.3IONS-SCNC: 9 MMOL/L (ref 4–13)
AST SERPL W P-5'-P-CCNC: 27 U/L (ref 13–39)
BASOPHILS # BLD AUTO: 0.07 THOUSANDS/ΜL (ref 0–0.1)
BASOPHILS NFR BLD AUTO: 1 % (ref 0–1)
BILIRUB SERPL-MCNC: 0.39 MG/DL (ref 0.2–1)
BUN SERPL-MCNC: 17 MG/DL (ref 5–25)
CALCIUM SERPL-MCNC: 10.2 MG/DL (ref 8.4–10.2)
CANCER AG125 SERPL-ACNC: 15.2 U/ML (ref 0–35)
CHLORIDE SERPL-SCNC: 100 MMOL/L (ref 96–108)
CO2 SERPL-SCNC: 30 MMOL/L (ref 21–32)
CREAT SERPL-MCNC: 0.62 MG/DL (ref 0.6–1.3)
EOSINOPHIL # BLD AUTO: 0.23 THOUSAND/ΜL (ref 0–0.61)
EOSINOPHIL NFR BLD AUTO: 2 % (ref 0–6)
ERYTHROCYTE [DISTWIDTH] IN BLOOD BY AUTOMATED COUNT: 11.9 % (ref 11.6–15.1)
GFR SERPL CREATININE-BSD FRML MDRD: 95 ML/MIN/1.73SQ M
GLUCOSE SERPL-MCNC: 80 MG/DL (ref 65–140)
HCT VFR BLD AUTO: 40 % (ref 34.8–46.1)
HGB BLD-MCNC: 13.3 G/DL (ref 11.5–15.4)
IMM GRANULOCYTES # BLD AUTO: 0.04 THOUSAND/UL (ref 0–0.2)
IMM GRANULOCYTES NFR BLD AUTO: 0 % (ref 0–2)
LIPASE SERPL-CCNC: 29 U/L (ref 11–82)
LYMPHOCYTES # BLD AUTO: 2.6 THOUSANDS/ΜL (ref 0.6–4.47)
LYMPHOCYTES NFR BLD AUTO: 26 % (ref 14–44)
MAGNESIUM SERPL-MCNC: 2.1 MG/DL (ref 1.9–2.7)
MCH RBC QN AUTO: 31.4 PG (ref 26.8–34.3)
MCHC RBC AUTO-ENTMCNC: 33.3 G/DL (ref 31.4–37.4)
MCV RBC AUTO: 94 FL (ref 82–98)
MONOCYTES # BLD AUTO: 0.65 THOUSAND/ΜL (ref 0.17–1.22)
MONOCYTES NFR BLD AUTO: 7 % (ref 4–12)
NEUTROPHILS # BLD AUTO: 6.48 THOUSANDS/ΜL (ref 1.85–7.62)
NEUTS SEG NFR BLD AUTO: 64 % (ref 43–75)
NRBC BLD AUTO-RTO: 0 /100 WBCS
PLATELET # BLD AUTO: 367 THOUSANDS/UL (ref 149–390)
PMV BLD AUTO: 11.4 FL (ref 8.9–12.7)
POTASSIUM SERPL-SCNC: 4.1 MMOL/L (ref 3.5–5.3)
PROT SERPL-MCNC: 8.4 G/DL (ref 6.4–8.4)
RBC # BLD AUTO: 4.24 MILLION/UL (ref 3.81–5.12)
SODIUM SERPL-SCNC: 139 MMOL/L (ref 135–147)
TSH SERPL DL<=0.05 MIU/L-ACNC: 1.37 UIU/ML (ref 0.45–4.5)
WBC # BLD AUTO: 10.07 THOUSAND/UL (ref 4.31–10.16)

## 2025-02-04 PROCEDURE — 74177 CT ABD & PELVIS W/CONTRAST: CPT

## 2025-02-04 PROCEDURE — 83690 ASSAY OF LIPASE: CPT

## 2025-02-04 PROCEDURE — 87086 URINE CULTURE/COLONY COUNT: CPT

## 2025-02-04 PROCEDURE — 99215 OFFICE O/P EST HI 40 MIN: CPT | Performed by: OBSTETRICS & GYNECOLOGY

## 2025-02-04 PROCEDURE — 3075F SYST BP GE 130 - 139MM HG: CPT | Performed by: STUDENT IN AN ORGANIZED HEALTH CARE EDUCATION/TRAINING PROGRAM

## 2025-02-04 PROCEDURE — 80053 COMPREHEN METABOLIC PANEL: CPT

## 2025-02-04 PROCEDURE — 99349 HOME/RES VST EST MOD MDM 40: CPT | Performed by: STUDENT IN AN ORGANIZED HEALTH CARE EDUCATION/TRAINING PROGRAM

## 2025-02-04 PROCEDURE — 82150 ASSAY OF AMYLASE: CPT

## 2025-02-04 PROCEDURE — 84443 ASSAY THYROID STIM HORMONE: CPT

## 2025-02-04 PROCEDURE — 3078F DIAST BP <80 MM HG: CPT | Performed by: STUDENT IN AN ORGANIZED HEALTH CARE EDUCATION/TRAINING PROGRAM

## 2025-02-04 PROCEDURE — 36415 COLL VENOUS BLD VENIPUNCTURE: CPT

## 2025-02-04 PROCEDURE — 86304 IMMUNOASSAY TUMOR CA 125: CPT

## 2025-02-04 PROCEDURE — 99459 PELVIC EXAMINATION: CPT | Performed by: OBSTETRICS & GYNECOLOGY

## 2025-02-04 PROCEDURE — 83735 ASSAY OF MAGNESIUM: CPT

## 2025-02-04 PROCEDURE — 85025 COMPLETE CBC W/AUTO DIFF WBC: CPT

## 2025-02-04 RX ORDER — OXYCODONE HYDROCHLORIDE 5 MG/1
5 TABLET ORAL EVERY 4 HOURS PRN
Qty: 10 TABLET | Refills: 0 | Status: SHIPPED | OUTPATIENT
Start: 2025-02-04 | End: 2025-02-14 | Stop reason: SDUPTHER

## 2025-02-04 RX ORDER — ONDANSETRON 8 MG/1
8 TABLET, FILM COATED ORAL EVERY 8 HOURS PRN
Qty: 30 TABLET | Refills: 1 | Status: SHIPPED | OUTPATIENT
Start: 2025-02-04

## 2025-02-04 RX ORDER — LORAZEPAM 1 MG/1
1 TABLET ORAL EVERY 8 HOURS PRN
Qty: 20 TABLET | Refills: 0 | Status: SHIPPED | OUTPATIENT
Start: 2025-02-04

## 2025-02-04 RX ADMIN — IOHEXOL 50 ML: 350 INJECTION, SOLUTION INTRAVENOUS at 16:37

## 2025-02-04 NOTE — PROGRESS NOTES
Name: Nancy Calderon      : 1960      MRN: [id number]  Encounter Provider: Magi Noriega MD  Encounter Date: 2025   Encounter department: [facility]  :  Assessment & Plan  Status post RA- Total Laparoscopic  hysterectomy, bilateral salpingoopherectomy, pelvic and para-aortic lymphadenectomy, omental biopsy    25: S/P RA-TLH, BSO, pelvic and para-aortic lymphadenectomy, omental biopsy, and AOIPd due to history of biopsy proven high grade endometrial adenocarcinoma.  Surgical scars healing well. Patient able to maneuver into a seated position with bearable pain.  Normal mood, cooperative with exam.  Scheduled follow up with gyn-onc today to discuss possible treatment with carbo/taxol/dostarlimab.  Orders:    Ambulatory Referral to Physical Therapy; Future    Referral to Home Health- [facility]; Future    Endometrial cancer (HCC)  See AP s/p total laparoscopic hysterectomy, BSO.  Orders:    Referral to Home Health- [facility]; Future    Ambulatory dysfunction  Ambulates with walker at baseline. States LE weakness, possibly from prolonged duration of time in bed.   Refer to PT and VNA.     Orders:    Ambulatory Referral to Physical Therapy; Future    Referral to Home Health- [facility]; Future    Chronic HFrEF (heart failure with reduced ejection fraction) (HCC)  Wt Readings from Last 3 Encounters:   25 71.7 kg (158 lb)   25 79.9 kg (176 lb 2.4 oz)   24 70.3 kg (155 lb)     Euvolemic on exam.  ECHO on 2024 with LVEF 55%, grade 1 diastolic dysfunction  Improved from prior EF 15%.  Adherent with ASA, atorvastatin 40 mg, furosemide, losartan, metoprolol, and jardiance.         Primary hypertension  BP today: 135/75; at goal (<140/90).  Adherent with ASA 81 mg daily, losartan 25 mg daily, metoprolol 25 BID, furosemide 20 mg daily.  Continue above medications.       Gastroesophageal reflux disease without  esophagitis  Coughing and intermittent chest likely due to GERD.  Patient visibly laying flat in bed, states at times she uses 2 pillows to prop herself up with some relief.    Plan:  Monitor response to sxs with use of new motorized bed. Instructed to raise head of the bed at least 6 inches.         Type 2 diabetes mellitus without complication, without long-term current use of insulin ([facility])    Lab Results   Component Value Date    HGBA1C 6.1 (H) 12/12/2024     Continue metformin 500 mg daily at beaskfast and Jardiance 10 mg.  Repeat A1c and DFE at next home visit in 3 months.         Left-sided weakness  First noted in Aug 2024, patient was instructed to seek ED evaluation to rule out stroke, however did not go. She reported improvement in sxs the following day.  Today, states left sided weakness has been present for some time. But is able to lift arm to about 90 degrees at the shoulder with elbow flexed and stand on both legs with assistance from friend/home assistant.    Plan:   Referral to VNA and physical therapy.   Orders:    Ambulatory Referral to Physical Therapy; Future    [facility]           History of Present Illness    - [id number]    Nancy Calderon is a pleasant 64 y.o. female with a history of CHFrEF, T2DM, HTN, GERD, chronic bilateral thoracic back pain, left sided weakness, and endometrial cancer s/p total laparoscopic hysterectomy and BSO on 1/18 and presents today for initial home visit.  She continues to feel pain from laparoscopy sites on her abdomen and vaginal pain. She had some bleeding s/p hysterectomy and BSO which has stopped 2 days ago. Abdominal pain is limiting her movement. She has regular bowel movements with assistance of PEG daily. She is able to ambulate to the bedside commode and to the bathroom. She has a home caregiver, Kanchan who is with her for 6 hours during the weekday and 5 hours on Sunday but is interested  in receiving care in the evenings to aid in medications and ambulating to the bathroom. Her brother was also present for the visit who stated he is planning on putting a mechanical bed to aid in raising the head of the bed.  Her home is well kept, there are no loose rugs, wires, or items of clothing on the floor which would put her at risk of falls, however she is very weak to stand with assistance of the walker. She has an shower chair easily accessible in her shower and has ample space to walk around with her walker. She adheres to all medications for her chronic conditions and has denies side effects.      Review of Systems   Constitutional:  Positive for appetite change (decreased) and fatigue. Negative for chills and fever.   HENT:  Positive for postnasal drip and trouble swallowing. Negative for sinus pressure, sinus pain and sore throat.    Respiratory:  Positive for cough (non-productive). Negative for choking, chest tightness, shortness of breath and wheezing.    Cardiovascular:  Negative for chest pain and palpitations.   Gastrointestinal:  Positive for abdominal pain (epigastric; suprapubic). Negative for abdominal distention, anal bleeding, blood in stool, constipation, diarrhea, nausea and vomiting.   Genitourinary:  Positive for pelvic pain and vaginal pain. Negative for difficulty urinating, dysuria, flank pain, hematuria, vaginal bleeding and vaginal discharge.   Musculoskeletal:  Positive for back pain and gait problem (Bilateral LE weakness). Negative for joint swelling and myalgias.   Skin:  Negative for color change, rash and wound.   Neurological:  Positive for weakness (LUE, LLE (chronic; unchanged)). Negative for dizziness, light-headedness and headaches.   Psychiatric/Behavioral:  Negative for dysphoric mood, self-injury and suicidal ideas. The patient is not nervous/anxious.        Objective   /75 (BP Location: Right arm, Patient Position: Supine, Cuff Size: Standard)   Pulse 93    Temp (!) 96.3 °F (35.7 °C) (Temporal)   Resp 20   SpO2 95%      Physical Exam  Vitals reviewed.   Constitutional:       General: She is not in acute distress.     Appearance: Normal appearance. She is ill-appearing (mildly). She is not diaphoretic.   HENT:      Head:      Comments: 1 cm well circumscribed solid lump on left parietal lobe. Mobile, nontender. No drainage or surrounding erythema.     Nose: Nose normal.      Mouth/Throat:      Mouth: Mucous membranes are moist.   Eyes:      Extraocular Movements: Extraocular movements intact.      Conjunctiva/sclera: Conjunctivae normal.   Neck:      Vascular: No carotid bruit.   Cardiovascular:      Rate and Rhythm: Normal rate and regular rhythm.      Pulses: Normal pulses.      Heart sounds: Normal heart sounds. No murmur heard.  Pulmonary:      Effort: Pulmonary effort is normal. No respiratory distress.      Breath sounds: Normal breath sounds. No wheezing.   Chest:      Chest wall: No tenderness.   Abdominal:      General: A surgical scar is present. Bowel sounds are normal. There is no distension.      Palpations: Abdomen is soft.      Tenderness: There is abdominal tenderness in the epigastric area and suprapubic area. There is no right CVA tenderness, left CVA tenderness, guarding or rebound.   Musculoskeletal:         General: No tenderness.      Cervical back: Normal range of motion. No tenderness.      Right lower leg: No edema.      Left lower leg: No edema.   Skin:     General: Skin is warm.      Capillary Refill: Capillary refill takes less than 2 seconds.      Coloration: Skin is not jaundiced.      Comments: 1 cm well circumscribed solid lump on left parietal lobe and above manubrium. Mobile, nontender. No drainage or surrounding erythema.  Spider veins present on anterior bilateral thighs.   Neurological:      Mental Status: She is alert.

## 2025-02-04 NOTE — ASSESSMENT & PLAN NOTE
1/18/25: S/P RA-TLH, BSO, pelvic and para-aortic lymphadenectomy, omental biopsy, and AOIPd due to history of biopsy proven high grade endometrial adenocarcinoma.  Surgical scars healing well. Patient able to maneuver into a seated position with bearable pain.  Normal mood, cooperative with exam.  Scheduled follow up with gyn-onc today to discuss possible treatment with carbo/taxol/dostarlimab.  Orders:    Ambulatory Referral to Physical Therapy; Future    Referral to Home Health- [facility]; Future

## 2025-02-04 NOTE — ASSESSMENT & PLAN NOTE
First noted in Aug 2024, patient was instructed to seek ED evaluation to rule out stroke, however did not go. She reported improvement in sxs the following day.  Today, states left sided weakness has been present for some time. But is able to lift arm to about 90 degrees at the shoulder with elbow flexed and stand on both legs with assistance from friend/home assistant.    Plan:   Referral to [facility] and physical therapy.   Orders:    Ambulatory Referral to Physical Therapy; Future    Referral to Home Health- [facility]; Future

## 2025-02-04 NOTE — ASSESSMENT & PLAN NOTE
BP today: 135/75; at goal (<140/90).  Adherent with ASA 81 mg daily, losartan 25 mg daily, metoprolol 25 BID, furosemide 20 mg daily.  Continue above medications.

## 2025-02-04 NOTE — PROGRESS NOTES
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  ---We discussed recommendation for adjuvant chemoIO based on the RCT [id number]/GOG-3031/ROSEANNE trial, in which patients with recurrent or primary advanced stage Ill or IV endometrial cancer received dostarlimab plus chemotherapy followed by monotherapy or placebo plus chemotherapy followed by monotherapy. There was statistically significant and clinically meaningful progression-free survival benefits across all populations with the addition of IO compared with patients who received chemotherapy alone. The study also observed an early trend toward improved overall survival in all populations.   ---We discussed the recommendation for adjuvant chemotherapy and XRT. [id number] was a RCT comparing adjuvant chemotherapy plus radiation to chemotherapy for locally advanced stage III-RANDALL endometrial cancer or stage I/II clear cell or serous endometrial cancer s/p hysterectomy/BSO. The chemoRT group reduced locoregional recurrence rate but did not improve recurrence-free survival or distant recurrence. PORTEC3 was a RCT comparing adjuvant chemoradiotherapy to radiation alone for high risk endometrial cancer after hysterectomy/BSO/LN assessment. The chemoRT group had improved 5 year recurrence free survival but did not improve 5 year overall survival. Women with stage III, age >70, and clear cell histology appeared to have the greatest  RFS benefit with chemoRT.    Treatment plan:  - Initiate treatment with carboplatin AUC5/paclitaxel 175 mg/m2/dostarlimab 500mg every 3 weeks. We discussed plan to continue dostarlimab as maintenance treatment pending response. Risks and side effects of chemotherapy and consents were signed.   - Referral to radiation oncology placed for possible XRT after chemo complete  - Referral to oncology genetics placed    Orders:    Ambulatory Referral to Radiation Oncology; Future    oxyCODONE (Roxicodone) 5 immediate release tablet; Take 1 tablet (5 mg total) by mouth every 4 (four) hours as needed for moderate pain Max Daily Amount: 30 mg    CBC and Platelet; Future    Comprehensive metabolic panel; Future    Urine culture; Future    Ambulatory Referral to Oncology Genetics; Future    Status post RA- Total Laparoscopic  hysterectomy, bilateral salpingoopherectomy, pelvic and para-aortic lymphadenectomy, omental biopsy  - Laparoscopic incisions healing well  - Abdomen tender to deep palpation but soft and non-distended without rebound/guarding or peritonitic signs  - Pelvic exam with healing vaginal cuff without evidence of infection or dehiscence  - Given that patient is reporting continued moderate-severe postoperative abdominal pain outside of that expected at 2 weeks after laparoscopic surgery, will obtain CT AP, CBC, CMP, and urine culture to investigate for postop complications/pathologic source of pain  - 10 tabs of oxycodone 5mg sent to preferred patient pharmacy            [place] used throughout encounter.    History of Present Illness   Reason for Visit / CC:   Postop visit and treatment planning    Nancy Calderon is a 64 y.o. female now 2 weeks s/p RATLH/BSO/PPALND for high grade uterine carcinoma on 1/16. Final pathology is consistent with a stage IIIC2 MMRp p53m [id number] uterine papillary serous carcinoma.     Today, patient reports continued moderate to severe  postoperative pain in her lower abdomen and vagina. She is taking PRN APAP and ibuprofen without complete relief. She does report better control with oxycodone previously but this has run out. She has had a low appetite but denies nausea/emesis. She has had significant constipation but this has improved with PRN miralax and colace. She has some urinary discomfort/burning. Ambulating with walker (which is her baseline). She does report significant bilateral leg pain and occasional dizziness which has been occurring since her heart attack; she thinks these symptoms are stable from prior. No numbness/weakness in bilateral upper or lower extremities. She receives 6 hours of assistance at home and is otherwise completing her own ADLs/IADLs but feels very fatigued. Denies vaginal bleeding/discharge. Following lifting and pelvic restrictions.       Oncology History   Cancer Staging   Endometrial cancer ([facility])  Staging form: Corpus Uteri - Carcinoma, AJCC 8th Edition  - Clinical stage from 1/16/2025: FIGO Stage IIIC2 (cT1b, cN2, cM0) - Signed by Amanda Grayson MD on 2/3/2025  Histopathologic type: Papillary serous cystadenocarcinoma  Stage prefix: Initial diagnosis  Histologic grade (G): G3  Histologic grading system: 3 grade system  Lymph-vascular invasion (LVI): LVI present/identified, NOS  Pelvic tyler status: Positive  Number of pelvic nodes positive from dissection: 9  Number of pelvic nodes examined during dissection: 20  Para-aortic status: Positive  Number of para-aortic nodes positive from dissection: 7  Number of para-aortic nodes examined during dissection: 9  Lymph node metastasis: Present  Omentectomy performed: Yes  Oncology History   Endometrial cancer ([facility])   12/5/2024 Initial Diagnosis    Endometrial cancer ([facility])     1/16/2025 -  Cancer Staged    Staging form: Corpus Uteri - Carcinoma, AJCC 8th Edition  - Clinical stage from 1/16/2025: FIGO Stage IIIC2 (cT1b, cN2, cM0) - Signed by Amanda Ruiz  MD [place] on 2/3/2025  Histopathologic type: Papillary serous cystadenocarcinoma  Stage prefix: Initial diagnosis  Histologic grade (G): G3  Histologic grading system: 3 grade system  Lymph-vascular invasion (LVI): LVI present/identified, NOS  Pelvic tyler status: Positive  Number of pelvic nodes positive from dissection: 9  Number of pelvic nodes examined during dissection: 20  Para-aortic status: Positive  Number of para-aortic nodes positive from dissection: 7  Number of para-aortic nodes examined during dissection: 9  Lymph node metastasis: Present  Omentectomy performed: Yes       2/21/2025 -  Chemotherapy    alteplase (CATHFLO), 2 mg, Intracatheter, Every 1 Minute as needed, 0 of 12 cycles  palonosetron (ALOXI), 0.25 mg, Intravenous, Once, 0 of 6 cycles  fosaprepitant (EMEND) IVPB, 150 mg, Intravenous, Once, 0 of 6 cycles  dostarlimab-gxly (JEMPERLI) IVPB, 500 mg, Intravenous, Once, 0 of 12 cycles  CARBOplatin (PARAPLATIN) IVPB (GOG AUC DOSING), , Intravenous, Once, 0 of 6 cycles  PACLItaxel (TAXOL) chemo IVPB, 175 mg/m2 = 295.8 mg, Intravenous, Once, 0 of 6 cycles        Review of Systems   Constitutional:  Positive for fatigue. Negative for chills and fever.   HENT:  Negative for ear pain and sore throat.    Eyes:  Negative for pain and visual disturbance.   Respiratory:  Negative for cough and shortness of breath.    Cardiovascular:  Negative for chest pain and palpitations.   Gastrointestinal:  Positive for abdominal pain and constipation. Negative for diarrhea, nausea and vomiting.   Genitourinary:  Positive for dysuria and vaginal pain. Negative for hematuria, vaginal bleeding and vaginal discharge.   Musculoskeletal:  Positive for gait problem (walks with walker). Negative for arthralgias and back pain.   Skin:  Negative for color change and rash.   Neurological:  Positive for dizziness. Negative for seizures and syncope.   All other systems reviewed and are negative.   A complete review of  systems is negative other than that noted above in the HPI.  Medical History Reviewed by provider this encounter:     .     Objective   /90 (BP Location: Right arm, Patient Position: Sitting)   Pulse 105   Ht 5' (1.524 m)   Wt 71.7 kg (158 lb)   SpO2 97%   BMI 30.86 kg/m²     Body mass index is 30.86 kg/m².  Pain Screening:  Pain Score:   6  ECOG ECOG Performance Status: 2 - Ambulatory and capable of all selfcare but unable to carry out any work activities.  Up and about more than 50% of waking hours   Physical Exam  Vitals reviewed. Exam conducted with a chaperone present.   Constitutional:       General: She is not in acute distress.     Appearance: Normal appearance. She is well-developed. She is not ill-appearing, toxic-appearing or diaphoretic.   HENT:      Head: Normocephalic and atraumatic.   Eyes:      General: No scleral icterus.     Extraocular Movements: Extraocular movements intact.      Conjunctiva/sclera: Conjunctivae normal.   Neck:      Thyroid: No thyromegaly.   Cardiovascular:      Rate and Rhythm: Regular rhythm. Tachycardia present.   Pulmonary:      Effort: Pulmonary effort is normal.   Abdominal:      General: There is no distension.      Palpations: Abdomen is soft. There is no mass.      Tenderness: There is abdominal tenderness. There is no guarding or rebound.   Genitourinary:     Comments: The uterus, cervix, and adnexa are surgically absent.  Vagina healing without signs of cuff cellulitis or dehiscence.  Musculoskeletal:         General: No swelling or tenderness.      Cervical back: Normal range of motion and neck supple.   Lymphadenopathy:      Cervical: No cervical adenopathy.   Skin:     General: Skin is warm and dry.      Coloration: Skin is not jaundiced or pale.      Findings: No lesion or rash.      Comments: Robotic lap incisions healing well without evidence of infection/induration/erythema   Neurological:      General: No focal deficit present.      Mental Status:  She is alert and oriented to person, place, and time. Mental status is at baseline.      Cranial Nerves: No cranial nerve deficit.      Motor: No weakness.      Gait: Gait normal.      Comments: Pain with leg flexion bilaterally  No numbness/weakness identified   Psychiatric:         Mood and Affect: Mood normal.         Behavior: Behavior normal.         Thought Content: Thought content normal.         Judgment: Judgment normal.        Labs: I have reviewed pertinent labs.  Lab Results   Component Value Date/Time     7.3 12/18/2024 09:22 AM     Lab Results   Component Value Date/Time    Potassium 4.1 01/18/2025 05:42 AM    Chloride 104 01/18/2025 05:42 AM    CO2 28 01/18/2025 05:42 AM    BUN 12 01/18/2025 05:42 AM    Creatinine 0.55 (L) 01/18/2025 05:42 AM    Glucose, Fasting 107 (H) 01/17/2025 05:06 AM    Calcium 8.5 01/18/2025 05:42 AM    AST 31 12/12/2024 10:20 AM    ALT 53 (H) 12/12/2024 10:20 AM    Alkaline Phosphatase 63 12/12/2024 10:20 AM    eGFR 99 01/18/2025 05:42 AM     Lab Results   Component Value Date/Time    WBC 7.71 01/18/2025 05:42 AM    Hemoglobin 11.4 (L) 01/18/2025 05:42 AM    Hematocrit 35.7 01/18/2025 05:42 AM    MCV 98 01/18/2025 05:42 AM    Platelets 173 01/18/2025 05:42 AM     Lab Results   Component Value Date/Time    Absolute Neutrophils 5.44 01/17/2025 10:21 AM        Trend:  Lab Results   Component Value Date     7.3 12/18/2024       Radiology Results Review : No pertinent imaging studies reviewed.  Other Study Results Review : Pathology reports reviewed.    Administrative Statements   I have spent a total time of 45 minutes in caring for this patient on the day of the visit/encounter including Diagnostic results, Prognosis, Risks and benefits of tx options, Instructions for management, Patient and family education, and Reviewing / ordering tests, medicine, procedures  .     Amanda Grayson MD, MPH  Division of Gynecologic Oncology  02/04/25 5:37 PM

## 2025-02-04 NOTE — ASSESSMENT & PLAN NOTE
Lab Results   Component Value Date    HGBA1C 6.1 (H) 12/12/2024     Continue metformin 500 mg daily at [facility] and Jardiance 10 mg.  Repeat A1c and DFE at next home visit in 3 months.

## 2025-02-04 NOTE — ASSESSMENT & PLAN NOTE
- Reviewed pathology demonstrating stage IIIC2 MMRp p53m [id number] uterine papillary serous carcinoma  - We reviewed the aggressive histology and advanced stage of her cancer and discussed indications for adjuvant therapy with recommendation for chemotherapy, immunotherapy, and radiation oncology referral with rationale as below.  ---We discussed recommendation for adjuvant chemoIO based on the RCT [id number]/GOG-3031/ROSEANNE trial, in which patients with recurrent or primary advanced stage Ill or IV endometrial cancer received dostarlimab plus chemotherapy followed by monotherapy or placebo plus chemotherapy followed by monotherapy. There was statistically significant and clinically meaningful progression-free survival benefits across all populations with the addition of IO compared with patients who received chemotherapy alone. The study also observed an early trend toward improved overall survival in all populations.   ---We discussed the recommendation for adjuvant chemotherapy and XRT. [id number] was a RCT comparing adjuvant chemotherapy plus radiation to chemotherapy for locally advanced stage III-RANDALL endometrial cancer or stage I/II clear cell or serous endometrial cancer s/p hysterectomy/BSO. The chemoRT group reduced locoregional recurrence rate but did not improve recurrence-free survival or distant recurrence. PORTEC3 was a RCT comparing adjuvant chemoradiotherapy to radiation alone for high risk endometrial cancer after hysterectomy/BSO/LN assessment. The chemoRT group had improved 5 year recurrence free survival but did not improve 5 year overall survival. Women with stage III, age >70, and clear cell histology appeared to have the greatest RFS benefit with chemoRT.    Treatment plan:  - Initiate treatment with carboplatin AUC5/paclitaxel 175 mg/m2/dostarlimab 500mg every 3 weeks. We discussed plan to continue dostarlimab as maintenance treatment pending response. Risks and side effects of chemotherapy and  consents were signed.   - Referral to radiation oncology placed for possible XRT after chemo complete  - Referral to oncology genetics placed    Orders:    Ambulatory Referral to Radiation Oncology; Future    oxyCODONE (Roxicodone) 5 immediate release tablet; Take 1 tablet (5 mg total) by mouth every 4 (four) hours as needed for moderate pain Max Daily Amount: 30 mg    CBC and Platelet; Future    Comprehensive metabolic panel; Future    Urine culture; Future    Ambulatory Referral to Oncology Genetics; Future

## 2025-02-04 NOTE — ASSESSMENT & PLAN NOTE
- Laparoscopic incisions healing well  - Abdomen tender to deep palpation but soft and non-distended without rebound/guarding or peritonitic signs  - Pelvic exam with healing vaginal cuff without evidence of infection or dehiscence  - Given that patient is reporting continued moderate-severe postoperative abdominal pain outside of that expected at 2 weeks after laparoscopic surgery, will obtain CT AP, CBC, CMP, and urine culture to investigate for postop complications/pathologic source of pain  - 10 tabs of oxycodone 5mg sent to preferred patient pharmacy

## 2025-02-04 NOTE — TELEPHONE ENCOUNTER
Called patient with results of urgent CT scan and labs after postop visit on 2/4.     CT results with bilateral pelvic fluid collections (4.1 and 3.1cm) likely representing postoperative seromas without rim enhancement or evidence of abscess. No injury to bladder or bowel noted and no bleeding. Indistinct soft tissue along iliac bifurcation and right common iliac likely due to hemostatic agent placed intraoperatively.    Hgb stable, CMP with mildly elevated ALT as below. Normal creatinine. No elevated WBC.    Results from last 7 days   Lab Units 02/04/25  1542   WBC Thousand/uL 10.07   HEMOGLOBIN g/dL 13.3   HEMATOCRIT % 40.0   PLATELETS Thousands/uL 367     Results from last 7 days   Lab Units 02/04/25  1542   SODIUM mmol/L 139   CHLORIDE mmol/L 100   CO2 mmol/L 30   BUN mg/dL 17   CREATININE mg/dL 0.62   CALCIUM mg/dL 10.2   ALK PHOS U/L 80   ALT U/L 70*   AST U/L 27     Discussed above results to patient with no evidence of acute postoperative complication. Explained that postoperative fluid collections do not require treatment as long as no e/o infection and should resolve spontaneously.     Recommend continued pain management with PRN ibuprofen, APAP (limited due to ^ALT) and oxycodone.     Amanda Grayson MD, MPH  Division of Gynecologic Oncology  02/04/25 6:40 PM

## 2025-02-04 NOTE — ASSESSMENT & PLAN NOTE
Wt Readings from Last 3 Encounters:   02/04/25 71.7 kg (158 lb)   01/18/25 79.9 kg (176 lb 2.4 oz)   12/18/24 70.3 kg (155 lb)     Euvolemic on exam.  ECHO on 11/2024 with LVEF 55%, grade 1 diastolic dysfunction  Improved from prior EF 15%.  Adherent with ASA, atorvastatin 40 mg, furosemide, losartan, metoprolol, and jardiance.

## 2025-02-04 NOTE — ASSESSMENT & PLAN NOTE
See AP s/p total laparoscopic hysterectomy, BSO.  Orders:    Referral to Home Health- [facility]; Future

## 2025-02-04 NOTE — ASSESSMENT & PLAN NOTE
Ambulates with walker at baseline. States LE weakness, possibly from prolonged duration of time in bed.   Refer to PT and VNA.     Orders:    Ambulatory Referral to Physical Therapy; Future    [facility]; Future

## 2025-02-05 LAB — BACTERIA UR CULT: NORMAL

## 2025-02-06 ENCOUNTER — DOCUMENTATION (OUTPATIENT)
Dept: HEMATOLOGY ONCOLOGY | Facility: CLINIC | Age: 65
End: 2025-02-06

## 2025-02-07 ENCOUNTER — PATIENT OUTREACH (OUTPATIENT)
Dept: CASE MANAGEMENT | Facility: OTHER | Age: 65
End: 2025-02-07

## 2025-02-07 NOTE — PROGRESS NOTES
OSW received incoming email from [employer] that check for electric bill was sent out yesterday, 2/6. OSW will f/u with pt/brother in a few weeks, as pt will be starting chemotherapy for ongoing cancer treatment.

## 2025-02-07 NOTE — PROGRESS NOTES
Consultation Visit   Name: Nancy Calderon      : 1960      MRN: [id number]  Encounter Provider: Nick Gilliam MD  Encounter Date: 2/10/2025   Encounter department: [facility] RADIATION ONCOLOGY  :  Assessment & Plan  Endometrial cancer (HCC)  Nancy Calderon is a 64 y.o. female with FIGO IIIC2 (mB1rG1b) serous endometrial cancer s/p ZE/BSO with PLND and PALND on 25 revealing 4.7cm of disease, with 60% MMI, extensive LVSI, negative margins, no cervical stromal invasion (but upon confirmation with pathology there was LVSI in the cervix), with 9/20 bilateral pelvic nodes and 7/9 PA nodes involved. Washings were negative.  She had a preoperative CT chest abdomen pelvis showing endometrial malignancy, iliac and retroperitoneal lymphadenopathy, but no evidence of metastatic disease in the chest.  There was an adrenal finding for which follow-up was recommended.  Postoperative CT abdomen pelvis showed postoperative changes and stability in the left adrenal nodule.  Her case was reviewed at multidisciplinary tumor board with recommendations for systemic therapy with chemotherapy/immunotherapy, followed by external beam radiation therapy.  She is planned for Paclitaxel, carboplatin, Dostarlimab, with last chemotherapy cycle booked 25.    Based on her histology, pathologic features, and imaging findings, I agree with initiation of systemic therapy, followed by restaging and subsequent planning for pelvic and paraaortic EBRT (45Gy in 25fx) with a boost to the vaginal cuff (2-3 fx) given LVSI present in the cervix (confirmed with pathologist).     The benefits, alternatives and potential adverse effects of radiation therapy were explained to the patient. Risks include but are not limited to fatigue, skin reaction/dermatitis, hyperpigmentation, urinary frequency/urgency, dysuria, diarrhea, abdominal fullness/bloating, a permanent change in bowel habits,  vaginal stenosis, cystitis, proctitis, risk of fistulas/adhesions, perforation, infection, laceration, bleeding, and risk of secondary malignancy. We also discussed that, even with appropriate therapy, there is still a risk of progression or recurrence.  She agreed to proceed with radiation therapy and informed consent was signed.    She is due to start chemotherapy on 2/21/2025, with final cycle of chemotherapy scheduled on 6/6/2025.  I will confer with gynecology oncology regarding restaging PET/CT after completion of chemotherapy and routine back to radiation oncology.  Pending imaging results, she will require follow-up visit to review results and update plan/consent for radiation. She was given my contact information and instructions to call back with any questions.     A Lao video  was utilized for this entirety of this visit.  Orders:  •  Ambulatory Referral to Radiation Oncology      History of Present Illness   Chief Complaint   Patient presents with   • Consult     Pertinent Medical History   With newly diagnosed Endometrial Cancer, Stage IIIC2. Originally presented with postmenopausal bleeding to her OB GYN. She is s/p total hysterectomy, tube, and ovaries 1/16/25.   She presents to discuss XRT. Referred by . D1C1 scheduled for 2/21/25.     PMHx Chronic heart failure, MI, Type 2 diabetes,and hypertension. She has family history of vaginal cancer in Paternal Grandmother, and Paternal Aunt.     12/6/24 Tissue Exam   A.  Endometrium (biopsy):     - High-grade endometrial adenocarcinoma with necrosis, most compatible with serous carcinoma.      Comment:  Mismatch repair panel and Her-2 are pending.    12/16/24 CT CAP  IMPRESSION:  1.  Markedly thickened, heterogeneously enhancing endometrium compatible with known malignancy.  2.  Mild bilateral iliac chain and retroperitoneal lymphadenopathy, suspicious for metastases.  3.  No findings of metastatic disease in the  "chest.    1/16/25 Tissue Exam    Uterus, cervix, bilateral fallopian tubes and ovaries; hysterectomy and bilateral salpingo-oophorectomy:       - Serous carcinoma involving endometrium, 4.7 cm (greatest dimension), see note          - Myometrial invasion: Present             - Depth of myometrial invasion 12 mm / 20 mm (myometrial thickness)          - Uterine serosa involvement: Not identified          - Lower uterine segment involvement: Present, myoinvasive         - Cervical stroma involvement: Not identified         - Other tissue involvement: Not identified        - Lymphovascular invasion: Present, extensive        - All margins negative for carcinoma       - Background myometrium with leiomyoma and adenomyosis       - Bilateral benign ovaries with no significant histopathologic abnormalities       - Bilateral benign fallopian tubes with no significant histopathologic abnormalities       - See synoptic report     B. Lymph nodes (10), \"left pelvic lymph nodes\"; dissection:       - Metastatic serous carcinoma in three of ten lymph nodes (3/10)         - Size of largest deposit: 2.2 cm      C. Lymph nodes (10), \"right pelvic lymph nodes\"; dissection:       - Metastatic serous carcinoma in six of ten lymph nodes (6/10)         - Size of largest deposit: 2.2 cm      D. Lymph nodes (9), \"right para-aortic lymph nodes\"; dissection:      - Metastatic serous carcinoma in seven of nine lymph nodes (7/9)         - Size of largest deposit: 1.1 cm      E. Fibroadipose tissue, \"left para-aortic lymph node\"; biopsy:       - Benign fibroadipose tissue       - No lymph node tissue identified       - Negative for malignancy      F. Fibroadipose tissue, \"omentum\"; biopsy:       - Benign fibroadipose tissue       - Negative for malignancy     2/4/25   Discussed Carboplatin AUC5/[id number]/m2/ dostarlimab 500 mg q3 weeks  Discussed for her to continue on dostarlimab maintenance, depending on treatment " response. Referral for XRT is placed.     2/4/25 CT Abdomen and pelvis   IMPRESSION:  Status post interval hysterectomy. Bilateral pelvic fluid collections likely representing postoperative seromas.  Indistinct soft tissue about the iliac bifurcation and right common iliac artery. This may be postoperative however attention on follow-up is recommended to exclude a neoplastic etiology.       Upcoming   2/14/25   2/21/25 D1C1  3/14/25 D1 C2     Today she reports feeling well overall.  She has some abdominal discomfort.  She denies bleeding.  She denies pain.  She presents with her brother.  She lives alone and ambulates with a walker.   Oncology History   Cancer Staging   Endometrial cancer (Formerly [facility])  Staging form: Corpus Uteri - Carcinoma, AJCC 8th Edition  - Clinical stage from 1/16/2025: FIGO Stage IIIC2 (cT1b, cN2, cM0) - Signed by Amanda Grayson MD on 2/3/2025  Histopathologic type: Papillary serous cystadenocarcinoma  Stage prefix: Initial diagnosis  Histologic grade (G): G3  Histologic grading system: 3 grade system  Lymph-vascular invasion (LVI): LVI present/identified, NOS  Pelvic tyler status: Positive  Number of pelvic nodes positive from dissection: 9  Number of pelvic nodes examined during dissection: 20  Para-aortic status: Positive  Number of para-aortic nodes positive from dissection: 7  Number of para-aortic nodes examined during dissection: 9  Lymph node metastasis: Present  Omentectomy performed: Yes  Oncology History   Endometrial cancer (Formerly [facility])   12/5/2024 Initial Diagnosis    Endometrial cancer (Former[facility])     1/16/2025 -  Cancer Staged    Staging form: Corpus Uteri - Carcinoma, AJCC 8th Edition  - Clinical stage from 1/16/2025: FIGO Stage IIIC2 (cT1b, cN2, cM0) - Signed by Amanda Grayson MD on 2/3/2025  Histopathologic type: Papillary serous cystadenocarcinoma  Stage prefix: Initial diagnosis  Histologic grade (G): G3  Histologic grading system: 3 grade  system  Lymph-vascular invasion (LVI): LVI present/identified, NOS  Pelvic tyler status: Positive  Number of pelvic nodes positive from dissection: 9  Number of pelvic nodes examined during dissection: 20  Para-aortic status: Positive  Number of para-aortic nodes positive from dissection: 7  Number of para-aortic nodes examined during dissection: 9  Lymph node metastasis: Present  Omentectomy performed: Yes       2/21/2025 -  Chemotherapy    alteplase (CATHFLO), 2 mg, Intracatheter, Every 1 Minute as needed, 0 of 12 cycles  palonosetron (ALOXI), 0.25 mg, Intravenous, Once, 0 of 6 cycles  fosaprepitant (EMEND) IVPB, 150 mg, Intravenous, Once, 0 of 6 cycles  dostarlimab-gxly (JEMPERLI) IVPB, 500 mg, Intravenous, Once, 0 of 12 cycles  CARBOplatin (PARAPLATIN) IVPB (GOG AUC DOSING), , Intravenous, Once, 0 of 6 cycles  PACLItaxel (TAXOL) chemo IVPB, 175 mg/m2 = 295.8 mg, Intravenous, Once, 0 of 6 cycles        Review of Systems Refer to nursing note.         Objective   /83   Pulse 79   Temp 97.5 °F (36.4 °C)   Wt 71.7 kg (158 lb)   SpO2 99%   BMI 30.86 kg/m²     Pain Screening:  Pain Score:   6  ECOG    Physical Exam   General Appearance:  Alert, cooperative, no distress, appears stated age.  Appears fatigued.  Cardiovascular:  Extremities warm and well perfused  Lungs: Respirations unlabored, no cyanosis, able to speak in complete sentences without dyspnea.  Abdomen: Non-distended  Extremities: No cyanosis or edema  Skin: No generalized rash or dermatitis  Neurologic: ANOx3, speech and cognition intact.  Ambulates with walker.    Radiology Results: I have reviewed radiology images/reports described above.       Administrative Statements   I have spent a total time of 60 minutes in caring for this patient on the day of the visit/encounter including Risks and benefits of tx options, Instructions for management, Patient and family education, Risk factor reductions, Impressions, Counseling / Coordination of  care, Documenting in the medical record, Reviewing / ordering tests, medicine, procedures  , Obtaining or reviewing history  , and Communicating with other healthcare professionals .

## 2025-02-07 NOTE — ASSESSMENT & PLAN NOTE
Nancy Calderon is a 64 y.o. female with FIGO IIIC2 ([id number]) serous endometrial cancer s/p ZE/BSO with PLND and PALND on 1/16/25 revealing 4.7cm of disease, with 60% MMI, extensive LVSI, negative margins, no cervical stromal invasion (but upon confirmation with pathology there was LVSI in the cervix), with 9/20 bilateral pelvic nodes and 7/9 PA nodes involved. Washings were negative.  She had a preoperative CT chest abdomen pelvis showing endometrial malignancy, iliac and retroperitoneal lymphadenopathy, but no evidence of metastatic disease in the chest.  There was an adrenal finding for which follow-up was recommended.  Postoperative CT abdomen pelvis showed postoperative changes and stability in the left adrenal nodule.  Her case was reviewed at multidisciplinary tumor board with recommendations for systemic therapy with chemotherapy/immunotherapy, followed by external beam radiation therapy.  She is planned for Paclitaxel, carboplatin, Dostarlimab, with last chemotherapy cycle booked 6/6/25.    Based on her histology, pathologic features, and imaging findings, I agree with initiation of systemic therapy, followed by restaging and subsequent planning for pelvic and paraaortic EBRT (45Gy in 25fx) with a boost to the vaginal cuff (2-3 fx) given LVSI present in the cervix (confirmed with pathologist).     The benefits, alternatives and potential adverse effects of radiation therapy were explained to the patient. Risks include but are not limited to fatigue, skin reaction/dermatitis, hyperpigmentation, urinary frequency/urgency, dysuria, diarrhea, abdominal fullness/bloating, a permanent change in bowel habits, vaginal stenosis, cystitis, proctitis, risk of fistulas/adhesions, perforation, infection, laceration, bleeding, and risk of secondary malignancy. We also discussed that, even with appropriate therapy, there is still a risk of progression or recurrence.  She agreed to proceed with radiation  therapy and informed consent was signed.    She is due to start chemotherapy on 2/21/2025, with final cycle of chemotherapy scheduled on 6/6/2025.  I will confer with gynecology oncology regarding restaging PET/CT after completion of chemotherapy and routine back to radiation oncology.  Pending imaging results, she will require follow-up visit to review results and update plan/consent for radiation. She was given my contact information and instructions to call back with any questions.     A [place] video  was utilized for this entirety of this visit.  Orders:  •  Ambulatory Referral to Radiation Oncology

## 2025-02-10 ENCOUNTER — CONSULT (OUTPATIENT)
Dept: RADIATION ONCOLOGY | Facility: CLINIC | Age: 65
End: 2025-02-10
Payer: COMMERCIAL

## 2025-02-10 ENCOUNTER — TELEPHONE (OUTPATIENT)
Dept: GYNECOLOGIC ONCOLOGY | Facility: CLINIC | Age: 65
End: 2025-02-10

## 2025-02-10 VITALS
BODY MASS INDEX: 30.86 KG/M2 | TEMPERATURE: 97.5 F | HEART RATE: 79 BPM | SYSTOLIC BLOOD PRESSURE: 120 MMHG | WEIGHT: 158 LBS | DIASTOLIC BLOOD PRESSURE: 83 MMHG | OXYGEN SATURATION: 99 %

## 2025-02-10 DIAGNOSIS — C54.1 ENDOMETRIAL CANCER (HCC): Primary | ICD-10-CM

## 2025-02-10 PROCEDURE — 99245 OFF/OP CONSLTJ NEW/EST HI 55: CPT | Performed by: INTERNAL MEDICINE

## 2025-02-10 NOTE — PROGRESS NOTES
"Joel  ID # [id number]    Nancy Calderon 1960 is a 64 y.o. female With newly diagnosed Endometrial Cancer, Stage IIIC2. Originally presented with postmenopausal bleeding to her OB GYN. She is s/p total hysterectomy, tube, and ovaries 1/16/25.   She presents to discuss XRT. Referred by .     PMHx Chronic heart failure, MI, Type 2 diabetes,and hypertension. She has family history of vaginal cancer in Paternal Grandmother, and Paternal Aunt.     12/6/24 Tissue Exam   A.  Endometrium (biopsy):     - High-grade endometrial adenocarcinoma with necrosis, most compatible with serous carcinoma.      Comment:  Mismatch repair panel and Her-2 are pending.    12/16/24 CT CAP  IMPRESSION:     1.  Markedly thickened, heterogeneously enhancing endometrium compatible with known malignancy.  2.  Mild bilateral iliac chain and retroperitoneal lymphadenopathy, suspicious for metastases.  3.  No findings of metastatic disease in the chest.      1/16/25 Tissue Exam    Uterus, cervix, bilateral fallopian tubes and ovaries; hysterectomy and bilateral salpingo-oophorectomy:       - Serous carcinoma involving endometrium, 4.7 cm (greatest dimension), see note          - Myometrial invasion: Present             - Depth of myometrial invasion 12 mm / 20 mm (myometrial thickness)          - Uterine serosa involvement: Not identified          - Lower uterine segment involvement: Present, myoinvasive         - Cervical stroma involvement: Not identified         - Other tissue involvement: Not identified        - Lymphovascular invasion: Present, extensive        - All margins negative for carcinoma       - Background myometrium with leiomyoma and adenomyosis       - Bilateral benign ovaries with no significant histopathologic abnormalities       - Bilateral benign fallopian tubes with no significant histopathologic abnormalities       - See synoptic report     B. Lymph nodes (10), \"left pelvic lymph " "nodes\"; dissection:       - Metastatic serous carcinoma in three of ten lymph nodes (3/10)         - Size of largest deposit: 2.2 cm      C. Lymph nodes (10), \"right pelvic lymph nodes\"; dissection:       - Metastatic serous carcinoma in six of ten lymph nodes (6/10)         - Size of largest deposit: 2.2 cm      D. Lymph nodes (9), \"right para-aortic lymph nodes\"; dissection:      - Metastatic serous carcinoma in seven of nine lymph nodes (7/9)         - Size of largest deposit: 1.1 cm      E. Fibroadipose tissue, \"left para-aortic lymph node\"; biopsy:       - Benign fibroadipose tissue       - No lymph node tissue identified       - Negative for malignancy      F. Fibroadipose tissue, \"omentum\"; biopsy:       - Benign fibroadipose tissue       - Negative for malignancy     2/3/25  Gynecology Oncology Tumor Case Review   The group recommended to consider treatment with systemic chemotherapy with immunotherapy +/- EBRT and consider genetics consultation.     2/4/25   Discussed Carboplatin AUC5/[id number]/m2/ dostarlimab 500 mg q3 weeks  Discussed for her to continue on dostarlimab maintenance, depending on treatment response. Referral for radiation oncology is placed.     2/4/25 CT Abdomen and pelvis   IMPRESSION:  Status post interval hysterectomy. Bilateral pelvic fluid collections likely representing postoperative seromas.   Indistinct soft tissue about the iliac bifurcation and right common iliac artery. This may be postoperative however attention on follow-up is recommended to exclude a neoplastic etiology.       Upcoming   2/14/25   2/21/25 D1C1  3/14/25 D1 C2         Oncology History   Endometrial cancer (HCC)   12/5/2024 Initial Diagnosis    Endometrial cancer (HCC)     1/16/2025 -  Cancer Staged    Staging form: Corpus Uteri - Carcinoma, AJCC 8th Edition  - Clinical stage from 1/16/2025: FIGO Stage IIIC2 (cT1b, cN2, cM0) - Signed by Amanda Grayson MD on " 2/3/2025  Histopathologic type: Papillary serous cystadenocarcinoma  Stage prefix: Initial diagnosis  Histologic grade (G): G3  Histologic grading system: 3 grade system  Lymph-vascular invasion (LVI): LVI present/identified, NOS  Pelvic tyler status: Positive  Number of pelvic nodes positive from dissection: 9  Number of pelvic nodes examined during dissection: 20  Para-aortic status: Positive  Number of para-aortic nodes positive from dissection: 7  Number of para-aortic nodes examined during dissection: 9  Lymph node metastasis: Present  Omentectomy performed: Yes       2/21/2025 -  Chemotherapy    alteplase (CATHFLO), 2 mg, Intracatheter, Every 1 Minute as needed, 0 of 12 cycles  palonosetron (ALOXI), 0.25 mg, Intravenous, Once, 0 of 6 cycles  fosaprepitant (EMEND) IVPB, 150 mg, Intravenous, Once, 0 of 6 cycles  dostarlimab-gxly (JEMPERLI) IVPB, 500 mg, Intravenous, Once, 0 of 12 cycles  CARBOplatin (PARAPLATIN) IVPB (GOG AUC DOSING), , Intravenous, Once, 0 of 6 cycles  PACLItaxel (TAXOL) chemo IVPB, 175 mg/m2 = 295.8 mg, Intravenous, Once, 0 of 6 cycles         Review of Systems:  Review of Systems   Genitourinary:  Positive for pelvic pain, vaginal bleeding (last episode 2 weeks ago) and vaginal pain. Negative for vaginal discharge.   Musculoskeletal:  Positive for back pain and neck pain.   Psychiatric/Behavioral:  The patient is nervous/anxious.         H/o anxiety       Clinical Trial: no      Pregnancy test needed:  no    Prior Radiation: none    Teaching [facility] radiation oncology booklet given    MST completed    Implantable Devices (Port, Pacemaker, pain stimulator): none    Hip Replacement: none      [unfilled]  Health Maintenance   Topic Date Due   • DM Eye Exam  Never done   • HIV Screening  Never done   • Breast Cancer Screening: Mammogram  Never done   • Colorectal Cancer Screening  Never done   • BMI: Followup Plan  07/29/2025   • ONC Colorectal Surgery Screening  08/04/2025   • Endometrial  Survivorship Visit  10/31/2025   • ONC Weight Management Consult  11/14/2025   • ONC Mammogram  11/28/2025   • ONC DXA Scan  12/26/2025   • COVID-19 Vaccine (1) 02/18/2025 (Originally 8/24/1965)   • Influenza Vaccine (1) 06/30/2025 (Originally 9/1/2024)   • Hepatitis C Screening  07/29/2025 (Originally 1960)   • RSV Vaccine for Pregnant Patients and Patients Age 60+ Years (1 - Risk 60-74 years 1-dose series) 07/29/2025 (Originally 8/24/2020)   • Pneumococcal Vaccine: Pediatrics (0 to 5 Years) and At-Risk Patients (6 to 64 Years) (1 of 2 - PCV) 07/29/2025 (Originally 8/24/1966)   • Zoster Vaccine (2 of 2) 11/18/2025 (Originally 9/23/2024)   • HEMOGLOBIN A1C  06/12/2025   • Annual Physical  07/29/2025   • Diabetic Foot Exam  07/29/2025   • Depression Screening  11/19/2025   • Kidney Health Evaluation: Albumin/Creatinine Ratio  12/12/2025   • BMI: Adult  02/04/2026   • Kidney Health Evaluation: GFR  02/04/2026   • DTaP,Tdap,and Td Vaccines (2 - Td or Tdap) 07/29/2034   • Meningococcal B Vaccine  Aged Out   • RSV Vaccine age 0-20 Months  Aged Out   • HIB Vaccine  Aged Out   • IPV Vaccine  Aged Out   • Hepatitis A Vaccine  Aged Out   • Meningococcal ACWY Vaccine  Aged Out   • HPV Vaccine  Aged Out     Past Medical History:   Diagnosis Date   • Diabetes mellitus (HCC)    • Fibroid    • Heart failure (HCC)    • Hyperlipidemia    • Hypertension    • Myocardial infarction (HCC) 06/05/2024   • Nonischemic cardiomyopathy (HCC)      Past Surgical History:   Procedure Laterality Date   • BLADDER NECK SUSPENSION     • CARDIAC CATHETERIZATION Left 07/10/2024    Procedure: Cardiac Left Heart Cath;  Surgeon: Justino Patel MD;  Location: [facility];  Service: Cardiology   • MA LAPS TOTAL HYSTERECT 250 GM/< W/RMVL TUBE/OVARY N/A 1/16/2025    Procedure: ROBOTIC ASSISTED TOTAL LAPAROSCOPIC HYSTERECTOMY, BILATERAL SALPINGO-OOPHERECTOMY, PELVIC AND PARA-AORTIC LYMPHADENECTOMY, OMENTAL BIOPSY, EXAM UNDER ANESTHESIA,  CYSTOSCOPY;  Surgeon: Amanda Grayson MD;  Location: [facility];  Service: Gynecology Oncology   • SPINAL FIXATION SURGERY      L3-L5 fusion     Family History   Problem Relation Age of Onset   • Cancer Paternal Aunt         vaginal   • Vaginal cancer Paternal Aunt    • Cancer Paternal Grandmother         vaginal   • Vaginal cancer Paternal Grandmother    • Breast cancer Neg Hx    • Colon cancer Neg Hx    • Ovarian cancer Neg Hx    • Endometrial cancer Neg Hx      Social History     Tobacco Use   • Smoking status: Never   • Smokeless tobacco: Never   Vaping Use   • Vaping status: Never Used   Substance Use Topics   • Alcohol use: Never   • Drug use: Never        Current Outpatient Medications:   •  aspirin 81 mg chewable tablet, Chew 1 tablet (81 mg total) daily, Disp: 90 tablet, Rfl: 1  •  atorvastatin (LIPITOR) 40 mg tablet, Take 1 tablet (40 mg total) by mouth daily, Disp: 90 tablet, Rfl: 2  •  Blood Glucose Monitoring Suppl (OneTouch Verio Reflect) w/Device KIT, Check blood sugars three times daily. Please substitute with appropriate alternative as covered by patient's insurance. Dx: E11.65, Disp: 1 kit, Rfl: 0  •  Blood Pressure Monitoring (B-D ASSURE BPM/DELUXE ARM CUFF) MISC, Use in the morning, Disp: 1 each, Rfl: 0  •  colchicine (COLCRYS) 0.6 mg tablet, Take 1 tablet (0.6 mg total) by mouth daily, Disp: 30 tablet, Rfl: 5  •  Empagliflozin (JARDIANCE) 10 MG TABS tablet, Take 1 tablet (10 mg total) by mouth daily, Disp: 90 tablet, Rfl: 3  •  furosemide (LASIX) 20 mg tablet, Take 1 tablet (20 mg total) by mouth daily, Disp: 30 tablet, Rfl: 4  •  glucose blood (OneTouch Verio) test strip, Check blood sugars three times daily. Please substitute with appropriate alternative as covered by patient's insurance. Dx: E11.65, Disp: 300 each, Rfl: 3  •  ibuprofen (MOTRIN) 600 mg tablet, Take 1 tablet (600 mg total) by mouth every 8 (eight) hours for 7 days, Disp: , Rfl:   •  lidocaine (LMX) 4 % cream, Apply  topically as needed for mild pain, Disp: 28 g, Rfl: 1  •  LORazepam (ATIVAN) 1 mg tablet, Take 1 tablet (1 mg total) by mouth every 8 (eight) hours as needed (nausea or anxiety), Disp: 20 tablet, Rfl: 0  •  losartan (COZAAR) 25 mg tablet, Take 1 tablet (25 mg total) by mouth daily (Patient taking differently: Take 25 mg by mouth every morning), Disp: 30 tablet, Rfl: 3  •  metFORMIN (GLUCOPHAGE) 500 mg tablet, Take 1 tablet (500 mg total) by mouth daily with breakfast, Disp: 90 tablet, Rfl: 1  •  metoprolol succinate (TOPROL-XL) 25 mg 24 hr tablet, Take 1 tablet (25 mg total) by mouth 2 (two) times a day, Disp: 180 tablet, Rfl: 2  •  naloxone (NARCAN) 4 mg/0.1 mL nasal spray, Administer 1 spray into a nostril. If no response after 2-3 minutes, give another dose in the other nostril using a new spray., Disp: 1 each, Rfl: 1  •  ondansetron (ZOFRAN) 8 mg tablet, Take 1 tablet (8 mg total) by mouth every 8 (eight) hours as needed for nausea or vomiting, Disp: 30 tablet, Rfl: 1  •  OneTouch Delica Lancets 33G MISC, Check blood sugars three times daily. Please substitute with appropriate alternative as covered by patient's insurance. Dx: E11.65, Disp: 300 each, Rfl: 3  •  oxyCODONE (Roxicodone) 5 immediate release tablet, Take 1 tablet (5 mg total) by mouth every 4 (four) hours as needed for moderate pain Max Daily Amount: 30 mg, Disp: 10 tablet, Rfl: 0  •  traMADol (Ultram) 50 mg tablet, Take 1 tablet (50 mg total) by mouth every 6 (six) hours as needed for moderate pain, Disp: 20 tablet, Rfl: 0  No Known Allergies   There were no vitals filed for this visit.

## 2025-02-10 NOTE — Clinical Note
Edgardo Patel,  I reviewed adjuvant RT with Nancy. Very high risk case, agree with getting her chemotherapy in first. Would you get a PET/CT after completion of chemotherapy for restaging and route her back to us? This would help with RT planning.   She was quite overwhelmed with everything, and we'll likely sit down with the  again closer to the radiation to review the process, etc.   Thanks.

## 2025-02-10 NOTE — TELEPHONE ENCOUNTER
Outgoing call to brother related to discuss chemo treatment instructions using a [place] speaking .   All questions answered.   Written instructions in [place] mailed via [employer].  Reviewed lab draws, antiemetics and chemo calendar.  Instructed to review packet and to call the office should he have any further questions related to treatment.

## 2025-02-11 ENCOUNTER — PATIENT OUTREACH (OUTPATIENT)
Dept: HEMATOLOGY ONCOLOGY | Facility: CLINIC | Age: 65
End: 2025-02-11

## 2025-02-11 NOTE — PROGRESS NOTES
Called pt using  #[phone] to discuss transportation needs, no answer, left a detailed message with my contact information

## 2025-02-14 ENCOUNTER — OFFICE VISIT (OUTPATIENT)
Dept: GYNECOLOGIC ONCOLOGY | Facility: CLINIC | Age: 65
End: 2025-02-14

## 2025-02-14 VITALS
HEIGHT: 60 IN | SYSTOLIC BLOOD PRESSURE: 140 MMHG | HEART RATE: 82 BPM | BODY MASS INDEX: 31.41 KG/M2 | OXYGEN SATURATION: 98 % | DIASTOLIC BLOOD PRESSURE: 80 MMHG | TEMPERATURE: 96.9 F | WEIGHT: 160 LBS

## 2025-02-14 DIAGNOSIS — Z90.710 STATUS POST HYSTERECTOMY: ICD-10-CM

## 2025-02-14 DIAGNOSIS — G89.3 CANCER RELATED PAIN: ICD-10-CM

## 2025-02-14 DIAGNOSIS — C54.1 ENDOMETRIAL CANCER (HCC): Primary | ICD-10-CM

## 2025-02-14 DIAGNOSIS — E27.9 ADRENAL NODULE (HCC): ICD-10-CM

## 2025-02-14 PROCEDURE — 99024 POSTOP FOLLOW-UP VISIT: CPT | Performed by: OBSTETRICS & GYNECOLOGY

## 2025-02-14 RX ORDER — OXYCODONE HYDROCHLORIDE 5 MG/1
5 TABLET ORAL EVERY 4 HOURS PRN
Qty: 10 TABLET | Refills: 0 | Status: SHIPPED | OUTPATIENT
Start: 2025-02-14

## 2025-02-14 NOTE — ASSESSMENT & PLAN NOTE
1/16/25 s/p ZE/BSO with PLND and PALND : FIGO stage [id number], pT1b pN2a  [id number] uterine papillary serous carcinoma , extensive LVSI present, 9/20 bilateral pelvic nodes and 7/9 PA nodes involved by the tumor.     Current treatment plan:   Adjuvant Q21 days carboplatin AUC5/paclitaxel 175 mg/m2/dostarlimab 500mg every 3 weeks x 6 cycles, followed by restaging and radiation. Plan for maintenance dostarlimab as maintenance afterward.     Cycle 1 Carbo/Taxol/Dostarlimab - planned on 2/21/25 (at [facility])   Follow up in 3 weeks before cycle 2       Orders:    Ambulatory Referral to Physical Therapy; Future    oxyCODONE (Roxicodone) 5 immediate release tablet; Take 1 tablet (5 mg total) by mouth every 4 (four) hours as needed for moderate pain Max Daily Amount: 30 mg

## 2025-02-14 NOTE — ASSESSMENT & PLAN NOTE
"2/4/25 CT AP \"ADRENAL GLANDS: Stable 1.8 cm left adrenal nodule is noted. Right adrenal gland is unremarkable.\"     Will continue to revaluate on future imaging.          "

## 2025-02-14 NOTE — PROGRESS NOTES
"Name: Nancy Calderon      : 1960      MRN: [id number]  Encounter Provider: Amanda Grayson MD  Encounter Date: 2025   Encounter department: [facility]  :  Assessment & Plan  Endometrial cancer (HCC)  25 s/p ZE/BSO with PLND and PALND : FIGO stage [id number], pT1b pN2a  [id number] uterine papillary serous carcinoma , extensive LVSI present, /20 bilateral pelvic nodes and 7/9 PA nodes involved by the tumor.     Current treatment plan:   Adjuvant Q21 days carboplatin AUC5/paclitaxel 175 mg/m2/dostarlimab 500mg every 3 weeks x 6 cycles, followed by restaging and radiation. Plan for maintenance dostarlimab as maintenance afterward.     Cycle 1 Carbo/Taxol/Dostarlimab - planned on 25 (at [facility])   Follow up in 3 weeks before cycle 2       Orders:    Ambulatory Referral to Physical Therapy; Future    oxyCODONE (Roxicodone) 5 immediate release tablet; Take 1 tablet (5 mg total) by mouth every 4 (four) hours as needed for moderate pain Max Daily Amount: 30 mg    Status post RA- Total Laparoscopic  hysterectomy, bilateral salpingoopherectomy, pelvic and para-aortic lymphadenectomy, omental biopsy  Post op  CT AP (25): \"Status post interval hysterectomy. Bilateral pelvic fluid collections likely representing postoperative seromas.Indistinct soft tissue about the iliac bifurcation and right common iliac artery\" which is likely \"due to hemostatic agent placed intraoperatively. \"   Orders:    Ambulatory Referral to Physical Therapy; Future    Adrenal nodule (HCC)  25 CT AP \"ADRENAL GLANDS: Stable 1.8 cm left adrenal nodule is noted. Right adrenal gland is unremarkable.\"     Will continue to revaluate on future imaging.          Cancer related pain  25 reports stable lower abdominal pain , pointing to her pelvic region radiating to bilateral hips , rated as 5 on 1-10 scale  ,well controlled with PRN " "acetaminophen/ibuprofen/oxycodone which she did not need more than once a day of each PRN.     Denies any fever ir signs to suggest infection. CT showed post op seroma. Also she has baseline joints/hips pain to start with pre-op,     PLAN   - to continue with her current PRN Acetaminophen and Oxycodone. Advised on possible side effects of NSAIDS and to limit use as possible,   - will refer to PT (previous used wheelchair, today using a walker)  - previously palliative care referral was offered but she declined.             History of Present Illness   Reason for Visit / CC: Post-op Visit    HPI  Nancy Calderon is a 64 y.o. female. PMH of HTN, HLD, DM-2, MI, presenting today as post op follow up, for her uterine carcinoma.     11/25/24 US Pelvis (d/t PMB) showed \" Markedly thickened and heterogeneous endometrium with multiple foci of arterial vascularity suspicious for malignancy. Endometrial biopsy recommended. Small subserosal fibroid.Right ovary not visualized.\"      12/16/24 CT CAP \"1.  Markedly thickened, heterogeneously enhancing endometrium compatible with known malignancy. 2.  Mild bilateral iliac chain and retroperitoneal lymphadenopathy, suspicious for metastases. 3.  No findings of metastatic disease in the chest.\"     1/16/25 s/p ZE/BSO with PLND and PALND : FIGO stage [id number], pT1b pN2a  [id number] uterine papillary serous carcinoma , extensive LVSI present, 9/20 bilateral pelvic nodes and 7/9 PA nodes involved by the tumor.     2/4/25 CT AP: Status post interval hysterectomy. Bilateral pelvic fluid collections likely representing postoperative seromas.Indistinct soft tissue about the iliac bifurcation and right common iliac artery. This may be postoperative however attention on follow-up is recommended to exclude a neoplastic etiology. ADRENAL GLANDS: Stable 1.8 cm left adrenal nodule is noted. Right adrenal gland is unremarkable.    02/10/25 met with rad oncology, Dr. Gilliam: \"systemic " "therapy, followed by restaging and subsequent planning for pelvic and paraaortic EBRT (45Gy in 25fx) with a boost to the vaginal cuff (2-3 fx) given LVSI present in the cervix (confirmed with pathologist)\".     Adjuvant Q21 days carboplatin AUC5/paclitaxel 175 mg/m2/dostarlimab 500mg every 3 weeks x 6 cycles, followed by restaging and radiation. Plan for maintenance dostarlimab as maintenance afterward.     Cycle 1 Carbo/Taxol/Dostarlimab - planned on 2/21/25 (at [facility])     2/14/25 presenting to follow up with her brother who helped translating during the visit. She reports lower abdominal pain/discomfort well controlled with PRN meds (see ROS), otherwise no new symptoms or changes. She agrees to PT. She continues to follow up with cardiology. Above A&P reviewed and she agrees to outlined plan.       Pertinent Medical History      Oncology History   Cancer Staging   Endometrial cancer (HCC)  Staging form: Corpus Uteri - Carcinoma, AJCC 8th Edition  - Clinical stage from 1/16/2025: FIGO Stage IIIC2 (cT1b, cN2, cM0) - Signed by Amanda Grayson MD on 2/3/2025  Histopathologic type: Papillary serous cystadenocarcinoma  Stage prefix: Initial diagnosis  Histologic grade (G): G3  Histologic grading system: 3 grade system  Lymph-vascular invasion (LVI): LVI present/identified, NOS  Pelvic tyler status: Positive  Number of pelvic nodes positive from dissection: 9  Number of pelvic nodes examined during dissection: 20  Para-aortic status: Positive  Number of para-aortic nodes positive from dissection: 7  Number of para-aortic nodes examined during dissection: 9  Lymph node metastasis: Present  Omentectomy performed: Yes  Oncology History   Endometrial cancer (HCC)   12/5/2024 Initial Diagnosis    Endometrial cancer ([facility])     1/16/2025 -  Cancer Staged    Staging form: Corpus Uteri - Carcinoma, AJCC 8th Edition  - Clinical stage from 1/16/2025: FIGO Stage IIIC2 (cT1b, cN2, cM0) - Signed by Amanda " Sara Grayson MD on 2/3/2025  Histopathologic type: Papillary serous cystadenocarcinoma  Stage prefix: Initial diagnosis  Histologic grade (G): G3  Histologic grading system: 3 grade system  Lymph-vascular invasion (LVI): LVI present/identified, NOS  Pelvic tyler status: Positive  Number of pelvic nodes positive from dissection: 9  Number of pelvic nodes examined during dissection: 20  Para-aortic status: Positive  Number of para-aortic nodes positive from dissection: 7  Number of para-aortic nodes examined during dissection: 9  Lymph node metastasis: Present  Omentectomy performed: Yes       2/21/2025 -  Chemotherapy    alteplase (CATHFLO), 2 mg, Intracatheter, Every 1 Minute as needed, 0 of 12 cycles  palonosetron (ALOXI), 0.25 mg, Intravenous, Once, 0 of 6 cycles  fosaprepitant (EMEND) IVPB, 150 mg, Intravenous, Once, 0 of 6 cycles  dostarlimab-gxly (JEMPERLI) IVPB, 500 mg, Intravenous, Once, 0 of 12 cycles  CARBOplatin (PARAPLATIN) IVPB (GOG AUC DOSING), , Intravenous, Once, 0 of 6 cycles  PACLItaxel (TAXOL) chemo IVPB, 175 mg/m2 = 295.8 mg, Intravenous, Once, 0 of 6 cycles        Review of Systems  - GENERAL: Negative for any nausea, vomiting, fevers, chills, or weight loss.  - HEENT: Negative for any head/Neck trauma, pain, double/blurry vision, sinusitis, rhinitis, nose bleeding.  - CARDIAC: Negative for any chest pain, palpitation, Dyspnea on exertion, peripheral edema.  - PULMONARY: Negative for any SOB, cough, wheezing.   - GASTROINTESTINAL: Negative for any abdominal pain, N/V/D/C, blood in stool.   - GENITOURINARY: positive for chronic and stable pelvic/lower abdominal pain and tenderness exacerbated by abrupt motions e.g. road bumps while in the car or lower abdominal palpation, somewhat extending to bilateral hips and buttocks areas, bilaterally but no radiating pain down to the thigh or legs ; rated as 5 on scale 1-10 , well controlled with PRN meds as above ;  Negative for any dysuria,  hematuria, incontinence.  - NEUROLOGIC: Negative for any muscle weakness, numbness/tingling, memory changes.    - MUSCULOSKELETAL: Negative for any joint pains/swelling, limited ROM.   - INTEGUMENTARY: Negative for any rashes, cuts/ lesions.  - HEMATOLOGIC: Negative for any abnormal bruising, frequent infections or bleeding.         A complete review of systems is negative other than that noted above in the HPI.         Objective   /80   Pulse 82   Temp (!) 96.9 °F (36.1 °C) (Temporal)   Ht 5' (1.524 m)   Wt 72.6 kg (160 lb)   SpO2 98%   BMI 31.25 kg/m²     Body mass index is 31.25 kg/m².  Pain Screening:  Pain Score:   5  ECOG   2  Physical Exam   - GEN: uses a walker; required minimal assistance to stand from seated position  ; Appears well, alert and oriented x 3, pleasant and cooperative, in no acute distress  - HEENT: Anicteric, mucous membranes moist, PERRL and EOMI   - NECK: No lymphadenopathy, JVD or carotid bruits   - HEART: RRR, normal S1 and S2, no murmurs, clicks, gallops or rubs   - LUNGS: Clear to auscultation bilaterally; no wheezes, rales, or rhonchi  - ABDOMEN: surgical scar healing well, has tenderness to palpation of lower abdomen bilaterally ; Normal bowel sounds, soft, no distention, no organomegaly or masses felt on exam.   - EXTREMITIES: Peripheral pulses normal; no clubbing, cyanosis, or edema  - NEURO: No focal findings, CN II-XII are grossly intact.   - Musculoskeletal: 5/5 strength, normal ROM, no swollen or erythematous joints.   - SKIN: Normal without suspicious lesions on exposed skin      Labs: I have reviewed pertinent labs.  Lab Results   Component Value Date/Time     15.2 02/04/2025 03:42 PM     Lab Results   Component Value Date/Time    Potassium 4.1 02/04/2025 03:42 PM    Chloride 100 02/04/2025 03:42 PM    CO2 30 02/04/2025 03:42 PM    BUN 17 02/04/2025 03:42 PM    Creatinine 0.62 02/04/2025 03:42 PM    Glucose, Fasting 107 (H) 01/17/2025 05:06 AM    Calcium  10.2 02/04/2025 03:42 PM    AST 27 02/04/2025 03:42 PM    ALT 70 (H) 02/04/2025 03:42 PM    Alkaline Phosphatase 80 02/04/2025 03:42 PM    eGFR 95 02/04/2025 03:42 PM     Lab Results   Component Value Date/Time    WBC 10.07 02/04/2025 03:42 PM    Hemoglobin 13.3 02/04/2025 03:42 PM    Hematocrit 40.0 02/04/2025 03:42 PM    MCV 94 02/04/2025 03:42 PM    Platelets 367 02/04/2025 03:42 PM     Lab Results   Component Value Date/Time    Absolute Neutrophils 6.48 02/04/2025 03:42 PM        Trend:  Lab Results   Component Value Date     15.2 02/04/2025     7.3 12/18/2024       Radiology Results Review : I have reviewed images/report studies in PACS as described above (in the HPI).  Other Study Results Review : EKG was reviewed.     Administrative Statements   I have spent a total time of 45 minutes in caring for this patient on the day of the visit/encounter including Diagnostic results, Prognosis, Risks and benefits of tx options, Instructions for management, Patient and family education, Importance of tx compliance, Risk factor reductions, Impressions, Counseling / Coordination of care, Documenting in the medical record, Reviewing / ordering tests, medicine, procedures  , and Obtaining or reviewing history  .

## 2025-02-14 NOTE — ASSESSMENT & PLAN NOTE
2/14/25 reports stable lower abdominal pain , pointing to her pelvic region radiating to bilateral hips , rated as 5 on 1-10 scale  ,well controlled with PRN acetaminophen/ibuprofen/oxycodone which she did not need more than once a day of each PRN.     Denies any fever ir signs to suggest infection. CT showed post op seroma. Also she has baseline joints/hips pain to start with pre-op,     PLAN   - to continue with her current PRN Acetaminophen and Oxycodone. Advised on possible side effects of NSAIDS and to limit use as possible,   - will refer to PT (previous used wheelchair, today using a walker)  - previously palliative care referral was offered but she declined.

## 2025-02-14 NOTE — ASSESSMENT & PLAN NOTE
"Post op  CT AP (2/4/25): \"Status post interval hysterectomy. Bilateral pelvic fluid collections likely representing postoperative seromas.Indistinct soft tissue about the iliac bifurcation and right common iliac artery\" which is likely \"due to hemostatic agent placed intraoperatively. \"   Orders:    Ambulatory Referral to Physical Therapy; Future    "

## 2025-02-19 ENCOUNTER — APPOINTMENT (OUTPATIENT)
Dept: LAB | Facility: HOSPITAL | Age: 65
End: 2025-02-19
Payer: COMMERCIAL

## 2025-02-19 DIAGNOSIS — C54.1 ENDOMETRIAL CANCER (HCC): ICD-10-CM

## 2025-02-19 DIAGNOSIS — E11.9 TYPE 2 DIABETES MELLITUS WITHOUT COMPLICATION, WITHOUT LONG-TERM CURRENT USE OF INSULIN (HCC): ICD-10-CM

## 2025-02-19 LAB
ALBUMIN SERPL BCG-MCNC: 4.3 G/DL (ref 3.5–5)
ALP SERPL-CCNC: 54 U/L (ref 34–104)
ALT SERPL W P-5'-P-CCNC: 16 U/L (ref 7–52)
AMYLASE SERPL-CCNC: 42 IU/L (ref 29–103)
ANION GAP SERPL CALCULATED.3IONS-SCNC: 8 MMOL/L (ref 4–13)
AST SERPL W P-5'-P-CCNC: 14 U/L (ref 13–39)
BASOPHILS # BLD AUTO: 0.04 THOUSANDS/ΜL (ref 0–0.1)
BASOPHILS NFR BLD AUTO: 1 % (ref 0–1)
BILIRUB SERPL-MCNC: 0.76 MG/DL (ref 0.2–1)
BUN SERPL-MCNC: 21 MG/DL (ref 5–25)
CALCIUM SERPL-MCNC: 9.5 MG/DL (ref 8.4–10.2)
CANCER AG125 SERPL-ACNC: 7.2 U/ML (ref 0–35)
CHLORIDE SERPL-SCNC: 102 MMOL/L (ref 96–108)
CO2 SERPL-SCNC: 28 MMOL/L (ref 21–32)
CREAT SERPL-MCNC: 0.55 MG/DL (ref 0.6–1.3)
EOSINOPHIL # BLD AUTO: 0.27 THOUSAND/ΜL (ref 0–0.61)
EOSINOPHIL NFR BLD AUTO: 4 % (ref 0–6)
ERYTHROCYTE [DISTWIDTH] IN BLOOD BY AUTOMATED COUNT: 11.9 % (ref 11.6–15.1)
EST. AVERAGE GLUCOSE BLD GHB EST-MCNC: 146 MG/DL
GFR SERPL CREATININE-BSD FRML MDRD: 99 ML/MIN/1.73SQ M
GLUCOSE P FAST SERPL-MCNC: 106 MG/DL (ref 65–99)
HBA1C MFR BLD: 6.7 %
HCT VFR BLD AUTO: 39.1 % (ref 34.8–46.1)
HGB BLD-MCNC: 12.7 G/DL (ref 11.5–15.4)
IMM GRANULOCYTES # BLD AUTO: 0.02 THOUSAND/UL (ref 0–0.2)
IMM GRANULOCYTES NFR BLD AUTO: 0 % (ref 0–2)
LIPASE SERPL-CCNC: 21 U/L (ref 11–82)
LYMPHOCYTES # BLD AUTO: 2.23 THOUSANDS/ΜL (ref 0.6–4.47)
LYMPHOCYTES NFR BLD AUTO: 33 % (ref 14–44)
MAGNESIUM SERPL-MCNC: 2 MG/DL (ref 1.9–2.7)
MCH RBC QN AUTO: 31.2 PG (ref 26.8–34.3)
MCHC RBC AUTO-ENTMCNC: 32.5 G/DL (ref 31.4–37.4)
MCV RBC AUTO: 96 FL (ref 82–98)
MONOCYTES # BLD AUTO: 0.57 THOUSAND/ΜL (ref 0.17–1.22)
MONOCYTES NFR BLD AUTO: 9 % (ref 4–12)
NEUTROPHILS # BLD AUTO: 3.55 THOUSANDS/ΜL (ref 1.85–7.62)
NEUTS SEG NFR BLD AUTO: 53 % (ref 43–75)
NRBC BLD AUTO-RTO: 0 /100 WBCS
PLATELET # BLD AUTO: 174 THOUSANDS/UL (ref 149–390)
PMV BLD AUTO: 11.3 FL (ref 8.9–12.7)
POTASSIUM SERPL-SCNC: 3.9 MMOL/L (ref 3.5–5.3)
PROT SERPL-MCNC: 7.5 G/DL (ref 6.4–8.4)
RBC # BLD AUTO: 4.07 MILLION/UL (ref 3.81–5.12)
SODIUM SERPL-SCNC: 138 MMOL/L (ref 135–147)
TSH SERPL DL<=0.05 MIU/L-ACNC: 2.22 UIU/ML (ref 0.45–4.5)
WBC # BLD AUTO: 6.68 THOUSAND/UL (ref 4.31–10.16)

## 2025-02-19 PROCEDURE — 36415 COLL VENOUS BLD VENIPUNCTURE: CPT

## 2025-02-19 PROCEDURE — 82150 ASSAY OF AMYLASE: CPT

## 2025-02-19 PROCEDURE — 85025 COMPLETE CBC W/AUTO DIFF WBC: CPT

## 2025-02-19 PROCEDURE — 80053 COMPREHEN METABOLIC PANEL: CPT

## 2025-02-19 PROCEDURE — 84443 ASSAY THYROID STIM HORMONE: CPT

## 2025-02-19 PROCEDURE — 83036 HEMOGLOBIN GLYCOSYLATED A1C: CPT

## 2025-02-19 PROCEDURE — 83690 ASSAY OF LIPASE: CPT

## 2025-02-19 PROCEDURE — 86304 IMMUNOASSAY TUMOR CA 125: CPT

## 2025-02-19 PROCEDURE — 83735 ASSAY OF MAGNESIUM: CPT

## 2025-02-20 ENCOUNTER — LAB REQUISITION (OUTPATIENT)
Dept: LAB | Facility: HOSPITAL | Age: 65
End: 2025-02-20

## 2025-02-20 DIAGNOSIS — C54.9 MALIGNANT NEOPLASM OF CORPUS UTERI, UNSPECIFIED (HCC): ICD-10-CM

## 2025-02-20 RX ORDER — SODIUM CHLORIDE 9 MG/ML
20 INJECTION, SOLUTION INTRAVENOUS ONCE
Status: CANCELLED | OUTPATIENT
Start: 2025-02-21

## 2025-02-20 RX ORDER — PALONOSETRON 0.05 MG/ML
0.25 INJECTION, SOLUTION INTRAVENOUS ONCE
Status: CANCELLED | OUTPATIENT
Start: 2025-02-21

## 2025-02-21 ENCOUNTER — HOSPITAL ENCOUNTER (OUTPATIENT)
Dept: INFUSION CENTER | Facility: HOSPITAL | Age: 65
End: 2025-02-21
Attending: OBSTETRICS & GYNECOLOGY
Payer: COMMERCIAL

## 2025-02-21 VITALS
SYSTOLIC BLOOD PRESSURE: 117 MMHG | BODY MASS INDEX: 32.36 KG/M2 | WEIGHT: 160.5 LBS | RESPIRATION RATE: 18 BRPM | TEMPERATURE: 97.8 F | HEIGHT: 59 IN | DIASTOLIC BLOOD PRESSURE: 76 MMHG | HEART RATE: 84 BPM

## 2025-02-21 DIAGNOSIS — C54.1 ENDOMETRIAL CANCER (HCC): Primary | ICD-10-CM

## 2025-02-21 PROCEDURE — 96375 TX/PRO/DX INJ NEW DRUG ADDON: CPT

## 2025-02-21 PROCEDURE — 96367 TX/PROPH/DG ADDL SEQ IV INF: CPT

## 2025-02-21 PROCEDURE — 96413 CHEMO IV INFUSION 1 HR: CPT

## 2025-02-21 PROCEDURE — 96415 CHEMO IV INFUSION ADDL HR: CPT

## 2025-02-21 PROCEDURE — 96417 CHEMO IV INFUS EACH ADDL SEQ: CPT

## 2025-02-21 RX ORDER — SODIUM CHLORIDE 9 MG/ML
20 INJECTION, SOLUTION INTRAVENOUS ONCE
Status: COMPLETED | OUTPATIENT
Start: 2025-02-21 | End: 2025-02-21

## 2025-02-21 RX ORDER — PALONOSETRON 0.05 MG/ML
0.25 INJECTION, SOLUTION INTRAVENOUS ONCE
Status: COMPLETED | OUTPATIENT
Start: 2025-02-21 | End: 2025-02-21

## 2025-02-21 RX ADMIN — DEXAMETHASONE SODIUM PHOSPHATE 20 MG: 10 INJECTION INTRAMUSCULAR; INTRAVENOUS at 09:58

## 2025-02-21 RX ADMIN — FAMOTIDINE 20 MG: 10 INJECTION, SOLUTION INTRAVENOUS at 10:17

## 2025-02-21 RX ADMIN — SODIUM CHLORIDE 20 ML/HR: 0.9 INJECTION, SOLUTION INTRAVENOUS at 09:30

## 2025-02-21 RX ADMIN — DIPHENHYDRAMINE HYDROCHLORIDE 25 MG: 50 INJECTION, SOLUTION INTRAMUSCULAR; INTRAVENOUS at 09:40

## 2025-02-21 RX ADMIN — PALONOSETRON 0.25 MG: 0.05 INJECTION, SOLUTION INTRAVENOUS at 09:35

## 2025-02-21 RX ADMIN — SODIUM CHLORIDE 500 MG: 9 INJECTION, SOLUTION INTRAVENOUS at 11:27

## 2025-02-21 RX ADMIN — PACLITAXEL 295.8 MG: 6 INJECTION, SOLUTION INTRAVENOUS at 12:38

## 2025-02-21 RX ADMIN — FOSAPREPITANT 150 MG: 150 INJECTION, POWDER, LYOPHILIZED, FOR SOLUTION INTRAVENOUS at 10:40

## 2025-02-21 RX ADMIN — CARBOPLATIN 484 MG: 10 INJECTION, SOLUTION INTRAVENOUS at 16:21

## 2025-02-21 NOTE — PROGRESS NOTES
Pt arrived for D1C1 chemo infusion, labs w/in parameters. A 1127 Jemperli infusion started @ 1127. At 1139 pt c/o RIGHT leg cramp which she states started minutes after starting infusion. Stooped med @ that time & notified CELENA Barker. Stated can restart med when pain is resolved. Chemo infusion restarted @ 1155 w/out incident. Pt tolerated all other chemo infusions w/out any adverse effects. Confirmed next appt & appt calendar given to patient

## 2025-02-21 NOTE — PLAN OF CARE
Problem: Knowledge Deficit  Goal: Patient/family/caregiver demonstrates understanding of disease process, treatment plan, medications, and discharge instructions  Description: Complete learning assessment and assess knowledge base.  Interventions:  - Provide teaching at level of understanding  - Provide teaching via preferred learning methods  2/21/2025 1731 by Catherine Villalpando RN  Outcome: Progressing  2/21/2025 1711 by Catherine Villalpando RN  Outcome: Progressing

## 2025-02-26 ENCOUNTER — TELEPHONE (OUTPATIENT)
Age: 65
End: 2025-02-26

## 2025-02-26 ENCOUNTER — APPOINTMENT (OUTPATIENT)
Dept: LAB | Facility: HOSPITAL | Age: 65
End: 2025-02-26
Payer: COMMERCIAL

## 2025-02-26 DIAGNOSIS — C54.1 ENDOMETRIAL CANCER (HCC): ICD-10-CM

## 2025-02-26 LAB
ALBUMIN SERPL BCG-MCNC: 4.3 G/DL (ref 3.5–5)
ALP SERPL-CCNC: 55 U/L (ref 34–104)
ALT SERPL W P-5'-P-CCNC: 18 U/L (ref 7–52)
ANION GAP SERPL CALCULATED.3IONS-SCNC: 8 MMOL/L (ref 4–13)
AST SERPL W P-5'-P-CCNC: 13 U/L (ref 13–39)
BASOPHILS # BLD AUTO: 0.02 THOUSANDS/ÂΜL (ref 0–0.1)
BASOPHILS NFR BLD AUTO: 0 % (ref 0–1)
BILIRUB SERPL-MCNC: 1.03 MG/DL (ref 0.2–1)
BUN SERPL-MCNC: 20 MG/DL (ref 5–25)
CALCIUM SERPL-MCNC: 9.6 MG/DL (ref 8.4–10.2)
CHLORIDE SERPL-SCNC: 97 MMOL/L (ref 96–108)
CO2 SERPL-SCNC: 29 MMOL/L (ref 21–32)
CREAT SERPL-MCNC: 0.59 MG/DL (ref 0.6–1.3)
EOSINOPHIL # BLD AUTO: 0.22 THOUSAND/ÂΜL (ref 0–0.61)
EOSINOPHIL NFR BLD AUTO: 4 % (ref 0–6)
ERYTHROCYTE [DISTWIDTH] IN BLOOD BY AUTOMATED COUNT: 12 % (ref 11.6–15.1)
GFR SERPL CREATININE-BSD FRML MDRD: 97 ML/MIN/1.73SQ M
GLUCOSE P FAST SERPL-MCNC: 135 MG/DL (ref 65–99)
HCT VFR BLD AUTO: 39.9 % (ref 34.8–46.1)
HGB BLD-MCNC: 13.1 G/DL (ref 11.5–15.4)
IMM GRANULOCYTES # BLD AUTO: 0.01 THOUSAND/UL (ref 0–0.2)
IMM GRANULOCYTES NFR BLD AUTO: 0 % (ref 0–2)
LYMPHOCYTES # BLD AUTO: 1.62 THOUSANDS/ÂΜL (ref 0.6–4.47)
LYMPHOCYTES NFR BLD AUTO: 30 % (ref 14–44)
MAGNESIUM SERPL-MCNC: 2.1 MG/DL (ref 1.9–2.7)
MCH RBC QN AUTO: 31 PG (ref 26.8–34.3)
MCHC RBC AUTO-ENTMCNC: 32.8 G/DL (ref 31.4–37.4)
MCV RBC AUTO: 95 FL (ref 82–98)
MONOCYTES # BLD AUTO: 0.12 THOUSAND/ÂΜL (ref 0.17–1.22)
MONOCYTES NFR BLD AUTO: 2 % (ref 4–12)
NEUTROPHILS # BLD AUTO: 3.36 THOUSANDS/ÂΜL (ref 1.85–7.62)
NEUTS SEG NFR BLD AUTO: 64 % (ref 43–75)
NRBC BLD AUTO-RTO: 0 /100 WBCS
PLATELET # BLD AUTO: 174 THOUSANDS/UL (ref 149–390)
PMV BLD AUTO: 12.1 FL (ref 8.9–12.7)
POTASSIUM SERPL-SCNC: 4.4 MMOL/L (ref 3.5–5.3)
PROT SERPL-MCNC: 7.8 G/DL (ref 6.4–8.4)
RBC # BLD AUTO: 4.22 MILLION/UL (ref 3.81–5.12)
SODIUM SERPL-SCNC: 134 MMOL/L (ref 135–147)
WBC # BLD AUTO: 5.35 THOUSAND/UL (ref 4.31–10.16)

## 2025-02-26 PROCEDURE — 80053 COMPREHEN METABOLIC PANEL: CPT

## 2025-02-26 PROCEDURE — 36415 COLL VENOUS BLD VENIPUNCTURE: CPT

## 2025-02-26 PROCEDURE — 85025 COMPLETE CBC W/AUTO DIFF WBC: CPT

## 2025-02-26 PROCEDURE — 83735 ASSAY OF MAGNESIUM: CPT

## 2025-02-26 NOTE — TELEPHONE ENCOUNTER
[facility] is calling to request that we enter labs for Dr Grayson, patient is there now for blood work.  [facility] called but they were busy helping other patients.

## 2025-03-07 ENCOUNTER — OFFICE VISIT (OUTPATIENT)
Dept: GYNECOLOGIC ONCOLOGY | Facility: CLINIC | Age: 65
End: 2025-03-07
Payer: COMMERCIAL

## 2025-03-07 ENCOUNTER — APPOINTMENT (OUTPATIENT)
Dept: LAB | Facility: CLINIC | Age: 65
End: 2025-03-07
Payer: COMMERCIAL

## 2025-03-07 ENCOUNTER — PATIENT OUTREACH (OUTPATIENT)
Dept: CASE MANAGEMENT | Facility: OTHER | Age: 65
End: 2025-03-07

## 2025-03-07 ENCOUNTER — TELEPHONE (OUTPATIENT)
Dept: HEMATOLOGY ONCOLOGY | Facility: CLINIC | Age: 65
End: 2025-03-07

## 2025-03-07 VITALS
HEART RATE: 90 BPM | OXYGEN SATURATION: 99 % | WEIGHT: 158 LBS | TEMPERATURE: 97.6 F | BODY MASS INDEX: 32.28 KG/M2 | SYSTOLIC BLOOD PRESSURE: 142 MMHG | DIASTOLIC BLOOD PRESSURE: 88 MMHG

## 2025-03-07 DIAGNOSIS — G62.9 POLYNEUROPATHY: ICD-10-CM

## 2025-03-07 DIAGNOSIS — N39.498 OTHER URINARY INCONTINENCE: ICD-10-CM

## 2025-03-07 DIAGNOSIS — E27.9 ADRENAL NODULE (HCC): ICD-10-CM

## 2025-03-07 DIAGNOSIS — R10.2 PELVIC PAIN: ICD-10-CM

## 2025-03-07 DIAGNOSIS — E11.9 TYPE 2 DIABETES MELLITUS WITHOUT COMPLICATION, WITHOUT LONG-TERM CURRENT USE OF INSULIN (HCC): ICD-10-CM

## 2025-03-07 DIAGNOSIS — C54.1 ENDOMETRIAL CANCER (HCC): Primary | ICD-10-CM

## 2025-03-07 DIAGNOSIS — C54.1 ENDOMETRIAL CANCER (HCC): ICD-10-CM

## 2025-03-07 DIAGNOSIS — Z90.710 STATUS POST HYSTERECTOMY: ICD-10-CM

## 2025-03-07 PROBLEM — R32 ABSENCE OF BLADDER CONTINENCE: Status: ACTIVE | Noted: 2025-03-07

## 2025-03-07 LAB
ALBUMIN SERPL BCG-MCNC: 4.4 G/DL (ref 3.5–5)
ALP SERPL-CCNC: 65 U/L (ref 34–104)
ALT SERPL W P-5'-P-CCNC: 17 U/L (ref 7–52)
AMYLASE SERPL-CCNC: 44 IU/L (ref 29–103)
ANION GAP SERPL CALCULATED.3IONS-SCNC: 7 MMOL/L (ref 4–13)
AST SERPL W P-5'-P-CCNC: 14 U/L (ref 13–39)
BASOPHILS # BLD AUTO: 0.05 THOUSANDS/ÂΜL (ref 0–0.1)
BASOPHILS NFR BLD AUTO: 2 % (ref 0–1)
BILIRUB SERPL-MCNC: 0.45 MG/DL (ref 0.2–1)
BILIRUB UR QL STRIP: NEGATIVE
BUN SERPL-MCNC: 13 MG/DL (ref 5–25)
CALCIUM SERPL-MCNC: 9.5 MG/DL (ref 8.4–10.2)
CANCER AG125 SERPL-ACNC: 5.4 U/ML (ref 0–35)
CHLORIDE SERPL-SCNC: 104 MMOL/L (ref 96–108)
CLARITY UR: CLEAR
CO2 SERPL-SCNC: 27 MMOL/L (ref 21–32)
COLOR UR: NORMAL
CREAT SERPL-MCNC: 0.58 MG/DL (ref 0.6–1.3)
EOSINOPHIL # BLD AUTO: 0.1 THOUSAND/ÂΜL (ref 0–0.61)
EOSINOPHIL NFR BLD AUTO: 3 % (ref 0–6)
ERYTHROCYTE [DISTWIDTH] IN BLOOD BY AUTOMATED COUNT: 12.1 % (ref 11.6–15.1)
EST. AVERAGE GLUCOSE BLD GHB EST-MCNC: 148 MG/DL
GFR SERPL CREATININE-BSD FRML MDRD: 97 ML/MIN/1.73SQ M
GLUCOSE P FAST SERPL-MCNC: 113 MG/DL (ref 65–99)
GLUCOSE UR STRIP-MCNC: NEGATIVE MG/DL
HBA1C MFR BLD: 6.8 %
HCT VFR BLD AUTO: 38.2 % (ref 34.8–46.1)
HGB BLD-MCNC: 12.7 G/DL (ref 11.5–15.4)
HGB UR QL STRIP.AUTO: NEGATIVE
IMM GRANULOCYTES # BLD AUTO: 0.01 THOUSAND/UL (ref 0–0.2)
IMM GRANULOCYTES NFR BLD AUTO: 0 % (ref 0–2)
KETONES UR STRIP-MCNC: NEGATIVE MG/DL
LEUKOCYTE ESTERASE UR QL STRIP: NEGATIVE
LIPASE SERPL-CCNC: 22 U/L (ref 11–82)
LYMPHOCYTES # BLD AUTO: 1.43 THOUSANDS/ÂΜL (ref 0.6–4.47)
LYMPHOCYTES NFR BLD AUTO: 43 % (ref 14–44)
MAGNESIUM SERPL-MCNC: 2 MG/DL (ref 1.9–2.7)
MCH RBC QN AUTO: 31 PG (ref 26.8–34.3)
MCHC RBC AUTO-ENTMCNC: 33.2 G/DL (ref 31.4–37.4)
MCV RBC AUTO: 93 FL (ref 82–98)
MONOCYTES # BLD AUTO: 0.55 THOUSAND/ÂΜL (ref 0.17–1.22)
MONOCYTES NFR BLD AUTO: 16 % (ref 4–12)
NEUTROPHILS # BLD AUTO: 1.22 THOUSANDS/ÂΜL (ref 1.85–7.62)
NEUTS SEG NFR BLD AUTO: 36 % (ref 43–75)
NITRITE UR QL STRIP: NEGATIVE
NRBC BLD AUTO-RTO: 0 /100 WBCS
PH UR STRIP.AUTO: 5 [PH]
PLATELET # BLD AUTO: 258 THOUSANDS/UL (ref 149–390)
PMV BLD AUTO: 10.1 FL (ref 8.9–12.7)
POTASSIUM SERPL-SCNC: 4.5 MMOL/L (ref 3.5–5.3)
PROT SERPL-MCNC: 7.5 G/DL (ref 6.4–8.4)
PROT UR STRIP-MCNC: NEGATIVE MG/DL
RBC # BLD AUTO: 4.1 MILLION/UL (ref 3.81–5.12)
SODIUM SERPL-SCNC: 138 MMOL/L (ref 135–147)
SP GR UR STRIP.AUTO: 1.01 (ref 1–1.03)
TSH SERPL DL<=0.05 MIU/L-ACNC: 1.53 UIU/ML (ref 0.45–4.5)
UROBILINOGEN UR STRIP-ACNC: <2 MG/DL
WBC # BLD AUTO: 3.36 THOUSAND/UL (ref 4.31–10.16)

## 2025-03-07 PROCEDURE — 82150 ASSAY OF AMYLASE: CPT

## 2025-03-07 PROCEDURE — 83036 HEMOGLOBIN GLYCOSYLATED A1C: CPT

## 2025-03-07 PROCEDURE — 86304 IMMUNOASSAY TUMOR CA 125: CPT

## 2025-03-07 PROCEDURE — 84443 ASSAY THYROID STIM HORMONE: CPT

## 2025-03-07 PROCEDURE — 36415 COLL VENOUS BLD VENIPUNCTURE: CPT

## 2025-03-07 PROCEDURE — 80053 COMPREHEN METABOLIC PANEL: CPT

## 2025-03-07 PROCEDURE — 99215 OFFICE O/P EST HI 40 MIN: CPT | Performed by: OBSTETRICS & GYNECOLOGY

## 2025-03-07 PROCEDURE — 83735 ASSAY OF MAGNESIUM: CPT

## 2025-03-07 PROCEDURE — 99459 PELVIC EXAMINATION: CPT | Performed by: OBSTETRICS & GYNECOLOGY

## 2025-03-07 PROCEDURE — 81003 URINALYSIS AUTO W/O SCOPE: CPT

## 2025-03-07 PROCEDURE — 83690 ASSAY OF LIPASE: CPT

## 2025-03-07 PROCEDURE — 85025 COMPLETE CBC W/AUTO DIFF WBC: CPT

## 2025-03-07 RX ORDER — TRAMADOL HYDROCHLORIDE 50 MG/1
50 TABLET ORAL EVERY 6 HOURS PRN
Qty: 20 TABLET | Refills: 0 | Status: SHIPPED | OUTPATIENT
Start: 2025-03-07

## 2025-03-07 NOTE — H&P (VIEW-ONLY)
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 S/p rad onc referral, plan per discussion with their team is to obtain a post-chemo PETCT and then plan XRT treatment plan  Labs reviewed and stable, due for repeat labs today. Given reported neuropathy and body aches, will dose-reduce taxol for C#2. Plan for C#2 carboplatin AUC 5 / paclitaxel 135 mg/m2 / dostarlimab 500mg. Cycle 2 planned on 3/14   Reportedly hard stick at the infusion center, will refer for IR Port Placement   Labs and premedication as per protocol   Has PRN Zofran, worked well for her post chemo nausea   Palliative care referral for ongoing cancer-related symptom management    Orders:    Ambulatory Referral to Palliative Care; Future    Ambulatory Referral to Interventional Radiology; Future    UA w Reflex to Microscopic w Reflex to Culture; Future    Ambulatory Referral to Physical Therapy; Future    traMADol (Ultram) 50 mg tablet; Take 1 tablet (50 mg total) by mouth every 6 (six) hours as needed for moderate pain    Ambulatory Referral to Oncology Social Worker; Future    Status post RA- Total Laparoscopic  hysterectomy, bilateral salpingoopherectomy, pelvic and para-aortic lymphadenectomy, omental biopsy  CT AP from  with small postop seromas and no other acute findings  VSS wnl and labs stable, abdominal exam overall benign, no need to repeat CT at this time  Pelvic exam with vaginal tenderness but otherwise unremarkable with no evidence of cuff dehiscence, fistula, or lesions    Can discontinue lifting restrictions  Rx for tramadol sent to patient  "pharmacy given poor tolerance to oxycodone       Adrenal nodule (HCC)  2/4/25 CT AP:  Stable 1.8 cm left adrenal nodule.   Will revaluate on future imaging after chemo/IO       Other urinary incontinence  Noted urinary incontinence since after the surgery on 1/16/25   Denies fecal material or blood  Urine culture 2/5/25 was negative   Pelvic exam today with vaginal tenderness but otherwise unremarkable with no evidence of cuff dehiscence, fistula, or lesions    PLAN   Repeat UA with reflex, CBC, CMP  Referral to Pelvic Physical Therapy        Pelvic pain  Pelvic exam today with vaginal tenderness but otherwise unremarkable    Patient reported the pain was present from before the surgery   Chronic pelvic pain ? No evidence of infection .   Patient takes PRN acetaminophen Q6 hours and Oxycodone HS but can not tolerate oxycodone during the day because of dizziness after taking it.     PLAN   Referral to palliative care as above  Continue with PRN Acetaminophen 650 mg Q6 hours   Will try Tramadol instead of oxycodone hopefully for better tolerance. Rx sent to preferred patient pharmacy    Orders:    Ambulatory Referral to Physical Therapy; Future    Polyneuropathy  Reports bilateral hands/ fingers numbness tingling and occasionally may drop items but not common. This is chronic and she does have a history of diabetes which could be contributing. She also reports leg myalgias/arthralgias 2-3 days post-chemo which could be due to taxol.    Given above will reduce Taxol to 135 mg/m2 for C#2             History of Present Illness   Reason for Visit / CC:  Pre-Chemo Visit      HPI  Nancy Calderon is a 64 y.o. female. PMH of HTN, HLD, DM-2, MI, now 7 weeks s/p RATLH/BSO/PPALND for a stage IIIC2 MMRp p53m KSE6obl uterine papillary serous carcinoma, now on chemoIO. She is presenting today for postop check and treatment planning for her uterine carcinoma.      11/25/24 US Pelvis (d/t PMB) showed \" Markedly " "thickened and heterogeneous endometrium with multiple foci of arterial vascularity suspicious for malignancy. Endometrial biopsy recommended. Small subserosal fibroid.Right ovary not visualized.\"       12/16/24 CT CAP \"1.  Markedly thickened, heterogeneously enhancing endometrium compatible with known malignancy. 2.  Mild bilateral iliac chain and retroperitoneal lymphadenopathy, suspicious for metastases. 3.  No findings of metastatic disease in the chest.\"      1/16/25 s/p ZE/BSO with PLND and PALND : FIGO stage [id number], pT1b pN2a  MIO1wmg uterine papillary serous carcinoma , extensive LVSI present, 9/20 bilateral pelvic nodes and 7/9 PA nodes involved by the tumor.      2/4/25 CT AP: Status post interval hysterectomy. Bilateral pelvic fluid collections likely representing postoperative seromas.Indistinct soft tissue about the iliac bifurcation and right common iliac artery. This may be postoperative however attention on follow-up is recommended to exclude a neoplastic etiology. ADRENAL GLANDS: Stable 1.8 cm left adrenal nodule is noted. Right adrenal gland is unremarkable.     02/10/25 met with rad oncology, Dr. Gilliam: \"systemic therapy, followed by restaging and subsequent planning for pelvic and paraaortic EBRT (45Gy in 25fx) with a boost to the vaginal cuff (2-3 fx) given LVSI present in the cervix (confirmed with pathologist)\".      Adjuvant Q21 days carboplatin AUC5/paclitaxel 175 mg/m2/dostarlimab 500mg every 3 weeks x 6 cycles, followed by restaging and radiation. Plan for maintenance dostarlimab as maintenance afterward.      Cycle 1 Carbo/Taxol/Dostarlimab - on 2/21/25 (at [facility])     3/7/25 presenting for follow up pre cycle 2, with her brother. Reported post chemo transient nausea and body ache x 1 - 2 days well controlled with PRN meds and now resolved. Denies fever or vomiting. Reported bilateral hands/fingers tingling but noted that is unchanged from prior (diabetic) and able " to use her hands , button shirts but occasionally drop items. Patient has chronic pelvic pain since  before surgery , lower abdominal and also intravaginally. Vaginal exam today with tenderness / pain but otherwise no abnormality noted . Patient also noted since after surgery has urinary incontinence , denies any blood or fecal content, request support with incontinence supplies, for which referral to  placed; See above A&P which was reviewed in details with the patient and her brother and they agree.              Oncology History   Cancer Staging   Endometrial cancer ([facility])  Staging form: Corpus Uteri - Carcinoma, AJCC 8th Edition  - Clinical stage from 1/16/2025: FIGO Stage IIIC2 (cT1b, cN2, cM0) - Signed by Amanda Grayson MD on 2/3/2025  Histopathologic type: Papillary serous cystadenocarcinoma  Stage prefix: Initial diagnosis  Histologic grade (G): G3  Histologic grading system: 3 grade system  Lymph-vascular invasion (LVI): LVI present/identified, NOS  Pelvic tyler status: Positive  Number of pelvic nodes positive from dissection: 9  Number of pelvic nodes examined during dissection: 20  Para-aortic status: Positive  Number of para-aortic nodes positive from dissection: 7  Number of para-aortic nodes examined during dissection: 9  Lymph node metastasis: Present  Omentectomy performed: Yes  Oncology History   Endometrial cancer ([facility])   12/5/2024 Initial Diagnosis    Endometrial cancer ([facility])     1/16/2025 -  Cancer Staged    Staging form: Corpus Uteri - Carcinoma, AJCC 8th Edition  - Clinical stage from 1/16/2025: FIGO Stage IIIC2 (cT1b, cN2, cM0) - Signed by Amanda Grayson MD on 2/3/2025  Histopathologic type: Papillary serous cystadenocarcinoma  Stage prefix: Initial diagnosis  Histologic grade (G): G3  Histologic grading system: 3 grade system  Lymph-vascular invasion (LVI): LVI present/identified, NOS  Pelvic tyler status: Positive  Number of pelvic nodes positive from  dissection: 9  Number of pelvic nodes examined during dissection: 20  Para-aortic status: Positive  Number of para-aortic nodes positive from dissection: 7  Number of para-aortic nodes examined during dissection: 9  Lymph node metastasis: Present  Omentectomy performed: Yes       2/21/2025 -  Chemotherapy    alteplase (CATHFLO), 2 mg, Intracatheter, Every 1 Minute as needed, 1 of 12 cycles  palonosetron (ALOXI), 0.25 mg, Intravenous, Once, 1 of 6 cycles  Administration: 0.25 mg (2/21/2025)  fosaprepitant (EMEND) IVPB, 150 mg, Intravenous, Once, 1 of 6 cycles  Administration: 150 mg (2/21/2025)  dostarlimab-gxly (JEMPERLI) IVPB, 500 mg, Intravenous, Once, 1 of 12 cycles  Administration: 500 mg (2/21/2025)  CARBOplatin (PARAPLATIN) IVPB (GOG AUC DOSING), 484 mg, Intravenous, Once, 1 of 6 cycles  Administration: 484 mg (2/21/2025)  PACLItaxel (TAXOL) chemo IVPB, 175 mg/m2 = 295.8 mg, Intravenous, Once, 1 of 6 cycles  Administration: 295.8 mg (2/21/2025)        Review of Systems   Constitutional:  Negative for chills and fever.   HENT:  Negative for ear pain and sore throat.    Eyes:  Negative for pain and visual disturbance.   Respiratory:  Negative for cough and shortness of breath.    Cardiovascular:  Negative for chest pain and palpitations.   Gastrointestinal:  Positive for abdominal pain. Negative for constipation, diarrhea, nausea and vomiting.   Genitourinary:  Positive for frequency, pelvic pain, urgency and vaginal pain. Negative for dysuria, hematuria and vaginal bleeding.   Musculoskeletal:  Positive for arthralgias and myalgias. Negative for back pain.   Skin:  Negative for color change and rash.   Neurological:  Negative for seizures and syncope.   All other systems reviewed and are negative.  A complete review of systems is negative other than that noted above in the HPI.    Medical History Reviewed by provider this encounter:  Tobacco  Allergies  Meds  Problems  Med Hx  Surg Hx  Fam Hx     .      Objective   /88 (BP Location: Left arm, Patient Position: Sitting, Cuff Size: Standard)   Pulse 90   Temp 97.6 °F (36.4 °C) (Temporal)   Wt 71.7 kg (158 lb)   SpO2 99%   BMI 32.28 kg/m²     Body mass index is 32.28 kg/m².  Pain Screening:  Pain Score:   7 (lower abdomen and back)  ECOG   2  Physical Exam   - GEN: uses walker ; Appears well, alert and oriented x 3, pleasant and cooperative, in no acute distress  - HEENT: Anicteric, mucous membranes moist, PERRL and EOMI   - NECK: No lymphadenopathy, JVD or carotid bruits   - HEART: RRR, normal S1 and S2, no murmurs, clicks, gallops or rubs   - LUNGS: Clear to auscultation bilaterally; no wheezes, rales, or rhonchi  - ABDOMEN: abdominal surgical scars healed well, lower abdominal tender to palpation ; soft abdomen , no guarding; bowel sounds normal  -  : pelvic exam today by Dr. ATKINSON with myself and CHRISSY Patel in the room, tender vagina during exam but no evidence to suggest infection/inflammation otherwise, no discharge, no evidence of bleed , no abnormal tissue / lesions found. No vaginal cuff dehiscence or fistula noted.  - EXTREMITIES: Peripheral pulses normal; no clubbing, cyanosis, or edema  - NEURO: No focal findings, CN II-XII are grossly intact.   - Musculoskeletal: 5/5 strength, normal ROM, no swollen or erythematous joints.   - SKIN: Normal without suspicious lesions on exposed skin      Labs: I have reviewed pertinent labs.   Lab Results   Component Value Date/Time     7.2 02/19/2025 09:28 AM     Lab Results   Component Value Date/Time    Potassium 4.4 02/26/2025 09:07 AM    Chloride 97 02/26/2025 09:07 AM    CO2 29 02/26/2025 09:07 AM    BUN 20 02/26/2025 09:07 AM    Creatinine 0.59 (L) 02/26/2025 09:07 AM    Glucose, Fasting 135 (H) 02/26/2025 09:07 AM    Calcium 9.6 02/26/2025 09:07 AM    AST 13 02/26/2025 09:07 AM    ALT 18 02/26/2025 09:07 AM    Alkaline Phosphatase 55 02/26/2025 09:07 AM    eGFR 97 02/26/2025 09:07 AM     Lab Results    Component Value Date/Time    WBC 3.36 (L) 03/07/2025 10:24 AM    Hemoglobin 12.7 03/07/2025 10:24 AM    Hematocrit 38.2 03/07/2025 10:24 AM    MCV 93 03/07/2025 10:24 AM    Platelets 258 03/07/2025 10:24 AM     Lab Results   Component Value Date/Time    Absolute Neutrophils 1.22 (L) 03/07/2025 10:24 AM        Trend:  Lab Results   Component Value Date     7.2 02/19/2025     15.2 02/04/2025     7.3 12/18/2024       Radiology Results Review : I have reviewed images/report studies in PACS as described above (in the HPI).  Other Study Results Review : No additional pertinent studies reviewed.    Administrative Statements   I have spent a total time of 45 minutes in caring for this patient on the day of the visit/encounter including Diagnostic results, Prognosis, Risks and benefits of tx options, Instructions for management, Patient and family education, Importance of tx compliance, Risk factor reductions, Impressions, Counseling / Coordination of care, Documenting in the medical record, Reviewing/placing orders in the medical record (including tests, medications, and/or procedures), and Obtaining or reviewing history  .    Amanda Grayson MD, MPH  Division of Gynecologic Oncology  03/07/25 10:49 AM

## 2025-03-07 NOTE — PROGRESS NOTES
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 S/p rad onc referral, plan per discussion with their team is to obtain a post-chemo PETCT and then plan XRT treatment plan  Labs reviewed and stable, due for repeat labs today. Given reported neuropathy and body aches, will dose-reduce taxol for C#2. Plan for C#2 carboplatin AUC 5 / paclitaxel 135 mg/m2 / dostarlimab 500mg. Cycle 2 planned on 3/14   Reportedly hard stick at the infusion center, will refer for IR Port Placement   Labs and premedication as per protocol   Has PRN Zofran, worked well for her post chemo nausea   Palliative care referral for ongoing cancer-related symptom management    Orders:    Ambulatory Referral to Palliative Care; Future    Ambulatory Referral to Interventional Radiology; Future    UA w Reflex to Microscopic w Reflex to Culture; Future    Ambulatory Referral to Physical Therapy; Future    traMADol (Ultram) 50 mg tablet; Take 1 tablet (50 mg total) by mouth every 6 (six) hours as needed for moderate pain    Ambulatory Referral to Oncology Social Worker; Future    Status post RA- Total Laparoscopic  hysterectomy, bilateral salpingoopherectomy, pelvic and para-aortic lymphadenectomy, omental biopsy  CT AP from  with small postop seromas and no other acute findings  VSS wnl and labs stable, abdominal exam overall benign, no need to repeat CT at this time  Pelvic exam with vaginal tenderness but otherwise unremarkable with no evidence of cuff dehiscence, fistula, or lesions    Can discontinue lifting restrictions  Rx for tramadol sent to patient  "pharmacy given poor tolerance to oxycodone       Adrenal nodule (HCC)  2/4/25 CT AP:  Stable 1.8 cm left adrenal nodule.   Will revaluate on future imaging after chemo/IO       Other urinary incontinence  Noted urinary incontinence since after the surgery on 1/16/25   Denies fecal material or blood  Urine culture 2/5/25 was negative   Pelvic exam today with vaginal tenderness but otherwise unremarkable with no evidence of cuff dehiscence, fistula, or lesions    PLAN   Repeat UA with reflex, CBC, CMP  Referral to Pelvic Physical Therapy        Pelvic pain  Pelvic exam today with vaginal tenderness but otherwise unremarkable    Patient reported the pain was present from before the surgery   Chronic pelvic pain ? No evidence of infection .   Patient takes PRN acetaminophen Q6 hours and Oxycodone HS but can not tolerate oxycodone during the day because of dizziness after taking it.     PLAN   Referral to palliative care as above  Continue with PRN Acetaminophen 650 mg Q6 hours   Will try Tramadol instead of oxycodone hopefully for better tolerance. Rx sent to preferred patient pharmacy    Orders:    Ambulatory Referral to Physical Therapy; Future    Polyneuropathy  Reports bilateral hands/ fingers numbness tingling and occasionally may drop items but not common. This is chronic and she does have a history of diabetes which could be contributing. She also reports leg myalgias/arthralgias 2-3 days post-chemo which could be due to taxol.    Given above will reduce Taxol to 135 mg/m2 for C#2             History of Present Illness   Reason for Visit / CC:  Pre-Chemo Visit      HPI  Nancy Calderon is a 64 y.o. female. PMH of HTN, HLD, DM-2, MI, now 7 weeks s/p RATLH/BSO/PPALND for a stage IIIC2 MMRp p53m [id number] uterine papillary serous carcinoma, now on chemoIO. She is presenting today for postop check and treatment planning for her uterine carcinoma.      11/25/24 US Pelvis (d/t PMB) showed \" Markedly " "thickened and heterogeneous endometrium with multiple foci of arterial vascularity suspicious for malignancy. Endometrial biopsy recommended. Small subserosal fibroid.Right ovary not visualized.\"       12/16/24 CT CAP \"1.  Markedly thickened, heterogeneously enhancing endometrium compatible with known malignancy. 2.  Mild bilateral iliac chain and retroperitoneal lymphadenopathy, suspicious for metastases. 3.  No findings of metastatic disease in the chest.\"      1/16/25 s/p ZE/BSO with PLND and PALND : FIGO stage [id number], pT1b pN2a  [id number] uterine papillary serous carcinoma , extensive LVSI present, 9/20 bilateral pelvic nodes and 7/9 PA nodes involved by the tumor.      2/4/25 CT AP: Status post interval hysterectomy. Bilateral pelvic fluid collections likely representing postoperative seromas.Indistinct soft tissue about the iliac bifurcation and right common iliac artery. This may be postoperative however attention on follow-up is recommended to exclude a neoplastic etiology. ADRENAL GLANDS: Stable 1.8 cm left adrenal nodule is noted. Right adrenal gland is unremarkable.     02/10/25 met with rad oncology, Dr. Gilliam: \"systemic therapy, followed by restaging and subsequent planning for pelvic and paraaortic EBRT (45Gy in 25fx) with a boost to the vaginal cuff (2-3 fx) given LVSI present in the cervix (confirmed with pathologist)\".      Adjuvant Q21 days carboplatin AUC5/paclitaxel 175 mg/m2/dostarlimab 500mg every 3 weeks x 6 cycles, followed by restaging and radiation. Plan for maintenance dostarlimab as maintenance afterward.      Cycle 1 Carbo/Taxol/Dostarlimab - on 2/21/25 (at [facility])     3/7/25 presenting for follow up pre cycle 2, with her brother. Reported post chemo transient nausea and body ache x 1 - 2 days well controlled with PRN meds and now resolved. Denies fever or vomiting. Reported bilateral hands/fingers tingling but noted that is unchanged from prior (diabetic) and able " to use her hands , button shirts but occasionally drop items. Patient has chronic pelvic pain since  before surgery , lower abdominal and also intravaginally. Vaginal exam today with tenderness / pain but otherwise no abnormality noted . Patient also noted since after surgery has urinary incontinence , denies any blood or fecal content, request support with incontinence supplies, for which referral to  placed; See above A&P which was reviewed in details with the patient and her brother and they agree.              Oncology History   Cancer Staging   Endometrial cancer ([facility])  Staging form: Corpus Uteri - Carcinoma, AJCC 8th Edition  - Clinical stage from 1/16/2025: FIGO Stage IIIC2 (cT1b, cN2, cM0) - Signed by Amanda Grayson MD on 2/3/2025  Histopathologic type: Papillary serous cystadenocarcinoma  Stage prefix: Initial diagnosis  Histologic grade (G): G3  Histologic grading system: 3 grade system  Lymph-vascular invasion (LVI): LVI present/identified, NOS  Pelvic tyler status: Positive  Number of pelvic nodes positive from dissection: 9  Number of pelvic nodes examined during dissection: 20  Para-aortic status: Positive  Number of para-aortic nodes positive from dissection: 7  Number of para-aortic nodes examined during dissection: 9  Lymph node metastasis: Present  Omentectomy performed: Yes  Oncology History   Endometrial cancer ([facility])   12/5/2024 Initial Diagnosis    Endometrial cancer ([facility])     1/16/2025 -  Cancer Staged    Staging form: Corpus Uteri - Carcinoma, AJCC 8th Edition  - Clinical stage from 1/16/2025: FIGO Stage IIIC2 (cT1b, cN2, cM0) - Signed by Amanda Grayson MD on 2/3/2025  Histopathologic type: Papillary serous cystadenocarcinoma  Stage prefix: Initial diagnosis  Histologic grade (G): G3  Histologic grading system: 3 grade system  Lymph-vascular invasion (LVI): LVI present/identified, NOS  Pelvic tyler status: Positive  Number of pelvic nodes positive from  dissection: 9  Number of pelvic nodes examined during dissection: 20  Para-aortic status: Positive  Number of para-aortic nodes positive from dissection: 7  Number of para-aortic nodes examined during dissection: 9  Lymph node metastasis: Present  Omentectomy performed: Yes       2/21/2025 -  Chemotherapy    alteplase (CATHFLO), 2 mg, Intracatheter, Every 1 Minute as needed, 1 of 12 cycles  palonosetron (ALOXI), 0.25 mg, Intravenous, Once, 1 of 6 cycles  Administration: 0.25 mg (2/21/2025)  fosaprepitant (EMEND) IVPB, 150 mg, Intravenous, Once, 1 of 6 cycles  Administration: 150 mg (2/21/2025)  dostarlimab-gxly (JEMPERLI) IVPB, 500 mg, Intravenous, Once, 1 of 12 cycles  Administration: 500 mg (2/21/2025)  CARBOplatin (PARAPLATIN) IVPB (GOG AUC DOSING), 484 mg, Intravenous, Once, 1 of 6 cycles  Administration: 484 mg (2/21/2025)  PACLItaxel (TAXOL) chemo IVPB, 175 mg/m2 = 295.8 mg, Intravenous, Once, 1 of 6 cycles  Administration: 295.8 mg (2/21/2025)        Review of Systems   Constitutional:  Negative for chills and fever.   HENT:  Negative for ear pain and sore throat.    Eyes:  Negative for pain and visual disturbance.   Respiratory:  Negative for cough and shortness of breath.    Cardiovascular:  Negative for chest pain and palpitations.   Gastrointestinal:  Positive for abdominal pain. Negative for constipation, diarrhea, nausea and vomiting.   Genitourinary:  Positive for frequency, pelvic pain, urgency and vaginal pain. Negative for dysuria, hematuria and vaginal bleeding.   Musculoskeletal:  Positive for arthralgias and myalgias. Negative for back pain.   Skin:  Negative for color change and rash.   Neurological:  Negative for seizures and syncope.   All other systems reviewed and are negative.  A complete review of systems is negative other than that noted above in the HPI.    Medical History Reviewed by provider this encounter:  Tobacco  Allergies  Meds  Problems  Med Hx  Surg Hx  Fam Hx     .      Objective   /88 (BP Location: Left arm, Patient Position: Sitting, Cuff Size: Standard)   Pulse 90   Temp 97.6 °F (36.4 °C) (Temporal)   Wt 71.7 kg (158 lb)   SpO2 99%   BMI 32.28 kg/m²     Body mass index is 32.28 kg/m².  Pain Screening:  Pain Score:   7 (lower abdomen and back)  ECOG   2  Physical Exam   - GEN: uses walker ; Appears well, alert and oriented x 3, pleasant and cooperative, in no acute distress  - HEENT: Anicteric, mucous membranes moist, PERRL and EOMI   - NECK: No lymphadenopathy, JVD or carotid bruits   - HEART: RRR, normal S1 and S2, no murmurs, clicks, gallops or rubs   - LUNGS: Clear to auscultation bilaterally; no wheezes, rales, or rhonchi  - ABDOMEN: abdominal surgical scars healed well, lower abdominal tender to palpation ; soft abdomen , no guarding; bowel sounds normal  -  : pelvic exam today by Dr. ATKINSON with myself and CHRISSY Patel in the room, tender vagina during exam but no evidence to suggest infection/inflammation otherwise, no discharge, no evidence of bleed , no abnormal tissue / lesions found. No vaginal cuff dehiscence or fistula noted.  - EXTREMITIES: Peripheral pulses normal; no clubbing, cyanosis, or edema  - NEURO: No focal findings, CN II-XII are grossly intact.   - Musculoskeletal: 5/5 strength, normal ROM, no swollen or erythematous joints.   - SKIN: Normal without suspicious lesions on exposed skin      Labs: I have reviewed pertinent labs.   Lab Results   Component Value Date/Time     7.2 02/19/2025 09:28 AM     Lab Results   Component Value Date/Time    Potassium 4.4 02/26/2025 09:07 AM    Chloride 97 02/26/2025 09:07 AM    CO2 29 02/26/2025 09:07 AM    BUN 20 02/26/2025 09:07 AM    Creatinine 0.59 (L) 02/26/2025 09:07 AM    Glucose, Fasting 135 (H) 02/26/2025 09:07 AM    Calcium 9.6 02/26/2025 09:07 AM    AST 13 02/26/2025 09:07 AM    ALT 18 02/26/2025 09:07 AM    Alkaline Phosphatase 55 02/26/2025 09:07 AM    eGFR 97 02/26/2025 09:07 AM     Lab Results    Component Value Date/Time    WBC 3.36 (L) 03/07/2025 10:24 AM    Hemoglobin 12.7 03/07/2025 10:24 AM    Hematocrit 38.2 03/07/2025 10:24 AM    MCV 93 03/07/2025 10:24 AM    Platelets 258 03/07/2025 10:24 AM     Lab Results   Component Value Date/Time    Absolute Neutrophils 1.22 (L) 03/07/2025 10:24 AM        Trend:  Lab Results   Component Value Date     7.2 02/19/2025     15.2 02/04/2025     7.3 12/18/2024       Radiology Results Review : I have reviewed images/report studies in PACS as described above (in the HPI).  Other Study Results Review : No additional pertinent studies reviewed.    Administrative Statements   I have spent a total time of 45 minutes in caring for this patient on the day of the visit/encounter including Diagnostic results, Prognosis, Risks and benefits of tx options, Instructions for management, Patient and family education, Importance of tx compliance, Risk factor reductions, Impressions, Counseling / Coordination of care, Documenting in the medical record, Reviewing/placing orders in the medical record (including tests, medications, and/or procedures), and Obtaining or reviewing history  .    Amanda Grayson MD, MPH  Division of Gynecologic Oncology  03/07/25 10:49 AM

## 2025-03-07 NOTE — TELEPHONE ENCOUNTER
Writer spoke with PT on 3/7/25 using  line (rep [id number]). PT stated her brother was not able to  her newly Rx medication today because the pharmacy stated it was not covered. Writer informed PT that he would relay this information to the physicians office and confirmed to the PT that she has active PA MA so as long as the medication is approved, little to none expense should fall on her. PT extremely concerned/upset over not receiving her meds yet and stated she cannot afford to pay for anything due to her situation. Writer reinforced and confirmed that he will inform the physician office and request they follow up with her. PT was grateful for the writers call. PT disconnected before writer was able to give contact information.        Jah Pryor  Phone:[phone]  Email:[email or web address]

## 2025-03-07 NOTE — ASSESSMENT & PLAN NOTE
2/4/25 CT AP:  Stable 1.8 cm left adrenal nodule.   Will revaluate on future imaging after chemo/IO

## 2025-03-07 NOTE — ASSESSMENT & PLAN NOTE
1/16/25 s/p ZE/BSO with PLND and PALND   stage IIIC2 MMRp p53m [id number] uterine papillary serous carcinoma, with extensive LVSI     Plan to continue chemotherapy/IO x 6 cycles. S/p rad onc referral, plan per discussion with their team is to obtain a post-chemo PETCT and then plan XRT treatment plan  Labs reviewed and stable, due for repeat labs today. Given reported neuropathy and body aches, will dose-reduce taxol for C#2. Plan for C#2 carboplatin AUC 5 / paclitaxel 135 mg/m2 / dostarlimab 500mg. Cycle 2 planned on 3/14   Reportedly hard stick at the infusion center, will refer for IR Port Placement   Labs and premedication as per protocol   Has PRN Zofran, worked well for her post chemo nausea   Palliative care referral for ongoing cancer-related symptom management    Orders:    Ambulatory Referral to Palliative Care; Future    Ambulatory Referral to Interventional Radiology; Future    UA w Reflex to Microscopic w Reflex to Culture; Future    Ambulatory Referral to Physical Therapy; Future    traMADol (Ultram) 50 mg tablet; Take 1 tablet (50 mg total) by mouth every 6 (six) hours as needed for moderate pain    Ambulatory Referral to Oncology Social Worker; Future

## 2025-03-07 NOTE — PROGRESS NOTES
OSW received referral for patient.  On chemo for uterine cancer; insurance did not cover some necessary meds like oxycodone also patient asked for help with urinary incontinence supplies, pads etc. Appreciate help with resources / supplies if possible.    OSW will send to  to help with copay

## 2025-03-07 NOTE — ASSESSMENT & PLAN NOTE
CT AP from 2/4 with small postop seromas and no other acute findings  VSS wnl and labs stable, abdominal exam overall benign, no need to repeat CT at this time  Pelvic exam with vaginal tenderness but otherwise unremarkable with no evidence of cuff dehiscence, fistula, or lesions    Can discontinue lifting restrictions  Rx for tramadol sent to patient pharmacy given poor tolerance to oxycodone

## 2025-03-10 ENCOUNTER — ANESTHESIA EVENT (OUTPATIENT)
Dept: ANESTHESIOLOGY | Facility: HOSPITAL | Age: 65
End: 2025-03-10

## 2025-03-10 ENCOUNTER — ANESTHESIA (OUTPATIENT)
Dept: ANESTHESIOLOGY | Facility: HOSPITAL | Age: 65
End: 2025-03-10

## 2025-03-10 ENCOUNTER — DOCUMENTATION (OUTPATIENT)
Age: 65
End: 2025-03-10

## 2025-03-10 LAB — MISCELLANEOUS LAB TEST RESULT: NORMAL

## 2025-03-10 NOTE — PROGRESS NOTES
Received request from Jah SALGADO to get a PA for Tramadol for patient. Auth has been submitted via cover my meds and this came back as approved:

## 2025-03-11 ENCOUNTER — PATIENT OUTREACH (OUTPATIENT)
Dept: CASE MANAGEMENT | Facility: OTHER | Age: 65
End: 2025-03-11

## 2025-03-11 ENCOUNTER — ANESTHESIA EVENT (OUTPATIENT)
Dept: PERIOP | Facility: HOSPITAL | Age: 65
End: 2025-03-11
Payer: COMMERCIAL

## 2025-03-11 NOTE — PROGRESS NOTES
OSW outreached to patient with  [id number]    Patient reported that she is having some pain in her back and vagina. She reported that she does have medication that was ordered for her that is helpful. She reported that she has to stop the pain medication due to her upcoming surgery though.     Patient reported that she would like assistance obtaining briefs. Patient reported that her brother picked her up one package which helps her, particularly at night but stated sometimes she uses them during the night due to incontinence. Patient reported that she is size medium. OSW will reach out to provider to see if order can be placed to [employer] for incontinence products.     Patient did report that she is running out of cardiac medications, OSW suggested that she reach out to Cardiologist to review those medications with that department and request refills, particularly with upcoming surgical procedure. Patient in agreement of same.

## 2025-03-11 NOTE — PRE-PROCEDURE INSTRUCTIONS
Pre-Surgery Instructions:   Medication Instructions    aspirin 81 mg chewable tablet Take day of surgery.    atorvastatin (LIPITOR) 40 mg tablet Take day of surgery.    colchicine (COLCRYS) 0.6 mg tablet Take day of surgery.    Empagliflozin (JARDIANCE) 10 MG TABS tablet Pt's last dose was 3/10- msg sent to anesthesia    furosemide (LASIX) 20 mg tablet Hold day of surgery.    losartan (COZAAR) 25 mg tablet Hold day of surgery.    metFORMIN (GLUCOPHAGE) 500 mg tablet Hold day of surgery.    metoprolol succinate (TOPROL-XL) 25 mg 24 hr tablet Take day of surgery.    naloxone (NARCAN) 4 mg/0.1 mL nasal spray Uses PRN- OK to take day of surgery    oxyCODONE (Roxicodone) 5 immediate release tablet Uses PRN- OK to take day of surgery    traMADol (Ultram) 50 mg tablet Uses PRN- OK to take day of surgery   Medication instructions for day of surgery reviewed. Please take all instructed medications with only a sip of water.       You will receive a call one business day prior to surgery with an arrival time and hospital directions. If your surgery is scheduled on a Monday, the hospital will be calling you on the Friday prior to your surgery. If you have not heard from anyone by 8pm, please call the hospital supervisor through the hospital  at [phone]. ([place] [phone] or [place] [phone]).    Do not eat or drink anything after midnight the night before your surgery, including candy, mints, lifesavers, or chewing gum. Do not drink alcohol 24hrs before your surgery. Try not to smoke at least 24hrs before your surgery.       Follow the pre surgery showering instructions as listed in the “My Surgical Experience Booklet” or otherwise provided by your surgeon's office. Do not use a blade to shave the surgical area 1 week before surgery. It is okay to use a clean electric clippers up to 24 hours before surgery. Do not apply any lotions, creams, including makeup, cologne, deodorant, or perfumes after showering  on the day of your surgery. Do not use dry shampoo, hair spray, hair gel, or any type of hair products.     No contact lenses, eye make-up, or artificial eyelashes. Remove nail polish, including gel polish, and any artificial, gel, or acrylic nails if possible. Remove all jewelry including rings and body piercing jewelry.     Wear causal clothing that is easy to take on and off. Consider your type of surgery.    Keep any valuables, jewelry, piercings at home. Please bring any specially ordered equipment (sling, braces) if indicated.    Arrange for a responsible person to drive you to and from the hospital on the day of your surgery. Please confirm the visitor policy for the day of your procedure when you receive your phone call with an arrival time.     Call the surgeon's office with any new illnesses, exposures, or additional questions prior to surgery.    Please reference your “My Surgical Experience Booklet” for additional information to prepare for your upcoming surgery.

## 2025-03-12 ENCOUNTER — APPOINTMENT (OUTPATIENT)
Dept: LAB | Facility: HOSPITAL | Age: 65
End: 2025-03-12
Payer: COMMERCIAL

## 2025-03-12 DIAGNOSIS — E11.9 TYPE 2 DIABETES MELLITUS WITHOUT COMPLICATION, WITHOUT LONG-TERM CURRENT USE OF INSULIN (HCC): ICD-10-CM

## 2025-03-12 DIAGNOSIS — R32 URINARY INCONTINENCE, UNSPECIFIED TYPE: Primary | ICD-10-CM

## 2025-03-12 DIAGNOSIS — C54.1 ENDOMETRIAL CANCER (HCC): ICD-10-CM

## 2025-03-12 LAB
ALBUMIN SERPL BCG-MCNC: 4.6 G/DL (ref 3.5–5)
ALP SERPL-CCNC: 67 U/L (ref 34–104)
ALT SERPL W P-5'-P-CCNC: 17 U/L (ref 7–52)
AMYLASE SERPL-CCNC: 50 IU/L (ref 29–103)
ANION GAP SERPL CALCULATED.3IONS-SCNC: 10 MMOL/L (ref 4–13)
AST SERPL W P-5'-P-CCNC: 15 U/L (ref 13–39)
BASOPHILS # BLD AUTO: 0.07 THOUSANDS/ÂΜL (ref 0–0.1)
BASOPHILS NFR BLD AUTO: 1 % (ref 0–1)
BILIRUB SERPL-MCNC: 0.46 MG/DL (ref 0.2–1)
BUN SERPL-MCNC: 20 MG/DL (ref 5–25)
CALCIUM SERPL-MCNC: 9.5 MG/DL (ref 8.4–10.2)
CANCER AG125 SERPL-ACNC: 6.2 U/ML (ref 0–35)
CHLORIDE SERPL-SCNC: 101 MMOL/L (ref 96–108)
CO2 SERPL-SCNC: 28 MMOL/L (ref 21–32)
CREAT SERPL-MCNC: 0.58 MG/DL (ref 0.6–1.3)
EOSINOPHIL # BLD AUTO: 0.08 THOUSAND/ÂΜL (ref 0–0.61)
EOSINOPHIL NFR BLD AUTO: 2 % (ref 0–6)
ERYTHROCYTE [DISTWIDTH] IN BLOOD BY AUTOMATED COUNT: 12 % (ref 11.6–15.1)
EST. AVERAGE GLUCOSE BLD GHB EST-MCNC: 146 MG/DL
GFR SERPL CREATININE-BSD FRML MDRD: 97 ML/MIN/1.73SQ M
GLUCOSE P FAST SERPL-MCNC: 119 MG/DL (ref 65–99)
HBA1C MFR BLD: 6.7 %
HCT VFR BLD AUTO: 39.2 % (ref 34.8–46.1)
HGB BLD-MCNC: 12.8 G/DL (ref 11.5–15.4)
IMM GRANULOCYTES # BLD AUTO: 0.03 THOUSAND/UL (ref 0–0.2)
IMM GRANULOCYTES NFR BLD AUTO: 1 % (ref 0–2)
LIPASE SERPL-CCNC: 31 U/L (ref 11–82)
LYMPHOCYTES # BLD AUTO: 2.02 THOUSANDS/ÂΜL (ref 0.6–4.47)
LYMPHOCYTES NFR BLD AUTO: 38 % (ref 14–44)
MAGNESIUM SERPL-MCNC: 2 MG/DL (ref 1.9–2.7)
MCH RBC QN AUTO: 30.5 PG (ref 26.8–34.3)
MCHC RBC AUTO-ENTMCNC: 32.7 G/DL (ref 31.4–37.4)
MCV RBC AUTO: 93 FL (ref 82–98)
MONOCYTES # BLD AUTO: 0.51 THOUSAND/ÂΜL (ref 0.17–1.22)
MONOCYTES NFR BLD AUTO: 10 % (ref 4–12)
NEUTROPHILS # BLD AUTO: 2.64 THOUSANDS/ÂΜL (ref 1.85–7.62)
NEUTS SEG NFR BLD AUTO: 48 % (ref 43–75)
NRBC BLD AUTO-RTO: 0 /100 WBCS
PLATELET # BLD AUTO: 295 THOUSANDS/UL (ref 149–390)
PMV BLD AUTO: 10.9 FL (ref 8.9–12.7)
POTASSIUM SERPL-SCNC: 4.1 MMOL/L (ref 3.5–5.3)
PROT SERPL-MCNC: 7.9 G/DL (ref 6.4–8.4)
RBC # BLD AUTO: 4.2 MILLION/UL (ref 3.81–5.12)
SODIUM SERPL-SCNC: 139 MMOL/L (ref 135–147)
TSH SERPL DL<=0.05 MIU/L-ACNC: 2.79 UIU/ML (ref 0.45–4.5)
WBC # BLD AUTO: 5.35 THOUSAND/UL (ref 4.31–10.16)

## 2025-03-12 PROCEDURE — 85025 COMPLETE CBC W/AUTO DIFF WBC: CPT

## 2025-03-12 PROCEDURE — 86304 IMMUNOASSAY TUMOR CA 125: CPT

## 2025-03-12 PROCEDURE — 80053 COMPREHEN METABOLIC PANEL: CPT

## 2025-03-12 PROCEDURE — 82150 ASSAY OF AMYLASE: CPT

## 2025-03-12 PROCEDURE — 83036 HEMOGLOBIN GLYCOSYLATED A1C: CPT

## 2025-03-12 PROCEDURE — 83690 ASSAY OF LIPASE: CPT

## 2025-03-12 PROCEDURE — 36415 COLL VENOUS BLD VENIPUNCTURE: CPT

## 2025-03-12 PROCEDURE — 83735 ASSAY OF MAGNESIUM: CPT

## 2025-03-12 PROCEDURE — 84443 ASSAY THYROID STIM HORMONE: CPT

## 2025-03-12 RX ORDER — BROMPHENIRAMIN/PSEUDOEPHEDRINE 1-15MG/5ML
LIQUID (ML) ORAL AS NEEDED
Qty: 20 EACH | Refills: 2 | Status: SHIPPED | OUTPATIENT
Start: 2025-03-12 | End: 2025-03-24 | Stop reason: SDUPTHER

## 2025-03-13 ENCOUNTER — ANESTHESIA (OUTPATIENT)
Dept: PERIOP | Facility: HOSPITAL | Age: 65
End: 2025-03-13
Payer: COMMERCIAL

## 2025-03-13 ENCOUNTER — APPOINTMENT (OUTPATIENT)
Dept: RADIOLOGY | Facility: HOSPITAL | Age: 65
End: 2025-03-13
Payer: COMMERCIAL

## 2025-03-13 ENCOUNTER — HOSPITAL ENCOUNTER (OUTPATIENT)
Facility: HOSPITAL | Age: 65
Setting detail: OUTPATIENT SURGERY
Discharge: HOME | End: 2025-03-13
Attending: RADIOLOGY | Admitting: RADIOLOGY
Payer: COMMERCIAL

## 2025-03-13 VITALS
DIASTOLIC BLOOD PRESSURE: 59 MMHG | SYSTOLIC BLOOD PRESSURE: 109 MMHG | RESPIRATION RATE: 16 BRPM | OXYGEN SATURATION: 98 % | HEART RATE: 74 BPM | TEMPERATURE: 97.5 F

## 2025-03-13 LAB — GLUCOSE SERPL-MCNC: 121 MG/DL (ref 65–140)

## 2025-03-13 PROCEDURE — 82948 REAGENT STRIP/BLOOD GLUCOSE: CPT

## 2025-03-13 PROCEDURE — 77001 FLUOROGUIDE FOR VEIN DEVICE: CPT

## 2025-03-13 PROCEDURE — 77001 FLUOROGUIDE FOR VEIN DEVICE: CPT | Performed by: RADIOLOGY

## 2025-03-13 PROCEDURE — C1788 PORT, INDWELLING, IMP: HCPCS | Performed by: RADIOLOGY

## 2025-03-13 PROCEDURE — 76937 US GUIDE VASCULAR ACCESS: CPT | Performed by: RADIOLOGY

## 2025-03-13 PROCEDURE — 36561 INSERT TUNNELED CV CATH: CPT | Performed by: RADIOLOGY

## 2025-03-13 DEVICE — 8F PLASTIC DIGNITY® MID-SIZED CT PORT W/SILICONE FILLED SUTURE HOLES W/ATTACHABLE CHRONOFLEX® POLYURETHANE CATHETER
Type: IMPLANTABLE DEVICE | Site: CHEST | Status: FUNCTIONAL
Brand: DIGNITY®

## 2025-03-13 RX ORDER — LIDOCAINE HYDROCHLORIDE AND EPINEPHRINE 10; 10 MG/ML; UG/ML
INJECTION, SOLUTION INFILTRATION; PERINEURAL AS NEEDED
Status: DISCONTINUED | OUTPATIENT
Start: 2025-03-13 | End: 2025-03-13 | Stop reason: HOSPADM

## 2025-03-13 RX ORDER — SODIUM CHLORIDE, SODIUM LACTATE, POTASSIUM CHLORIDE, CALCIUM CHLORIDE 600; 310; 30; 20 MG/100ML; MG/100ML; MG/100ML; MG/100ML
INJECTION, SOLUTION INTRAVENOUS CONTINUOUS PRN
Status: DISCONTINUED | OUTPATIENT
Start: 2025-03-13 | End: 2025-03-13

## 2025-03-13 RX ORDER — SODIUM CHLORIDE 9 MG/ML
INJECTION, SOLUTION INTRAVENOUS AS NEEDED
Status: DISCONTINUED | OUTPATIENT
Start: 2025-03-13 | End: 2025-03-13 | Stop reason: HOSPADM

## 2025-03-13 RX ORDER — CEFAZOLIN SODIUM 2 G/50ML
SOLUTION INTRAVENOUS AS NEEDED
Status: DISCONTINUED | OUTPATIENT
Start: 2025-03-13 | End: 2025-03-13

## 2025-03-13 RX ORDER — ONDANSETRON 2 MG/ML
INJECTION INTRAMUSCULAR; INTRAVENOUS AS NEEDED
Status: DISCONTINUED | OUTPATIENT
Start: 2025-03-13 | End: 2025-03-13

## 2025-03-13 RX ORDER — MIDAZOLAM HYDROCHLORIDE 2 MG/2ML
INJECTION, SOLUTION INTRAMUSCULAR; INTRAVENOUS AS NEEDED
Status: DISCONTINUED | OUTPATIENT
Start: 2025-03-13 | End: 2025-03-13

## 2025-03-13 RX ORDER — FENTANYL CITRATE 50 UG/ML
INJECTION, SOLUTION INTRAMUSCULAR; INTRAVENOUS AS NEEDED
Status: DISCONTINUED | OUTPATIENT
Start: 2025-03-13 | End: 2025-03-13

## 2025-03-13 RX ORDER — PALONOSETRON 0.05 MG/ML
0.25 INJECTION, SOLUTION INTRAVENOUS ONCE
Status: CANCELLED | OUTPATIENT
Start: 2025-03-14

## 2025-03-13 RX ORDER — ONDANSETRON 2 MG/ML
4 INJECTION INTRAMUSCULAR; INTRAVENOUS EVERY 4 HOURS PRN
Status: DISCONTINUED | OUTPATIENT
Start: 2025-03-13 | End: 2025-03-13 | Stop reason: HOSPADM

## 2025-03-13 RX ORDER — CEFAZOLIN SODIUM 2 G/50ML
2000 SOLUTION INTRAVENOUS ONCE
Status: DISCONTINUED | OUTPATIENT
Start: 2025-03-13 | End: 2025-03-13 | Stop reason: HOSPADM

## 2025-03-13 RX ORDER — SODIUM CHLORIDE 9 MG/ML
20 INJECTION, SOLUTION INTRAVENOUS ONCE
Status: CANCELLED | OUTPATIENT
Start: 2025-03-14

## 2025-03-13 RX ORDER — SODIUM CHLORIDE, SODIUM LACTATE, POTASSIUM CHLORIDE, CALCIUM CHLORIDE 600; 310; 30; 20 MG/100ML; MG/100ML; MG/100ML; MG/100ML
125 INJECTION, SOLUTION INTRAVENOUS CONTINUOUS
Status: DISCONTINUED | OUTPATIENT
Start: 2025-03-13 | End: 2025-03-13 | Stop reason: HOSPADM

## 2025-03-13 RX ORDER — PROPOFOL 10 MG/ML
INJECTION, EMULSION INTRAVENOUS CONTINUOUS PRN
Status: DISCONTINUED | OUTPATIENT
Start: 2025-03-13 | End: 2025-03-13

## 2025-03-13 RX ADMIN — ONDANSETRON 4 MG: 2 INJECTION INTRAMUSCULAR; INTRAVENOUS at 10:06

## 2025-03-13 RX ADMIN — FENTANYL CITRATE 50 MCG: 50 INJECTION INTRAMUSCULAR; INTRAVENOUS at 10:09

## 2025-03-13 RX ADMIN — MIDAZOLAM 1 MG: 1 INJECTION INTRAMUSCULAR; INTRAVENOUS at 10:09

## 2025-03-13 RX ADMIN — SODIUM CHLORIDE, SODIUM LACTATE, POTASSIUM CHLORIDE, AND CALCIUM CHLORIDE: .6; .31; .03; .02 INJECTION, SOLUTION INTRAVENOUS at 09:40

## 2025-03-13 RX ADMIN — CEFAZOLIN SODIUM 2000 MG: 2 SOLUTION INTRAVENOUS at 10:09

## 2025-03-13 RX ADMIN — PROPOFOL 50 MCG/KG/MIN: 10 INJECTION, EMULSION INTRAVENOUS at 10:11

## 2025-03-13 RX ADMIN — MIDAZOLAM 1 MG: 1 INJECTION INTRAMUSCULAR; INTRAVENOUS at 10:06

## 2025-03-13 NOTE — OP NOTE
Chest port placement 3/13/2025     History:      Endometrial carcinoma     Contrast: none     Procedure:     The patient was identified verbally and by wristband. Timeout was performed. Informed consent was obtained.      All elements of maximal sterile barrier technique were followed (cap, mask, sterile gown, sterile gloves, large sterile sheet, hand hygiene and 2% chlorhexidine for cutaneous antisepsis).     Following obtaining informed consent, the patient was prepped and draped in the usual sterile fashion. Using ultrasound guidance, access was gained to the patient's right nternal jugular vein using a micropuncture system. The micropuncture wire was removed and a .035 wire was advanced to the level of the inferior vena cava using fluoroscopic guidance.     Using 1% lidocaine, the region of the right anterior chest was was anesthetized. A small incision was made using a 15 blade scalpel. The port pocket was created using blunt dissection.     The catheter tubing was tunneled from the incision to the venotomy. The micropuncture dilator was exchanged for a peel-away sheath, using fluoroscopic guidance. The catheter was place through the peel-away sheath. The catheter was measured and cut to size. The catheter was attached to the port. The port was flushed with saline to ensure patency without evidence of leakage. The port was placed in the port pocket. The port was sutured in the pocket using 3-0 Vicryl.     The incisions were approximated with 3-0 Vicryl and tissue adhesive. The patient tolerated the procedure well without apparent immediate complications. The patient left the IR department in unchanged condition.     Dr. Byers performed and directly supervised the entire procedure.     Findings:      Using ultrasound guidance, the right internal jugular vein was cannulated using Seldinger technique. The right internal jugular vein was evaluated as a potential access site. The right internal jugular vein was  patent, and free of thrombus. Static images of the vessel was obtained. Visualization of real time needle entry into the vessel was obtained.     Fluoroscopic spot image demonstrates a newly placed single lumen chest port via the right internal jugular vein with the most central aspect at the SVC/RA junction. The catheter tubing is smooth in contour.        IMPRESSION:     Successful placement of a single lumen chest port via the right internal jugular vein.

## 2025-03-13 NOTE — ANESTHESIA POSTPROCEDURE EVALUATION
Post-Op Assessment Note    CV Status:  Stable    Pain management: satisfactory to patient       Mental Status:  Awake   Hydration Status:  Stable   PONV Controlled:  None   Airway Patency:  Patent     Post Op Vitals Reviewed: Yes    No anethesia notable event occurred.    Staff: Anesthesiologist           Last Filed PACU Vitals:  Vitals Value Taken Time   Temp 97.4 °F (36.3 °C) 03/13/25 1113   Pulse 70 03/13/25 1112   /57 03/13/25 1100   Resp 16 03/13/25 1113   SpO2 95 % 03/13/25 1112   Vitals shown include unfiled device data.    Modified Maribel:     Vitals Value Taken Time   Activity 2 03/13/25 1100   Respiration 2 03/13/25 1100   Circulation 2 03/13/25 1100   Consciousness 2 03/13/25 1100   Oxygen Saturation 2 03/13/25 1100     Modified Maribel Score: 10

## 2025-03-13 NOTE — ANESTHESIA PREPROCEDURE EVALUATION
Procedure:  INSERTION VENOUS PORT ( PORT-A-CATH) IR (Chest)    Relevant Problems   CARDIO   (+) Primary hypertension      ENDO   (+) Type 2 diabetes mellitus without complication, without long-term current use of insulin (HCC)      GI/HEPATIC   (+) Gastroesophageal reflux disease without esophagitis      GYN   (+) Endometrial cancer (HCC)      MUSCULOSKELETAL   (+) Chronic bilateral thoracic back pain      NEURO/PSYCH   (+) Chronic bilateral thoracic back pain      Obstetrics/Gynecology   (+) Status post RA- Total Laparoscopic  hysterectomy, bilateral salpingoopherectomy, pelvic and para-aortic lymphadenectomy, omental biopsy        Physical Exam    Airway    Mallampati score: II  TM Distance: >3 FB  Neck ROM: full     Dental   No notable dental hx     Cardiovascular      Pulmonary      Other Findings  post-pubertal.      Anesthesia Plan  ASA Score- 3     Anesthesia Type- IV sedation with anesthesia with ASA Monitors.         Additional Monitors:     Airway Plan:            Plan Factors-Exercise tolerance (METS): <4 METS.    Chart reviewed.    Patient summary reviewed.    Patient is not a current smoker.      Obstructive sleep apnea risk education given perioperatively.        Induction- intravenous.    Postoperative Plan-     Perioperative Resuscitation Plan - Level 1 - Full Code.       Informed Consent- Anesthetic plan and risks discussed with patient.  I personally reviewed this patient with the CRNA. Discussed and agreed on the Anesthesia Plan with the CRNA..      NPO Status:  No vitals data found for the desired time range.

## 2025-03-13 NOTE — INTERVAL H&P NOTE
Patient arrived to [facility] for port placement    The procedure and risks were discussed with the patient.  All questions were answered. Informed consent was obtained.    H & P reviewed after examining the patient and I find no changes in the patient condition since the H & P has been written.    /83   Pulse 77   Temp (!) 97.2 °F (36.2 °C) (Temporal)   Resp 18   SpO2 98%     Patient re-evaluated. Accept as history and physical.    Suman Byers, DO/March 13, 2025/9:22 AM

## 2025-03-14 ENCOUNTER — HOSPITAL ENCOUNTER (OUTPATIENT)
Dept: INFUSION CENTER | Facility: HOSPITAL | Age: 65
End: 2025-03-14
Attending: OBSTETRICS & GYNECOLOGY
Payer: COMMERCIAL

## 2025-03-14 VITALS
WEIGHT: 161.38 LBS | TEMPERATURE: 97.8 F | BODY MASS INDEX: 32.53 KG/M2 | HEIGHT: 59 IN | DIASTOLIC BLOOD PRESSURE: 64 MMHG | RESPIRATION RATE: 18 BRPM | SYSTOLIC BLOOD PRESSURE: 99 MMHG | HEART RATE: 80 BPM

## 2025-03-14 DIAGNOSIS — C54.1 ENDOMETRIAL CANCER (HCC): Primary | ICD-10-CM

## 2025-03-14 DIAGNOSIS — L53.9 SKIN ERYTHEMA: Primary | ICD-10-CM

## 2025-03-14 PROCEDURE — 96417 CHEMO IV INFUS EACH ADDL SEQ: CPT

## 2025-03-14 PROCEDURE — 96367 TX/PROPH/DG ADDL SEQ IV INF: CPT

## 2025-03-14 PROCEDURE — 96368 THER/DIAG CONCURRENT INF: CPT

## 2025-03-14 PROCEDURE — 96413 CHEMO IV INFUSION 1 HR: CPT

## 2025-03-14 PROCEDURE — 96415 CHEMO IV INFUSION ADDL HR: CPT

## 2025-03-14 PROCEDURE — 96375 TX/PRO/DX INJ NEW DRUG ADDON: CPT

## 2025-03-14 RX ORDER — SODIUM CHLORIDE 9 MG/ML
20 INJECTION, SOLUTION INTRAVENOUS ONCE
Status: COMPLETED | OUTPATIENT
Start: 2025-03-14 | End: 2025-03-14

## 2025-03-14 RX ORDER — SULFAMETHOXAZOLE AND TRIMETHOPRIM 800; 160 MG/1; MG/1
1 TABLET ORAL EVERY 12 HOURS SCHEDULED
Qty: 14 TABLET | Refills: 0 | Status: SHIPPED | OUTPATIENT
Start: 2025-03-14 | End: 2025-03-21

## 2025-03-14 RX ORDER — PALONOSETRON 0.05 MG/ML
0.25 INJECTION, SOLUTION INTRAVENOUS ONCE
Status: COMPLETED | OUTPATIENT
Start: 2025-03-14 | End: 2025-03-14

## 2025-03-14 RX ADMIN — SODIUM CHLORIDE 20 ML/HR: 0.9 INJECTION, SOLUTION INTRAVENOUS at 09:05

## 2025-03-14 RX ADMIN — FAMOTIDINE 20 MG: 10 INJECTION, SOLUTION INTRAVENOUS at 09:05

## 2025-03-14 RX ADMIN — CARBOPLATIN 484 MG: 10 INJECTION INTRAVENOUS at 15:11

## 2025-03-14 RX ADMIN — DEXAMETHASONE SODIUM PHOSPHATE 20 MG: 10 INJECTION INTRAMUSCULAR; INTRAVENOUS at 09:05

## 2025-03-14 RX ADMIN — FOSAPREPITANT 150 MG: 150 INJECTION, POWDER, LYOPHILIZED, FOR SOLUTION INTRAVENOUS at 09:49

## 2025-03-14 RX ADMIN — PACLITAXEL 228 MG: 6 INJECTION, SOLUTION INTRAVENOUS at 11:40

## 2025-03-14 RX ADMIN — DIPHENHYDRAMINE HYDROCHLORIDE 25 MG: 50 INJECTION, SOLUTION INTRAMUSCULAR; INTRAVENOUS at 09:26

## 2025-03-14 RX ADMIN — PALONOSETRON 0.25 MG: 0.05 INJECTION, SOLUTION INTRAVENOUS at 10:34

## 2025-03-14 RX ADMIN — SODIUM CHLORIDE 500 MG: 9 INJECTION, SOLUTION INTRAVENOUS at 10:41

## 2025-03-14 NOTE — PROGRESS NOTES
Name: Nancy Calderon      : 1960      MRN: [id number]  Encounter Provider: Elba Titus MD  Encounter Date: 3/19/2025   Encounter department: [facility]  :  Assessment & Plan  Endometrial cancer (HCC)  She underwent ZE/BSO with PLND and PALND in 25.   Postoperative CT abdomen pelvis showed postoperative changes and stability in the left adrenal nodule.   She is currently on Paclitaxel, carboplatin, Dostarlimab, that started on 2025 with last chemotherapy cycle booked around 25.    Orders:    Ambulatory Referral to Palliative Care    oxyCODONE (ROXICODONE) 5 immediate release tablet; Take 1 tablet (5 mg total) by mouth every 6 (six) hours as needed for severe pain Max Daily Amount: 20 mg    Palliative care encounter    Orders:    oxyCODONE (ROXICODONE) 5 immediate release tablet; Take 1 tablet (5 mg total) by mouth every 6 (six) hours as needed for severe pain Max Daily Amount: 20 mg    Cancer related pain  Stop tramadol  Switch back to oxyIR 5mg PO q6H prn  Will keep regimen simple at this time  Orders:    oxyCODONE (ROXICODONE) 5 immediate release tablet; Take 1 tablet (5 mg total) by mouth every 6 (six) hours as needed for severe pain Max Daily Amount: 20 mg    Drug-induced constipation  Miralax 1-2 times daily         Follow up  RTO in 4 weeks    Decisional apparatus: Patient is competent on my exam today. If competence is lost, patient's substitute decision maker would default to siblings by [place] Act 169.     PDMP Review: I have reviewed the patient's controlled substance dispensing history in the Prescription Drug Monitoring Program in compliance with the [place] regulations before prescribing any controlled substances.    /  Tramadol Hcl 50 Mg Tablet  20.005Sa [id number] h [phone] MMEComm InsPA50  Oxycodone Hcl (Ir) 5 Mg Tablet  10.003Sa [id number] h [phone] MMEPrivate  PayPA02/07/202502/04/20251  Oxycodone Hcl (Ir) 5 Mg Tablet  10.002Sa [id number] h [phone] MMEPrivate PayPA02/04/202502/04/20251  Lorazepam 1 Mg Tablet  20.007Je [id number] h [phone] LMEComm InsPA01/18/202501/18/20251  Oxycodone Hcl (Ir) 5 Mg Tablet  16.004Ja [id number] [phone] MMEComm InsPA12/17/202412/17/20241  Tramadol Hcl 50 Mg Tablet  20.005Sa [id number] h [phone] MMEComm InsPA     History of Present Illness   Nancy Calderon is a 64 y.o. female with newly diagnosed stage IIIC2 serous endometrial cancer with CT CAP in 12/16/2024 showing markedly thickened, heterogeneously enhancing endometrium compatible with malignancy; mild bilateral iliac chain and retroperitoneal lymphadenopathy, suspicious for metastases; no findings of metastatic disease in the chest. She underwent ZE/BSO with PLND and PALND in 1/16/25. Postoperative CT abdomen pelvis showed postoperative changes and stability in the left adrenal nodule. Her case was reviewed at multidisciplinary tumor board with recommendations for systemic therapy with chemotherapy/immunotherapy, followed by external beam radiation therapy.  She is currently on Paclitaxel, carboplatin, Dostarlimab, that started on 2/21/2025 with last chemotherapy cycle booked around 6/6/25. She is referred to palliative medicine for supportive cares.     Met with patient and his brother.  via [employer] utilized ID# [id number]. Introduced palliative medicine. She complains of ongoing pain in the vagina area as well as bilateral knees that she thinks is from chemo. She is taking tramadol but admits that the oxycodone worked better.     Time spent discussing etiology of pain (related to cancer) and overall management of cancer-related pain. Cancer-directed therapies, ie chemotherapy, radiation therapy, immunotherapy, will make the most significant and long term improvement in cancer pain. Role of opioid and other adjuncts at this time  is for lowering pain threshold to a more tolerable level so she can continue to remain functional despite continued presence of pain. This is not meant for complete resolution of pain.  Discussed short-term side effects of opioids - including constipation, respiratory depression, death.     I encourage her to take only as needed for severe pain. If pain persists and/or getting worse, she should report this to her oncology team to consider possible cancer progression.     She is constipated, last BM 2 days ago. Advised miralax BID.     She is also concerned with the redness around her port site. She was started on bactrim by gynonc for this reason that she only started yesterday. Advise to continue this but if worsening - redness, discharge - to proceed to the ED.     She met with [facility]. Refer to separate documentation for more details.          Objective   There were no vitals taken for this visit.    Physical Exam  Constitutional:       Comments: Chronically ill looking, not toxic appearing. Comfortable, pleasant, looks tired   HENT:      Head: Normocephalic and atraumatic.   Eyes:      General: No scleral icterus.        Right eye: No discharge.         Left eye: No discharge.   Pulmonary:      Effort: Pulmonary effort is normal. No respiratory distress.      Comments: On RA  Abdominal:      General: Abdomen is flat. There is no distension.   Musculoskeletal:         General: No swelling.   Skin:     General: Skin is warm and dry.      Coloration: Skin is not jaundiced or pale.      Comments: Some redness around the port site, no discharge, bruising around it which is improving   Neurological:      General: No focal deficit present.      Mental Status: She is alert and oriented to person, place, and time.   Psychiatric:         Mood and Affect: Mood normal.         Behavior: Behavior normal.         Thought Content: Thought content normal.         Judgment: Judgment normal.       Recent labs:  Lab Results    Component Value Date/Time    SODIUM 139 03/12/2025 09:49 AM    K 4.1 03/12/2025 09:49 AM    BUN 20 03/12/2025 09:49 AM    CREATININE 0.58 (L) 03/12/2025 09:49 AM    GLUC 80 02/04/2025 03:42 PM    CALCIUM 9.5 03/12/2025 09:49 AM    AST 15 03/12/2025 09:49 AM    ALT 17 03/12/2025 09:49 AM    ALB 4.6 03/12/2025 09:49 AM    TP 7.9 03/12/2025 09:49 AM    EGFR 97 03/12/2025 09:49 AM     Lab Results   Component Value Date/Time    HGB 12.8 03/12/2025 09:49 AM    WBC 5.35 03/12/2025 09:49 AM     03/12/2025 09:49 AM    INR 0.91 12/18/2024 09:22 AM    PTT 27 12/18/2024 09:22 AM     Lab Results   Component Value Date/Time    BYX6DUBYVHCS 2.794 03/12/2025 09:49 AM       Recent Imaging:  Procedure: FL guided central venous access device insertion  Result Date: 3/14/2025  Impression: Impression: Successful placement of a single lumen chest port via the right internal jugular vein. Workstation performed: [id number]     Procedure: CT abdomen pelvis w contrast  Result Date: 2/4/2025  Impression: Status post interval hysterectomy. Bilateral pelvic fluid collections likely representing postoperative seromas. Indistinct soft tissue about the iliac bifurcation and right common iliac artery. This may be postoperative however attention on follow-up is recommended to exclude a neoplastic etiology. Workstation performed: [id number]     Procedure: CT chest abdomen pelvis w contrast  Addendum Date: 12/16/2024  Addendum: ADDENDUM: As described in the body of the report, there is an indeterminant 1.8 cm left adrenal nodule, possibly reflecting an adrenal adenoma versus metastasis. Recommend follow-up adrenal protocol CT or MRI for further evaluation. The study was marked in EPIC for significant notification.    Result Date: 12/16/2024  Impression: 1.  Markedly thickened, heterogeneously enhancing endometrium compatible with known malignancy. 2.  Mild bilateral iliac chain and retroperitoneal lymphadenopathy, suspicious for metastases.  3.  No findings of metastatic disease in the chest. Workstation performed: [id number]     Procedure: US pelvis complete w transvaginal  Result Date: 11/25/2024  Impression: 1.  Markedly thickened and heterogeneous endometrium with multiple foci of arterial vascularity suspicious for malignancy. Endometrial biopsy recommended. 2.  Small subserosal fibroid. 3.  Right ovary not visualized. I personally discussed this study with ANDREW WOODY on 11/25/2024 3:11 PM. Workstation performed: [id number]       Administrative Statements   I have spent a total time of 45 minutes in caring for this patient on the day of the visit/encounter including Diagnostic results, Risks and benefits of tx options, Instructions for management, Patient and family education, Importance of tx compliance, Risk factor reductions, Impressions, Counseling / Coordination of care, Documenting in the medical record, Reviewing/placing orders in the medical record (including tests, medications, and/or procedures), and Obtaining or reviewing history  .   Topics discussed with the patient / family include symptom assessment and management, medication review, medication adjustment, psychosocial support, opioid titration, supportive listening, and anticipatory guidance.    Elba Titus MD  Palliative Medicine & Supportive Care  Internal Medicine  Available via [employer]  Office: [phone]  Fax: [phone]

## 2025-03-14 NOTE — PROGRESS NOTES
Pt tolerated Chemotherapy treatment today with no adverse reactions. AVS provided, next apt 4/4/25 @ 0800. Left unit ambulatory with a steady gait using walker with family.

## 2025-03-14 NOTE — PROGRESS NOTES
Notified Yaritza Álvarez NP that pt arrived on unit complaining of pain at her port site. This port was placed yesterday. The site was red and warm to touch. Per Yaritza treat pt using peripheral IV and she will send prophylactic antibiotics . Pt and daughter informed Bruising or tenderness would be normal but if there is increased swelling or redness she should call the office.

## 2025-03-17 ENCOUNTER — PATIENT OUTREACH (OUTPATIENT)
Dept: CASE MANAGEMENT | Facility: OTHER | Age: 65
End: 2025-03-17

## 2025-03-17 NOTE — PROGRESS NOTES
OSW placed call to patient with  [phone]      Patient reported that she has been having a lot of body aches, headache, pain in legs, eyes, and nausea. She reported that medication helps.     Patient reported that her legs swelling, denies discoloration or oozing.     Patient asking again regarding cardiac medication. OSW again reiterated that she needs to call Cardiology and request refill. Patient reported that she will call Cardiologist.     OSW asked patient if okay to call patient's brother, patient agreeable.     OSW placed call to patient's brother to ask if patient has waiver, he reported that she does. OSW educated that  can be contacted to request briefs be covered via waiver. Brother will do so.

## 2025-03-19 ENCOUNTER — CONSULT (OUTPATIENT)
Dept: PALLIATIVE MEDICINE | Facility: CLINIC | Age: 65
End: 2025-03-19
Payer: COMMERCIAL

## 2025-03-19 ENCOUNTER — SOCIAL WORK (OUTPATIENT)
Dept: PALLIATIVE MEDICINE | Facility: CLINIC | Age: 65
End: 2025-03-19
Payer: COMMERCIAL

## 2025-03-19 ENCOUNTER — TELEPHONE (OUTPATIENT)
Dept: PALLIATIVE MEDICINE | Facility: CLINIC | Age: 65
End: 2025-03-19

## 2025-03-19 VITALS
RESPIRATION RATE: 16 BRPM | DIASTOLIC BLOOD PRESSURE: 64 MMHG | HEART RATE: 85 BPM | SYSTOLIC BLOOD PRESSURE: 110 MMHG | TEMPERATURE: 97.5 F | OXYGEN SATURATION: 97 %

## 2025-03-19 DIAGNOSIS — K59.03 DRUG-INDUCED CONSTIPATION: ICD-10-CM

## 2025-03-19 DIAGNOSIS — C54.1 ENDOMETRIAL CANCER (HCC): ICD-10-CM

## 2025-03-19 DIAGNOSIS — Z51.5 PALLIATIVE CARE ENCOUNTER: Primary | ICD-10-CM

## 2025-03-19 DIAGNOSIS — Z71.89 COUNSELING AND COORDINATION OF CARE: Primary | ICD-10-CM

## 2025-03-19 DIAGNOSIS — G89.3 CANCER RELATED PAIN: ICD-10-CM

## 2025-03-19 PROCEDURE — NC001 PR NO CHARGE

## 2025-03-19 PROCEDURE — 99244 OFF/OP CNSLTJ NEW/EST MOD 40: CPT | Performed by: INTERNAL MEDICINE

## 2025-03-19 RX ORDER — OXYCODONE HYDROCHLORIDE 5 MG/1
5 TABLET ORAL EVERY 6 HOURS PRN
Qty: 90 TABLET | Refills: 0 | Status: SHIPPED | OUTPATIENT
Start: 2025-03-19

## 2025-03-19 NOTE — ASSESSMENT & PLAN NOTE
Stop tramadol  Switch back to oxyIR 5mg PO q6H prn  Will keep regimen simple at this time  Orders:    oxyCODONE (ROXICODONE) 5 immediate release tablet; Take 1 tablet (5 mg total) by mouth every 6 (six) hours as needed for severe pain Max Daily Amount: 20 mg

## 2025-03-19 NOTE — TELEPHONE ENCOUNTER
PA for OXY 5 MG QTY 90 FOR 23 DAYS SUBMITTED to [facility]    via    [x]CMM-KEY: [id number]      [x]PA sent as URGENT    All office notes, labs and other pertaining documents and studies sent. Clinical questions answered. Awaiting determination from insurance company.     Turnaround time for your insurance to make a decision on your Prior Authorization can take 7-21 business days.

## 2025-03-19 NOTE — ASSESSMENT & PLAN NOTE
Orders:    oxyCODONE (ROXICODONE) 5 immediate release tablet; Take 1 tablet (5 mg total) by mouth every 6 (six) hours as needed for severe pain Max Daily Amount: 20 mg

## 2025-03-19 NOTE — PROGRESS NOTES
Palliative Outpatient Assessment of Need    SW completed an assessment of need which was completed with patient, brother and friend in the office.    Relationship status: Single   Duration of relationship:   Name of significant other:   Children and Ages: Pt has 4 adult children 2 Sons and 2 Daughters that lives in [place].  Pets:   Other important family information: Pt is from [place] and has lived in the [place] for 5 years.   Living situation (where and whom): Pt lives alone.   Patient's primary caregiver: Self/Friend   Any limitations of caregiver: Friend assists with IADLS   Environmental concerns or barriers:   history: None  Employment history/source of income: Not employed.   Disability:    Concerns regarding literacy: Pt is [place] speaking only.  via [employer] utilized.  Spirituality/ Pentecostalism: Adventist   Patient's strengths, social supports, and resources: Supportive Family and Friends   Cultural information:   Mental Health current or previous: Pt reports having a history of anxiety and depression. Pt is currently taking medication to help with symptoms.   Substance use or history: Pt reports never smoked or used tobacco products.  Sleep: Pt reports having a history of insomnia. Pt reports chemo also impact sleep.  Exercise: Pt reports not engaging   Diet/nutrition: Pt reports only eating small portions of meals.  Bowel Movement: Pt reports being constipated and is taking OTC to help relieve symptoms.  Durable Medical Equipment needs: Walker.  Transportation: No Transportation Concerns “Family provides transportation:  Financial concerns: No Financial Concerns  Advanced Directive: Pt reports not having a living will. Documents given today. If completed, can bring to any [facility] appt.  Other medical or social work providers involved: Hem/Onc, Gyn Onc, Radiology, [facility], [facility].  Patient/caregiver current level of coping:    Understanding: Pt and family appear  understanding of current medical status  Patient/family concerns and areas of need: Pt complains of ongoing pain in the vagina area as well as bilateral knees that she thinks is from chemo.  Patient's interests: Pt would like to continue following up with [facility] for GOC, and symptom management and additional support. See plan under palliative care.      I have spent 60 minutes with Patient and Family today in which greater than 50% of this time was spent in counseling/coordination of care.    *All questions may not be answered due to constraints.  Follow-up discussions may need to occur.

## 2025-03-19 NOTE — ASSESSMENT & PLAN NOTE
She underwent ZE/BSO with PLND and PALND in 1/16/25.   Postoperative CT abdomen pelvis showed postoperative changes and stability in the left adrenal nodule.   She is currently on Paclitaxel, carboplatin, Dostarlimab, that started on 2/21/2025 with last chemotherapy cycle booked around 6/6/25.    Orders:    Ambulatory Referral to Palliative Care    oxyCODONE (ROXICODONE) 5 immediate release tablet; Take 1 tablet (5 mg total) by mouth every 6 (six) hours as needed for severe pain Max Daily Amount: 20 mg

## 2025-03-19 NOTE — TELEPHONE ENCOUNTER
Prior Authorization [NEEDED] for [OXYCODONE 5MG IR ]    VIA [facility]  KEY  [id number]    ID#  Phone#  PCN:  BIN:  GRP:    Pharmacy: [facility], [place]

## 2025-03-19 NOTE — TELEPHONE ENCOUNTER
PA for OXY 5 MG  APPROVED     Date(s) approved UNTIL 09/19/2025        Patient advised by          []MyChart Message  []Phone call   [x]LMOM  []L/M to call office as no active Communication consent on file  []Unable to leave detailed message as VM not approved on Communication consent       Pharmacy advised by    [x]Fax  []Phone call  []Secure Chat    Specialty Pharmacy    []     Approval letter scanned into Media Yes

## 2025-03-21 ENCOUNTER — TELEPHONE (OUTPATIENT)
Dept: PALLIATIVE MEDICINE | Facility: CLINIC | Age: 65
End: 2025-03-21

## 2025-03-21 NOTE — PROGRESS NOTES
Heart Failure Outpatient Progress Note - Nancy Calderon 64 y.o. female MRN: [id number]    @ Encounter: [id number]      Assessment/Plan:    Patient Active Problem List    Diagnosis Date Noted    Palliative care encounter 03/19/2025    Drug-induced constipation 03/19/2025    Absence of bladder continence 03/07/2025    Cancer related pain 02/14/2025    Adrenal nodule (HCC) 02/14/2025    Status post RA- Total Laparoscopic  hysterectomy, bilateral salpingoopherectomy, pelvic and para-aortic lymphadenectomy, omental biopsy 01/18/2025    Endometrial cancer (HCC) 12/05/2024    Ambulatory dysfunction 11/08/2024    Acute pain of left shoulder 11/08/2024    Left-sided weakness 08/08/2024    Chronic bilateral thoracic back pain 07/30/2024    Type 2 diabetes mellitus without complication, without long-term current use of insulin (HCC) 07/29/2024    Primary hypertension 07/29/2024    Gastroesophageal reflux disease without esophagitis 07/29/2024    Nonischemic cardiomyopathy (HCC) 07/10/2024    Does not have health insurance 07/07/2024    Chronic HFrEF (heart failure with reduced ejection fraction) (Formerly [facility]) 07/05/2024     # Chronic HFimpEF, Stage C  Etiology: NICM; hx of HTN     Weight: 153 lbs  BNP:  7/5/24: 2673     Studies- personally reviewed by myself  EKG- SR, TWI laterally    Echo 11/15/24:  LVEF: 55%  RV: normal    LHC 7/10/24: no CAD     Echo 7/5/24  LVEF: 15%  LVIDd: 5.7 cm  RV: normal  Other: mild to moderate left sided pleural effusion     Neurohormonal Blockade:  --Beta-Blocker: metoprolol succinate 25 mg BID  --ACEi, ARB or ARNi:  losartan 25 mg daily  Cough from lisinopril     (or SVR reduction)  --Aldosterone Receptor Blocker:  --SGLT2-I: Jardiance 10 mg daily  --Diuretic: lasix 20 mg daily     Sudden Cardiac Death Risk Reduction:  --ICD: EF improved     Electrical Resynchronization:  --narrow QRS     Advanced Therapies (If appropriate):  --Inotrope:  --LVAD/Transplant Candidacy:     # HTN  #  hyperlipidemia  # DM2- HgA1C 6.7% on 3/24/25  # Hx of medication non adherence- doing well now  # uterine cancer, s/p ZE/SBO  Chemo- carboplatin     TODAY'S PLAN:  Lasix 20 mg daily  Continue Toprol 25 mg BID, losartan and Jardiance   Continue current GDMT, no health insurance  Not sure why on colchicine, can stop  Echo in one month for function on chemo  --2g sodium diet  - Daily weights     HPI:      65 yo following up for HFrEF. She has moved here from  5 years ago with reported hx of HTN and possible coronary stents? Reported by her in 2011 and possible hx of CHF. She did follow with a cardiolgoist in . She was not on her prescribed coreg and valsartan due to insurance issue.     Admission in July showed EF: 15%, LHC did not show any obstructive CAD. Started on GDMT  Seen in follow up by APs.      Interval History:  hysterectomy  Changed lisinopril to losartan due to cough  Decreased lasix to 20 mg daily    Uterine cancer, getting chemo through port  Review of Systems   Constitutional:  Negative for activity change, appetite change, fatigue and unexpected weight change.   HENT:  Negative for congestion and nosebleeds.    Eyes: Negative.    Respiratory:  Negative for cough, chest tightness and shortness of breath.    Cardiovascular:  Negative for chest pain, palpitations and leg swelling.   Gastrointestinal:  Negative for abdominal distention.   Endocrine: Negative.    Genitourinary: Negative.    Musculoskeletal: Negative.    Skin: Negative.    Neurological:  Negative for dizziness, syncope and weakness.   Hematological: Negative.    Psychiatric/Behavioral: Negative.         Past Medical History:   Diagnosis Date    Diabetes mellitus (HCC)     Fibroid     Heart failure (HCC)     Hyperlipidemia     Hypertension     Myocardial infarction (HCC) 06/05/2024    Nonischemic cardiomyopathy (HCC)          No Known Allergies  .    Current Outpatient Medications:     aspirin 81 mg chewable tablet, Chew 1 tablet (81 mg  total) daily, Disp: 90 tablet, Rfl: 1    atorvastatin (LIPITOR) 40 mg tablet, Take 1 tablet (40 mg total) by mouth daily, Disp: 90 tablet, Rfl: 2    Blood Glucose Monitoring Suppl (OneTouch Verio Reflect) w/Device KIT, Check blood sugars three times daily. Please substitute with appropriate alternative as covered by patient's insurance. Dx: E11.65, Disp: 1 kit, Rfl: 0    Blood Pressure Monitoring (B-D ASSURE BPM/DELUXE ARM CUFF) MISC, Use in the morning, Disp: 1 each, Rfl: 0    colchicine (COLCRYS) 0.6 mg tablet, Take 1 tablet (0.6 mg total) by mouth daily, Disp: 30 tablet, Rfl: 5    Empagliflozin (JARDIANCE) 10 MG TABS tablet, Take 1 tablet (10 mg total) by mouth daily, Disp: 90 tablet, Rfl: 3    furosemide (LASIX) 20 mg tablet, Take 1 tablet (20 mg total) by mouth daily, Disp: 30 tablet, Rfl: 4    glucose blood (OneTouch Verio) test strip, Check blood sugars three times daily. Please substitute with appropriate alternative as covered by patient's insurance. Dx: E11.65, Disp: 300 each, Rfl: 3    Incontinence Supplies MISC, Use in the morning Size small, Disp: 100 each, Rfl: 3    lidocaine (LMX) 4 % cream, Apply topically as needed for mild pain, Disp: 28 g, Rfl: 1    losartan (COZAAR) 25 mg tablet, Take 1 tablet (25 mg total) by mouth daily, Disp: 30 tablet, Rfl: 3    metFORMIN (GLUCOPHAGE) 500 mg tablet, Take 1 tablet (500 mg total) by mouth daily with breakfast, Disp: 90 tablet, Rfl: 1    metoprolol succinate (TOPROL-XL) 25 mg 24 hr tablet, Take 1 tablet (25 mg total) by mouth 2 (two) times a day, Disp: 180 tablet, Rfl: 2    naloxone (NARCAN) 4 mg/0.1 mL nasal spray, Administer 1 spray into a nostril. If no response after 2-3 minutes, give another dose in the other nostril using a new spray., Disp: 1 each, Rfl: 1    ondansetron (ZOFRAN) 8 mg tablet, Take 1 tablet (8 mg total) by mouth every 8 (eight) hours as needed for nausea or vomiting, Disp: 30 tablet, Rfl: 1    OneTouch Delica Lancets 33G MISC, Check blood  sugars three times daily. Please substitute with appropriate alternative as covered by patient's insurance. Dx: E11.65, Disp: 300 each, Rfl: 3    oxyCODONE (ROXICODONE) 5 immediate release tablet, Take 1 tablet (5 mg total) by mouth every 6 (six) hours as needed for severe pain Max Daily Amount: 20 mg, Disp: 90 tablet, Rfl: 0    traMADol (Ultram) 50 mg tablet, Take 1 tablet (50 mg total) by mouth every 6 (six) hours as needed for moderate pain, Disp: 20 tablet, Rfl: 0    LORazepam (ATIVAN) 1 mg tablet, Take 1 tablet (1 mg total) by mouth every 8 (eight) hours as needed (nausea or anxiety) (Patient not taking: Reported on 2/21/2025), Disp: 20 tablet, Rfl: 0    Social History     Socioeconomic History    Marital status: Single     Spouse name: Not on file    Number of children: Not on file    Years of education: Not on file    Highest education level: Not on file   Occupational History    Not on file   Tobacco Use    Smoking status: Never    Smokeless tobacco: Never   Vaping Use    Vaping status: Never Used   Substance and Sexual Activity    Alcohol use: Never    Drug use: Never    Sexual activity: Not Currently     Partners: Male     Birth control/protection: Post-menopausal   Other Topics Concern    Not on file   Social History Narrative    Not on file     Social Drivers of Health     Financial Resource Strain: Low Risk  (11/19/2024)    Overall Financial Resource Strain (CARDIA)     Difficulty of Paying Living Expenses: Not hard at all   Food Insecurity: No Food Insecurity (11/19/2024)    Hunger Vital Sign     Worried About Running Out of Food in the Last Year: Never true     Ran Out of Food in the Last Year: Never true   Transportation Needs: No Transportation Needs (11/19/2024)    PRAPARE - Transportation     Lack of Transportation (Medical): No     Lack of Transportation (Non-Medical): No   Physical Activity: Not on file   Stress: Not on file   Social Connections: Not on file   Intimate Partner Violence: Not on  file   Housing Stability: High Risk (11/19/2024)    Housing Stability Vital Sign     Unable to Pay for Housing in the Last Year: No     Number of Times Moved in the Last Year: 3     Homeless in the Last Year: No       Family History   Problem Relation Age of Onset    Cancer Paternal Aunt         vaginal    Vaginal cancer Paternal Aunt     Cancer Paternal Grandmother         vaginal    Vaginal cancer Paternal Grandmother     Breast cancer Neg Hx     Colon cancer Neg Hx     Ovarian cancer Neg Hx     Endometrial cancer Neg Hx        Physical Exam:    Vitals:     Physical Exam    Labs & Results:    Lab Results   Component Value Date    SODIUM 139 03/12/2025    K 4.1 03/12/2025     03/12/2025    CO2 28 03/12/2025    BUN 20 03/12/2025    CREATININE 0.58 (L) 03/12/2025    GLUC 80 02/04/2025    CALCIUM 9.5 03/12/2025     Lab Results   Component Value Date    WBC 5.35 03/12/2025    HGB 12.8 03/12/2025    HCT 39.2 03/12/2025    MCV 93 03/12/2025     03/12/2025         EKG personally reviewed by Tae Burris DO.     Counseling / Coordination of Care  I have spent a total time of 35 minutes on 03/24/25 in caring for this patient including Diagnostic results, Risks and benefits of tx options, Instructions for management, Importance of tx compliance, Impressions, Documenting in the medical record, and Reviewing / ordering tests, medicine, procedures  .      Thank you for the opportunity to participate in the care of this patient.    TAE BURRIS D.O.  DIRECTOR OF HEART FAILURE/ PULMONARY HYPERTENSION  MEDICAL DIRECTOR OF LVAD PROGRAM  [facility]

## 2025-03-24 ENCOUNTER — OFFICE VISIT (OUTPATIENT)
Dept: FAMILY MEDICINE CLINIC | Facility: CLINIC | Age: 65
End: 2025-03-24

## 2025-03-24 ENCOUNTER — OFFICE VISIT (OUTPATIENT)
Dept: CARDIOLOGY CLINIC | Facility: CLINIC | Age: 65
End: 2025-03-24
Payer: COMMERCIAL

## 2025-03-24 VITALS
SYSTOLIC BLOOD PRESSURE: 118 MMHG | DIASTOLIC BLOOD PRESSURE: 70 MMHG | SYSTOLIC BLOOD PRESSURE: 118 MMHG | OXYGEN SATURATION: 98 % | WEIGHT: 160 LBS | BODY MASS INDEX: 33.58 KG/M2 | WEIGHT: 160 LBS | HEIGHT: 58 IN | HEIGHT: 58 IN | HEART RATE: 88 BPM | BODY MASS INDEX: 33.58 KG/M2 | OXYGEN SATURATION: 98 % | DIASTOLIC BLOOD PRESSURE: 70 MMHG | HEART RATE: 88 BPM

## 2025-03-24 VITALS
HEIGHT: 58 IN | SYSTOLIC BLOOD PRESSURE: 100 MMHG | OXYGEN SATURATION: 98 % | HEART RATE: 85 BPM | HEART RATE: 85 BPM | SYSTOLIC BLOOD PRESSURE: 100 MMHG | RESPIRATION RATE: 16 BRPM | DIASTOLIC BLOOD PRESSURE: 70 MMHG | OXYGEN SATURATION: 98 % | BODY MASS INDEX: 33.67 KG/M2 | DIASTOLIC BLOOD PRESSURE: 70 MMHG | TEMPERATURE: 97.6 F | BODY MASS INDEX: 33.67 KG/M2 | HEIGHT: 58 IN | TEMPERATURE: 97.6 F | WEIGHT: 160.4 LBS | WEIGHT: 160.4 LBS | RESPIRATION RATE: 16 BRPM

## 2025-03-24 DIAGNOSIS — E11.9 TYPE 2 DIABETES MELLITUS WITHOUT COMPLICATION, WITHOUT LONG-TERM CURRENT USE OF INSULIN (HCC): Primary | ICD-10-CM

## 2025-03-24 DIAGNOSIS — I10 PRIMARY HYPERTENSION: Chronic | ICD-10-CM

## 2025-03-24 DIAGNOSIS — I42.8 NONISCHEMIC CARDIOMYOPATHY (HCC): Primary | Chronic | ICD-10-CM

## 2025-03-24 DIAGNOSIS — I50.22 CHRONIC HFREF (HEART FAILURE WITH REDUCED EJECTION FRACTION) (HCC): Chronic | ICD-10-CM

## 2025-03-24 DIAGNOSIS — R32 URINARY INCONTINENCE, UNSPECIFIED TYPE: ICD-10-CM

## 2025-03-24 LAB
SL AMB POCT HEMOGLOBIN AIC: 6.7 (ref ?–6.5)
SL AMB POCT HEMOGLOBIN AIC: 6.7 (ref ?–6.5)

## 2025-03-24 PROCEDURE — 99214 OFFICE O/P EST MOD 30 MIN: CPT | Performed by: INTERNAL MEDICINE

## 2025-03-24 PROCEDURE — 82043 UR ALBUMIN QUANTITATIVE: CPT

## 2025-03-24 PROCEDURE — 82570 ASSAY OF URINE CREATININE: CPT

## 2025-03-24 PROCEDURE — 99213 OFFICE O/P EST LOW 20 MIN: CPT | Performed by: INTERNAL MEDICINE

## 2025-03-24 PROCEDURE — 3074F SYST BP LT 130 MM HG: CPT | Performed by: INTERNAL MEDICINE

## 2025-03-24 PROCEDURE — 3078F DIAST BP <80 MM HG: CPT | Performed by: INTERNAL MEDICINE

## 2025-03-24 PROCEDURE — 83036 HEMOGLOBIN GLYCOSYLATED A1C: CPT | Performed by: INTERNAL MEDICINE

## 2025-03-24 RX ORDER — BROMPHENIRAMIN/PSEUDOEPHEDRINE 1-15MG/5ML
LIQUID (ML) ORAL AS NEEDED
Qty: 20 EACH | Refills: 2 | Status: SHIPPED | OUTPATIENT
Start: 2025-03-24 | End: 2025-03-24 | Stop reason: SDUPTHER

## 2025-03-24 NOTE — ASSESSMENT & PLAN NOTE
Lab Results   Component Value Date    HGBA1C 6.7 (A) 03/24/2025     Discontinue jardiance due to side effects.  Increase metformin to 500 mg BID.  Follow up in 3 months.  Orders:    POCT hemoglobin A1c    Ambulatory Referral to Ophthalmology; Future    Albumin / creatinine urine ratio

## 2025-03-24 NOTE — PROGRESS NOTES
Name: Nancy Calderon      : 1960      MRN: [id number]  Encounter Provider: Magi Noriega MD  Encounter Date: 3/24/2025   Encounter department: [facility]  :  Assessment & Plan  Type 2 diabetes mellitus without complication, without long-term current use of insulin (Formerly [facility])    Lab Results   Component Value Date    HGBA1C 6.7 (A) 2025     Discontinue jardiance due to side effects.  Increase metformin to 500 mg BID.  Follow up in 3 months.  Orders:    POCT hemoglobin A1c    Ambulatory Referral to Ophthalmology; Future    Albumin / creatinine urine ratio    Urinary incontinence, unspecified type    Orders:    Incontinence Supplies MISC; Use in the morning Size small    Letter for immigration  Due to prognosis of cancer, it is unrreasonable for patient to travel back to the [place] to visit her children. It is medically reasonable for patient's children to visit her in the [place] as soon as possible due to her prognosis.   Letters will be provided.       History of Present Illness    - [id number]    64-year-old female presents today with her brother and caregiver for follow-up of type 2 diabetes.  Since last visit she has been experiencing side effects (UTI) with Jardiance 10 mg over the past week.  She is doing well with metformin 500 mg daily.  Her average blood glucose is between 115-125 prior to chemo, however after receiving chemotherapy her sugars range into the 200s.  She is also experiencing new hypersensitivity on the sole of her of her right foot since receiving chemo.   She has not seen an ophthalmologist in many years and has noticed some vision changes (blurry vision).    She is also requesting a refill for her urinary incontinence supplies as well as a letter to immigration pledging her 3 children to come to [place] to visit her.    Diabetes  She has type 2 diabetes mellitus. Her disease course has been  "stable. There are no hypoglycemic associated symptoms. Pertinent negatives for hypoglycemia include no headaches. Associated symptoms include blurred vision, polyuria and visual change. Pertinent negatives for diabetes include no chest pain, no fatigue, no polydipsia, no polyphagia and no weakness. There are no hypoglycemic complications.     Review of Systems   Constitutional:  Negative for diaphoresis, fatigue and fever.   Eyes:  Positive for blurred vision and visual disturbance. Negative for photophobia, pain, discharge, redness and itching.   Respiratory:  Negative for chest tightness, shortness of breath and wheezing.    Cardiovascular:  Negative for chest pain and palpitations.   Endocrine: Positive for polyuria. Negative for polydipsia and polyphagia.   Skin:  Negative for rash.   Neurological:  Negative for syncope, weakness, light-headedness and headaches.       Objective   /70 (BP Location: Right arm, Patient Position: Sitting, Cuff Size: Standard)   Pulse 85   Temp 97.6 °F (36.4 °C) (Temporal)   Resp 16   Ht 4' 10\" (1.473 m)   Wt 72.8 kg (160 lb 6.4 oz)   SpO2 98%   BMI 33.52 kg/m²      Physical Exam  Vitals reviewed.   Constitutional:       Appearance: Normal appearance.   HENT:      Nose: Nose normal.      Mouth/Throat:      Mouth: Mucous membranes are moist.   Eyes:      Extraocular Movements: Extraocular movements intact.   Cardiovascular:      Rate and Rhythm: Normal rate and regular rhythm.      Pulses: no weak pulses.           Radial pulses are 2+ on the right side and 2+ on the left side.        Dorsalis pedis pulses are 2+ on the right side and 2+ on the left side.        Posterior tibial pulses are 2+ on the right side.      Heart sounds: No murmur heard.     No friction rub. No gallop.   Pulmonary:      Effort: Pulmonary effort is normal.      Breath sounds: No stridor. No wheezing, rhonchi or rales.   Chest:      Chest wall: No tenderness.   Abdominal:      General: There is no " distension.      Palpations: Abdomen is soft.      Tenderness: There is no abdominal tenderness.   Musculoskeletal:      Cervical back: Normal range of motion.      Right lower leg: No edema.      Left lower leg: No edema.   Feet:      Right foot:      Skin integrity: No ulcer, skin breakdown, erythema, warmth, callus or dry skin.      Left foot:      Skin integrity: No ulcer, skin breakdown, erythema, warmth, callus or dry skin.   Skin:     General: Skin is warm.      Capillary Refill: Capillary refill takes less than 2 seconds.   Neurological:      General: No focal deficit present.      Mental Status: She is alert and oriented to person, place, and time.         Diabetic Foot Exam    Patient's shoes and socks removed.    Right Foot/Ankle   Right Foot Inspection  Skin Exam: skin normal and skin intact. No dry skin, no warmth, no callus, no erythema, no maceration, no abnormal color, no pre-ulcer, no ulcer and no callus.     Toe Exam: ROM and strength within normal limits.     Sensory   Proprioception: intact  Monofilament testing: intact    Vascular  Capillary refills: < 3 seconds  The right DP pulse is 2+. The right PT pulse is 2+.     Left Foot/Ankle  Left Foot Inspection  Skin Exam: skin normal and skin intact. No dry skin, no warmth, no erythema, no maceration, normal color, no pre-ulcer, no ulcer and no callus.     Toe Exam: ROM and strength within normal limits.     Sensory   Proprioception: intact  Monofilament testing: intact    Vascular  Capillary refills: < 3 seconds  The left DP pulse is 2+.     Assign Risk Category  No deformity present  No loss of protective sensation  No weak pulses  Risk: 0

## 2025-03-25 LAB
CREAT UR-MCNC: 22 MG/DL
CREAT UR-MCNC: 22 MG/DL
MICROALBUMIN UR-MCNC: <7 MG/L
MICROALBUMIN UR-MCNC: <7 MG/L

## 2025-03-26 ENCOUNTER — TELEPHONE (OUTPATIENT)
Dept: FAMILY MEDICINE CLINIC | Facility: CLINIC | Age: 65
End: 2025-03-26

## 2025-03-26 DIAGNOSIS — I50.22 CHRONIC HFREF (HEART FAILURE WITH REDUCED EJECTION FRACTION) (HCC): Primary | Chronic | ICD-10-CM

## 2025-03-26 NOTE — LETTER
April 2, 2025     Patient: Nancy Calderon  YOB: 1960  Date of Visit: 3/26/2025      To Whom it May Concern:    Nancy Maciel is under my professional care as well as the care of multiple other specialties (Gyn-Onc, Heme-onc, Palliative Medicine, and Radiology Oncology).  Due to her current medical condition she is receiving multiple rounds of chemotherapy and will require close routine follow up with the above specialties.     Recent medical studies have shown that patients with advanced stage cancer have an increased quality of life when surrounded with loved ones and a continuous support system. Patient would benefit greatly with the presence of her children.    If you have any questions or concerns, please don't hesitate to call.         Sincerely,          Magi Noriega MD        CC: No Recipients

## 2025-03-26 NOTE — TELEPHONE ENCOUNTER
Brother of the patient came to office and Brought three Birth certificate. Birth certificate was put in provider mail box.

## 2025-03-26 NOTE — TELEPHONE ENCOUNTER
----- Message from Magi Noriega MD sent at 3/26/2025  9:06 AM EDT -----  Please contact patient and inform her that her kidney function has improved from 3 months ago.  Thank you!

## 2025-03-26 NOTE — LETTER
April 3, 2025     Patient: Nancy Calderon  YOB: 1960  Date of Visit: 3/26/2025      To Whom it May Concern:    Nancy Maciel is under my professional care as well as the care of multiple other specialties (Gyn-Onc, Heme-onc, Palliative Medicine, and Radiology Oncology).  Due to her current medical condition she is receiving multiple rounds of chemotherapy and will require close routine follow up with the above specialties.     Recent medical studies have shown that patients with advanced stage cancer have an increased quality of life when surrounded with loved ones and a continuous support system. Patient would benefit greatly with the presence of her children, including John Day.    If you have any questions or concerns, please don't hesitate to call.         Sincerely,          aMgi Noriega MD        CC: No Recipients

## 2025-03-26 NOTE — LETTER
April 3, 2025     Patient: Nancy Calderon  YOB: 1960  Date of Visit: 3/26/2025      To Whom it May Concern:    Nancy Maciel is under my professional care as well as the care of multiple other specialties (Gyn-Onc, Heme-onc, Palliative Medicine, and Radiology Oncology).  Due to her current medical condition she is receiving multiple rounds of chemotherapy and will require close routine follow up with the above specialties.     Recent medical studies have shown that patients with advanced stage cancer have an increased quality of life when surrounded with loved ones and a continuous support system. Patient would benefit greatly with the presence of her children, including Marck Schultz.    If you have any questions or concerns, please don't hesitate to call.         Sincerely,          Magi Noriega MD

## 2025-03-26 NOTE — LETTER
April 3, 2025     Patient: Nancy Calderon  YOB: 1960  Date of Visit: 3/26/2025      To Whom it May Concern:    Nancy Maciel is under my professional care as well as the care of multiple other specialties (Gyn-Onc, Heme-onc, Palliative Medicine, and Radiology Oncology).  Due to her current medical condition she is receiving multiple rounds of chemotherapy and will require close routine follow up with the above specialties.     Recent medical studies have shown that patients with advanced stage cancer have an increased quality of life when surrounded with loved ones and a continuous support system. Patient would benefit greatly with the presence of her children, including Lexi.    If you have any questions or concerns, please don't hesitate to call.         Sincerely,          Magi Noriega MD

## 2025-04-01 ENCOUNTER — OFFICE VISIT (OUTPATIENT)
Dept: GYNECOLOGIC ONCOLOGY | Facility: CLINIC | Age: 65
End: 2025-04-01
Payer: COMMERCIAL

## 2025-04-01 ENCOUNTER — PATIENT OUTREACH (OUTPATIENT)
Dept: CASE MANAGEMENT | Facility: OTHER | Age: 65
End: 2025-04-01

## 2025-04-01 VITALS
DIASTOLIC BLOOD PRESSURE: 80 MMHG | WEIGHT: 162 LBS | TEMPERATURE: 97.2 F | HEIGHT: 58 IN | BODY MASS INDEX: 34 KG/M2 | OXYGEN SATURATION: 99 % | HEART RATE: 80 BPM | SYSTOLIC BLOOD PRESSURE: 146 MMHG

## 2025-04-01 DIAGNOSIS — C54.1 ENDOMETRIAL CANCER (HCC): ICD-10-CM

## 2025-04-01 DIAGNOSIS — N39.498 OTHER URINARY INCONTINENCE: Primary | ICD-10-CM

## 2025-04-01 DIAGNOSIS — G62.9 NEUROPATHY: ICD-10-CM

## 2025-04-01 DIAGNOSIS — G89.3 CANCER RELATED PAIN: ICD-10-CM

## 2025-04-01 DIAGNOSIS — Z78.9 NEED FOR FOLLOW-UP BY SOCIAL WORKER: Primary | ICD-10-CM

## 2025-04-01 DIAGNOSIS — I50.22 CHRONIC HFREF (HEART FAILURE WITH REDUCED EJECTION FRACTION) (HCC): Chronic | ICD-10-CM

## 2025-04-01 DIAGNOSIS — E11.9 TYPE 2 DIABETES MELLITUS WITHOUT COMPLICATION, WITHOUT LONG-TERM CURRENT USE OF INSULIN (HCC): ICD-10-CM

## 2025-04-01 PROCEDURE — 99213 OFFICE O/P EST LOW 20 MIN: CPT | Performed by: OBSTETRICS & GYNECOLOGY

## 2025-04-01 RX ORDER — PHENAZOPYRIDINE HYDROCHLORIDE 100 MG/1
100 TABLET, FILM COATED ORAL 3 TIMES DAILY PRN
Qty: 10 TABLET | Refills: 0 | Status: SHIPPED | OUTPATIENT
Start: 2025-04-01

## 2025-04-01 NOTE — PROGRESS NOTES
OSW received referral for patient She is having a lot of financial toxicity and is also interested in applying for disability. . OSW will outreach to assess/address needs.

## 2025-04-02 ENCOUNTER — APPOINTMENT (OUTPATIENT)
Dept: LAB | Facility: HOSPITAL | Age: 65
End: 2025-04-02
Payer: COMMERCIAL

## 2025-04-02 DIAGNOSIS — C54.1 ENDOMETRIAL CANCER (HCC): ICD-10-CM

## 2025-04-02 DIAGNOSIS — E11.9 TYPE 2 DIABETES MELLITUS WITHOUT COMPLICATION, WITHOUT LONG-TERM CURRENT USE OF INSULIN (HCC): ICD-10-CM

## 2025-04-02 PROBLEM — G62.9 NEUROPATHY: Status: ACTIVE | Noted: 2025-04-02

## 2025-04-02 LAB
ALBUMIN SERPL BCG-MCNC: 4.2 G/DL (ref 3.5–5)
ALP SERPL-CCNC: 71 U/L (ref 34–104)
ALT SERPL W P-5'-P-CCNC: 22 U/L (ref 7–52)
AMYLASE SERPL-CCNC: 40 IU/L (ref 29–103)
ANION GAP SERPL CALCULATED.3IONS-SCNC: 10 MMOL/L (ref 4–13)
AST SERPL W P-5'-P-CCNC: 16 U/L (ref 13–39)
BASOPHILS # BLD AUTO: 0.04 THOUSANDS/ÂΜL (ref 0–0.1)
BASOPHILS NFR BLD AUTO: 1 % (ref 0–1)
BILIRUB SERPL-MCNC: 0.49 MG/DL (ref 0.2–1)
BUN SERPL-MCNC: 18 MG/DL (ref 5–25)
CALCIUM SERPL-MCNC: 9.6 MG/DL (ref 8.4–10.2)
CANCER AG125 SERPL-ACNC: 5 U/ML (ref 0–35)
CHLORIDE SERPL-SCNC: 101 MMOL/L (ref 96–108)
CO2 SERPL-SCNC: 28 MMOL/L (ref 21–32)
CREAT SERPL-MCNC: 0.53 MG/DL (ref 0.6–1.3)
EOSINOPHIL # BLD AUTO: 0.08 THOUSAND/ÂΜL (ref 0–0.61)
EOSINOPHIL NFR BLD AUTO: 1 % (ref 0–6)
ERYTHROCYTE [DISTWIDTH] IN BLOOD BY AUTOMATED COUNT: 12.7 % (ref 11.6–15.1)
EST. AVERAGE GLUCOSE BLD GHB EST-MCNC: 160 MG/DL
GFR SERPL CREATININE-BSD FRML MDRD: 100 ML/MIN/1.73SQ M
GLUCOSE P FAST SERPL-MCNC: 106 MG/DL (ref 65–99)
HBA1C MFR BLD: 7.2 %
HCT VFR BLD AUTO: 37.1 % (ref 34.8–46.1)
HGB BLD-MCNC: 12.1 G/DL (ref 11.5–15.4)
IMM GRANULOCYTES # BLD AUTO: 0.03 THOUSAND/UL (ref 0–0.2)
IMM GRANULOCYTES NFR BLD AUTO: 1 % (ref 0–2)
LIPASE SERPL-CCNC: 20 U/L (ref 11–82)
LYMPHOCYTES # BLD AUTO: 2.11 THOUSANDS/ÂΜL (ref 0.6–4.47)
LYMPHOCYTES NFR BLD AUTO: 35 % (ref 14–44)
MAGNESIUM SERPL-MCNC: 2 MG/DL (ref 1.9–2.7)
MCH RBC QN AUTO: 31 PG (ref 26.8–34.3)
MCHC RBC AUTO-ENTMCNC: 32.6 G/DL (ref 31.4–37.4)
MCV RBC AUTO: 95 FL (ref 82–98)
MONOCYTES # BLD AUTO: 0.48 THOUSAND/ÂΜL (ref 0.17–1.22)
MONOCYTES NFR BLD AUTO: 8 % (ref 4–12)
NEUTROPHILS # BLD AUTO: 3.37 THOUSANDS/ÂΜL (ref 1.85–7.62)
NEUTS SEG NFR BLD AUTO: 54 % (ref 43–75)
NRBC BLD AUTO-RTO: 0 /100 WBCS
PLATELET # BLD AUTO: 210 THOUSANDS/UL (ref 149–390)
PMV BLD AUTO: 10.4 FL (ref 8.9–12.7)
POTASSIUM SERPL-SCNC: 3.9 MMOL/L (ref 3.5–5.3)
PROT SERPL-MCNC: 7.3 G/DL (ref 6.4–8.4)
RBC # BLD AUTO: 3.9 MILLION/UL (ref 3.81–5.12)
SODIUM SERPL-SCNC: 139 MMOL/L (ref 135–147)
TSH SERPL DL<=0.05 MIU/L-ACNC: 2.15 UIU/ML (ref 0.45–4.5)
WBC # BLD AUTO: 6.11 THOUSAND/UL (ref 4.31–10.16)

## 2025-04-02 PROCEDURE — 83036 HEMOGLOBIN GLYCOSYLATED A1C: CPT

## 2025-04-02 PROCEDURE — 36415 COLL VENOUS BLD VENIPUNCTURE: CPT

## 2025-04-02 PROCEDURE — 84443 ASSAY THYROID STIM HORMONE: CPT

## 2025-04-02 PROCEDURE — 83690 ASSAY OF LIPASE: CPT

## 2025-04-02 PROCEDURE — 82150 ASSAY OF AMYLASE: CPT

## 2025-04-02 PROCEDURE — 80053 COMPREHEN METABOLIC PANEL: CPT

## 2025-04-02 PROCEDURE — 86304 IMMUNOASSAY TUMOR CA 125: CPT

## 2025-04-02 PROCEDURE — 85025 COMPLETE CBC W/AUTO DIFF WBC: CPT

## 2025-04-02 PROCEDURE — 83735 ASSAY OF MAGNESIUM: CPT

## 2025-04-02 RX ORDER — DAPAGLIFLOZIN 5 MG/1
5 TABLET, FILM COATED ORAL DAILY
Qty: 30 TABLET | Refills: 0 | Status: SHIPPED | OUTPATIENT
Start: 2025-04-02

## 2025-04-02 NOTE — ASSESSMENT & PLAN NOTE
- Urinary urgency with incontinence and bladder spasm pain w urination  - Prior Ucx negative  - Prior pelvic exam w/o evidence of fistula  - Rx sent for pyridium for bladder spasm  - Can discuss possible OAB treatment pending symptoms  - Prior referral sent to PFPT  Orders:    phenazopyridine (PYRIDIUM) 100 mg tablet; Take 1 tablet (100 mg total) by mouth 3 (three) times a day as needed for bladder spasms

## 2025-04-02 NOTE — ASSESSMENT & PLAN NOTE
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 S/p rad onc referral, plan per discussion with their team is to obtain a post-chemo PETCT and then plan XRT treatment plan  - Labs reviewed and stable, due for repeat labs this week. Plan for C#3 carboplatin AUC 5 / paclitaxel 135 mg/m2 / dostarlimab 500mg as scheduled  - Labs and premedication as per protocol   - Has PRN Zofran, worked well for her post chemo nausea

## 2025-04-02 NOTE — ASSESSMENT & PLAN NOTE
Wt Readings from Last 3 Encounters:   04/01/25 73.5 kg (162 lb)   03/24/25 72.6 kg (160 lb)   03/24/25 72.8 kg (160 lb 6.4 oz)     Mild 1+ lower extremity edema on exam, otherwise euvolemic appearing  ECHO on 11/2024 with LVEF 55%, grade 1 diastolic dysfunction  Continue, atorvastatin 40 mg, furosemide, losartan, metoprolol  Will clarify jardiance continuation w PCP and cardiology  Seen by cardiology 3/24     Next TTE scheduled 4/24

## 2025-04-02 NOTE — TELEPHONE ENCOUNTER
Letter provided. Letter and 3 copies of birth certificates placed in completed patient forms bin.      - [id number]  Contact patient and informed her of completion. Patient's brother will  tomorrow.    Discussed starting Farxiga 5 mg as GDMT for CHF. Patient amenable.  If experiencing UTI sxs, discontinue Farxiga and consider increasing other GDMT medications for optimization.

## 2025-04-02 NOTE — PROGRESS NOTES
Name: Adrienne Theodore      : 1960      MRN: [id number]  Encounter Provider: Belkys Ho MD  Encounter Date: 2025   Encounter department: [facility]  :  Assessment & Plan  Other urinary incontinence  - Urinary urgency with incontinence and bladder spasm pain w urination  - Prior Ucx negative  - Prior pelvic exam w/o evidence of fistula  - Rx sent for pyridium for bladder spasm  - Can discuss possible OAB treatment pending symptoms  - Prior referral sent to PFPT  Orders:    phenazopyridine (PYRIDIUM) 100 mg tablet; Take 1 tablet (100 mg total) by mouth 3 (three) times a day as needed for bladder spasms    Chronic HFrEF (heart failure with reduced ejection fraction) ([facility])  Wt Readings from Last 3 Encounters:   25 73.5 kg (162 lb)   25 72.6 kg (160 lb)   25 72.8 kg (160 lb 6.4 oz)     Mild 1+ lower extremity edema on exam, otherwise euvolemic appearing  ECHO on 2024 with LVEF 55%, grade 1 diastolic dysfunction  Continue, atorvastatin 40 mg, furosemide, losartan, metoprolol  Will clarify jardiance continuation w PCP and cardiology  Seen by cardiology 3/24     Next TTE scheduled      Type 2 diabetes mellitus without complication, without long-term current use of insulin ([facility])    Lab Results   Component Value Date    HGBA1C 6.7 (A) 2025     - Managed by PCP (Dr. Lea)  - Metformin 500mg BID  - Will clarify jardiance w PCP and cardiologist         Cancer related pain  - Follows with palliative care (Dr. 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 S/p rad onc referral, plan per discussion with  their team is to obtain a post-chemo PETCT and then plan XRT treatment plan  - Labs reviewed and stable, due for repeat labs this week. Plan for C#3 carboplatin AUC 5 / paclitaxel 135 mg/m2 / dostarlimab 500mg as scheduled  - Labs and premedication as per protocol   - Has PRN Zofran, worked well for her post chemo nausea        Neuropathy  - Pre-existing T2DM  - Symptoms improved after taxol dose-reduced  - Can add gabapentin in future if needed         History of Present Illness   Reason for Visit / CC:  Pre-Chemo Visit    Adrienne Theodore is a 64 y.o. female with PMH of HTN, HLD, DM-2, MI and stage IIIC2 MMRp p53m [id number] uterine papillary serous carcinoma s/p RATLH/BSO/PPALND now on chemoIO with plan for consolidative XRT. She is most recently s/p C#2 carbo/taxol/dostarlimab on 3/14.     She was last seen in clinic on 3/7. At that visit she was reporting continued lower abdominal and vaginal pain, urinary incontinence, and chronic leg pain. Pelvic exam noted intact vaginal cuff and no evidence of fistula and Ucx was negative. She was seen by palliative care (Dr. Wood) on 3/19 and switched back from tramadol to oxycodone IR. She was also seen by her PCP (Dr. Lea) on 3/24 who discontinued jardiance and increased metformin to 500mg BID. She was seen by cardiology (Dr. Ahn) on 3/24 and colchicine was discontinued. She has a f/u TTE scheduled 4/24.    Today, she reports that she overall feels improved from her last visit. She has continued abdominal and vaginal pain but it is now 3/10 (previously 8/10) and she is content with current level of pain control. She reports post chemo transient nausea and body ache x 1 - 2 days well controlled with PRN meds and now resolved. Reported bilateral hands/fingers tingling but noted that is unchanged from prior (diabetic). She has continued urinary urgency with occasional incontinence but thinks they has also improved. She does report some continued dysuria  at the end of urination and occasional feeling of incomplete emptying. She has had a low appetite but denies nausea/emesis. She has had significant constipation but this has improved with PRN miralax and colace. She has some urinary discomfort/burning. Ambulating with walker (which is her baseline). She receives assistance at home and is otherwise completing her own ADLs/IADLs. Denies vaginal bleeding/discharge. She does note lower extremity swelling but denies chest pain/shortness of breath.        Oncology History   Cancer Staging   Endometrial cancer ([facility])  Staging form: Corpus Uteri - Carcinoma, AJCC 8th Edition  - Clinical stage from 1/16/2025: FIGO Stage IIIC2 (cT1b, cN2, cM0) - Signed by Belkys Ho MD on 2/3/2025  Histopathologic type: Papillary serous cystadenocarcinoma  Stage prefix: Initial diagnosis  Histologic grade (G): G3  Histologic grading system: 3 grade system  Lymph-vascular invasion (LVI): LVI present/identified, NOS  Pelvic laura status: Positive  Number of pelvic nodes positive from dissection: 9  Number of pelvic nodes examined during dissection: 20  Para-aortic status: Positive  Number of para-aortic nodes positive from dissection: 7  Number of para-aortic nodes examined during dissection: 9  Lymph node metastasis: Present  Omentectomy performed: Yes  Oncology History   Endometrial cancer ([facility])   12/5/2024 Initial Diagnosis    Endometrial cancer ([facility])     1/16/2025 -  Cancer Staged    Staging form: Corpus Uteri - Carcinoma, AJCC 8th Edition  - Clinical stage from 1/16/2025: FIGO Stage IIIC2 (cT1b, cN2, cM0) - Signed by Belkys Ho MD on 2/3/2025  Histopathologic type: Papillary serous cystadenocarcinoma  Stage prefix: Initial diagnosis  Histologic grade (G): G3  Histologic grading system: 3 grade system  Lymph-vascular invasion (LVI): LVI present/identified, NOS  Pelvic laura status: Positive  Number of pelvic nodes positive from dissection: 9  Number of pelvic  "nodes examined during dissection: 20  Para-aortic status: Positive  Number of para-aortic nodes positive from dissection: 7  Number of para-aortic nodes examined during dissection: 9  Lymph node metastasis: Present  Omentectomy performed: Yes       2/21/2025 -  Chemotherapy    alteplase (CATHFLO), 2 mg, Intracatheter, Every 1 Minute as needed, 2 of 12 cycles  palonosetron (ALOXI), 0.25 mg, Intravenous, Once, 2 of 6 cycles  Administration: 0.25 mg (2/21/2025)  fosaprepitant (EMEND) IVPB, 150 mg, Intravenous, Once, 2 of 6 cycles  Administration: 150 mg (2/21/2025)  dostarlimab-gxly (JEMPERLI) IVPB, 500 mg, Intravenous, Once, 2 of 12 cycles  Administration: 500 mg (2/21/2025)  CARBOplatin (PARAPLATIN) IVPB (GOG AUC DOSING), 484 mg, Intravenous, Once, 2 of 6 cycles  Administration: 484 mg (2/21/2025)  PACLItaxel (TAXOL) chemo IVPB, 175 mg/m2 = 295.8 mg, Intravenous, Once, 2 of 6 cycles  Dose modification: 135 mg/m2 (original dose 175 mg/m2, Cycle 3, Reason: Neuropathy)  Administration: 295.8 mg (2/21/2025)        Review of Systems A complete review of systems is negative other than that noted above in the HPI.  Medical History Reviewed by provider this encounter:  Tobacco  Allergies  Meds  Problems  Med Hx  Surg Hx  Fam Hx     .     Objective   /80   Pulse 80   Temp (!) 97.2 °F (36.2 °C) (Temporal)   Ht 4' 10\" (1.473 m)   Wt 73.5 kg (162 lb)   SpO2 99%   BMI 33.86 kg/m²     Body mass index is 33.86 kg/m².  Pain Screening:     ECOG ECOG Performance Status: 2 - Ambulatory and capable of all selfcare but unable to carry out any work activities.  Up and about more than 50% of waking hours   Physical Exam  Vitals reviewed. Exam conducted with a chaperone present.   Constitutional:       General: She is not in acute distress.     Appearance: Normal appearance. She is not ill-appearing.   HENT:      Head: Normocephalic and atraumatic.      Mouth/Throat:      Mouth: Mucous membranes are moist.   Eyes:      " General:         Right eye: No discharge.         Left eye: No discharge.      Conjunctiva/sclera: Conjunctivae normal.   Cardiovascular:      Rate and Rhythm: Normal rate and regular rhythm.   Pulmonary:      Effort: Pulmonary effort is normal.   Abdominal:      Palpations: Abdomen is soft. There is no mass.      Tenderness: There is no abdominal tenderness.      Hernia: No hernia is present.   Genitourinary:     Comments: Deferred  Musculoskeletal:         General: Swelling (1+ bilateral LE edema) present.      Right lower leg: No edema.      Left lower leg: No edema.   Skin:     General: Skin is warm and dry.      Coloration: Skin is not jaundiced.      Findings: No rash.   Neurological:      General: No focal deficit present.      Mental Status: She is alert and oriented to person, place, and time.      Cranial Nerves: No cranial nerve deficit.      Sensory: No sensory deficit.      Motor: No weakness.      Gait: Gait normal.   Psychiatric:         Mood and Affect: Mood normal.         Behavior: Behavior normal.         Thought Content: Thought content normal.         Judgment: Judgment normal.          Labs: I have reviewed pertinent labs. =  Lab Results   Component Value Date/Time     6.2 03/12/2025 09:49 AM     Lab Results   Component Value Date/Time    Potassium 3.9 04/02/2025 09:36 AM    Chloride 101 04/02/2025 09:36 AM    CO2 28 04/02/2025 09:36 AM    BUN 18 04/02/2025 09:36 AM    Creatinine 0.53 (L) 04/02/2025 09:36 AM    Glucose, Fasting 106 (H) 04/02/2025 09:36 AM    Calcium 9.6 04/02/2025 09:36 AM    AST 16 04/02/2025 09:36 AM    ALT 22 04/02/2025 09:36 AM    Alkaline Phosphatase 71 04/02/2025 09:36 AM    eGFR 100 04/02/2025 09:36 AM     Lab Results   Component Value Date/Time    WBC 6.11 04/02/2025 09:36 AM    Hemoglobin 12.1 04/02/2025 09:36 AM    Hematocrit 37.1 04/02/2025 09:36 AM    MCV 95 04/02/2025 09:36 AM    Platelets 210 04/02/2025 09:36 AM     Lab Results   Component Value Date/Time     Absolute Neutrophils 3.37 04/02/2025 09:36 AM        Trend:  Lab Results   Component Value Date     6.2 03/12/2025     5.4 03/07/2025     7.2 02/19/2025     15.2 02/04/2025     7.3 12/18/2024       Administrative Statements   I have spent a total time of 25 minutes in caring for this patient on the day of the visit/encounter including Patient and family education, Counseling / Coordination of care, and Documenting in the medical record.    Belkys Ho MD, MPH  Division of Gynecologic Oncology  04/02/25 12:01 PM

## 2025-04-02 NOTE — ASSESSMENT & PLAN NOTE
Lab Results   Component Value Date    HGBA1C 6.7 (A) 03/24/2025     - Managed by PCP (Dr. Lea)  - Metformin 500mg BID  - Will clarify jardiance w PCP and cardiologist

## 2025-04-02 NOTE — ASSESSMENT & PLAN NOTE
- Pre-existing T2DM  - Symptoms improved after taxol dose-reduced  - Can add gabapentin in future if needed        Reason For Visit  OTIS SCHUMACHER is here today for a nurse visit for TB Placement.      Current Meds   1. Azithromycin 250 MG Oral Tablet; Take 2 tablets today, then one tablet daily for 4 days;   Therapy: 05Jul2017 to (Evaluate:10Jul2017); Last Rx:05Jul2017 Ordered   2. Mupirocin 2 % External Ointment; APPLY A SMALL AMOUNT 3 TIMES DAILY AS   DIRECTED;   Therapy: 01Feb2017 to (Evaluate:11Jun2017); Last Rx:12Apr2017 Ordered   3. Qvar 40 MCG/ACT Inhalation Aerosol Solution; INHALE 2 PUFFS TWICE DAILY;   Therapy: 05Apr2016 to (Last Rx:[id number]) Ordered   4. Singulair 10 MG Oral Tablet; TAKE 1 TABLET BY MOUTH  DAILY;   Therapy: 02May2016 to (Evaluate:27Jan2017); Last Rx:02May2016 Ordered   5. Ventolin  (90 Base) MCG/ACT Inhalation Aerosol Solution; INAHLE 1 TO 2 PUFFS   BY MOUTH EVERY 4 TO 6 HOURS AS NEEDED;   Therapy: 01Jun2015 to (Last Rx:01Jun2015) Ordered    Allergies  Amoxicillin TABS  Latex Gloves MISC  Sulfa Drugs    Assessment   1. Encounter for preventive health examination (V70.0) (Z00.00)    Plan   1. PPD    Signatures   Electronically signed by : Evelin Rod, ; Jul 31 2017  9:26AM CST

## 2025-04-03 ENCOUNTER — TELEPHONE (OUTPATIENT)
Age: 65
End: 2025-04-03

## 2025-04-03 RX ORDER — PALONOSETRON 0.05 MG/ML
0.25 INJECTION, SOLUTION INTRAVENOUS ONCE
Status: CANCELLED | OUTPATIENT
Start: 2025-04-11

## 2025-04-03 RX ORDER — SODIUM CHLORIDE 9 MG/ML
20 INJECTION, SOLUTION INTRAVENOUS ONCE
Status: CANCELLED | OUTPATIENT
Start: 2025-04-11

## 2025-04-03 NOTE — TELEPHONE ENCOUNTER
Placed call to patient's brother,Gera with the assistance of , Juan Carlos /[id number]. Patient started to experience flu like symptoms since after her lab draw appointment yesterday. He reports that she is experiencing a cold, congestion, cough,runny nose and generalized aches.     He denies fever, but states her face is flushed. Patient is using OTC flu medications and home remedies- warm tea. Advised the need to go to urgent care for evaluation of these symptoms. Gera declined and stated patient is not that ill, but will need time to recover, and would like to reschedule tomorrow's chemo.    Reviewed patient's chart and saw that appointment was already rescheduled to 4/11 from previous call. Advised Gera of same.     He will also like to know if she needs to repeat labs for next week, being that she had already completed it yesterday.    FOLLOW UP: Please call patient with recommendations,    REASON FOR CONVERSATION: To reschedule chemo for tomorrow    SYMPTOMS: Flu like symptoms    OTHER: N/A    DISPOSITION: Discuss with provider and call patient back.

## 2025-04-03 NOTE — TELEPHONE ENCOUNTER
Brother of patient calling.    Patient has flu.    Need to reschedule treatment 4/4    Call brother at [phone] or patient using .

## 2025-04-03 NOTE — TELEPHONE ENCOUNTER
Patient's brother came in and stated that the letter is good, however, each letter needs to have the names of her children which is why they provided the birth certificates.    Please call brother once completed. Letter and certificates will be placed back in your bin.

## 2025-04-03 NOTE — TELEPHONE ENCOUNTER
Brother of patient calling in, would like a call back to discuss his sister's symptoms, call disconnected, please call him back to discuss. [phone]

## 2025-04-04 ENCOUNTER — HOSPITAL ENCOUNTER (OUTPATIENT)
Dept: INFUSION CENTER | Facility: HOSPITAL | Age: 65
Discharge: HOME | End: 2025-04-04
Attending: OBSTETRICS & GYNECOLOGY

## 2025-04-04 NOTE — TELEPHONE ENCOUNTER
Can you check in on her? I would like her to have labs repeated next week prior to 4/11. Please let them know we will send a new calendar as well. I have it on my list. Thanks!

## 2025-04-04 NOTE — TELEPHONE ENCOUNTER
Return call to Gera using a German speaking , notified him that his mother will need labs repeated next week some time before 4/11.  Also informed that a new treatment calendar will be mailed to him  He voiced understanding.

## 2025-04-08 LAB — SCAN RESULT: NORMAL

## 2025-04-09 ENCOUNTER — APPOINTMENT (OUTPATIENT)
Dept: LAB | Facility: HOSPITAL | Age: 65
End: 2025-04-09
Payer: COMMERCIAL

## 2025-04-09 DIAGNOSIS — C54.1 ENDOMETRIAL CANCER (HCC): ICD-10-CM

## 2025-04-09 DIAGNOSIS — E11.9 TYPE 2 DIABETES MELLITUS WITHOUT COMPLICATION, WITHOUT LONG-TERM CURRENT USE OF INSULIN (HCC): ICD-10-CM

## 2025-04-09 LAB
ALBUMIN SERPL BCG-MCNC: 4.4 G/DL (ref 3.5–5)
ALP SERPL-CCNC: 82 U/L (ref 34–104)
ALT SERPL W P-5'-P-CCNC: 34 U/L (ref 7–52)
AMYLASE SERPL-CCNC: 38 IU/L (ref 29–103)
ANION GAP SERPL CALCULATED.3IONS-SCNC: 9 MMOL/L (ref 4–13)
AST SERPL W P-5'-P-CCNC: 19 U/L (ref 13–39)
BASOPHILS # BLD AUTO: 0.05 THOUSANDS/ÂΜL (ref 0–0.1)
BASOPHILS NFR BLD AUTO: 1 % (ref 0–1)
BILIRUB SERPL-MCNC: 0.5 MG/DL (ref 0.2–1)
BUN SERPL-MCNC: 16 MG/DL (ref 5–25)
CALCIUM SERPL-MCNC: 9.8 MG/DL (ref 8.4–10.2)
CANCER AG125 SERPL-ACNC: 5.7 U/ML (ref 0–35)
CHLORIDE SERPL-SCNC: 102 MMOL/L (ref 96–108)
CO2 SERPL-SCNC: 28 MMOL/L (ref 21–32)
CREAT SERPL-MCNC: 0.57 MG/DL (ref 0.6–1.3)
EOSINOPHIL # BLD AUTO: 0.12 THOUSAND/ÂΜL (ref 0–0.61)
EOSINOPHIL NFR BLD AUTO: 2 % (ref 0–6)
ERYTHROCYTE [DISTWIDTH] IN BLOOD BY AUTOMATED COUNT: 13.1 % (ref 11.6–15.1)
GFR SERPL CREATININE-BSD FRML MDRD: 98 ML/MIN/1.73SQ M
GLUCOSE P FAST SERPL-MCNC: 120 MG/DL (ref 65–99)
HCT VFR BLD AUTO: 36.4 % (ref 34.8–46.1)
HGB BLD-MCNC: 11.8 G/DL (ref 11.5–15.4)
IMM GRANULOCYTES # BLD AUTO: 0.03 THOUSAND/UL (ref 0–0.2)
IMM GRANULOCYTES NFR BLD AUTO: 1 % (ref 0–2)
LIPASE SERPL-CCNC: 18 U/L (ref 11–82)
LYMPHOCYTES # BLD AUTO: 2.28 THOUSANDS/ÂΜL (ref 0.6–4.47)
LYMPHOCYTES NFR BLD AUTO: 35 % (ref 14–44)
MAGNESIUM SERPL-MCNC: 2 MG/DL (ref 1.9–2.7)
MCH RBC QN AUTO: 30.6 PG (ref 26.8–34.3)
MCHC RBC AUTO-ENTMCNC: 32.4 G/DL (ref 31.4–37.4)
MCV RBC AUTO: 95 FL (ref 82–98)
MONOCYTES # BLD AUTO: 0.44 THOUSAND/ÂΜL (ref 0.17–1.22)
MONOCYTES NFR BLD AUTO: 7 % (ref 4–12)
NEUTROPHILS # BLD AUTO: 3.68 THOUSANDS/ÂΜL (ref 1.85–7.62)
NEUTS SEG NFR BLD AUTO: 54 % (ref 43–75)
NRBC BLD AUTO-RTO: 0 /100 WBCS
PLATELET # BLD AUTO: 228 THOUSANDS/UL (ref 149–390)
PMV BLD AUTO: 10.4 FL (ref 8.9–12.7)
POTASSIUM SERPL-SCNC: 4.4 MMOL/L (ref 3.5–5.3)
PROT SERPL-MCNC: 7.8 G/DL (ref 6.4–8.4)
RBC # BLD AUTO: 3.85 MILLION/UL (ref 3.81–5.12)
SODIUM SERPL-SCNC: 139 MMOL/L (ref 135–147)
TSH SERPL DL<=0.05 MIU/L-ACNC: 2.89 UIU/ML (ref 0.45–4.5)
WBC # BLD AUTO: 6.6 THOUSAND/UL (ref 4.31–10.16)

## 2025-04-09 PROCEDURE — 36415 COLL VENOUS BLD VENIPUNCTURE: CPT

## 2025-04-09 PROCEDURE — 83690 ASSAY OF LIPASE: CPT

## 2025-04-09 PROCEDURE — 83735 ASSAY OF MAGNESIUM: CPT

## 2025-04-09 PROCEDURE — 80053 COMPREHEN METABOLIC PANEL: CPT

## 2025-04-09 PROCEDURE — 84443 ASSAY THYROID STIM HORMONE: CPT

## 2025-04-09 PROCEDURE — 82150 ASSAY OF AMYLASE: CPT

## 2025-04-09 PROCEDURE — 86304 IMMUNOASSAY TUMOR CA 125: CPT

## 2025-04-09 PROCEDURE — 85025 COMPLETE CBC W/AUTO DIFF WBC: CPT

## 2025-04-11 ENCOUNTER — HOSPITAL ENCOUNTER (OUTPATIENT)
Dept: INFUSION CENTER | Facility: HOSPITAL | Age: 65
End: 2025-04-11
Attending: OBSTETRICS & GYNECOLOGY
Payer: COMMERCIAL

## 2025-04-11 VITALS
WEIGHT: 163.14 LBS | RESPIRATION RATE: 18 BRPM | DIASTOLIC BLOOD PRESSURE: 79 MMHG | HEART RATE: 82 BPM | BODY MASS INDEX: 34.24 KG/M2 | HEIGHT: 58 IN | SYSTOLIC BLOOD PRESSURE: 138 MMHG | TEMPERATURE: 97.7 F

## 2025-04-11 DIAGNOSIS — C54.1 ENDOMETRIAL CANCER (HCC): Primary | ICD-10-CM

## 2025-04-11 PROCEDURE — 96413 CHEMO IV INFUSION 1 HR: CPT

## 2025-04-11 PROCEDURE — 96415 CHEMO IV INFUSION ADDL HR: CPT

## 2025-04-11 PROCEDURE — 96367 TX/PROPH/DG ADDL SEQ IV INF: CPT

## 2025-04-11 PROCEDURE — 96417 CHEMO IV INFUS EACH ADDL SEQ: CPT

## 2025-04-11 PROCEDURE — 96375 TX/PRO/DX INJ NEW DRUG ADDON: CPT

## 2025-04-11 RX ORDER — PALONOSETRON 0.05 MG/ML
0.25 INJECTION, SOLUTION INTRAVENOUS ONCE
Status: COMPLETED | OUTPATIENT
Start: 2025-04-11 | End: 2025-04-11

## 2025-04-11 RX ORDER — SODIUM CHLORIDE 9 MG/ML
20 INJECTION, SOLUTION INTRAVENOUS ONCE
Status: COMPLETED | OUTPATIENT
Start: 2025-04-11 | End: 2025-04-11

## 2025-04-11 RX ADMIN — CARBOPLATIN 495 MG: 10 INJECTION, SOLUTION INTRAVENOUS at 15:22

## 2025-04-11 RX ADMIN — FAMOTIDINE 20 MG: 10 INJECTION, SOLUTION INTRAVENOUS at 10:40

## 2025-04-11 RX ADMIN — PACLITAXEL 225.6 MG: 6 INJECTION, SOLUTION INTRAVENOUS at 12:17

## 2025-04-11 RX ADMIN — DEXAMETHASONE SODIUM PHOSPHATE 20 MG: 10 INJECTION, SOLUTION INTRAMUSCULAR; INTRAVENOUS at 10:19

## 2025-04-11 RX ADMIN — SODIUM CHLORIDE 500 MG: 9 INJECTION, SOLUTION INTRAVENOUS at 11:36

## 2025-04-11 RX ADMIN — FOSAPREPITANT DIMEGLUMINE 150 MG: 150 INJECTION, POWDER, LYOPHILIZED, FOR SOLUTION INTRAVENOUS at 11:02

## 2025-04-11 RX ADMIN — PALONOSETRON 0.25 MG: 0.05 INJECTION, SOLUTION INTRAVENOUS at 11:00

## 2025-04-11 RX ADMIN — SODIUM CHLORIDE 20 ML/HR: 0.9 INJECTION, SOLUTION INTRAVENOUS at 09:54

## 2025-04-11 RX ADMIN — DIPHENHYDRAMINE HYDROCHLORIDE 25 MG: 50 INJECTION, SOLUTION INTRAMUSCULAR; INTRAVENOUS at 09:55

## 2025-04-11 NOTE — PROGRESS NOTES
Pt tolerated Chemotherapy treatment today with no adverse reactions. AVS provided, next apt 5/2/25 @ 0800. Left unit ambulatory with a steady gait using walker with family.

## 2025-04-23 DIAGNOSIS — I42.8 NONISCHEMIC CARDIOMYOPATHY (HCC): Chronic | ICD-10-CM

## 2025-04-23 DIAGNOSIS — I10 PRIMARY HYPERTENSION: Chronic | ICD-10-CM

## 2025-04-23 RX ORDER — LOSARTAN POTASSIUM 25 MG/1
25 TABLET ORAL DAILY
Qty: 30 TABLET | Refills: 5 | Status: SHIPPED | OUTPATIENT
Start: 2025-04-23

## 2025-04-24 ENCOUNTER — HOSPITAL ENCOUNTER (OUTPATIENT)
Dept: NON INVASIVE DIAGNOSTICS | Facility: HOSPITAL | Age: 65
Discharge: HOME | End: 2025-04-24
Payer: COMMERCIAL

## 2025-04-24 VITALS
DIASTOLIC BLOOD PRESSURE: 79 MMHG | HEIGHT: 60 IN | WEIGHT: 163 LBS | BODY MASS INDEX: 32 KG/M2 | HEART RATE: 82 BPM | SYSTOLIC BLOOD PRESSURE: 138 MMHG

## 2025-04-24 DIAGNOSIS — I42.8 NONISCHEMIC CARDIOMYOPATHY (HCC): Chronic | ICD-10-CM

## 2025-04-24 DIAGNOSIS — I10 PRIMARY HYPERTENSION: Chronic | ICD-10-CM

## 2025-04-24 DIAGNOSIS — I50.22 CHRONIC HFREF (HEART FAILURE WITH REDUCED EJECTION FRACTION) (HCC): Chronic | ICD-10-CM

## 2025-04-24 LAB
BSA FOR ECHO PROCEDURE: 1.71 M2
E WAVE DECELERATION TIME: 281 MS
E/A RATIO: 0.73
LV EF US.2D.A4C+ESTIMATED: 48 %
MV E'TISSUE VEL-SEP: 6 CM/S
MV PEAK A VEL: 0.85 M/S
MV PEAK E VEL: 62 CM/S
MV STENOSIS PRESSURE HALF TIME: 82 MS
MV VALVE AREA P 1/2 METHOD: 2.7
TRICUSPID ANNULAR PLANE SYSTOLIC EXCURSION: 2.2 CM

## 2025-04-24 PROCEDURE — 93325 DOPPLER ECHO COLOR FLOW MAPG: CPT

## 2025-04-24 PROCEDURE — 93321 DOPPLER ECHO F-UP/LMTD STD: CPT

## 2025-04-24 PROCEDURE — 93308 TTE F-UP OR LMTD: CPT

## 2025-04-24 PROCEDURE — 93325 DOPPLER ECHO COLOR FLOW MAPG: CPT | Performed by: INTERNAL MEDICINE

## 2025-04-24 PROCEDURE — 93308 TTE F-UP OR LMTD: CPT | Performed by: INTERNAL MEDICINE

## 2025-04-24 PROCEDURE — 93321 DOPPLER ECHO F-UP/LMTD STD: CPT | Performed by: INTERNAL MEDICINE

## 2025-04-24 RX ORDER — LOSARTAN POTASSIUM 25 MG/1
25 TABLET ORAL DAILY
Qty: 30 TABLET | Refills: 0 | OUTPATIENT
Start: 2025-04-24

## 2025-04-28 ENCOUNTER — PATIENT OUTREACH (OUTPATIENT)
Dept: HEMATOLOGY ONCOLOGY | Facility: CLINIC | Age: 65
End: 2025-04-28

## 2025-04-28 NOTE — ASSESSMENT & PLAN NOTE
- Pre-existing T2DM  - Symptoms improved after taxol dose-reduced  - Given continued pain numbness and tingling in her toes, Rx provided for gabapentin, start at 100mg qhs and can increase to 200mg qhs if tolerating well  Orders:    gabapentin (Neurontin) 100 mg capsule; Take 1 capsule (100 mg total) by mouth daily at bedtime

## 2025-04-28 NOTE — ASSESSMENT & PLAN NOTE
Lab Results   Component Value Date    HGBA1C 7.2 (H) 04/02/2025   - Managed by PCP (Dr. Lea)  - Metformin 500mg BID, farxiga

## 2025-04-28 NOTE — PROGRESS NOTES
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 S/p rad onc referral, plan per discussion with their team is to obtain a post-chemo PETCT and then plan XRT treatment plan  - Labs reviewed and stable, due for repeat labs this week. Plan for C#4 carboplatin AUC 5 / paclitaxel 135 mg/m2 / dostarlimab 500mg as scheduled  - Labs and premedication as per protocol   - Has PRN Zofran, worked well for her post chemo nausea     Orders:     VAS VENOUS DUPLEX - LOWER LIMB BILATERAL; Future    gabapentin (Neurontin) 100 mg capsule; Take 1 capsule (100 mg total) by mouth daily at bedtime    XR knee 3 vw left non injury; Future    XR knee 3 vw right non injury; Future    Type 2 diabetes mellitus without complication, without long-term current use of insulin ([facility])    Lab Results   Component Value Date    HGBA1C 7.2 (H) 2025   - Managed by PCP (Dr. Lea)  - Metformin 500mg BID, farxiga         Neuropathy  - Pre-existing T2DM  - Symptoms improved after taxol dose-reduced  - Given continued pain numbness and tingling in her toes, Rx provided for gabapentin, start at 100mg qhs and can increase to 200mg qhs if tolerating well  Orders:    gabapentin (Neurontin) 100 mg capsule; Take 1 capsule (100 mg total) by mouth daily at bedtime    Cancer related pain  - Follows with palliative care (Dr. Wood), next appt   - Continue oxyIR 5mg PO q6H prn   - gabapentin added as above       Chronic HFrEF (heart failure with reduced  ejection fraction) (HCC)  Wt Readings from Last 3 Encounters:   04/29/25 73.9 kg (163 lb)   04/24/25 73.9 kg (163 lb)   04/11/25 74 kg (163 lb 2.3 oz)     - Follows with [facility]'s cardiology Dr. Ahn  -Mild 1+ lower extremity edema on exam, otherwise euvolemic appearing  ECHO on 11/2024 with LVEF 55%, grade 1 diastolic dysfunction  Continue, atorvastatin 40 mg, furosemide, losartan, metoprolol, farxiga  -TTE performed 4/24, not read yet  -Patient has many questions about her TTE and I told her that these would have to be discussed with her cardiologist       Other urinary incontinence  -Patient reports improvement of symptoms with Pyridium.  Refill sent to patient preferred pharmacy  - I again recommended patient follow-up with pelvic floor physical therapy as I believe this will significantly help her pelvic floor symptoms  Orders:    phenazopyridine (PYRIDIUM) 100 mg tablet; Take 1 tablet (100 mg total) by mouth 3 (three) times a day as needed for bladder spasms    Lower extremity edema  -1+ bilateral lower extremity edema on exam.  -  patient has known heart failure, TTE recently performed but not yet read as above.  Patient instructed to follow-up with Dr. Ahn her cardiologist with these results  - Lower extremity Dopplers ordered to rule out DVT  - Bilateral knee x-rays obtained to assess for arthritis or structural damage  Orders:     VAS VENOUS DUPLEX - LOWER LIMB BILATERAL; Future    XR knee 3 vw left non injury; Future    XR knee 3 vw right non injury; Future    Chronic pain of both knees  - Bilateral knee x-rays obtained to assess for arthritis or structural damage  Orders:    XR knee 3 vw left non injury; Future    XR knee 3 vw right non injury; Future          History of Present Illness   Reason for Visit / CC:  Pre-Chemo Visit    Adrienne Theodore is a 64 y.o. female with PMH of HTN, HLD, DM-2, MI and stage IIIC2 MMRp p53m [id number] uterine papillary serous carcinoma s/p RATLH/BSO/PPALND  now on chemoIO with plan for consolidative XRT. She is most recently s/p C#3 carbo/taxol/dostarlimab on 4/11. She presents for C#4 clearance today.    She was last seen in clinic on 4/1. At that appointment her lower abdominal pain had improved but she continued to have urinary symptoms with bladder spasms. I sent a prescription for pyridium to see if that improved her symptoms as well as referral to pelvic floor PT which she has not yet attempted. She sees palliative care (Dr. Wood) and is comfortable with current regimen of oxycodone IR PRN. She follows with Dr. Ahn for cardiology and she had a f/u TTE on 4/24 which has been performed but not read.    Today patient reports that she feels a little bit better since her last visit.  She has continued abdominal vaginal pain although it is improved from prior.  She does report worsening lower extremity and knee pain.  She thinks that her lower extremities sometimes feel hot and red. She denies chest pain/shortness of breath. She also reports some faint numbness and tingling in her toes.  She denies any current nausea or emesis and states that her Zofran is working well. She reports that the Pyridium I prescribed previously helped her urinary symptoms and would like a refill.  She does continue to note pressure with urination.  Her constipation is improved with as needed meds. Ambulating with walker (which is her baseline). She receives assistance at home and is otherwise completing her own ADLs/IADLs. Denies vaginal bleeding/discharge.         Oncology History   Cancer Staging   Endometrial cancer (HCC)  Staging form: Corpus Uteri - Carcinoma, AJCC 8th Edition  - Clinical stage from 1/16/2025: FIGO Stage IIIC2 (cT1b, cN2, cM0) - Signed by Belkys Ho MD on 2/3/2025  Histopathologic type: Papillary serous cystadenocarcinoma  Stage prefix: Initial diagnosis  Histologic grade (G): G3  Histologic grading system: 3 grade system  Lymph-vascular invasion  (LVI): LVI present/identified, NOS  Pelvic laura status: Positive  Number of pelvic nodes positive from dissection: 9  Number of pelvic nodes examined during dissection: 20  Para-aortic status: Positive  Number of para-aortic nodes positive from dissection: 7  Number of para-aortic nodes examined during dissection: 9  Lymph node metastasis: Present  Omentectomy performed: Yes  Oncology History   Endometrial cancer (HCC)   12/5/2024 Initial Diagnosis    Endometrial cancer (HCC)     1/16/2025 -  Cancer Staged    Staging form: Corpus Uteri - Carcinoma, AJCC 8th Edition  - Clinical stage from 1/16/2025: FIGO Stage IIIC2 (cT1b, cN2, cM0) - Signed by Belkys Ho MD on 2/3/2025  Histopathologic type: Papillary serous cystadenocarcinoma  Stage prefix: Initial diagnosis  Histologic grade (G): G3  Histologic grading system: 3 grade system  Lymph-vascular invasion (LVI): LVI present/identified, NOS  Pelvic laura status: Positive  Number of pelvic nodes positive from dissection: 9  Number of pelvic nodes examined during dissection: 20  Para-aortic status: Positive  Number of para-aortic nodes positive from dissection: 7  Number of para-aortic nodes examined during dissection: 9  Lymph node metastasis: Present  Omentectomy performed: Yes       2/21/2025 -  Chemotherapy    dostarlimab-gxly (JEMPERLI) IVPB, 500 mg, 3 of 12 cycles  Administration: 500 mg (2/21/2025), 500 mg (3/14/2025)  CARBOplatin (PARAPLATIN) IVPB (GOG AUC DOSING), 484 mg, 3 of 6 cycles  Administration: 484 mg (2/21/2025), 484 mg (3/14/2025)  PACLItaxel (TAXOL) chemo IVPB, 175 mg/m2 = 295.8 mg, 3 of 6 cycles  Dose modification: 135 mg/m2 (original dose 175 mg/m2, Cycle 3, Reason: Neuropathy)  Administration: 295.8 mg (2/21/2025), 228 mg (3/14/2025)        Review of Systems A complete review of systems is negative other than that noted above in the HPI.  Medical History Reviewed by provider this encounter:  Tobacco  Allergies  Meds  Problems  Med  Hx  Surg Hx  Fam Hx     .     Objective   BP (!) 134/104 (BP Location: Left arm, Patient Position: Sitting)   Pulse 68   Ht 5' (1.524 m)   Wt 73.9 kg (163 lb)   SpO2 100%   BMI 31.83 kg/m²     Body mass index is 31.83 kg/m².  Pain Screening:     ECOG ECOG Performance Status: 2 - Ambulatory and capable of all selfcare but unable to carry out any work activities.  Up and about more than 50% of waking hours   Physical Exam  Vitals reviewed. Exam conducted with a chaperone present.   Constitutional:       General: She is not in acute distress.     Appearance: Normal appearance. She is not ill-appearing.   HENT:      Head: Normocephalic and atraumatic.      Mouth/Throat:      Mouth: Mucous membranes are moist.   Eyes:      General:         Right eye: No discharge.         Left eye: No discharge.      Conjunctiva/sclera: Conjunctivae normal.   Cardiovascular:      Rate and Rhythm: Normal rate and regular rhythm.   Pulmonary:      Effort: Pulmonary effort is normal.   Abdominal:      Palpations: Abdomen is soft. There is no mass.      Tenderness: There is no abdominal tenderness.      Hernia: No hernia is present.   Genitourinary:     Comments: Deferred  Musculoskeletal:         General: Swelling (1+ bilateral LE edema) and tenderness present.      Right lower leg: No edema.      Left lower leg: No edema.   Skin:     General: Skin is warm and dry.      Coloration: Skin is not jaundiced.      Findings: No rash.   Neurological:      General: No focal deficit present.      Mental Status: She is alert and oriented to person, place, and time.      Cranial Nerves: No cranial nerve deficit.      Sensory: No sensory deficit.      Motor: No weakness.      Gait: Gait normal.   Psychiatric:         Mood and Affect: Mood normal.         Behavior: Behavior normal.         Thought Content: Thought content normal.         Judgment: Judgment normal.          Labs: I have reviewed pertinent labs.   Lab Results   Component Value  Date/Time     5.7 04/09/2025 08:51 AM     Lab Results   Component Value Date/Time    Potassium 4.4 04/09/2025 08:51 AM    Chloride 102 04/09/2025 08:51 AM    CO2 28 04/09/2025 08:51 AM    BUN 16 04/09/2025 08:51 AM    Creatinine 0.57 (L) 04/09/2025 08:51 AM    Glucose, Fasting 120 (H) 04/09/2025 08:51 AM    Calcium 9.8 04/09/2025 08:51 AM    AST 19 04/09/2025 08:51 AM    ALT 34 04/09/2025 08:51 AM    Alkaline Phosphatase 82 04/09/2025 08:51 AM    eGFR 98 04/09/2025 08:51 AM     Lab Results   Component Value Date/Time    WBC 6.60 04/09/2025 08:51 AM    Hemoglobin 11.8 04/09/2025 08:51 AM    Hematocrit 36.4 04/09/2025 08:51 AM    MCV 95 04/09/2025 08:51 AM    Platelets 228 04/09/2025 08:51 AM     Lab Results   Component Value Date/Time    Absolute Neutrophils 3.68 04/09/2025 08:51 AM        Trend:  Lab Results   Component Value Date     5.7 04/09/2025     5.0 04/02/2025     6.2 03/12/2025     5.4 03/07/2025     7.2 02/19/2025     15.2 02/04/2025     7.3 12/18/2024       Radiology Results Review : No pertinent imaging studies reviewed.  Other Study Results Review : No additional pertinent studies reviewed.    Administrative Statements   I have spent a total time of 25 minutes in caring for this patient on the day of the visit/encounter including Risks and benefits of tx options, Instructions for management, Patient and family education, Importance of tx compliance, and Reviewing/placing orders in the medical record (including tests, medications, and/or procedures).    Belkys Ho MD, MPH  Division of Gynecologic Oncology  04/29/25 6:43 PM

## 2025-04-28 NOTE — ASSESSMENT & PLAN NOTE
1/16/25 s/p CASSANDRA/BSO with PPALND  Stage IIIC2 MMRp p53m [id number] uterine papillary serous carcinoma, with extensive LVSI   C#2 taxol dose-reduced to 135 mg/m2  Signatera negative  S/p port placement     - Plan to continue chemotherapy/IO x 6 cycles and continue dostarlimab maintenance. S/p rad onc referral, plan per discussion with their team is to obtain a post-chemo PETCT and then plan XRT treatment plan  - Labs reviewed and stable, due for repeat labs this week. Plan for C#4 carboplatin AUC 5 / paclitaxel 135 mg/m2 / dostarlimab 500mg as scheduled  - Labs and premedication as per protocol   - Has PRN Zofran, worked well for her post chemo nausea     Orders:     VAS VENOUS DUPLEX - LOWER LIMB BILATERAL; Future    gabapentin (Neurontin) 100 mg capsule; Take 1 capsule (100 mg total) by mouth daily at bedtime    XR knee 3 vw left non injury; Future    XR knee 3 vw right non injury; Future

## 2025-04-28 NOTE — ASSESSMENT & PLAN NOTE
Wt Readings from Last 3 Encounters:   04/29/25 73.9 kg (163 lb)   04/24/25 73.9 kg (163 lb)   04/11/25 74 kg (163 lb 2.3 oz)     - Follows with [facility] cardiology Dr. Ahn  -Mild 1+ lower extremity edema on exam, otherwise euvolemic appearing  ECHO on 11/2024 with LVEF 55%, grade 1 diastolic dysfunction  Continue, atorvastatin 40 mg, furosemide, losartan, metoprolol, farxiga  -TTE performed 4/24, not read yet  -Patient has many questions about her TTE and I told her that these would have to be discussed with her cardiologist

## 2025-04-28 NOTE — ASSESSMENT & PLAN NOTE
- Follows with palliative care (Dr. Wood), next appt 4/30  - Continue oxyIR 5mg PO q6H prn   - gabapentin added as above

## 2025-04-28 NOTE — PROGRESS NOTES
I reached out and spoke with Adrienne  using  # [id number] to follow up and to review for any new changes in barriers to care and offer supportive services as needed.     Barriers noted previously and outcome of interventions;  None     Reviewed for any new barriers as noted below.    Are you having any side effects from your treatment?Yes, describe: pain     Are you eating and drinking normally? yes    Have you been experiencing any uncontrolled pain related to your cancer diagnosis? Yes, describe: vaginal pain , pt is seeing Dr JOSE GUADALUPE palacios  tomorrow 4/29    If not already established, have needs changed for a palliative care referral? established    Do you have a good support system? Yes     Are you interested in any support groups? no    How are you doing with transportation to your appointments? Pts son takes her to all appointments     Do you have any questions or concerns regarding your treatment plan? No    Any new financial concerns for your household or medical bills? No    Do you know when your upcoming appointments are? yes  Future Appointments   Date Time Provider Department Center   4/29/2025  9:00 AM Belkys Ho MD GYN ONC [facility]   4/30/2025  9:30 AM Renetta Wood MD [facility]   5/2/2025  8:00 AM  INF CHAIR 1  Infusion [facility]   5/20/2025 10:30 AM Belkys Ho MD GYN ONC [facility]   5/23/2025  8:00 AM  INF CHAIR 1  Infusion [facility]   6/6/2025 10:15 AM Belkys Ho MD GYN ONC [facility]   6/11/2025  3:40 PM Olivier Lea MD [facility]   6/13/2025  8:00 AM  INF CHAIR 1  Infusion [facility]   6/19/2025 10:00 AM Cindy Antoine [facility] Practice-Onc   6/19/2025 11:40 AM DO MOHAN Brandon [facility]   6/26/2025  8:45 AM Belkys Ho MD GYN ONC [facility]On[facility]   7/3/2025  8:00 AM  INF CHAIR 1  Infusion [facility]   10/3/2025  9:20 AM DO MOHAN Brandon [facility]  Practice-[facility]          Based on individual needs I will follow up with them in 4 weeks. I have provided my direct contact information and welcome them to contact me if their needs as discussed above change. They were appreciative for the call.

## 2025-04-29 ENCOUNTER — TELEPHONE (OUTPATIENT)
Dept: CARDIOLOGY CLINIC | Facility: CLINIC | Age: 65
End: 2025-04-29

## 2025-04-29 ENCOUNTER — NURSE TRIAGE (OUTPATIENT)
Age: 65
End: 2025-04-29

## 2025-04-29 ENCOUNTER — OFFICE VISIT (OUTPATIENT)
Dept: GYNECOLOGIC ONCOLOGY | Facility: CLINIC | Age: 65
End: 2025-04-29
Payer: COMMERCIAL

## 2025-04-29 VITALS
OXYGEN SATURATION: 100 % | DIASTOLIC BLOOD PRESSURE: 104 MMHG | SYSTOLIC BLOOD PRESSURE: 134 MMHG | BODY MASS INDEX: 32 KG/M2 | WEIGHT: 163 LBS | HEIGHT: 60 IN | HEART RATE: 68 BPM

## 2025-04-29 DIAGNOSIS — G62.9 NEUROPATHY: ICD-10-CM

## 2025-04-29 DIAGNOSIS — C54.1 ENDOMETRIAL CANCER (HCC): Primary | ICD-10-CM

## 2025-04-29 DIAGNOSIS — I50.22 CHRONIC HFREF (HEART FAILURE WITH REDUCED EJECTION FRACTION) (HCC): Chronic | ICD-10-CM

## 2025-04-29 DIAGNOSIS — G89.3 CANCER RELATED PAIN: ICD-10-CM

## 2025-04-29 DIAGNOSIS — R60.0 LOWER EXTREMITY EDEMA: ICD-10-CM

## 2025-04-29 DIAGNOSIS — G89.29 CHRONIC PAIN OF BOTH KNEES: ICD-10-CM

## 2025-04-29 DIAGNOSIS — M25.562 CHRONIC PAIN OF BOTH KNEES: ICD-10-CM

## 2025-04-29 DIAGNOSIS — E11.9 TYPE 2 DIABETES MELLITUS WITHOUT COMPLICATION, WITHOUT LONG-TERM CURRENT USE OF INSULIN (HCC): ICD-10-CM

## 2025-04-29 DIAGNOSIS — N39.498 OTHER URINARY INCONTINENCE: ICD-10-CM

## 2025-04-29 DIAGNOSIS — M25.561 CHRONIC PAIN OF BOTH KNEES: ICD-10-CM

## 2025-04-29 LAB
BSA FOR ECHO PROCEDURE: 1.71 M2
E WAVE DECELERATION TIME: 281 MS
E/A RATIO: 0.73
LV EF US.2D.A4C+ESTIMATED: 48 %
MV E'TISSUE VEL-SEP: 6 CM/S
MV PEAK A VEL: 0.85 M/S
MV PEAK E VEL: 62 CM/S
MV STENOSIS PRESSURE HALF TIME: 82 MS
MV VALVE AREA P 1/2 METHOD: 2.68
TRICUSPID ANNULAR PLANE SYSTOLIC EXCURSION: 2.2 CM

## 2025-04-29 PROCEDURE — 99213 OFFICE O/P EST LOW 20 MIN: CPT | Performed by: OBSTETRICS & GYNECOLOGY

## 2025-04-29 RX ORDER — PHENAZOPYRIDINE HYDROCHLORIDE 100 MG/1
100 TABLET, FILM COATED ORAL 3 TIMES DAILY PRN
Qty: 60 TABLET | Refills: 2 | Status: SHIPPED | OUTPATIENT
Start: 2025-04-29

## 2025-04-29 RX ORDER — GABAPENTIN 100 MG/1
100 CAPSULE ORAL
Qty: 30 CAPSULE | Refills: 2 | Status: SHIPPED | OUTPATIENT
Start: 2025-04-29

## 2025-04-29 NOTE — PROGRESS NOTES
Heart Failure Outpatient Progress Note - Adrienne Theodore 64 y.o. female MRN: [id number]    @ Encounter: [id number]      Assessment/Plan:    Patient Active Problem List    Diagnosis Date Noted    Neuropathy 04/02/2025    Palliative care encounter 03/19/2025    Drug-induced constipation 03/19/2025    Absence of bladder continence 03/07/2025    Cancer related pain 02/14/2025    Adrenal nodule (HCC) 02/14/2025    Status post RA- Total Laparoscopic  hysterectomy, bilateral salpingoopherectomy, pelvic and para-aortic lymphadenectomy, omental biopsy 01/18/2025    Endometrial cancer (HCC) 12/05/2024    Ambulatory dysfunction 11/08/2024    Acute pain of left shoulder 11/08/2024    Left-sided weakness 08/08/2024    Chronic bilateral thoracic back pain 07/30/2024    Type 2 diabetes mellitus without complication, without long-term current use of insulin (HCC) 07/29/2024    Primary hypertension 07/29/2024    Gastroesophageal reflux disease without esophagitis 07/29/2024    Nonischemic cardiomyopathy (HCC) 07/10/2024    Does not have health insurance 07/07/2024    Chronic HFrEF (heart failure with reduced ejection fraction) ([facility]) 07/05/2024     # Chronic HFimpEF, Stage C  Etiology: NICM; hx of HTN     Weight: 153 lbs  BNP:  7/5/24: 2673     Studies- personally reviewed by myself  EKG- SR, TWI laterally    Echo 4/24/25  LVEF: 50%  RV: normal    Echo 11/15/24:  LVEF: 55%  RV: normal    LHC 7/10/24: no CAD     Echo 7/5/24  LVEF: 15%  LVIDd: 5.7 cm  RV: normal  Other: mild to moderate left sided pleural effusion     Neurohormonal Blockade:  --Beta-Blocker: metoprolol succinate 25 mg BID  --ACEi, ARB or ARNi:  losartan 25 mg daily  Cough from lisinopril     (or SVR reduction)  --Aldosterone Receptor Blocker:  --SGLT2-I: Jardiance 10 mg daily  --Diuretic: lasix 20 mg daily     Sudden Cardiac Death Risk Reduction:  --ICD: EF improved     Electrical Resynchronization:  --narrow QRS     Advanced Therapies (If  appropriate):  --Inotrope:  --LVAD/Transplant Candidacy:     # Acute DVT  AC: Starting Eliquis today  LE duplex 5/1/25: RLE occlusive thrombus soleal vein  LLE: occlusive thrombus in posterior tibial vein  # HTN  # hyperlipidemia  # DM2- HgA1C 6.7% on 3/24/25  # Hx of medication non adherence- doing well now  # uterine cancer, s/p CASSANDRA/SBO  Chemo- carboplatin     TODAY'S PLAN:  Acute DVT in setting of hypercoagulable state  Starting on Eliquis today  More short of breath in last 5 days- will order CTA though wont change treatment, help explain dyspnea if has PE  EF preserved, ok to proceed with next chemo- EF around 50%  Lasix 20 mg daily  Continue Toprol 25 mg BID, losartan and Jardiance   Continue current GDMT, no health insurance    --2g sodium diet  - Daily weights     HPI:      63 yo following up for HFrEF. She has moved here from  5 years ago with reported hx of HTN and possible coronary stents? Reported by her in 2011 and possible hx of CHF. She did follow with a cardiolgoist in . She was not on her prescribed coreg and valsartan due to insurance issue.     Admission in July showed EF: 15%, LHC did not show any obstructive CAD. Started on GDMT  Seen in follow up by APs.    short of breath for last 5 days  Interval History:  Echo 4/24/25  LVEF: 50-55%- my read  RV: normal  Review of Systems   Constitutional:  Negative for activity change, appetite change, fatigue and unexpected weight change.   HENT:  Negative for congestion and nosebleeds.    Eyes: Negative.    Respiratory:  Negative for cough, chest tightness and shortness of breath.    Cardiovascular:  Negative for chest pain, palpitations and leg swelling.   Gastrointestinal:  Negative for abdominal distention.   Endocrine: Negative.    Genitourinary: Negative.    Musculoskeletal: Negative.    Skin: Negative.    Neurological:  Negative for dizziness, syncope and weakness.   Hematological: Negative.    Psychiatric/Behavioral: Negative.         Past  Medical History:   Diagnosis Date    Diabetes mellitus (HCC)     Fibroid     Heart failure (HCC)     Hyperlipidemia     Hypertension     Myocardial infarction (HCC) 06/05/2024    Nonischemic cardiomyopathy (HCC)          No Known Allergies  .    Current Outpatient Medications:     aspirin 81 mg chewable tablet, Chew 1 tablet (81 mg total) daily, Disp: 90 tablet, Rfl: 1    atorvastatin (LIPITOR) 40 mg tablet, Take 1 tablet (40 mg total) by mouth daily, Disp: 90 tablet, Rfl: 2    Blood Glucose Monitoring Suppl (OneTouch Verio Reflect) w/Device KIT, Check blood sugars three times daily. Please substitute with appropriate alternative as covered by patient's insurance. Dx: E11.65, Disp: 1 kit, Rfl: 0    Blood Pressure Monitoring (B-D ASSURE BPM/DELUXE ARM CUFF) MISC, Use in the morning, Disp: 1 each, Rfl: 0    candesartan (ATACAND) 16 mg tablet, Take 16 mg by mouth daily, Disp: , Rfl:     carvedilol (COREG) 6.25 mg tablet, Take 6.25 mg by mouth 2 (two) times a day with meals, Disp: , Rfl:     dapagliflozin (Farxiga) 5 MG TABS, Take 1 tablet (5 mg total) by mouth daily, Disp: 30 tablet, Rfl: 0    furosemide (LASIX) 20 mg tablet, Take 1 tablet (20 mg total) by mouth daily, Disp: 30 tablet, Rfl: 4    gabapentin (Neurontin) 100 mg capsule, Take 1 capsule (100 mg total) by mouth daily at bedtime, Disp: 30 capsule, Rfl: 2    glucose blood (OneTouch Verio) test strip, Check blood sugars three times daily. Please substitute with appropriate alternative as covered by patient's insurance. Dx: E11.65, Disp: 300 each, Rfl: 3    Incontinence Supplies MISC, Use in the morning Size small, Disp: 100 each, Rfl: 3    lidocaine (LMX) 4 % cream, Apply topically as needed for mild pain, Disp: 28 g, Rfl: 1    LORazepam (ATIVAN) 1 mg tablet, Take 1 tablet (1 mg total) by mouth every 8 (eight) hours as needed (nausea or anxiety) (Patient not taking: Reported on 2/21/2025), Disp: 20 tablet, Rfl: 0    losartan (COZAAR) 25 mg tablet, Take 1 tablet  (25 mg total) by mouth daily, Disp: 30 tablet, Rfl: 5    metFORMIN (GLUCOPHAGE) 500 mg tablet, Take 1 tablet (500 mg total) by mouth daily with breakfast, Disp: 90 tablet, Rfl: 1    methylPREDNISolone 4 MG tablet therapy pack, Use as directed on package (Patient not taking: Reported on 4/29/2025), Disp: 1 each, Rfl: 0    metoprolol succinate (TOPROL-XL) 25 mg 24 hr tablet, Take 1 tablet (25 mg total) by mouth 2 (two) times a day, Disp: 180 tablet, Rfl: 2    naloxone (NARCAN) 4 mg/0.1 mL nasal spray, Administer 1 spray into a nostril. If no response after 2-3 minutes, give another dose in the other nostril using a new spray., Disp: 1 each, Rfl: 1    ondansetron (ZOFRAN) 8 mg tablet, Take 1 tablet (8 mg total) by mouth every 8 (eight) hours as needed for nausea or vomiting, Disp: 30 tablet, Rfl: 1    OneTouch Delica Lancets 33G MISC, Check blood sugars three times daily. Please substitute with appropriate alternative as covered by patient's insurance. Dx: E11.65, Disp: 300 each, Rfl: 3    oxyCODONE (ROXICODONE) 5 immediate release tablet, Take 1 tablet (5 mg total) by mouth every 6 (six) hours as needed for severe pain Max Daily Amount: 20 mg, Disp: 90 tablet, Rfl: 0    phenazopyridine (PYRIDIUM) 100 mg tablet, Take 1 tablet (100 mg total) by mouth 3 (three) times a day as needed for bladder spasms, Disp: 60 tablet, Rfl: 2    traMADol (Ultram) 50 mg tablet, Take 1 tablet (50 mg total) by mouth every 6 (six) hours as needed for moderate pain, Disp: 20 tablet, Rfl: 0    Social History     Socioeconomic History    Marital status: Single     Spouse name: Not on file    Number of children: Not on file    Years of education: Not on file    Highest education level: Not on file   Occupational History    Not on file   Tobacco Use    Smoking status: Never    Smokeless tobacco: Never   Vaping Use    Vaping status: Never Used   Substance and Sexual Activity    Alcohol use: Never    Drug use: Never    Sexual activity: Not  Currently     Partners: Male     Birth control/protection: Post-menopausal   Other Topics Concern    Not on file   Social History Narrative    ** Merged History Encounter **          Social Drivers of Health     Financial Resource Strain: Low Risk  (11/19/2024)    Overall Financial Resource Strain (CARDIA)     Difficulty of Paying Living Expenses: Not hard at all   Food Insecurity: No Food Insecurity (11/19/2024)    Hunger Vital Sign     Worried About Running Out of Food in the Last Year: Never true     Ran Out of Food in the Last Year: Never true   Transportation Needs: No Transportation Needs (11/19/2024)    PRAPARE - Transportation     Lack of Transportation (Medical): No     Lack of Transportation (Non-Medical): No   Physical Activity: Not on file   Stress: Not on file   Social Connections: Not on file   Intimate Partner Violence: Not on file   Housing Stability: High Risk (11/19/2024)    Housing Stability Vital Sign     Unable to Pay for Housing in the Last Year: No     Number of Times Moved in the Last Year: 3     Homeless in the Last Year: No       Family History   Problem Relation Age of Onset    Cancer Paternal Aunt         vaginal    Vaginal cancer Paternal Aunt     Cancer Paternal Grandmother         vaginal    Vaginal cancer Paternal Grandmother     Breast cancer Neg Hx     Colon cancer Neg Hx     Ovarian cancer Neg Hx     Endometrial cancer Neg Hx     Hypertension Mother     Diabetes Mother     Hypertension Father        Physical Exam:    Vitals:     Physical Exam    Labs & Results:    Lab Results   Component Value Date    SODIUM 139 04/09/2025    K 4.4 04/09/2025     04/09/2025    CO2 28 04/09/2025    BUN 16 04/09/2025    CREATININE 0.57 (L) 04/09/2025    GLUC 80 02/04/2025    CALCIUM 9.8 04/09/2025     Lab Results   Component Value Date    WBC 6.60 04/09/2025    HGB 11.8 04/09/2025    HCT 36.4 04/09/2025    MCV 95 04/09/2025     04/09/2025         EKG personally reviewed by Sean Ahn  DO.     Counseling / Coordination of Care  I have spent a total time of 35 minutes on 04/29/25 in caring for this patient including Diagnostic results, Risks and benefits of tx options, Instructions for management, Importance of tx compliance, Impressions, Documenting in the medical record, and Reviewing / ordering tests, medicine, procedures  .      Thank you for the opportunity to participate in the care of this patient.    ALIS FIGUEROA D.O.  DIRECTOR OF HEART FAILURE/ PULMONARY HYPERTENSION  MEDICAL DIRECTOR OF LVAD PROGRAM  [facility]

## 2025-04-29 NOTE — TELEPHONE ENCOUNTER
Recalled pt through language line, interpretor [id number].  Advised to come for appt on 5/1/25.  Verbally understood.

## 2025-04-29 NOTE — TELEPHONE ENCOUNTER
Called pt through language line, interpretor [id number]. Spoke with pt. And gave her echo results and that she can do next Chemo.  She said she is sob, fatigued, BLLE edema L>R, and has pain in Left side of chest( started after the colchicine was stopped).  Wt is slightly up to 162.8. she said she was 151-152 before starting chemo.  She is concerned wt is up.    She does eat healthy and is drink 160 oz of water daily because she feels she is on fire inside.    She was given appt to see you on Thursday, 5/1/25.  Do you want her to keep appt ?    4/9/25 Cr-0.57, K-4.4, Bun-16,GFR-98    She is taking: lasix 20 mg qd    Do you want her to increase lasix to see if that helps symptoms?

## 2025-04-29 NOTE — TELEPHONE ENCOUNTER
"FOLLOW UP: pt would like call back re: echo results.  Pt also wanted to see Dr. Ahn prior to next chemo infusion which is 5/2. Apt made for 5/1/25.     REASON FOR CONVERSATION: Breathing Problem    SYMPTOMS: pt c/o of ongoing breathing issue as well as occasional stabbing pain lasting a second L side of chest. Pt states these symptoms were present at last visit w/ Dr. Ahn which is why echo was ordered. Pt is asking for results of echo today and also visit to see Dr. Ahn.     OTHER: received call from Abdulaziz Ho's office. Pt was there for apt and is asking for echo results. Study has not yet been read. S/w Mariah in HF clinic. She is going to have report read and they will call pt's brother back today w/ results. Pt does not speak English. Called pt back to triage symptoms using interpretor Vikas # [id number].  It was very difficult to obtain answers from pt, she is c/o of ongoing breathing issue \"like something is holding me inside\". Per pt her symptoms are unchanged since last visit w/ Dr. Ahn 03/24/25.  Pt was made aware visit 5/1 is at [facility] office in [place]. She was advised should symptoms worsen to report to ED for evaluation, she will do so.     DISPOSITION: See Within 3 Days in Office      Reason for Disposition   MODERATE longstanding difficulty breathing (e.g., speaks in phrases, SOB even at rest, pulse 100-120) and SAME as normal    Answer Assessment - Initial Assessment Questions  1. RESPIRATORY STATUS: \"Describe your breathing?\" (e.g., wheezing, shortness of breath, unable to speak, severe coughing)       Pt states she feels struggling to breath at times  2. ONSET: \"When did this breathing problem begin?\"       After her last chemo appointment, she had at last ov w/ Dr. Ahn which is why echo was ordered   3. PATTERN \"Does the difficult breathing come and go, or has it been constant since it started?\"       Unable to obtain  4. SEVERITY: \"How bad is your breathing?\" (e.g., mild, " "moderate, severe)       \"It bothers me\"  5. RECURRENT SYMPTOM: \"Have you had difficulty breathing before?\" If Yes, ask: \"When was the last time?\" and \"What happened that time?\"       See above   6. CARDIAC HISTORY: \"Do you have any history of heart disease?\" (e.g., heart attack, angina, bypass surgery, angioplasty)       CHF, HTN, cardiomyopathy  7. LUNG HISTORY: \"Do you have any history of lung disease?\"  (e.g., pulmonary embolus, asthma, emphysema)      Denies PE,asthma or emphysema, pt states she has pulmonary edema  8. CAUSE: \"What do you think is causing the breathing problem?\"       Chemotherapy possible   9. OTHER SYMPTOMS: \"Do you have any other symptoms?\" (e.g., chest pain, cough, dizziness, fever, runny nose)      Will occasionally feel sudden stabbing pain on the left side lasting for a second, pt states this is why Dr. Ahn ordered Echo at last visit    Protocols used: Breathing Difficulty-Adult-[place]    "

## 2025-04-29 NOTE — TELEPHONE ENCOUNTER
Sent secure chat to Dr. Ahn about pt needing to be seen prior to 5th round of chemo.      I looked at echo, can proceed with chemo, function preserved  per secure chat message response from Dr. Ahn.

## 2025-04-29 NOTE — ASSESSMENT & PLAN NOTE
-Patient reports improvement of symptoms with Pyridium.  Refill sent to patient preferred pharmacy  - I again recommended patient follow-up with pelvic floor physical therapy as I believe this will significantly help her pelvic floor symptoms  Orders:    phenazopyridine (PYRIDIUM) 100 mg tablet; Take 1 tablet (100 mg total) by mouth 3 (three) times a day as needed for bladder spasms

## 2025-04-30 ENCOUNTER — APPOINTMENT (OUTPATIENT)
Dept: LAB | Facility: HOSPITAL | Age: 65
End: 2025-04-30
Payer: COMMERCIAL

## 2025-04-30 ENCOUNTER — OFFICE VISIT (OUTPATIENT)
Dept: PALLIATIVE MEDICINE | Facility: CLINIC | Age: 65
End: 2025-04-30
Payer: COMMERCIAL

## 2025-04-30 VITALS
OXYGEN SATURATION: 97 % | SYSTOLIC BLOOD PRESSURE: 120 MMHG | BODY MASS INDEX: 31.83 KG/M2 | WEIGHT: 163 LBS | HEART RATE: 75 BPM | DIASTOLIC BLOOD PRESSURE: 64 MMHG | RESPIRATION RATE: 16 BRPM

## 2025-04-30 DIAGNOSIS — E11.9 TYPE 2 DIABETES MELLITUS WITHOUT COMPLICATION, WITHOUT LONG-TERM CURRENT USE OF INSULIN (HCC): ICD-10-CM

## 2025-04-30 DIAGNOSIS — C54.1 ENDOMETRIAL CANCER (HCC): ICD-10-CM

## 2025-04-30 DIAGNOSIS — Z51.5 PALLIATIVE CARE ENCOUNTER: ICD-10-CM

## 2025-04-30 DIAGNOSIS — G89.3 CANCER RELATED PAIN: ICD-10-CM

## 2025-04-30 DIAGNOSIS — C54.1 ENDOMETRIAL CANCER (HCC): Primary | ICD-10-CM

## 2025-04-30 LAB
ALBUMIN SERPL BCG-MCNC: 4.1 G/DL (ref 3.5–5)
ALP SERPL-CCNC: 67 U/L (ref 34–104)
ALT SERPL W P-5'-P-CCNC: 18 U/L (ref 7–52)
ANION GAP SERPL CALCULATED.3IONS-SCNC: 9 MMOL/L (ref 4–13)
AST SERPL W P-5'-P-CCNC: 16 U/L (ref 13–39)
BASOPHILS # BLD AUTO: 0.03 THOUSANDS/ÂΜL (ref 0–0.1)
BASOPHILS NFR BLD AUTO: 1 % (ref 0–1)
BILIRUB SERPL-MCNC: 0.48 MG/DL (ref 0.2–1)
BUN SERPL-MCNC: 16 MG/DL (ref 5–25)
CALCIUM SERPL-MCNC: 8.9 MG/DL (ref 8.4–10.2)
CANCER AG125 SERPL-ACNC: 5.7 U/ML (ref 0–35)
CHLORIDE SERPL-SCNC: 104 MMOL/L (ref 96–108)
CO2 SERPL-SCNC: 27 MMOL/L (ref 21–32)
CREAT SERPL-MCNC: 0.52 MG/DL (ref 0.6–1.3)
EOSINOPHIL # BLD AUTO: 0.08 THOUSAND/ÂΜL (ref 0–0.61)
EOSINOPHIL NFR BLD AUTO: 2 % (ref 0–6)
ERYTHROCYTE [DISTWIDTH] IN BLOOD BY AUTOMATED COUNT: 13.8 % (ref 11.6–15.1)
EST. AVERAGE GLUCOSE BLD GHB EST-MCNC: 151 MG/DL
GFR SERPL CREATININE-BSD FRML MDRD: 101 ML/MIN/1.73SQ M
GLUCOSE P FAST SERPL-MCNC: 120 MG/DL (ref 65–99)
HBA1C MFR BLD: 6.9 %
HCT VFR BLD AUTO: 35 % (ref 34.8–46.1)
HGB BLD-MCNC: 11.4 G/DL (ref 11.5–15.4)
IMM GRANULOCYTES # BLD AUTO: 0.04 THOUSAND/UL (ref 0–0.2)
IMM GRANULOCYTES NFR BLD AUTO: 1 % (ref 0–2)
LYMPHOCYTES # BLD AUTO: 1.73 THOUSANDS/ÂΜL (ref 0.6–4.47)
LYMPHOCYTES NFR BLD AUTO: 37 % (ref 14–44)
MAGNESIUM SERPL-MCNC: 2 MG/DL (ref 1.9–2.7)
MCH RBC QN AUTO: 30.8 PG (ref 26.8–34.3)
MCHC RBC AUTO-ENTMCNC: 32.6 G/DL (ref 31.4–37.4)
MCV RBC AUTO: 95 FL (ref 82–98)
MONOCYTES # BLD AUTO: 0.45 THOUSAND/ÂΜL (ref 0.17–1.22)
MONOCYTES NFR BLD AUTO: 10 % (ref 4–12)
NEUTROPHILS # BLD AUTO: 2.3 THOUSANDS/ÂΜL (ref 1.85–7.62)
NEUTS SEG NFR BLD AUTO: 49 % (ref 43–75)
NRBC BLD AUTO-RTO: 0 /100 WBCS
PLATELET # BLD AUTO: 222 THOUSANDS/UL (ref 149–390)
PMV BLD AUTO: 10.4 FL (ref 8.9–12.7)
POTASSIUM SERPL-SCNC: 4.2 MMOL/L (ref 3.5–5.3)
PROT SERPL-MCNC: 7.4 G/DL (ref 6.4–8.4)
RBC # BLD AUTO: 3.7 MILLION/UL (ref 3.81–5.12)
SODIUM SERPL-SCNC: 140 MMOL/L (ref 135–147)
WBC # BLD AUTO: 4.63 THOUSAND/UL (ref 4.31–10.16)

## 2025-04-30 PROCEDURE — 36415 COLL VENOUS BLD VENIPUNCTURE: CPT

## 2025-04-30 PROCEDURE — 85025 COMPLETE CBC W/AUTO DIFF WBC: CPT

## 2025-04-30 PROCEDURE — 83735 ASSAY OF MAGNESIUM: CPT

## 2025-04-30 PROCEDURE — 83036 HEMOGLOBIN GLYCOSYLATED A1C: CPT

## 2025-04-30 PROCEDURE — 86304 IMMUNOASSAY TUMOR CA 125: CPT

## 2025-04-30 PROCEDURE — 80053 COMPREHEN METABOLIC PANEL: CPT

## 2025-04-30 PROCEDURE — 99214 OFFICE O/P EST MOD 30 MIN: CPT | Performed by: INTERNAL MEDICINE

## 2025-04-30 NOTE — ASSESSMENT & PLAN NOTE
Mostly joint/bone/muscle pain/neuropathy and vaginal pain, responding well to as needed oxycodone  Continue OxyIR 5mg PO q6H prn - averaging 0-2 doses a day  Was ordered gabapentin by gynonc recently for neuropathy, has yet to take

## 2025-04-30 NOTE — PROGRESS NOTES
Name: Adrienne Theodore      : 1960      MRN: [id number]  Encounter Provider: Renetta Wood MD  Encounter Date: 2025   Encounter department: [facility] PALLIATIVE CARE [facility]  :  Assessment & Plan  Endometrial cancer (HCC)  She is currently on Paclitaxel, carboplatin, Dostarlimab, that started on 2025 with last chemotherapy cycle booked around 25.  Tolerating above relatively well except for increase in joint/bone/muscle pain/neuropathy, responding well to as needed oxycodone       Cancer related pain  Mostly joint/bone/muscle pain/neuropathy and vaginal pain, responding well to as needed oxycodone  Continue OxyIR 5mg PO q6H prn - averaging 0-2 doses a day  Was ordered gabapentin by gynonc recently for neuropathy, has yet to take       Palliative care encounter           Follow up  RTO in 4 weeks, check in on symptoms    Decisional apparatus: Patient is competent on my exam today. If competence is lost, patient's substitute decision maker would default to siblings by [place] Act 169.     PDMP Review: I have reviewed the patient's controlled substance dispensing history in the Prescription Drug Monitoring Program in compliance with the [facility] regulations before prescribing any controlled substances.    2025 1 Oxycodone Hcl (Ir) 5 Mg Tablet 90.00 23 Ti Donnell [id number] S h (1081) 0 29.35 MME Comm Ins [place]   2025 1 Oxycodone Hcl (Ir) 5 Mg Tablet 10.00 3 Sa [place] [id number] S h (1081) 0 25.00 MME Private Pay [place]   2025 1 Oxycodone Hcl (Ir) 5 Mg Tablet 10.00 2 [place] [id number] S h (1081) 0 37.50 MME [place]   2025 1 Lorazepam 1 Mg Tablet 20.00 7 Je Syn [id number] S h (1081) 0 2.86 LME [place]   2025 1 Oxycodone Hcl (Ir) 5 Mg Tablet 16.00 4 Ja O\' [id number] St (9996) 0 30.00 [place]   2024 1 Tramadol Hcl 50 Mg Tablet 20.00 5 Sa [place] [id number] S h (1081) 0 40.00 MME Comm [place]      History of Present Illness   Adrienne Theodore is a 64 y.o. female with newly diagnosed stage IIIC2 serous endometrial cancer with CT CAP in 12/16/2024 showing markedly thickened, heterogeneously enhancing endometrium compatible with malignancy; mild bilateral iliac chain and retroperitoneal lymphadenopathy, suspicious for metastases; no findings of metastatic disease in the chest. She underwent CASSANDRA/BSO with PLND and PALND in 1/16/25. Postoperative CT abdomen pelvis showed postoperative changes and stability in the left adrenal nodule. Her case was reviewed at multidisciplinary tumor board with recommendations for systemic therapy with chemotherapy/immunotherapy, followed by external beam radiation therapy.  She is currently on Paclitaxel, carboplatin, Dostarlimab, that started on 2/21/2025 with last chemotherapy cycle booked around 6/6/25. She is referred to palliative medicine for supportive cares.     Met with patient and his brother.  via blogfoster utilized [place]/ID# [id number]. She looks stable. Reports ongoing pain in her joints, mostly in the knees, as well as arms, and pins and needles sensation to both feet since starting chemo. Also feels fatigues, easily short of breath. Overall, symptoms related to cancer and current treatments. I discussed continued use of supportive measures like pain meds to assist with her comfort and tolerability of needed chemo. She uses oxycodone as needed to good effect. She only uses 0-2 doses a day. She also applies a cream she got from [place] on her knees and arms which helps (provides warm and soothing feeling, so I'm guessing this is some type of menthol). She has yet to begin the gabapentin ordered by her oncologist a day or so ago. She is moving bowels with no issues.     Objective   /64 (BP Location: Left arm, Patient Position: Sitting, Cuff Size: Large)   Pulse 75   Resp 16   Wt 73.9 kg (163 lb)   SpO2 97%   BMI 31.83  kg/m²     Physical Exam  Constitutional:       Comments: Chronically ill looking, not toxic appearing. Comfortable, pleasant, stable   HENT:      Head: Normocephalic and atraumatic.   Eyes:      General: No scleral icterus.        Right eye: No discharge.         Left eye: No discharge.   Pulmonary:      Effort: Pulmonary effort is normal. No respiratory distress.      Comments: On RA  Abdominal:      General: Abdomen is flat. There is no distension.   Musculoskeletal:         General: No swelling.      Comments: Knees are not red warm of swollen but is slightly tender to touch   Skin:     General: Skin is warm and dry.      Coloration: Skin is not jaundiced or pale.   Neurological:      General: No focal deficit present.      Mental Status: She is alert and oriented to person, place, and time.   Psychiatric:         Mood and Affect: Mood normal.         Behavior: Behavior normal.         Thought Content: Thought content normal.         Judgment: Judgment normal.       Recent labs:  Lab Results   Component Value Date/Time    SODIUM 140 04/30/2025 08:58 AM    K 4.2 04/30/2025 08:58 AM    BUN 16 04/30/2025 08:58 AM    CREATININE 0.52 (L) 04/30/2025 08:58 AM    GLUC 80 02/04/2025 03:42 PM    CALCIUM 8.9 04/30/2025 08:58 AM    AST 16 04/30/2025 08:58 AM    ALT 18 04/30/2025 08:58 AM    ALB 4.1 04/30/2025 08:58 AM    TP 7.4 04/30/2025 08:58 AM    EGFR 101 04/30/2025 08:58 AM     Lab Results   Component Value Date/Time    HGB 11.4 (L) 04/30/2025 08:58 AM    WBC 4.63 04/30/2025 08:58 AM     04/30/2025 08:58 AM    INR 0.91 12/18/2024 09:22 AM    PTT 27 12/18/2024 09:22 AM     Lab Results   Component Value Date/Time    DUF6GVYGWOLH 2.890 04/09/2025 08:51 AM       Recent Imaging:  Procedure: FL guided central venous access device insertion  Result Date: 3/14/2025  Impression: Impression: Successful placement of a single lumen chest port via the right internal jugular vein. Workstation performed: [id number]      Procedure: CT abdomen pelvis w contrast  Result Date: 2/4/2025  Impression: Status post interval hysterectomy. Bilateral pelvic fluid collections likely representing postoperative seromas. Indistinct soft tissue about the iliac bifurcation and right common iliac artery. This may be postoperative however attention on follow-up is recommended to exclude a neoplastic etiology. Workstation performed: [id number]       Administrative Statements   I have spent a total time of 25 minutes in caring for this patient on the day of the visit/encounter including Diagnostic results, Risks and benefits of tx options, Instructions for management, Patient and family education, Importance of tx compliance, Risk factor reductions, Impressions, Counseling / Coordination of care, Documenting in the medical record, Reviewing/placing orders in the medical record (including tests, medications, and/or procedures), and Obtaining or reviewing history  .   Topics discussed with the patient / family include symptom assessment and management, medication review, medication adjustment, psychosocial support, opioid titration, supportive listening, and anticipatory guidance.    Renetta Wood MD  Palliative Medicine & Supportive Care  Internal Medicine  Available via [employer] Text  Office: [phone]  Fax: [phone]

## 2025-04-30 NOTE — ASSESSMENT & PLAN NOTE
She is currently on Paclitaxel, carboplatin, Dostarlimab, that started on 2/21/2025 with last chemotherapy cycle booked around 6/6/25.  Tolerating above relatively well except for increase in joint/bone/muscle pain/neuropathy, responding well to as needed oxycodone

## 2025-05-01 ENCOUNTER — HOSPITAL ENCOUNTER (EMERGENCY)
Facility: HOSPITAL | Age: 65
Discharge: HOME | End: 2025-05-01
Attending: EMERGENCY MEDICINE
Payer: COMMERCIAL

## 2025-05-01 ENCOUNTER — HOSPITAL ENCOUNTER (OUTPATIENT)
Dept: NON INVASIVE DIAGNOSTICS | Facility: HOSPITAL | Age: 65
Discharge: HOME | End: 2025-05-01
Attending: OBSTETRICS & GYNECOLOGY
Payer: COMMERCIAL

## 2025-05-01 ENCOUNTER — HOSPITAL ENCOUNTER (OUTPATIENT)
Dept: CT IMAGING | Facility: HOSPITAL | Age: 65
Discharge: HOME | End: 2025-05-01
Attending: INTERNAL MEDICINE
Payer: COMMERCIAL

## 2025-05-01 ENCOUNTER — OFFICE VISIT (OUTPATIENT)
Dept: CARDIOLOGY CLINIC | Facility: CLINIC | Age: 65
End: 2025-05-01
Payer: COMMERCIAL

## 2025-05-01 VITALS
DIASTOLIC BLOOD PRESSURE: 74 MMHG | WEIGHT: 164 LBS | OXYGEN SATURATION: 100 % | SYSTOLIC BLOOD PRESSURE: 108 MMHG | BODY MASS INDEX: 32.03 KG/M2 | HEART RATE: 88 BPM

## 2025-05-01 VITALS
TEMPERATURE: 97.8 F | OXYGEN SATURATION: 99 % | WEIGHT: 157.63 LBS | RESPIRATION RATE: 18 BRPM | BODY MASS INDEX: 30.78 KG/M2 | HEART RATE: 74 BPM | DIASTOLIC BLOOD PRESSURE: 91 MMHG | SYSTOLIC BLOOD PRESSURE: 159 MMHG

## 2025-05-01 DIAGNOSIS — R06.00 DYSPNEA AND RESPIRATORY ABNORMALITY: ICD-10-CM

## 2025-05-01 DIAGNOSIS — I10 PRIMARY HYPERTENSION: Primary | Chronic | ICD-10-CM

## 2025-05-01 DIAGNOSIS — I82.443 ACUTE DVT OF BOTH TIBIAL VEINS (HCC): ICD-10-CM

## 2025-05-01 DIAGNOSIS — I82.493 ACUTE DEEP VEIN THROMBOSIS (DVT) OF OTHER SPECIFIED VEIN OF BOTH LOWER EXTREMITIES (HCC): Primary | ICD-10-CM

## 2025-05-01 DIAGNOSIS — C54.1 ENDOMETRIAL CANCER (HCC): ICD-10-CM

## 2025-05-01 DIAGNOSIS — I42.8 NONISCHEMIC CARDIOMYOPATHY (HCC): Chronic | ICD-10-CM

## 2025-05-01 DIAGNOSIS — R06.89 DYSPNEA AND RESPIRATORY ABNORMALITY: ICD-10-CM

## 2025-05-01 DIAGNOSIS — R60.0 LOWER EXTREMITY EDEMA: ICD-10-CM

## 2025-05-01 PROBLEM — I82.409 ACUTE DVT (DEEP VENOUS THROMBOSIS) (HCC): Status: ACTIVE | Noted: 2025-05-01

## 2025-05-01 PROCEDURE — 99214 OFFICE O/P EST MOD 30 MIN: CPT | Performed by: INTERNAL MEDICINE

## 2025-05-01 PROCEDURE — 93970 EXTREMITY STUDY: CPT

## 2025-05-01 PROCEDURE — 99284 EMERGENCY DEPT VISIT MOD MDM: CPT | Performed by: PHYSICIAN ASSISTANT

## 2025-05-01 PROCEDURE — 93970 EXTREMITY STUDY: CPT | Performed by: SURGERY

## 2025-05-01 PROCEDURE — 99283 EMERGENCY DEPT VISIT LOW MDM: CPT

## 2025-05-01 PROCEDURE — 71275 CT ANGIOGRAPHY CHEST: CPT

## 2025-05-01 RX ORDER — SODIUM CHLORIDE 9 MG/ML
20 INJECTION, SOLUTION INTRAVENOUS ONCE
Status: CANCELLED | OUTPATIENT
Start: 2025-05-02

## 2025-05-01 RX ORDER — PALONOSETRON 0.05 MG/ML
0.25 INJECTION, SOLUTION INTRAVENOUS ONCE
Status: CANCELLED | OUTPATIENT
Start: 2025-05-02

## 2025-05-01 RX ADMIN — IOHEXOL 85 ML: 350 INJECTION, SOLUTION INTRAVENOUS at 17:46

## 2025-05-01 NOTE — ED PROVIDER NOTES
Time reflects when diagnosis was documented in both MDM as applicable and the Disposition within this note       Time User Action Codes Description Comment    5/1/2025  9:21 AM Jeni Baker Add [I82.493] Acute deep vein thrombosis (DVT) of other specified vein of both lower extremities (HCC)           ED Disposition       ED Disposition   Discharge    Condition   Good    Date/Time   Thu May 1, 2025  9:25 AM    Comment   Adrienne Theodore discharge to home/self care.                   Assessment & Plan       Medical Decision Making  64-year-old female past undergoes recently and for endometrial cancer currently undergoing chemotherapy, type 2 diabetes without long-term use of insulin, chronic HFrEF, and lower extremity edema for which she was undergoing an ultrasound upstairs and was sent down to the ER for positive DVT results.  Patient is noted to have an acute occlusive thrombus in the right soleal vein and an acute occlusive thrombus in the posterior left tibial vein.  Patient most recent hemoglobin was noted to be over 11, patient is currently following with both oncology and cardiology and has an appointment with cardiology today.  Attempted to reach out to ordering provider however this provider is unavailable.  Patient was counseled on initiation of Eliquis, Eliquis Dosepak was prescribed in the setting of a provoked DVT and hemoglobin over 11.  Patient is occasion was provided on the need to follow closely with both outpatient providers, will defer additional management or change to chemotherapy regimen/anticoagulation therapy to heme-onc/cardiology.  No further complaints offered and no indication for additional blood work or imaging at this time.     All imaging and/or lab testing discussed with patient, strict return to ED precautions discussed. Patient and/or family members verbalizes understanding and agrees with plan. Patient is stable for discharge     Portions of the record may have  "been created with voice recognition software. Occasional wrong word or \"sound a like\" substitutions may have occurred due to the inherent limitations of voice recognition software. Read the chart carefully and recognize, using context, where substitutions have occurred.    Risk  Prescription drug management.             Medications - No data to display    ED Risk Strat Scores                    No data recorded        SBIRT 20yo+      Flowsheet Row Most Recent Value   Initial Alcohol Screen: US AUDIT-C     1. How often do you have a drink containing alcohol? 0 Filed at: 05/01/2025 0915   2. How many drinks containing alcohol do you have on a typical day you are drinking?  0 Filed at: 05/01/2025 0915   3a. Male UNDER 65: How often do you have five or more drinks on one occasion? 0 Filed at: 05/01/2025 0915   3b. FEMALE Any Age, or MALE 65+: How often do you have 4 or more drinks on one occassion? 0 Filed at: 05/01/2025 0915   Audit-C Score 0 Filed at: 05/01/2025 0915   ANGE: How many times in the past year have you...    Used an illegal drug or used a prescription medication for non-medical reasons? Never Filed at: 05/01/2025 0915                            History of Present Illness       Chief Complaint   Patient presents with    Evaluation of Abnormal Diagnostic Test     Pt arrives from vascular lab. Per report, patient was found to have blood clots in b/l LEs.        Past Medical History:   Diagnosis Date    Diabetes mellitus (HCC)     Fibroid     Heart failure (HCC)     Hyperlipidemia     Hypertension     Myocardial infarction (HCC) 06/05/2024    Nonischemic cardiomyopathy (HCC)       Past Surgical History:   Procedure Laterality Date    BACK SURGERY      BLADDER NECK SUSPENSION      CARDIAC CATHETERIZATION Left 07/10/2024    Procedure: Cardiac Left Heart Cath;  Surgeon: Mello Pedroza MD;  Location: [facility];  Service: Cardiology    FL GUIDED CENTRAL VENOUS ACCESS DEVICE INSERTION  3/13/2025    MO ROBBY " TUNNELED CTR VAD W/SUBQ PORT AGE 5 YR/> N/A 3/13/2025    Procedure: INSERTION VENOUS PORT ( PORT-A-CATH) IR;  Surgeon: Maurilio Harrington DO;  Location: [facility];  Service: Interventional Radiology    DE LAPS TOTAL HYSTERECT 250 GM/< W/RMVL TUBE/OVARY N/A 1/16/2025    Procedure: ROBOTIC ASSISTED TOTAL LAPAROSCOPIC HYSTERECTOMY, BILATERAL SALPINGO-OOPHERECTOMY, PELVIC AND PARA-AORTIC LYMPHADENECTOMY, OMENTAL BIOPSY, EXAM UNDER ANESTHESIA, CYSTOSCOPY;  Surgeon: Belkys Ho MD;  Location: [facility];  Service: Gynecology Oncology    SPINAL FIXATION SURGERY      L3-L5 fusion    VAGINA SURGERY        Family History   Problem Relation Age of Onset    Cancer Paternal Aunt         vaginal    Vaginal cancer Paternal Aunt     Cancer Paternal Grandmother         vaginal    Vaginal cancer Paternal Grandmother     Breast cancer Neg Hx     Colon cancer Neg Hx     Ovarian cancer Neg Hx     Endometrial cancer Neg Hx     Hypertension Mother     Diabetes Mother     Hypertension Father       Social History     Tobacco Use    Smoking status: Never    Smokeless tobacco: Never   Vaping Use    Vaping status: Never Used   Substance Use Topics    Alcohol use: Never    Drug use: Never      E-Cigarette/Vaping    E-Cigarette Use Never User       E-Cigarette/Vaping Substances    Nicotine No     THC No     CBD No     Flavoring No     Other No     Unknown No       I have reviewed and agree with the history as documented.     64-year-old female sent down for positive DVT results from outpatient duplex ultrasound.  Patient reports bilateral lower extremity edema.  No other complaints.      Evaluation of Abnormal Diagnostic Test      Review of Systems   Constitutional:  Negative for chills, fatigue and fever.   HENT:  Negative for congestion, ear pain, rhinorrhea and sore throat.    Eyes:  Negative for redness.   Respiratory:  Negative for chest tightness and shortness of breath.    Cardiovascular:  Positive for leg swelling. Negative  for chest pain and palpitations.   Gastrointestinal:  Negative for abdominal pain, nausea and vomiting.   Genitourinary:  Negative for dysuria and hematuria.   Musculoskeletal: Negative.    Skin:  Negative for rash.   Neurological:  Negative for dizziness, syncope, light-headedness and numbness.           Objective       ED Triage Vitals [05/01/25 0917]   Temperature Pulse Blood Pressure Respirations SpO2 Patient Position - Orthostatic VS   97.8 °F (36.6 °C) 74 159/91 18 99 % Lying      Temp Source Heart Rate Source BP Location FiO2 (%) Pain Score    Oral Monitor Left arm -- 5      Vitals      Date and Time Temp Pulse SpO2 Resp BP Pain Score FACES Pain Rating User   05/01/25 0917 97.8 °F (36.6 °C) 74 99 % 18 159/91 5 -- AS            Physical Exam  Vitals and nursing note reviewed.   Constitutional:       Appearance: She is well-developed.   HENT:      Head: Normocephalic.   Eyes:      General: No scleral icterus.  Cardiovascular:      Rate and Rhythm: Normal rate and regular rhythm.   Pulmonary:      Effort: Pulmonary effort is normal.      Breath sounds: Normal breath sounds. No stridor.   Abdominal:      General: There is no distension.      Palpations: Abdomen is soft.      Tenderness: There is no abdominal tenderness.   Musculoskeletal:         General: Normal range of motion.      Comments: Bilateral lower extremity edema, left worse than right.  2+ dorsalis pedis pulse bilaterally normal sensation distal.  Patient is ambulatory with a steady gait uses a walker to assist ambulation.   Skin:     General: Skin is warm and dry.      Capillary Refill: Capillary refill takes less than 2 seconds.   Neurological:      Mental Status: She is alert and oriented to person, place, and time.         Results Reviewed       None            No orders to display       Procedures    ED Medication and Procedure Management   Prior to Admission Medications   Prescriptions Last Dose Informant Patient Reported? Taking?   Blood  Glucose Monitoring Suppl (OneTouch Verio Reflect) w/Device KIT  Self No No   Sig: Check blood sugars three times daily. Please substitute with appropriate alternative as covered by patient's insurance. Dx: E11.65   Blood Pressure Monitoring (B-D ASSURE BPM/DELUXE ARM CUFF) MISC  Self No No   Sig: Use in the morning   Incontinence Supplies MISC   No No   Sig: Use in the morning Size small   LORazepam (ATIVAN) 1 mg tablet  Self No No   Sig: Take 1 tablet (1 mg total) by mouth every 8 (eight) hours as needed (nausea or anxiety)   Patient not taking: Reported on 2/21/2025   OneTouch Delica Lancets 33G MISC  Self No No   Sig: Check blood sugars three times daily. Please substitute with appropriate alternative as covered by patient's insurance. Dx: E11.65   aspirin 81 mg chewable tablet  Self No No   Sig: Chew 1 tablet (81 mg total) daily   atorvastatin (LIPITOR) 40 mg tablet  Self No No   Sig: Take 1 tablet (40 mg total) by mouth daily   candesartan (ATACAND) 16 mg tablet   Yes No   Sig: Take 16 mg by mouth daily   carvedilol (COREG) 6.25 mg tablet   Yes No   Sig: Take 6.25 mg by mouth 2 (two) times a day with meals   dapagliflozin (Farxiga) 5 MG TABS   No No   Sig: Take 1 tablet (5 mg total) by mouth daily   furosemide (LASIX) 20 mg tablet  Self No No   Sig: Take 1 tablet (20 mg total) by mouth daily   gabapentin (Neurontin) 100 mg capsule   No No   Sig: Take 1 capsule (100 mg total) by mouth daily at bedtime   glucose blood (OneTouch Verio) test strip  Self No No   Sig: Check blood sugars three times daily. Please substitute with appropriate alternative as covered by patient's insurance. Dx: E11.65   lidocaine (LMX) 4 % cream  Self No No   Sig: Apply topically as needed for mild pain   losartan (COZAAR) 25 mg tablet   No No   Sig: Take 1 tablet (25 mg total) by mouth daily   metFORMIN (GLUCOPHAGE) 500 mg tablet  Self No No   Sig: Take 1 tablet (500 mg total) by mouth daily with breakfast   methylPREDNISolone 4 MG  tablet therapy pack   No No   Sig: Use as directed on package   Patient not taking: Reported on 10/22/2020   metoprolol succinate (TOPROL-XL) 25 mg 24 hr tablet  Self No No   Sig: Take 1 tablet (25 mg total) by mouth 2 (two) times a day   naloxone (NARCAN) 4 mg/0.1 mL nasal spray  Self No No   Sig: Administer 1 spray into a nostril. If no response after 2-3 minutes, give another dose in the other nostril using a new spray.   ondansetron (ZOFRAN) 8 mg tablet  Self No No   Sig: Take 1 tablet (8 mg total) by mouth every 8 (eight) hours as needed for nausea or vomiting   oxyCODONE (ROXICODONE) 5 immediate release tablet   No No   Sig: Take 1 tablet (5 mg total) by mouth every 6 (six) hours as needed for severe pain Max Daily Amount: 20 mg   phenazopyridine (PYRIDIUM) 100 mg tablet   No No   Sig: Take 1 tablet (100 mg total) by mouth 3 (three) times a day as needed for bladder spasms      Facility-Administered Medications: None     Discharge Medication List as of 5/1/2025  9:25 AM        START taking these medications    Details   apixaban (Eliquis) 5 mg Multiple Dosages:Starting Thu 5/1/2025, Until Wed 5/7/2025 at 2359, THEN Starting Thu 5/8/2025, Until Fri 5/30/2025 at 2359Take 2 tablets (10 mg total) by mouth 2 (two) times a day for 7 days, THEN 1 tablet (5 mg total) 2 (two) times a day for 23 days ., Normal           CONTINUE these medications which have NOT CHANGED    Details   aspirin 81 mg chewable tablet Chew 1 tablet (81 mg total) daily, Starting Mon 11/18/2024, Normal      atorvastatin (LIPITOR) 40 mg tablet Take 1 tablet (40 mg total) by mouth daily, Starting Fri 11/8/2024, Normal      Blood Glucose Monitoring Suppl (OneTouch Verio Reflect) w/Device KIT Check blood sugars three times daily. Please substitute with appropriate alternative as covered by patient's insurance. Dx: E11.65, Normal      Blood Pressure Monitoring (B-D ASSURE BPM/DELUXE ARM CUFF) MISC Use in the morning, Starting Fri 11/8/2024, Normal       candesartan (ATACAND) 16 mg tablet Take 16 mg by mouth daily, Historical Med      carvedilol (COREG) 6.25 mg tablet Take 6.25 mg by mouth 2 (two) times a day with meals, Historical Med      dapagliflozin (Farxiga) 5 MG TABS Take 1 tablet (5 mg total) by mouth daily, Starting Wed 4/2/2025, Normal      furosemide (LASIX) 20 mg tablet Take 1 tablet (20 mg total) by mouth daily, Starting Wed 12/18/2024, Normal      gabapentin (Neurontin) 100 mg capsule Take 1 capsule (100 mg total) by mouth daily at bedtime, Starting Tue 4/29/2025, Normal      glucose blood (OneTouch Verio) test strip Check blood sugars three times daily. Please substitute with appropriate alternative as covered by patient's insurance. Dx: E11.65, Normal      Incontinence Supplies MISC Use in the morning Size small, Starting Mon 3/24/2025, Normal      lidocaine (LMX) 4 % cream Apply topically as needed for mild pain, Starting Mon 7/29/2024, Normal      LORazepam (ATIVAN) 1 mg tablet Take 1 tablet (1 mg total) by mouth every 8 (eight) hours as needed (nausea or anxiety), Starting Tue 2/4/2025, Normal      losartan (COZAAR) 25 mg tablet Take 1 tablet (25 mg total) by mouth daily, Starting Wed 4/23/2025, Normal      metFORMIN (GLUCOPHAGE) 500 mg tablet Take 1 tablet (500 mg total) by mouth daily with breakfast, Starting Mon 11/18/2024, Normal      methylPREDNISolone 4 MG tablet therapy pack Use as directed on package, Normal      metoprolol succinate (TOPROL-XL) 25 mg 24 hr tablet Take 1 tablet (25 mg total) by mouth 2 (two) times a day, Starting Wed 12/18/2024, Normal      naloxone (NARCAN) 4 mg/0.1 mL nasal spray Administer 1 spray into a nostril. If no response after 2-3 minutes, give another dose in the other nostril using a new spray., Normal      ondansetron (ZOFRAN) 8 mg tablet Take 1 tablet (8 mg total) by mouth every 8 (eight) hours as needed for nausea or vomiting, Starting Tue 2/4/2025, Normal      OneTouch Delica Lancets 33G MISC Check blood  sugars three times daily. Please substitute with appropriate alternative as covered by patient's insurance. Dx: E11.65, Normal      oxyCODONE (ROXICODONE) 5 immediate release tablet Take 1 tablet (5 mg total) by mouth every 6 (six) hours as needed for severe pain Max Daily Amount: 20 mg, Starting Wed 3/19/2025, Normal      phenazopyridine (PYRIDIUM) 100 mg tablet Take 1 tablet (100 mg total) by mouth 3 (three) times a day as needed for bladder spasms, Starting Tue 4/29/2025, Normal           No discharge procedures on file.  ED SEPSIS DOCUMENTATION   Time reflects when diagnosis was documented in both MDM as applicable and the Disposition within this note       Time User Action Codes Description Comment    5/1/2025  9:21 AM Jeni Baker Add [I82.493] Acute deep vein thrombosis (DVT) of other specified vein of both lower extremities (HCC)                  Jeni Baker PA-C  05/01/25 0939

## 2025-05-02 ENCOUNTER — HOSPITAL ENCOUNTER (OUTPATIENT)
Dept: INFUSION CENTER | Facility: HOSPITAL | Age: 65
End: 2025-05-02
Attending: OBSTETRICS & GYNECOLOGY
Payer: COMMERCIAL

## 2025-05-02 ENCOUNTER — TELEPHONE (OUTPATIENT)
Age: 65
End: 2025-05-02

## 2025-05-02 ENCOUNTER — RESULTS FOLLOW-UP (OUTPATIENT)
Dept: CARDIOLOGY CLINIC | Facility: CLINIC | Age: 65
End: 2025-05-02

## 2025-05-02 VITALS
TEMPERATURE: 97.8 F | DIASTOLIC BLOOD PRESSURE: 74 MMHG | BODY MASS INDEX: 34.43 KG/M2 | SYSTOLIC BLOOD PRESSURE: 111 MMHG | HEART RATE: 80 BPM | HEIGHT: 58 IN | WEIGHT: 164.02 LBS | RESPIRATION RATE: 18 BRPM

## 2025-05-02 DIAGNOSIS — C54.1 ENDOMETRIAL CANCER (HCC): Primary | ICD-10-CM

## 2025-05-02 PROCEDURE — 96413 CHEMO IV INFUSION 1 HR: CPT

## 2025-05-02 PROCEDURE — 96417 CHEMO IV INFUS EACH ADDL SEQ: CPT

## 2025-05-02 PROCEDURE — 96415 CHEMO IV INFUSION ADDL HR: CPT

## 2025-05-02 PROCEDURE — 96375 TX/PRO/DX INJ NEW DRUG ADDON: CPT

## 2025-05-02 PROCEDURE — 96367 TX/PROPH/DG ADDL SEQ IV INF: CPT

## 2025-05-02 RX ORDER — PALONOSETRON 0.05 MG/ML
0.25 INJECTION, SOLUTION INTRAVENOUS ONCE
Status: COMPLETED | OUTPATIENT
Start: 2025-05-02 | End: 2025-05-02

## 2025-05-02 RX ORDER — SODIUM CHLORIDE 9 MG/ML
20 INJECTION, SOLUTION INTRAVENOUS ONCE
Status: COMPLETED | OUTPATIENT
Start: 2025-05-02 | End: 2025-05-02

## 2025-05-02 RX ADMIN — DIPHENHYDRAMINE HYDROCHLORIDE 25 MG: 50 INJECTION, SOLUTION INTRAMUSCULAR; INTRAVENOUS at 09:03

## 2025-05-02 RX ADMIN — PALONOSETRON 0.25 MG: 0.05 INJECTION, SOLUTION INTRAVENOUS at 09:24

## 2025-05-02 RX ADMIN — FOSAPREPITANT 150 MG: 150 INJECTION, POWDER, LYOPHILIZED, FOR SOLUTION INTRAVENOUS at 10:11

## 2025-05-02 RX ADMIN — SODIUM CHLORIDE 500 MG: 9 INJECTION, SOLUTION INTRAVENOUS at 10:52

## 2025-05-02 RX ADMIN — PACLITAXEL 225.6 MG: 6 INJECTION, SOLUTION INTRAVENOUS at 11:36

## 2025-05-02 RX ADMIN — FAMOTIDINE 20 MG: 10 INJECTION, SOLUTION INTRAVENOUS at 09:50

## 2025-05-02 RX ADMIN — DEXAMETHASONE SODIUM PHOSPHATE 20 MG: 10 INJECTION, SOLUTION INTRAMUSCULAR; INTRAVENOUS at 09:29

## 2025-05-02 RX ADMIN — CARBOPLATIN 495 MG: 10 INJECTION, SOLUTION INTRAVENOUS at 14:41

## 2025-05-02 RX ADMIN — SODIUM CHLORIDE 20 ML/HR: 0.9 INJECTION, SOLUTION INTRAVENOUS at 09:01

## 2025-05-02 NOTE — PROGRESS NOTES
Pt tolerated Jemperli, Taxol, carboplatin without difficulty.  Port flushed and deaccessed per protocol.  Confirmed next appt on 5/23 at 0800, and AVS provided.  Escorted to [facility] ambulatory and in stable condition for transport home by brother.

## 2025-05-02 NOTE — TELEPHONE ENCOUNTER
" on the line with patient's brother.    He reports patient has B/L DVTs in LE and is now on anti-coagulation. Patient also had a CTA completed, he is inquiring if patient has any \"blood clots\" in her lungs.    With the recent events, Gregory is inquiring if patient is safe to proceed with treatment.     I added gyn/onc provider to the call, she notes the CTA is negative for clots in the lungs and patient is ok to proceed with treatment.    Gregory also reports patient is starting with some congestion/pressure, but denies any fevers at this time.    Patient and brother agreeable to proceeding with treatment and heading in now. They have no additional questions or concerns at this moment.           "

## 2025-05-08 ENCOUNTER — PATIENT OUTREACH (OUTPATIENT)
Dept: CASE MANAGEMENT | Facility: OTHER | Age: 65
End: 2025-05-08

## 2025-05-08 NOTE — PROGRESS NOTES
OSW outreached to patient with  [id number].    Patient reported that she has been having some pain in her legs, dealing with blood clots, blood sugar difficulties and blood pressure problems. She reported that she does deal with fatigue, headaches, being dizzy, stomach, nausea and staying 1 week in bed after chemo. Patient reported that her provider is aware of all of these problems and is addressing.     Regarding disability, she reported that her and her brother have applied and she was denied. OSW provided education that many people will get denied disability but that they will have to appeal the decision and sometime provide additional information before they get approved. OSW suggested that she make another appointment with the social security office to either apply or appeal depending on where she is on the process. OSW provided contact information and suggested that her and her brother call together to schedule appointment.     Patient denies additional needs. She did take OSW contact information should a need arise.

## 2025-05-17 DIAGNOSIS — N39.498 OTHER URINARY INCONTINENCE: ICD-10-CM

## 2025-05-19 RX ORDER — PHENAZOPYRIDINE HYDROCHLORIDE 100 MG/1
100 TABLET, FILM COATED ORAL 3 TIMES DAILY PRN
Qty: 60 TABLET | Refills: 2 | Status: SHIPPED | OUTPATIENT
Start: 2025-05-19

## 2025-05-20 ENCOUNTER — OFFICE VISIT (OUTPATIENT)
Dept: GYNECOLOGIC ONCOLOGY | Facility: CLINIC | Age: 65
End: 2025-05-20
Payer: COMMERCIAL

## 2025-05-20 VITALS
OXYGEN SATURATION: 98 % | BODY MASS INDEX: 34.29 KG/M2 | DIASTOLIC BLOOD PRESSURE: 70 MMHG | HEIGHT: 58 IN | HEART RATE: 78 BPM | SYSTOLIC BLOOD PRESSURE: 116 MMHG | TEMPERATURE: 97.2 F

## 2025-05-20 DIAGNOSIS — I82.409 ACUTE DEEP VEIN THROMBOSIS (DVT) OF OTHER VEIN OF LOWER EXTREMITY, UNSPECIFIED LATERALITY (HCC): Primary | ICD-10-CM

## 2025-05-20 DIAGNOSIS — M25.561 CHRONIC PAIN OF BOTH KNEES: ICD-10-CM

## 2025-05-20 DIAGNOSIS — G89.29 CHRONIC PAIN OF BOTH KNEES: ICD-10-CM

## 2025-05-20 DIAGNOSIS — M25.562 CHRONIC PAIN OF BOTH KNEES: ICD-10-CM

## 2025-05-20 DIAGNOSIS — E11.9 TYPE 2 DIABETES MELLITUS WITHOUT COMPLICATION, WITHOUT LONG-TERM CURRENT USE OF INSULIN (HCC): ICD-10-CM

## 2025-05-20 DIAGNOSIS — G89.3 CANCER RELATED PAIN: ICD-10-CM

## 2025-05-20 DIAGNOSIS — I50.22 CHRONIC HFREF (HEART FAILURE WITH REDUCED EJECTION FRACTION) (HCC): Chronic | ICD-10-CM

## 2025-05-20 DIAGNOSIS — C54.1 ENDOMETRIAL CANCER (HCC): ICD-10-CM

## 2025-05-20 DIAGNOSIS — G62.9 NEUROPATHY: ICD-10-CM

## 2025-05-20 PROCEDURE — 99214 OFFICE O/P EST MOD 30 MIN: CPT | Performed by: OBSTETRICS & GYNECOLOGY

## 2025-05-20 RX ORDER — GABAPENTIN 100 MG/1
100 CAPSULE ORAL
Qty: 30 CAPSULE | Refills: 2 | Status: SHIPPED | OUTPATIENT
Start: 2025-05-20

## 2025-05-20 RX ORDER — LIDOCAINE AND PRILOCAINE 25; 25 MG/G; MG/G
CREAM TOPICAL AS NEEDED
Qty: 50 G | Refills: 2 | Status: SHIPPED | OUTPATIENT
Start: 2025-05-20

## 2025-05-20 NOTE — ASSESSMENT & PLAN NOTE
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 S/p rad onc referral, plan per discussion with their team is to obtain a post-chemo PETCT and then plan XRT treatment plan. Will order imaging at pre-C6 visit  - Labs reviewed and stable, due for repeat labs 5/21. Plan for C#5 carboplatin AUC 5 / paclitaxel 135 mg/m2 / dostarlimab 500mg on 5/23 as scheduled pending labs  - Labs and premedication as per protocol   - Has PRN Zofran, encouraged continued use  - EMLA sent for port site pain  Orders:    lidocaine-prilocaine (EMLA) cream; Apply topically as needed for mild pain    apixaban (Eliquis) 5 mg; Take 1 tablet (5 mg total) by mouth 2 (two) times a day    gabapentin (Neurontin) 100 mg capsule; Take 1 capsule (100 mg total) by mouth daily at bedtime Take 1-2 capsules (100mg-200mg) by mouth daily at bedtime

## 2025-05-20 NOTE — ASSESSMENT & PLAN NOTE
Wt Readings from Last 3 Encounters:   05/02/25 74.4 kg (164 lb 0.4 oz)   05/01/25 74.4 kg (164 lb)   05/01/25 71.5 kg (157 lb 10.1 oz)   - Follows with [facility] cardiology Dr. Ahn, last appt 5/1  - 11/2024 TTE with LVEF 55%, grade 1 diastolic dysfunction  -Repeat TTE performed 4/2025 LVEF 50%  - Cleared by cards to continue chemo  - Continue atorvastatin, furosemide, losartan, metoprolol, jardiance

## 2025-05-20 NOTE — ASSESSMENT & PLAN NOTE
Lab Results   Component Value Date    HGBA1C 6.9 (H) 04/30/2025   - Managed by PCP (Dr. Lea)  - Metformin 500mg BID, farxiga

## 2025-05-20 NOTE — ASSESSMENT & PLAN NOTE
- Right soleal vein DVT on LED 5/1  - CT PE on 5/1 negative  - Started on eliquis 5/1. Only had starter pack, rx sent for ongoing eliquis 5mg BID  Orders:    apixaban (Eliquis) 5 mg; Take 1 tablet (5 mg total) by mouth 2 (two) times a day

## 2025-05-20 NOTE — PROGRESS NOTES
Name: Adrienne Theodore      : 1960      MRN: [id number]  Encounter Provider: Belkys Ho MD  Encounter Date: 2025   Encounter department: [facility] [place]  :  Assessment & Plan  Acute deep vein thrombosis (DVT) of other vein of lower extremity, unspecified laterality (HCC)  - Right soleal vein DVT on LED   - CT PE on  negative  - Started on eliquis . Only had starter pack, rx sent for ongoing eliquis 5mg BID  Orders:    apixaban (Eliquis) 5 mg; Take 1 tablet (5 mg total) by mouth 2 (two) times a day    Chronic HFrEF (heart failure with reduced ejection fraction) ([facility])  Wt Readings from Last 3 Encounters:   25 74.4 kg (164 lb 0.4 oz)   25 74.4 kg (164 lb)   25 71.5 kg (157 lb 10.1 oz)   - Follows with [facility] cardiology Dr. Ahn, last appt   - 2024 TTE with LVEF 55%, grade 1 diastolic dysfunction  -Repeat TTE performed 2025 LVEF 50%  - Cleared by cards to continue chemo  - Continue atorvastatin, furosemide, losartan, metoprolol, jardiance       Type 2 diabetes mellitus without complication, without long-term current use of insulin ([facility])    Lab Results   Component Value Date    HGBA1C 6.9 (H) 2025   - Managed by PCP (Dr. 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 S/p rad onc referral, plan per discussion with their team is to obtain a post-chemo PETCT and then plan XRT treatment plan. Will order imaging at pre-C6 visit  - Labs reviewed and stable, due for repeat labs . Plan for C#5 carboplatin AUC 5 / paclitaxel 135 mg/m2 / dostarlimab 500mg on  as scheduled pending labs  - Labs and  premedication as per protocol   - Has PRN Zofran, encouraged continued use  - EMLA sent for port site pain  Orders:    lidocaine-prilocaine (EMLA) cream; Apply topically as needed for mild pain    apixaban (Eliquis) 5 mg; Take 1 tablet (5 mg total) by mouth 2 (two) times a day    gabapentin (Neurontin) 100 mg capsule; Take 1 capsule (100 mg total) by mouth daily at bedtime Take 1-2 capsules (100mg-200mg) by mouth daily at bedtime    Cancer related pain  - Follows with Dr. Wood, last appt 4/30, next appt 5/21  - pain well controlled on oxycodone IR 5mg q6hr PRN       Neuropathy  - Pre-existing T2DM  - Symptoms improved after taxol dose-reduced  - Started on gabapentin 4/29. Refill sent today, instructed to take 200mg nightly  Orders:    gabapentin (Neurontin) 100 mg capsule; Take 1 capsule (100 mg total) by mouth daily at bedtime Take 1-2 capsules (100mg-200mg) by mouth daily at bedtime    Chronic pain of both knees  - Chronic pain with gradual worsening per patient  - On exam, bilateral knees are tender at patella/tibial plateau  - Bilateral knee x-rays ordered at last visit, patient to obtain tomorrow 5/21  - Discussed possibility of orthopedic surgery consult pending xrays           History of Present Illness   Reason for Visit / CC:  Pre-Chemo Visit    Adrienne Theodore is a 64 y.o. female with PMH of HTN, HLD, DM-2, MI and stage IIIC2 MMRp p53m [id number] uterine papillary serous carcinoma s/p RATLH/BSO/PPALND now on chemoIO with plan for consolidative XRT. She is most recently s/p C#4 carbo/taxol/dostarlimab on 5/2. She presents for C#5 clearance today.    She was last seen in clinic on 4/29. At that appointment she reported improved urinary pain with pyridium. She did report worsening LE edema. A LED was performed on 5/1 demonstrating a DVT and she was started on eliquis. CT PE was negative on 5/1. She sees palliative care (Dr. Wood, last seen 4/30) and is comfortable with current regimen of  oxycodone IR PRN. She follows with Dr. Ahn for cardiology; she had a TTE on 4/24 w LVEF 50% (improved from 15%) and an appt on 5/1 with no medication changes.    Today patient's main complaint is worsening chronic bilateral knee pain. She thinks that her knees appear swollen bilaterally and it is more difficult for her to get around compared to prior. This is separate from her other leg swelling and lower extremity pain which is now improved with eliquis initiation and ongoing furosemide per cardiology. Abdominal pain well controlled. Peripheral neuropathy symptoms improved with gabapentin with almost no remaining numbness or tingling, requests refill. Pyridium continuing to work well for her urinary symptoms. She denies any current nausea or emesis and states that her Zofran is working well. Occasional constipation, otherwise no bowel changes. Ambulating with walker (which is her baseline), otherwise at normal function. Denies vaginal bleeding/discharge. She otherwise complains of port pain today. Denies chest pain/SOB/palpitations.        Oncology History   Cancer Staging   Endometrial cancer (HCC)  Staging form: Corpus Uteri - Carcinoma, AJCC 8th Edition  - Clinical stage from 1/16/2025: FIGO Stage IIIC2 (cT1b, cN2, cM0) - Signed by Belkys Ho MD on 2/3/2025  Histopathologic type: Papillary serous cystadenocarcinoma  Stage prefix: Initial diagnosis  Histologic grade (G): G3  Histologic grading system: 3 grade system  Lymph-vascular invasion (LVI): LVI present/identified, NOS  Pelvic laura status: Positive  Number of pelvic nodes positive from dissection: 9  Number of pelvic nodes examined during dissection: 20  Para-aortic status: Positive  Number of para-aortic nodes positive from dissection: 7  Number of para-aortic nodes examined during dissection: 9  Lymph node metastasis: Present  Omentectomy performed: Yes  Oncology History   Endometrial cancer (HCC)   12/5/2024 Initial Diagnosis     Endometrial cancer (HCC)     1/16/2025 -  Cancer Staged    Staging form: Corpus Uteri - Carcinoma, AJCC 8th Edition  - Clinical stage from 1/16/2025: FIGO Stage IIIC2 (cT1b, cN2, cM0) - Signed by Belkys Ho MD on 2/3/2025  Histopathologic type: Papillary serous cystadenocarcinoma  Stage prefix: Initial diagnosis  Histologic grade (G): G3  Histologic grading system: 3 grade system  Lymph-vascular invasion (LVI): LVI present/identified, NOS  Pelvic laura status: Positive  Number of pelvic nodes positive from dissection: 9  Number of pelvic nodes examined during dissection: 20  Para-aortic status: Positive  Number of para-aortic nodes positive from dissection: 7  Number of para-aortic nodes examined during dissection: 9  Lymph node metastasis: Present  Omentectomy performed: Yes       2/21/2025 -  Chemotherapy    dostarlimab-gxly (JEMPERLI) IVPB, 500 mg, 4 of 12 cycles  Administration: 500 mg (2/21/2025), 500 mg (4/11/2025), 500 mg (3/14/2025), 500 mg (5/2/2025)  CARBOplatin (PARAPLATIN) IVPB (GOG AUC DOSING), 484 mg, 4 of 6 cycles  Administration: 484 mg (2/21/2025), 495 mg (4/11/2025), 484 mg (3/14/2025), 495 mg (5/2/2025)  PACLItaxel (TAXOL) chemo IVPB, 175 mg/m2 = 295.8 mg, 4 of 6 cycles  Dose modification: 135 mg/m2 (original dose 175 mg/m2, Cycle 3, Reason: Neuropathy)  Administration: 295.8 mg (2/21/2025), 225.6 mg (4/11/2025), 228 mg (3/14/2025), 225.6 mg (5/2/2025)        Review of Systems   Constitutional:  Negative for chills and fever.   HENT:  Negative for ear pain and sore throat.    Eyes:  Negative for pain and visual disturbance.   Respiratory:  Negative for cough and shortness of breath.    Cardiovascular:  Negative for chest pain and palpitations.   Gastrointestinal:  Positive for constipation. Negative for abdominal pain, diarrhea, nausea and vomiting.   Genitourinary:  Positive for difficulty urinating and pelvic pain. Negative for dysuria, hematuria, vaginal bleeding and vaginal  "discharge.   Musculoskeletal:  Positive for joint swelling. Negative for arthralgias and back pain.        Bilateral knee pain   Skin:  Negative for color change and rash.   Neurological:  Negative for seizures and syncope.   All other systems reviewed and are negative.   A complete review of systems is negative other than that noted above in the HPI.  Medical History Reviewed by provider this encounter:  Tobacco  Allergies  Meds  Problems  Med Hx  Surg Hx  Fam Hx     .     Objective   /70   Pulse 78   Temp (!) 97.2 °F (36.2 °C) (Temporal)   Ht 4' 9.99\" (1.473 m)   SpO2 98%   BMI 34.29 kg/m²     Body mass index is 34.29 kg/m².  Pain Screening:     ECOG ECOG Performance Status: 2 - Ambulatory and capable of all selfcare but unable to carry out any work activities.  Up and about more than 50% of waking hours   Physical Exam  Vitals reviewed. Exam conducted with a chaperone present.   Constitutional:       General: She is not in acute distress.     Appearance: Normal appearance. She is not ill-appearing.   HENT:      Head: Normocephalic and atraumatic.      Mouth/Throat:      Mouth: Mucous membranes are moist.     Eyes:      General:         Right eye: No discharge.         Left eye: No discharge.      Conjunctiva/sclera: Conjunctivae normal.     Neck:      Comments: Port site c/d/I without e/o infection  Cardiovascular:      Rate and Rhythm: Normal rate and regular rhythm.   Pulmonary:      Effort: Pulmonary effort is normal.   Abdominal:      Palpations: Abdomen is soft. There is no mass.      Tenderness: There is no abdominal tenderness.      Hernia: No hernia is present.   Genitourinary:     Comments: Deferred    Musculoskeletal:         General: Swelling (1+ bilateral LE edema) and tenderness present.      Right lower leg: No edema.      Left lower leg: No edema.      Comments: Pain worst at bilateral knee patella/tibial plateaus     Skin:     General: Skin is warm and dry.      Coloration: Skin " is not jaundiced.      Findings: No rash.     Neurological:      General: No focal deficit present.      Mental Status: She is alert and oriented to person, place, and time.      Cranial Nerves: No cranial nerve deficit.      Sensory: No sensory deficit.      Motor: No weakness.      Gait: Gait normal.     Psychiatric:         Mood and Affect: Mood normal.         Behavior: Behavior normal.         Thought Content: Thought content normal.         Judgment: Judgment normal.          Labs: I have reviewed pertinent labs.   Lab Results   Component Value Date/Time     5.7 04/30/2025 08:58 AM     Lab Results   Component Value Date/Time    Potassium 4.2 04/30/2025 08:58 AM    Chloride 104 04/30/2025 08:58 AM    CO2 27 04/30/2025 08:58 AM    BUN 16 04/30/2025 08:58 AM    Creatinine 0.52 (L) 04/30/2025 08:58 AM    Glucose, Fasting 120 (H) 04/30/2025 08:58 AM    Calcium 8.9 04/30/2025 08:58 AM    AST 16 04/30/2025 08:58 AM    ALT 18 04/30/2025 08:58 AM    Alkaline Phosphatase 67 04/30/2025 08:58 AM    eGFR 101 04/30/2025 08:58 AM     Lab Results   Component Value Date/Time    WBC 4.63 04/30/2025 08:58 AM    Hemoglobin 11.4 (L) 04/30/2025 08:58 AM    Hematocrit 35.0 04/30/2025 08:58 AM    MCV 95 04/30/2025 08:58 AM    Platelets 222 04/30/2025 08:58 AM     Lab Results   Component Value Date/Time    Absolute Neutrophils 2.30 04/30/2025 08:58 AM        Trend:  Lab Results   Component Value Date     5.7 04/30/2025     5.7 04/09/2025     5.0 04/02/2025     6.2 03/12/2025     5.4 03/07/2025     7.2 02/19/2025     15.2 02/04/2025     7.3 12/18/2024       Radiology Results Review : No pertinent imaging studies reviewed.  Other Study Results Review : No additional pertinent studies reviewed.    Administrative Statements   I have spent a total time of 30 minutes in caring for this patient on the day of the visit/encounter including Risks and benefits of tx options, Instructions for  management, Patient and family education, Counseling / Coordination of care, and Documenting in the medical record.    Belkys Ho MD, MPH  Division of Gynecologic Oncology  05/20/25 12:04 PM

## 2025-05-20 NOTE — ASSESSMENT & PLAN NOTE
- Pre-existing T2DM  - Symptoms improved after taxol dose-reduced  - Started on gabapentin 4/29. Refill sent today, instructed to take 200mg nightly  Orders:    gabapentin (Neurontin) 100 mg capsule; Take 1 capsule (100 mg total) by mouth daily at bedtime Take 1-2 capsules (100mg-200mg) by mouth daily at bedtime

## 2025-05-20 NOTE — ASSESSMENT & PLAN NOTE
- Follows with Dr. Wood, last appt 4/30, next appt 5/21  - pain well controlled on oxycodone IR 5mg q6hr PRN

## 2025-05-20 NOTE — ASSESSMENT & PLAN NOTE
- Chronic pain with gradual worsening per patient  - On exam, bilateral knees are tender at patella/tibial plateau  - Bilateral knee x-rays ordered at last visit, patient to obtain tomorrow 5/21  - Discussed possibility of orthopedic surgery consult pending xrays

## 2025-05-21 ENCOUNTER — APPOINTMENT (OUTPATIENT)
Dept: LAB | Facility: HOSPITAL | Age: 65
End: 2025-05-21
Payer: COMMERCIAL

## 2025-05-21 ENCOUNTER — HOSPITAL ENCOUNTER (OUTPATIENT)
Dept: RADIOLOGY | Facility: HOSPITAL | Age: 65
Discharge: HOME | End: 2025-05-21
Payer: COMMERCIAL

## 2025-05-21 ENCOUNTER — OFFICE VISIT (OUTPATIENT)
Dept: PALLIATIVE MEDICINE | Facility: CLINIC | Age: 65
End: 2025-05-21
Payer: COMMERCIAL

## 2025-05-21 VITALS
SYSTOLIC BLOOD PRESSURE: 120 MMHG | HEART RATE: 76 BPM | DIASTOLIC BLOOD PRESSURE: 68 MMHG | TEMPERATURE: 97.7 F | RESPIRATION RATE: 15 BRPM | WEIGHT: 166.4 LBS | OXYGEN SATURATION: 96 % | BODY MASS INDEX: 34.79 KG/M2

## 2025-05-21 DIAGNOSIS — E11.9 TYPE 2 DIABETES MELLITUS WITHOUT COMPLICATION, WITHOUT LONG-TERM CURRENT USE OF INSULIN (HCC): ICD-10-CM

## 2025-05-21 DIAGNOSIS — G89.29 CHRONIC PAIN OF BOTH KNEES: ICD-10-CM

## 2025-05-21 DIAGNOSIS — C54.1 ENDOMETRIAL CANCER (HCC): Primary | ICD-10-CM

## 2025-05-21 DIAGNOSIS — R60.0 LOWER EXTREMITY EDEMA: ICD-10-CM

## 2025-05-21 DIAGNOSIS — M25.562 CHRONIC PAIN OF BOTH KNEES: ICD-10-CM

## 2025-05-21 DIAGNOSIS — G89.29 CHRONIC BILATERAL THORACIC BACK PAIN: ICD-10-CM

## 2025-05-21 DIAGNOSIS — M54.6 CHRONIC BILATERAL THORACIC BACK PAIN: ICD-10-CM

## 2025-05-21 DIAGNOSIS — Z90.710 STATUS POST HYSTERECTOMY: ICD-10-CM

## 2025-05-21 DIAGNOSIS — Z51.5 PALLIATIVE CARE ENCOUNTER: ICD-10-CM

## 2025-05-21 DIAGNOSIS — G89.3 CANCER RELATED PAIN: ICD-10-CM

## 2025-05-21 DIAGNOSIS — C54.1 ENDOMETRIAL CANCER (HCC): ICD-10-CM

## 2025-05-21 DIAGNOSIS — M25.561 CHRONIC PAIN OF BOTH KNEES: ICD-10-CM

## 2025-05-21 DIAGNOSIS — K59.03 DRUG-INDUCED CONSTIPATION: ICD-10-CM

## 2025-05-21 LAB
ALBUMIN SERPL BCG-MCNC: 4.5 G/DL (ref 3.5–5)
ALP SERPL-CCNC: 72 U/L (ref 34–104)
ALT SERPL W P-5'-P-CCNC: 22 U/L (ref 7–52)
ANION GAP SERPL CALCULATED.3IONS-SCNC: 10 MMOL/L (ref 4–13)
AST SERPL W P-5'-P-CCNC: 19 U/L (ref 13–39)
BASOPHILS # BLD AUTO: 0.03 THOUSANDS/ÂΜL (ref 0–0.1)
BASOPHILS NFR BLD AUTO: 1 % (ref 0–1)
BILIRUB SERPL-MCNC: 0.37 MG/DL (ref 0.2–1)
BUN SERPL-MCNC: 15 MG/DL (ref 5–25)
CALCIUM SERPL-MCNC: 9.4 MG/DL (ref 8.4–10.2)
CHLORIDE SERPL-SCNC: 100 MMOL/L (ref 96–108)
CO2 SERPL-SCNC: 28 MMOL/L (ref 21–32)
CREAT SERPL-MCNC: 0.53 MG/DL (ref 0.6–1.3)
EOSINOPHIL # BLD AUTO: 0.04 THOUSAND/ÂΜL (ref 0–0.61)
EOSINOPHIL NFR BLD AUTO: 1 % (ref 0–6)
ERYTHROCYTE [DISTWIDTH] IN BLOOD BY AUTOMATED COUNT: 14.5 % (ref 11.6–15.1)
EST. AVERAGE GLUCOSE BLD GHB EST-MCNC: 163 MG/DL
GFR SERPL CREATININE-BSD FRML MDRD: 100 ML/MIN/1.73SQ M
GLUCOSE P FAST SERPL-MCNC: 126 MG/DL (ref 65–99)
HBA1C MFR BLD: 7.3 %
HCT VFR BLD AUTO: 36.4 % (ref 34.8–46.1)
HGB BLD-MCNC: 11.8 G/DL (ref 11.5–15.4)
IMM GRANULOCYTES # BLD AUTO: 0.04 THOUSAND/UL (ref 0–0.2)
IMM GRANULOCYTES NFR BLD AUTO: 1 % (ref 0–2)
LYMPHOCYTES # BLD AUTO: 1.7 THOUSANDS/ÂΜL (ref 0.6–4.47)
LYMPHOCYTES NFR BLD AUTO: 36 % (ref 14–44)
MAGNESIUM SERPL-MCNC: 2 MG/DL (ref 1.9–2.7)
MCH RBC QN AUTO: 30.5 PG (ref 26.8–34.3)
MCHC RBC AUTO-ENTMCNC: 32.4 G/DL (ref 31.4–37.4)
MCV RBC AUTO: 94 FL (ref 82–98)
MONOCYTES # BLD AUTO: 0.47 THOUSAND/ÂΜL (ref 0.17–1.22)
MONOCYTES NFR BLD AUTO: 10 % (ref 4–12)
NEUTROPHILS # BLD AUTO: 2.39 THOUSANDS/ÂΜL (ref 1.85–7.62)
NEUTS SEG NFR BLD AUTO: 51 % (ref 43–75)
NRBC BLD AUTO-RTO: 0 /100 WBCS
PLATELET # BLD AUTO: 273 THOUSANDS/UL (ref 149–390)
PMV BLD AUTO: 10.5 FL (ref 8.9–12.7)
POTASSIUM SERPL-SCNC: 4.2 MMOL/L (ref 3.5–5.3)
PROT SERPL-MCNC: 7.9 G/DL (ref 6.4–8.4)
RBC # BLD AUTO: 3.87 MILLION/UL (ref 3.81–5.12)
SODIUM SERPL-SCNC: 138 MMOL/L (ref 135–147)
WBC # BLD AUTO: 4.65 THOUSAND/UL (ref 4.31–10.16)

## 2025-05-21 PROCEDURE — 85025 COMPLETE CBC W/AUTO DIFF WBC: CPT

## 2025-05-21 PROCEDURE — 36415 COLL VENOUS BLD VENIPUNCTURE: CPT

## 2025-05-21 PROCEDURE — 73562 X-RAY EXAM OF KNEE 3: CPT

## 2025-05-21 PROCEDURE — 83735 ASSAY OF MAGNESIUM: CPT

## 2025-05-21 PROCEDURE — 99214 OFFICE O/P EST MOD 30 MIN: CPT | Performed by: INTERNAL MEDICINE

## 2025-05-21 PROCEDURE — 83036 HEMOGLOBIN GLYCOSYLATED A1C: CPT

## 2025-05-21 PROCEDURE — 80053 COMPREHEN METABOLIC PANEL: CPT

## 2025-05-21 RX ORDER — OXYCODONE HYDROCHLORIDE 5 MG/1
5 TABLET ORAL EVERY 6 HOURS PRN
Qty: 90 TABLET | Refills: 0 | Status: SHIPPED | OUTPATIENT
Start: 2025-05-21

## 2025-05-21 RX ORDER — LIDOCAINE 40 MG/G
CREAM TOPICAL AS NEEDED
Qty: 28 G | Refills: 1 | Status: SHIPPED | OUTPATIENT
Start: 2025-05-21

## 2025-05-21 NOTE — ASSESSMENT & PLAN NOTE
GynOnc ordered Xray of both knees, not done yet  Orders:    lidocaine (LMX) 4 % cream; Apply topically as needed for mild pain (to both kness)     Home

## 2025-05-21 NOTE — ASSESSMENT & PLAN NOTE
Continues on Paclitaxel, Carboplatin, Dostarlimab, that started on 2/21/2025 with last chemotherapy cycle booked around 6/6/25 for 6 cycles  Then will get post chemo PET and possible XRT  Tolerating above relatively well except for increase in joint/bone/muscle pain/neuropathy, responding well to as needed oxycodone  Orders:    oxyCODONE (ROXICODONE) 5 immediate release tablet; Take 1 tablet (5 mg total) by mouth every 6 (six) hours as needed for severe pain Max Daily Amount: 20 mg

## 2025-05-21 NOTE — ASSESSMENT & PLAN NOTE
Mostly in the lower abdomen/vagina area/groin and now knees  Continue OxyIR 5mg PO q6H prn - averaging 0-2 a day   Lidocaine cream to the knees, 4x a day prn  Orders:    oxyCODONE (ROXICODONE) 5 immediate release tablet; Take 1 tablet (5 mg total) by mouth every 6 (six) hours as needed for severe pain Max Daily Amount: 20 mg

## 2025-05-21 NOTE — PROGRESS NOTES
Name: Adrienne Theodore      : 1960      MRN: [id number]  Encounter Provider: Renetta Wood MD  Encounter Date: 2025   [facility]  :  Assessment & Plan  Endometrial cancer (HCC)  Continues on Paclitaxel, Carboplatin, Dostarlimab, that started on 2025 with last chemotherapy cycle booked around 25 for 6 cycles  Then will get post chemo PET and possible XRT  Tolerating above relatively well except for increase in joint/bone/muscle pain/neuropathy, responding well to as needed oxycodone  Orders:    oxyCODONE (ROXICODONE) 5 immediate release tablet; Take 1 tablet (5 mg total) by mouth every 6 (six) hours as needed for severe pain Max Daily Amount: 20 mg    Cancer related pain  Mostly in the lower abdomen/vagina area/groin and now knees  Continue OxyIR 5mg PO q6H prn - averaging 0-2 a day   Lidocaine cream to the knees, 4x a day prn  Orders:    oxyCODONE (ROXICODONE) 5 immediate release tablet; Take 1 tablet (5 mg total) by mouth every 6 (six) hours as needed for severe pain Max Daily Amount: 20 mg    Drug-induced constipation  Continue miralax and Milk of Mg OD prn - uses either one to good effect        Palliative care encounter    Orders:    oxyCODONE (ROXICODONE) 5 immediate release tablet; Take 1 tablet (5 mg total) by mouth every 6 (six) hours as needed for severe pain Max Daily Amount: 20 mg    Status post RA- Total Laparoscopic  hysterectomy, bilateral salpingoopherectomy, pelvic and para-aortic lymphadenectomy, omental biopsy         Chronic pain of both knees  GynOnc ordered Xray of both knees, not done yet  Orders:    lidocaine (LMX) 4 % cream; Apply topically as needed for mild pain (to both kness)      Follow up  RTO in 4 weeks, check in on symptoms    Decisional apparatus: Patient is competent on my exam today. If competence is lost, patient's substitute decision maker would default to siblings by [place] Act 169.     PDMP  Review: I have reviewed the patient's controlled substance dispensing history in the Prescription Drug Monitoring Program in compliance with the [place] regulations before prescribing any controlled substances.    03/19/2025 03/19/2025 1 Oxycodone Hcl (Ir) 5 Mg Tablet 90.00 23 Ti Donnell [id number] S h (1081) 0 29.35 MME Comm Ins [place]   02/14/2025 02/14/2025 1 Oxycodone Hcl (Ir) 5 Mg Tablet 10.00 3 Sa Hue [id number] S h (1081) 0 25.00 MME Private Pay [place]   02/07/2025 02/04/2025 1 Oxycodone Hcl (Ir) 5 Mg Tablet 10.00 2 Sa Hue [id number] S h (1081) 0 37.50 MME Private Pay [place]   02/04/2025 02/04/2025 1 Lorazepam 1 Mg Tablet 20.00 7 Je Syn [id number] S h (1081) 0 2.86 LME Comm Ins [place]   01/18/2025 01/18/2025 1 Oxycodone Hcl (Ir) 5 Mg Tablet 16.00 4 Ja O\' [id number] St (9996) 0 30.00 [place]ME Comm [place]   12/17/2024 12/17/2024 1 Tramadol Hcl 50 Mg Tablet 20.00 5 Sa Hue [id number] S h (1081) 0 40.00 MME Comm Ins [place]     History of Present Illness   Adrienne Theodore is a 64 y.o. female with newly diagnosed stage IIIC2 serous endometrial cancer with CT CAP in 12/16/2024 showing markedly thickened, heterogeneously enhancing endometrium compatible with malignancy; mild bilateral iliac chain and retroperitoneal lymphadenopathy, suspicious for metastases; no findings of metastatic disease in the chest. She underwent CASSANDRA/BSO with PLND and PALND in 1/16/25. Postoperative CT abdomen pelvis showed postoperative changes and stability in the left adrenal nodule. Her case was reviewed at multidisciplinary tumor board with recommendations for systemic therapy with chemotherapy/immunotherapy, followed by external beam radiation therapy.  She is currently on Paclitaxel, carboplatin, Dostarlimab, that started on 2/21/2025 with last chemotherapy cycle booked around 6/6/25. She is referred to palliative medicine for supportive cares.     INTERVAL HISTORY/HPI:  Since her last visit, was found to have acute DVT, no PE - started on Eliquis. She returns today  for routine follow up with her brother.  via [employer] utilized [employer]/ID# [id number]. Complains of bilateral knee pain for which gynonc ordered xray of the knees. I also ordered lido cream on her knees. I explained that onc ordered xray and will let her know further recommendations once they receive results. They plan to do today. She uses oxyIR 5mg for her knee pain as well to good effect. Her CRP in the abdominal/vaginal area is also well controlled with oxyIR. Overall, averaging around 0-2 a day. She is moving her bowels with prn Milk of Mg or miralax OD.     Objective   There were no vitals taken for this visit.    Physical Exam  Constitutional:       Comments: Chronically ill looking, not toxic appearing. Comfortable, pleasant, stable   HENT:      Head: Normocephalic and atraumatic.     Eyes:      General: No scleral icterus.        Right eye: No discharge.         Left eye: No discharge.     Pulmonary:      Effort: Pulmonary effort is normal. No respiratory distress.      Comments: On RA  Abdominal:      General: Abdomen is flat. There is no distension.     Musculoskeletal:         General: No swelling.      Comments: Knees are not red warm of swollen but is slightly tender to touch     Skin:     General: Skin is warm and dry.      Coloration: Skin is not jaundiced or pale.     Neurological:      General: No focal deficit present.      Mental Status: She is alert and oriented to person, place, and time.     Psychiatric:         Mood and Affect: Mood normal.         Behavior: Behavior normal.         Thought Content: Thought content normal.         Judgment: Judgment normal.       Recent labs:  Lab Results   Component Value Date/Time    SODIUM 140 04/30/2025 08:58 AM    K 4.2 04/30/2025 08:58 AM    BUN 16 04/30/2025 08:58 AM    CREATININE 0.52 (L) 04/30/2025 08:58 AM    GLUC 80 02/04/2025 03:42 PM    CALCIUM 8.9 04/30/2025 08:58 AM    AST 16 04/30/2025 08:58 AM    ALT 18 04/30/2025 08:58 AM    ALB  4.1 04/30/2025 08:58 AM    TP 7.4 04/30/2025 08:58 AM    EGFR 101 04/30/2025 08:58 AM     Lab Results   Component Value Date/Time    HGB 11.4 (L) 04/30/2025 08:58 AM    WBC 4.63 04/30/2025 08:58 AM     04/30/2025 08:58 AM    INR 0.91 12/18/2024 09:22 AM    PTT 27 12/18/2024 09:22 AM     Lab Results   Component Value Date/Time    AIR1MZXFJPLC 2.890 04/09/2025 08:51 AM       Recent Imaging:  Procedure: CTA chest pe study  Result Date: 5/1/2025  Impression: 1. No evidence of proximal pulmonary artery embolus. Distal order branches not diagnostically evaluated due to respiratory motion artifact. 2. No acute cardiopulmonary process. Workstation performed: [id number]     Procedure: FL guided central venous access device insertion  Result Date: 3/14/2025  Impression: Impression: Successful placement of a single lumen chest port via the right internal jugular vein. Workstation performed: [id number]     Procedure: CT abdomen pelvis w contrast  Result Date: 2/4/2025  Impression: Status post interval hysterectomy. Bilateral pelvic fluid collections likely representing postoperative seromas. Indistinct soft tissue about the iliac bifurcation and right common iliac artery. This may be postoperative however attention on follow-up is recommended to exclude a neoplastic etiology. Workstation performed: [id number]       Administrative Statements   I have spent a total time of 25 minutes in caring for this patient on the day of the visit/encounter including Diagnostic results, Risks and benefits of tx options, Instructions for management, Patient and family education, Importance of tx compliance, Risk factor reductions, Impressions, Counseling / Coordination of care, Documenting in the medical record, Reviewing/placing orders in the medical record (including tests, medications, and/or procedures), and Obtaining or reviewing history  .   Topics discussed with the patient / family include symptom assessment and management,  medication review, medication adjustment, psychosocial support, opioid titration, supportive listening, and anticipatory guidance.    Renetta Wood MD  Palliative Medicine & Supportive Care  Internal Medicine  Available via [employer] [employer]  Office: [phone]  Fax: [phone]

## 2025-05-22 ENCOUNTER — RESULTS FOLLOW-UP (OUTPATIENT)
Dept: GYNECOLOGIC ONCOLOGY | Facility: CLINIC | Age: 65
End: 2025-05-22

## 2025-05-22 RX ORDER — SODIUM CHLORIDE 9 MG/ML
20 INJECTION, SOLUTION INTRAVENOUS ONCE
Status: CANCELLED | OUTPATIENT
Start: 2025-05-23

## 2025-05-22 RX ORDER — PALONOSETRON 0.05 MG/ML
0.25 INJECTION, SOLUTION INTRAVENOUS ONCE
Status: CANCELLED | OUTPATIENT
Start: 2025-05-23

## 2025-05-22 NOTE — TELEPHONE ENCOUNTER
Phone note    Attempted to call patient to discuss knee xray results which show no acute problem including no fracture, dislocation, effusion, or arthritis.     No answer, left VM in Croatian. MyChart not set up for patient.    Next visit 6/6    Belkys Ho MD, MPH  Division of Gynecologic Oncology  05/22/25 3:56 PM

## 2025-05-23 ENCOUNTER — HOSPITAL ENCOUNTER (OUTPATIENT)
Dept: INFUSION CENTER | Facility: HOSPITAL | Age: 65
End: 2025-05-23
Attending: OBSTETRICS & GYNECOLOGY
Payer: COMMERCIAL

## 2025-05-23 VITALS
BODY MASS INDEX: 35.54 KG/M2 | RESPIRATION RATE: 18 BRPM | DIASTOLIC BLOOD PRESSURE: 71 MMHG | WEIGHT: 169.31 LBS | SYSTOLIC BLOOD PRESSURE: 109 MMHG | TEMPERATURE: 97.5 F | HEART RATE: 80 BPM | HEIGHT: 58 IN

## 2025-05-23 DIAGNOSIS — C54.1 ENDOMETRIAL CANCER (HCC): Primary | ICD-10-CM

## 2025-05-23 PROCEDURE — 96415 CHEMO IV INFUSION ADDL HR: CPT

## 2025-05-23 PROCEDURE — 96413 CHEMO IV INFUSION 1 HR: CPT

## 2025-05-23 PROCEDURE — 96367 TX/PROPH/DG ADDL SEQ IV INF: CPT

## 2025-05-23 PROCEDURE — 96417 CHEMO IV INFUS EACH ADDL SEQ: CPT

## 2025-05-23 PROCEDURE — 96375 TX/PRO/DX INJ NEW DRUG ADDON: CPT

## 2025-05-23 RX ORDER — SODIUM CHLORIDE 9 MG/ML
20 INJECTION, SOLUTION INTRAVENOUS ONCE
Status: COMPLETED | OUTPATIENT
Start: 2025-05-23 | End: 2025-05-23

## 2025-05-23 RX ORDER — PALONOSETRON 0.05 MG/ML
0.25 INJECTION, SOLUTION INTRAVENOUS ONCE
Status: COMPLETED | OUTPATIENT
Start: 2025-05-23 | End: 2025-05-23

## 2025-05-23 RX ADMIN — DIPHENHYDRAMINE HYDROCHLORIDE 25 MG: 50 INJECTION, SOLUTION INTRAMUSCULAR; INTRAVENOUS at 09:13

## 2025-05-23 RX ADMIN — SODIUM CHLORIDE 20 ML/HR: 0.9 INJECTION, SOLUTION INTRAVENOUS at 08:40

## 2025-05-23 RX ADMIN — PALONOSETRON 0.25 MG: 0.05 INJECTION, SOLUTION INTRAVENOUS at 09:36

## 2025-05-23 RX ADMIN — CARBOPLATIN 495 MG: 10 INJECTION, SOLUTION INTRAVENOUS at 14:45

## 2025-05-23 RX ADMIN — FOSAPREPITANT DIMEGLUMINE 150 MG: 150 INJECTION, POWDER, LYOPHILIZED, FOR SOLUTION INTRAVENOUS at 09:38

## 2025-05-23 RX ADMIN — PACLITAXEL 225.6 MG: 6 INJECTION, SOLUTION INTRAVENOUS at 11:43

## 2025-05-23 RX ADMIN — DEXAMETHASONE SODIUM PHOSPHATE 20 MG: 10 INJECTION, SOLUTION INTRAMUSCULAR; INTRAVENOUS at 10:14

## 2025-05-23 RX ADMIN — FAMOTIDINE 20 MG: 10 INJECTION, SOLUTION INTRAVENOUS at 08:49

## 2025-05-23 RX ADMIN — SODIUM CHLORIDE 500 MG: 9 INJECTION, SOLUTION INTRAVENOUS at 10:58

## 2025-05-23 NOTE — PROGRESS NOTES
Patient tolerated IV chemotherapy without complications. Next appointment scheduled 6/13 at 0800-SH infusion AVS given.

## 2025-05-23 NOTE — PLAN OF CARE
Problem: Knowledge Deficit  Goal: Patient/family/caregiver demonstrates understanding of disease process, treatment plan, medications, and discharge instructions  Description: Complete learning assessment and assess knowledge base.  Interventions:  - Provide teaching at level of understanding  - Provide teaching via preferred learning methods  Outcome: Progressing     Problem: Potential for Falls  Goal: Patient will remain free of falls  Description: INTERVENTIONS:  - Educate patient/family on patient safety including physical limitations  - Instruct patient to call for assistance with activity   - Consider consulting OT/PT to assist with strengthening/mobility based on AM PAC & JH-HLM score  - Consult OT/PT to assist with strengthening/mobility   - Keep Call bell within reach  - Keep bed low and locked with side rails adjusted as appropriate  - Keep care items and personal belongings within reach  - Initiate and maintain comfort rounds  - Make Fall Risk Sign visible to staff  - Apply yellow socks and bracelet for high fall risk patients  - Consider moving patient to room near nurses station  Outcome: Progressing

## 2025-05-27 ENCOUNTER — IN HOME VISIT (OUTPATIENT)
Dept: FAMILY MEDICINE CLINIC | Facility: CLINIC | Age: 65
End: 2025-05-27

## 2025-05-27 VITALS
HEART RATE: 84 BPM | WEIGHT: 161.2 LBS | OXYGEN SATURATION: 97 % | DIASTOLIC BLOOD PRESSURE: 70 MMHG | SYSTOLIC BLOOD PRESSURE: 130 MMHG | BODY MASS INDEX: 33.84 KG/M2 | TEMPERATURE: 97.8 F | RESPIRATION RATE: 16 BRPM

## 2025-05-27 DIAGNOSIS — C54.1 ENDOMETRIAL CANCER (HCC): ICD-10-CM

## 2025-05-27 DIAGNOSIS — Z90.710 STATUS POST HYSTERECTOMY: ICD-10-CM

## 2025-05-27 DIAGNOSIS — I50.22 CHRONIC HFREF (HEART FAILURE WITH REDUCED EJECTION FRACTION) (HCC): ICD-10-CM

## 2025-05-27 DIAGNOSIS — I82.492 ACUTE DEEP VEIN THROMBOSIS (DVT) OF OTHER SPECIFIED VEIN OF LEFT LOWER EXTREMITY (HCC): ICD-10-CM

## 2025-05-27 DIAGNOSIS — K21.9 GASTROESOPHAGEAL REFLUX DISEASE, UNSPECIFIED WHETHER ESOPHAGITIS PRESENT: ICD-10-CM

## 2025-05-27 DIAGNOSIS — E11.9 TYPE 2 DIABETES MELLITUS WITHOUT COMPLICATION, WITHOUT LONG-TERM CURRENT USE OF INSULIN (HCC): Primary | ICD-10-CM

## 2025-05-27 DIAGNOSIS — I10 PRIMARY HYPERTENSION: ICD-10-CM

## 2025-05-27 DIAGNOSIS — R32 URINARY INCONTINENCE, UNSPECIFIED TYPE: ICD-10-CM

## 2025-05-27 DIAGNOSIS — R26.2 AMBULATORY DYSFUNCTION: ICD-10-CM

## 2025-05-27 PROCEDURE — 3075F SYST BP GE 130 - 139MM HG: CPT | Performed by: STUDENT IN AN ORGANIZED HEALTH CARE EDUCATION/TRAINING PROGRAM

## 2025-05-27 PROCEDURE — 3078F DIAST BP <80 MM HG: CPT | Performed by: STUDENT IN AN ORGANIZED HEALTH CARE EDUCATION/TRAINING PROGRAM

## 2025-05-27 PROCEDURE — 99350 HOME/RES VST EST HIGH MDM 60: CPT | Performed by: STUDENT IN AN ORGANIZED HEALTH CARE EDUCATION/TRAINING PROGRAM

## 2025-05-27 RX ORDER — FAMOTIDINE 20 MG/1
20 TABLET, FILM COATED ORAL 2 TIMES DAILY
Qty: 180 TABLET | Refills: 3 | Status: SHIPPED | OUTPATIENT
Start: 2025-05-27 | End: 2026-05-22

## 2025-05-27 RX ORDER — LANCETS 33 GAUGE
EACH MISCELLANEOUS
Qty: 300 EACH | Refills: 5 | Status: SHIPPED | OUTPATIENT
Start: 2025-05-27

## 2025-05-27 NOTE — ASSESSMENT & PLAN NOTE
Hx of occlusive thrombus in one of the paired posterior tibial  Veins in May 2025.  Adherent with Eliquis 5 mg twice daily.

## 2025-05-27 NOTE — ASSESSMENT & PLAN NOTE
Non-adherent to losartan 25 mg daily however BP controlled on Lasix 20 mg daily, metoprolol succinate 25 mg twice daily.  Will hold off on reinitiating losartan 25 mg with close monitoring of BP.  If BP uncontrolled consider restarting losartan 25 mg daily.

## 2025-05-27 NOTE — ASSESSMENT & PLAN NOTE
Reports moderate LE weakness with standing and walking.  Refer to [facility] for home exercise strengthening.    Orders:    Referral to Home Health- [facility]; Future

## 2025-05-27 NOTE — ASSESSMENT & PLAN NOTE
Lab Results   Component Value Date    HGBA1C 7.3 (H) 05/21/2025     BG controlled with metformin 500 mg daily.  Continue monitoring sugars daily.      Orders:    OneTouch Delica Lancets 33G MISC; Check blood sugars three times daily. Please substitute with appropriate alternative as covered by patient's insurance. Dx: E11.65

## 2025-05-27 NOTE — ASSESSMENT & PLAN NOTE
Continue Pyridium 100 mg 3 times a day as needed.  Orders:    Incontinence Supplies MISC; Use in the morning Size medium

## 2025-05-27 NOTE — LETTER
May 27, 2025    Adrienne Theodore  [address]  [facility] [address]      To whom it may concern,     Ms. Goyal has been in my care.  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  Ambulatory dysfunction due to lower extremity weakness.  Cancer-related pain    She is being followed closely by the following medical specialties:  Gynecology oncology  Hematology oncology  Palliative medicine  Cardiology  Family medicine      If you have any questions or concerns, please don't hesitate to call.        Sincerely,        Olivier Lea MD

## 2025-05-27 NOTE — PROGRESS NOTES
Name: Adrienne Theodore      : 1960      MRN: [id number]  Encounter Provider: Olivier Lea MD  Encounter Date: 2025   Encounter department: [facility]  :  Assessment & Plan  Type 2 diabetes mellitus without complication, without long-term current use of insulin ([facility])    Lab Results   Component Value Date    HGBA1C 7.3 (H) 2025     BG controlled with metformin 500 mg daily.  Continue monitoring sugars daily.      Orders:    OneTouch Delica Lancets 33G MISC; Check blood sugars three times daily. Please substitute with appropriate alternative as covered by patient's insurance. Dx: E11.65    Gastroesophageal reflux disease, unspecified whether esophagitis present  Trial famotidine 20 mg at least 30 minutes to an hour before eating.  Advised patient to elevate the head of the bed at least 30 degrees for 30 minutes to an hour after eating.    Orders:    famotidine (PEPCID) 20 mg tablet; Take 1 tablet (20 mg total) by mouth 2 (two) times a day    Status post hysterectomy  Orders:    Referral to Home Health- [facility]; Future    Ambulatory dysfunction  Reports moderate LE weakness with standing and walking.  Refer to [facility] for home exercise strengthening.    Orders:    Referral to Home [facility]- [facility]; Future    Chronic HFrEF (heart failure with reduced ejection fraction) ([facility])  Wt Readings from Last 3 Encounters:   25 73.1 kg (161 lb 3.2 oz)   25 76.8 kg (169 lb 5 oz)   25 75.5 kg (166 lb 6.4 oz)     Etiology: NICM; hx of HTN   Weight 161 lbs; euvolemic on exam.  Continue lasix 20 mg daily.       Primary hypertension  Non-adherent to losartan 25 mg daily however BP controlled on Lasix 20 mg daily, metoprolol succinate 25 mg twice daily.  Will hold off on reinitiating losartan 25 mg with close monitoring of BP.  If BP uncontrolled consider restarting losartan 25 mg daily.       Acute deep vein thrombosis (DVT) of other specified  vein of left lower extremity (HCC)  Hx of occlusive thrombus in one of the paired posterior tibial  Veins in May 2025.  Adherent with Eliquis 5 mg twice daily.         Urinary incontinence, unspecified type  Continue Pyridium 100 mg 3 times a day as needed.  Orders:    Incontinence Supplies MISC; Use in the morning Size medium    Endometrial cancer (HCC)  S/p hysterectomy.  Followed by gyn-onc, heme-onc, palliative medicine.  Taking 1-2 tramadol tablet a day however this was discontinued previously by Palliative medicine.  She rarely uses oxycodone.  Discontinue tramadol 50 mg every 6 hours  Continue oxycodone 5 mg every 6 hours as needed for pain.       SOCIAL SECURITY DOCUMENTATION  Letter completed. Patient's brother will  tomorrow.  Consulted social work, letter provided will possibly not meet criteria for application.  If needed, please bring in medical record form to be completed.         History of Present Illness   64 y.o. female with a history of CHFrEF, T2DM, HTN, GERD, chronic bilateral thoracic back pain, left sided weakness, and endometrial cancer s/p total laparoscopic hysterectomy and BSO in Jan 2025 and presents today with her brother for subsequent home visit.    Since her last visit, pain has been controlled with tramadol and oxycodone and she does not report abnormal uterine bleeding.  She is experiencing epigastric abdominal pain worse after she receives chemotherapy.  She consumes 3 meals a day mostly comprised of liquids/soups/fruits, however she reports having decreased appetite.  She has regular bowel movements with assistance of MiraLAX.  She is able to ambulate to the bedside commode into the bathroom with assistance of her walker.  Her brother acts as her home caregiver who is with her in the mornings ~8 AM to ~5 PM 7 days a week.  She now has a mechanical bed that is slightly raised at the head.  Her home remains well-kept.  1 loose rugs at the foot of her bed.  She has a shower  chair easily accessible in her shower.  Walkways provide ample space to walk around with her walker. She has not had a recent fall, however reports weakness in her legs when she stands.  She is adherent to all medications for her chronic conditions.    Patient is requesting letter to be provided to summarize all medical care as proof for SSI application.      Review of Systems   Constitutional:  Positive for appetite change (decreased) and fatigue. Negative for chills and fever.   HENT:  Negative for postnasal drip, sinus pressure, sinus pain, sore throat and trouble swallowing.    Respiratory:  Negative for cough, choking, chest tightness, shortness of breath and wheezing.    Cardiovascular:  Negative for chest pain and palpitations.   Gastrointestinal:  Positive for abdominal pain (epigastric; suprapubic). Negative for abdominal distention, anal bleeding, blood in stool, constipation, diarrhea, nausea and vomiting.   Genitourinary:  Negative for difficulty urinating, dysuria, flank pain, hematuria, pelvic pain, vaginal bleeding, vaginal discharge and vaginal pain.   Musculoskeletal:  Positive for back pain and gait problem (Bilateral LE weakness). Negative for joint swelling and myalgias.   Skin:  Negative for color change, rash and wound.   Neurological:  Positive for weakness (B/L LE). Negative for dizziness, light-headedness and headaches.   Psychiatric/Behavioral:  Negative for dysphoric mood, self-injury and suicidal ideas. The patient is not nervous/anxious.        Objective   /70 (BP Location: Right arm, Patient Position: Supine, Cuff Size: Standard)   Pulse 84   Temp 97.8 °F (36.6 °C) (Skin)   Resp 16   Wt 73.1 kg (161 lb 3.2 oz)   SpO2 97%   BMI 33.84 kg/m²      Physical Exam  Vitals reviewed.   Constitutional:       General: She is not in acute distress.     Appearance: Normal appearance. She is ill-appearing (fatigue; mild). She is not diaphoretic.   HENT:      Nose: Nose normal.       Mouth/Throat:      Mouth: Mucous membranes are moist.     Eyes:      Extraocular Movements: Extraocular movements intact.      Conjunctiva/sclera: Conjunctivae normal.     Neck:      Vascular: No carotid bruit.     Cardiovascular:      Rate and Rhythm: Normal rate and regular rhythm.      Pulses: Normal pulses.           Radial pulses are 2+ on the right side.      Heart sounds: Normal heart sounds. No murmur heard.  Pulmonary:      Effort: Pulmonary effort is normal. No respiratory distress.      Breath sounds: Normal breath sounds. No wheezing.   Chest:      Chest wall: No tenderness.   Abdominal:      General: A surgical scar is present. Bowel sounds are normal. There is no distension.      Palpations: Abdomen is soft.      Tenderness: There is abdominal tenderness in the epigastric area and suprapubic area. There is no right CVA tenderness, left CVA tenderness, guarding or rebound.     Musculoskeletal:         General: No tenderness.      Cervical back: Normal range of motion. No tenderness.      Right lower leg: No edema.      Left lower leg: No edema.     Skin:     General: Skin is warm.      Capillary Refill: Capillary refill takes less than 2 seconds.      Coloration: Skin is not jaundiced.      Comments: Spider veins present on anterior bilateral thighs.     Neurological:      Mental Status: She is alert and oriented to person, place, and time.      Motor: Weakness present.      Gait: Gait abnormal.       I have spent a total time of 60 minutes in caring for this patient on the day of the visit/encounter including Diagnostic results, Prognosis, Risks and benefits of tx options, Instructions for management, Patient and family education, Importance of tx compliance, Risk factor reductions, Counseling / Coordination of care, Documenting in the medical record, Reviewing/placing orders in the medical record (including tests, medications, and/or procedures), and Obtaining or reviewing history  .

## 2025-05-27 NOTE — ASSESSMENT & PLAN NOTE
S/p hysterectomy.  Followed by gyn-onc, heme-onc, palliative medicine.  Taking 1-2 tramadol tablet a day however this was discontinued previously by Palliative medicine.  She rarely uses oxycodone.  Discontinue tramadol 50 mg every 6 hours  Continue oxycodone 5 mg every 6 hours as needed for pain.       SOCIAL SECURITY DOCUMENTATION  Letter completed. Patient's brother will  tomorrow.  Consulted social work, letter provided will possibly not meet criteria for application.  If needed, please bring in medical record form to be completed.

## 2025-05-27 NOTE — ASSESSMENT & PLAN NOTE
Wt Readings from Last 3 Encounters:   05/27/25 73.1 kg (161 lb 3.2 oz)   05/23/25 76.8 kg (169 lb 5 oz)   05/21/25 75.5 kg (166 lb 6.4 oz)     Etiology: NICM; hx of HTN   Weight 161 lbs; euvolemic on exam.  Continue lasix 20 mg daily.

## 2025-05-27 NOTE — Clinical Note
When patient's brother picks up form, please inform him to apply to SSI in person. A medical record document will be provided if needed and we can fill it out.

## 2025-05-29 ENCOUNTER — TELEPHONE (OUTPATIENT)
Dept: FAMILY MEDICINE CLINIC | Facility: CLINIC | Age: 65
End: 2025-05-29

## 2025-05-29 NOTE — TELEPHONE ENCOUNTER
----- Message from Olivier Lea MD sent at 5/29/2025  9:20 AM EDT -----  Please tell patient's brother we have placed another referral to home health care. The first referral was denied because they were not available to come to the apartment. He should be getting a call from them in the next 2 days. If not he may call them.  [facility] - [phone].

## 2025-05-29 NOTE — TELEPHONE ENCOUNTER
Called today patient's brother, left a detailed message about the referral and requesting a  call back if he needs any further assistance.

## 2025-06-06 ENCOUNTER — OFFICE VISIT (OUTPATIENT)
Dept: GYNECOLOGIC ONCOLOGY | Facility: CLINIC | Age: 65
End: 2025-06-06
Payer: COMMERCIAL

## 2025-06-06 ENCOUNTER — PATIENT OUTREACH (OUTPATIENT)
Dept: HEMATOLOGY ONCOLOGY | Facility: CLINIC | Age: 65
End: 2025-06-06

## 2025-06-06 VITALS
BODY MASS INDEX: 34.85 KG/M2 | WEIGHT: 166 LBS | HEIGHT: 58 IN | TEMPERATURE: 97.8 F | HEART RATE: 79 BPM | SYSTOLIC BLOOD PRESSURE: 134 MMHG | DIASTOLIC BLOOD PRESSURE: 80 MMHG | OXYGEN SATURATION: 97 %

## 2025-06-06 DIAGNOSIS — G89.29 CHRONIC PAIN OF BOTH KNEES: ICD-10-CM

## 2025-06-06 DIAGNOSIS — I50.22 CHRONIC HFREF (HEART FAILURE WITH REDUCED EJECTION FRACTION) (HCC): Chronic | ICD-10-CM

## 2025-06-06 DIAGNOSIS — C54.1 ENDOMETRIAL CANCER (HCC): Primary | ICD-10-CM

## 2025-06-06 DIAGNOSIS — M25.561 CHRONIC PAIN OF BOTH KNEES: ICD-10-CM

## 2025-06-06 DIAGNOSIS — N39.41 URGE INCONTINENCE OF URINE: ICD-10-CM

## 2025-06-06 DIAGNOSIS — G89.3 CANCER RELATED PAIN: ICD-10-CM

## 2025-06-06 DIAGNOSIS — M25.562 CHRONIC PAIN OF BOTH KNEES: ICD-10-CM

## 2025-06-06 DIAGNOSIS — I82.492 ACUTE DEEP VEIN THROMBOSIS (DVT) OF OTHER SPECIFIED VEIN OF LEFT LOWER EXTREMITY (HCC): ICD-10-CM

## 2025-06-06 PROCEDURE — 99213 OFFICE O/P EST LOW 20 MIN: CPT | Performed by: OBSTETRICS & GYNECOLOGY

## 2025-06-06 NOTE — ASSESSMENT & PLAN NOTE
- Right soleal vein DVT on LED 5/1  - CT PE on 5/1 negative  - On eliquis, no refill needed at this time  - Reports improvement of leg pain/swelling since starting eliquis

## 2025-06-06 NOTE — ASSESSMENT & PLAN NOTE
- LED w acute DVT as above   - Bilateral knee x-rays negative 5/21  - improved with eliquis and lidocaine cream to knees

## 2025-06-06 NOTE — PROGRESS NOTES
Called pt using # [phone] to discuss transportation needs.  No answer, left a detailed message with my contact information

## 2025-06-06 NOTE — ASSESSMENT & PLAN NOTE
Stage IIIC2 MMRp p53m [id number] uterine papillary serous carcinoma, with extensive LVSI   -  1/16/25 s/p CASSANDRA/BSO with PPALND  - Plan for chemotherapy/IO x 6 cycles, adjuvant XRT, and dostarlimab maintenance.   - S/p rad onc referral, plan per discussion with their team is to obtain a post-chemo PETCT and then plan XRT treatment plan.   C#2 taxol dose-reduced to 135 mg/m2  Signatera negative 5/29  S/p port placement, has EMLA cream    - PETCT ordered today, will follow-up results and coordinate w rad onc  - Will follow-up pre chemo labs  - Plan for C#6 carboplatin AUC 5 / paclitaxel 135 mg/m2 / dostarlimab 500mg on 6/13 as scheduled pending labs  - Labs and premedication as per protocol   - Has PRN Zofran, encouraged continued use  Orders:    NM PET CT skull base to mid thigh; Future

## 2025-06-06 NOTE — PROGRESS NOTES
Name: Adrienne Theodore      : 1960      MRN: [id number]  Encounter Provider: Belkys Ho MD  Encounter Date: 2025   Encounter department: [facility] [place]  :  Assessment & Plan  Endometrial cancer (HCC)  Stage IIIC2 MMRp p53m [id number] uterine papillary serous carcinoma, with extensive LVSI   -  25 s/p CASSANDRA/BSO with PPALND  - Plan for chemotherapy/IO x 6 cycles, adjuvant XRT, and dostarlimab maintenance.   - S/p rad onc referral, plan per discussion with their team is to obtain a post-chemo PETCT and then plan XRT treatment plan.   C#2 taxol dose-reduced to 135 mg/m2  Signatera negative   S/p port placement, has EMLA cream    - PETCT ordered today, will follow-up results and coordinate w rad onc  - Will follow-up pre chemo labs  - Plan for C#6 carboplatin AUC 5 / paclitaxel 135 mg/m2 / dostarlimab 500mg on  as scheduled pending labs  - Labs and premedication as per protocol   - Has PRN Zofran, encouraged continued use  Orders:    NM PET CT skull base to mid thigh; Future    Acute deep vein thrombosis (DVT) of other specified vein of left lower extremity (HCC)  - Right soleal vein DVT on LED   - CT PE on  negative  - On eliquis, no refill needed at this time  - Reports improvement of leg pain/swelling since starting eliquis       Chronic HFrEF (heart failure with reduced ejection fraction) (HCC)  Wt Readings from Last 3 Encounters:   25 75.3 kg (166 lb)   25 73.1 kg (161 lb 3.2 oz)   25 76.8 kg (169 lb 5 oz)   - Follows with [facility] cardiology Dr. Ahn, last appt   - 2024 TTE with LVEF 55%, grade 1 diastolic dysfunction  -Repeat TTE performed 2025 LVEF 50%  - Cleared by cards to continue chemo  - Continue atorvastatin, furosemide, losartan, metoprolol, jardiance       Urge incontinence of urine  - symptoms adequately controlled with pyridium   - no refill needed today       Cancer related pain  - Follows  with Dr. Wood, last appt 5/21  - pain well controlled on oxycodone IR 5mg q6hr PRN       Chronic pain of both knees  - LED w acute DVT as above   - Bilateral knee x-rays negative 5/21  - improved with eliquis and lidocaine cream to knees         History of Present Illness   Reason for Visit / CC:  Pre-Chemo Visit    Adrienne Theodore is a 64 y.o. female with PMH of HTN, HLD, DM-2, MI and stage IIIC2 MMRp p53m [id number] uterine papillary serous carcinoma s/p RATLH/BSO/PPALND now on chemoIO with plan for consolidative XRT. She is most recently s/p C#5 carbo/taxol/dostarlimab on 5/23. She presents for C#6 clearance today which is scheduled 6/13.    Her chemo course has been complicated by acute DVT (on eliquis), urinary dysfunction (improved w pyridium), and pain (follows with palliative care, on oxycodone IR PRN). She has chronic CHF and follows with Dr. Ahn with cardiology (most recent TTE on 4/24 LVEF 50%).     Today, she reports overall doing and feeling much better compared to prior. She reports that her leg swelling and pain have both improved with her eliquis. She also continues on furosemide per cardiology. Abdominal pain well controlled with oxycodone. Peripheral neuropathy symptoms well controlled with gabapentin. She continues to have some chronic vaginal pain but this has also improved. Chemo-associated nausea well controlled with zofran. Does not need any refills today. Denies vaginal bleeding/discharge. Denies chest pain/SOB/palpitations. She got a new wheeled walker and has improved ambulation, moving much faster. She reports feeling the best she has in a long time.      Oncology History   Cancer Staging   Endometrial cancer (HCC)  Staging form: Corpus Uteri - Carcinoma, AJCC 8th Edition  - Clinical stage from 1/16/2025: FIGO Stage IIIC2 (cT1b, cN2, cM0) - Signed by Belkys Ho MD on 2/3/2025  Histopathologic type: Papillary serous cystadenocarcinoma  Stage prefix: Initial  diagnosis  Histologic grade (G): G3  Histologic grading system: 3 grade system  Lymph-vascular invasion (LVI): LVI present/identified, NOS  Pelvic laura status: Positive  Number of pelvic nodes positive from dissection: 9  Number of pelvic nodes examined during dissection: 20  Para-aortic status: Positive  Number of para-aortic nodes positive from dissection: 7  Number of para-aortic nodes examined during dissection: 9  Lymph node metastasis: Present  Omentectomy performed: Yes  Oncology History   Endometrial cancer (HCC)   12/5/2024 Initial Diagnosis    Endometrial cancer (HCC)     1/16/2025 -  Cancer Staged    Staging form: Corpus Uteri - Carcinoma, AJCC 8th Edition  - Clinical stage from 1/16/2025: FIGO Stage IIIC2 (cT1b, cN2, cM0) - Signed by Belkys Ho MD on 2/3/2025  Histopathologic type: Papillary serous cystadenocarcinoma  Stage prefix: Initial diagnosis  Histologic grade (G): G3  Histologic grading system: 3 grade system  Lymph-vascular invasion (LVI): LVI present/identified, NOS  Pelvic laura status: Positive  Number of pelvic nodes positive from dissection: 9  Number of pelvic nodes examined during dissection: 20  Para-aortic status: Positive  Number of para-aortic nodes positive from dissection: 7  Number of para-aortic nodes examined during dissection: 9  Lymph node metastasis: Present  Omentectomy performed: Yes       2/21/2025 -  Chemotherapy    dostarlimab-gxly (JEMPERLI) IVPB, 500 mg, 5 of 12 cycles  Administration: 500 mg (2/21/2025), 500 mg (4/11/2025), 500 mg (3/14/2025), 500 mg (5/2/2025), 500 mg (5/23/2025)  CARBOplatin (PARAPLATIN) IVPB (GOG AUC DOSING), 484 mg, 5 of 6 cycles  Administration: 484 mg (2/21/2025), 495 mg (4/11/2025), 484 mg (3/14/2025), 495 mg (5/2/2025), 495 mg (5/23/2025)  PACLItaxel (TAXOL) chemo IVPB, 175 mg/m2 = 295.8 mg, 5 of 6 cycles  Dose modification: 135 mg/m2 (original dose 175 mg/m2, Cycle 3, Reason: Neuropathy)  Administration: 295.8 mg (2/21/2025),  "225.6 mg (4/11/2025), 228 mg (3/14/2025), 225.6 mg (5/2/2025), 225.6 mg (5/23/2025)        Review of Systems A complete review of systems is negative other than that noted above in the HPI.  Medical History Reviewed by provider this encounter:  Tobacco  Allergies  Meds  Problems  Med Hx  Surg Hx  Fam Hx     .     Objective   /80   Pulse 79   Temp 97.8 °F (36.6 °C) (Temporal)   Ht 4' 9.87\" (1.47 m)   Wt 75.3 kg (166 lb)   SpO2 97%   BMI 34.85 kg/m²     Body mass index is 34.85 kg/m².  Pain Screening:     ECOG ECOG Performance Status: 2 - Ambulatory and capable of all selfcare but unable to carry out any work activities.  Up and about more than 50% of waking hours   Physical Exam  Vitals reviewed. Exam conducted with a chaperone present.   Constitutional:       General: She is not in acute distress.     Appearance: Normal appearance. She is not ill-appearing.      Comments: Ambulating with wheeled walker   HENT:      Head: Normocephalic and atraumatic.      Mouth/Throat:      Mouth: Mucous membranes are moist.     Eyes:      General:         Right eye: No discharge.         Left eye: No discharge.      Conjunctiva/sclera: Conjunctivae normal.     Neck:      Comments: Port site c/d/I without e/o infection  Cardiovascular:      Rate and Rhythm: Normal rate and regular rhythm.   Pulmonary:      Effort: Pulmonary effort is normal.   Abdominal:      Palpations: Abdomen is soft. There is no mass.      Tenderness: There is no abdominal tenderness.      Hernia: No hernia is present.   Genitourinary:     Comments: Deferred    Musculoskeletal:         General: Swelling (trace edema, improved from prior) present. No tenderness.      Right lower leg: No edema.      Left lower leg: No edema.     Skin:     General: Skin is warm and dry.      Coloration: Skin is not jaundiced.      Findings: No rash.     Neurological:      General: No focal deficit present.      Mental Status: She is alert and oriented to person, " place, and time.      Cranial Nerves: No cranial nerve deficit.      Sensory: No sensory deficit.      Motor: No weakness.      Gait: Gait normal.     Psychiatric:         Mood and Affect: Mood normal.         Behavior: Behavior normal.         Thought Content: Thought content normal.         Judgment: Judgment normal.         Labs: I have reviewed pertinent labs.   Lab Results   Component Value Date/Time     5.7 04/30/2025 08:58 AM     Lab Results   Component Value Date/Time    Potassium 4.2 05/21/2025 10:38 AM    Chloride 100 05/21/2025 10:38 AM    CO2 28 05/21/2025 10:38 AM    BUN 15 05/21/2025 10:38 AM    Creatinine 0.53 (L) 05/21/2025 10:38 AM    Glucose, Fasting 126 (H) 05/21/2025 10:38 AM    Calcium 9.4 05/21/2025 10:38 AM    AST 19 05/21/2025 10:38 AM    ALT 22 05/21/2025 10:38 AM    Alkaline Phosphatase 72 05/21/2025 10:38 AM    eGFR 100 05/21/2025 10:38 AM     Lab Results   Component Value Date/Time    WBC 4.65 05/21/2025 10:38 AM    Hemoglobin 11.8 05/21/2025 10:38 AM    Hematocrit 36.4 05/21/2025 10:38 AM    MCV 94 05/21/2025 10:38 AM    Platelets 273 05/21/2025 10:38 AM     Lab Results   Component Value Date/Time    Absolute Neutrophils 2.39 05/21/2025 10:38 AM        Trend:  Lab Results   Component Value Date     5.7 04/30/2025     5.7 04/09/2025     5.0 04/02/2025     6.2 03/12/2025     5.4 03/07/2025     7.2 02/19/2025     15.2 02/04/2025     7.3 12/18/2024       Radiology Results Review : No pertinent imaging studies reviewed.  Other Study Results Review : No additional pertinent studies reviewed.    Administrative Statements   I have spent a total time of 20 minutes in caring for this patient on the day of the visit/encounter including Instructions for management, Patient and family education, Counseling / Coordination of care, and Reviewing/placing orders in the medical record (including tests, medications, and/or procedures).    Belkys Contreras  MD Vic, MPH  Division of Gynecologic Oncology  06/06/25 2:08 PM

## 2025-06-06 NOTE — ASSESSMENT & PLAN NOTE
Wt Readings from Last 3 Encounters:   06/06/25 75.3 kg (166 lb)   05/27/25 73.1 kg (161 lb 3.2 oz)   05/23/25 76.8 kg (169 lb 5 oz)   - Follows with [facility] cardiology Dr. Ahn, last appt 5/1  - 11/2024 TTE with LVEF 55%, grade 1 diastolic dysfunction  -Repeat TTE performed 4/2025 LVEF 50%  - Cleared by cards to continue chemo  - Continue atorvastatin, furosemide, losartan, metoprolol, jardiance

## 2025-06-08 DIAGNOSIS — G62.9 NEUROPATHY: ICD-10-CM

## 2025-06-08 DIAGNOSIS — C54.1 ENDOMETRIAL CANCER (HCC): ICD-10-CM

## 2025-06-09 ENCOUNTER — PATIENT OUTREACH (OUTPATIENT)
Dept: HEMATOLOGY ONCOLOGY | Facility: CLINIC | Age: 65
End: 2025-06-09

## 2025-06-09 RX ORDER — GABAPENTIN 100 MG/1
100 CAPSULE ORAL
Qty: 30 CAPSULE | Refills: 2 | Status: SHIPPED | OUTPATIENT
Start: 2025-06-09

## 2025-06-09 NOTE — PROGRESS NOTES
Called pt using  [phone] to discuss transportation needs, no answer, left a detailed message with my contact information

## 2025-06-11 ENCOUNTER — APPOINTMENT (OUTPATIENT)
Dept: LAB | Facility: HOSPITAL | Age: 65
End: 2025-06-11
Payer: COMMERCIAL

## 2025-06-11 DIAGNOSIS — C54.1 ENDOMETRIAL CANCER (HCC): ICD-10-CM

## 2025-06-11 DIAGNOSIS — E11.9 TYPE 2 DIABETES MELLITUS WITHOUT COMPLICATION, WITHOUT LONG-TERM CURRENT USE OF INSULIN (HCC): ICD-10-CM

## 2025-06-11 LAB
ALBUMIN SERPL BCG-MCNC: 4.2 G/DL (ref 3.5–5)
ALP SERPL-CCNC: 61 U/L (ref 34–104)
ALT SERPL W P-5'-P-CCNC: 16 U/L (ref 7–52)
ANION GAP SERPL CALCULATED.3IONS-SCNC: 8 MMOL/L (ref 4–13)
AST SERPL W P-5'-P-CCNC: 15 U/L (ref 13–39)
BASOPHILS # BLD MANUAL: 0.05 THOUSAND/UL (ref 0–0.1)
BASOPHILS NFR MAR MANUAL: 1 % (ref 0–1)
BILIRUB SERPL-MCNC: 0.34 MG/DL (ref 0.2–1)
BUN SERPL-MCNC: 21 MG/DL (ref 5–25)
CALCIUM SERPL-MCNC: 9.1 MG/DL (ref 8.4–10.2)
CHLORIDE SERPL-SCNC: 104 MMOL/L (ref 96–108)
CO2 SERPL-SCNC: 27 MMOL/L (ref 21–32)
CREAT SERPL-MCNC: 0.55 MG/DL (ref 0.6–1.3)
EOSINOPHIL # BLD MANUAL: 0.05 THOUSAND/UL (ref 0–0.4)
EOSINOPHIL NFR BLD MANUAL: 1 % (ref 0–6)
ERYTHROCYTE [DISTWIDTH] IN BLOOD BY AUTOMATED COUNT: 14.3 % (ref 11.6–15.1)
EST. AVERAGE GLUCOSE BLD GHB EST-MCNC: 154 MG/DL
GFR SERPL CREATININE-BSD FRML MDRD: 99 ML/MIN/1.73SQ M
GLUCOSE P FAST SERPL-MCNC: 120 MG/DL (ref 65–99)
HBA1C MFR BLD: 7 %
HCT VFR BLD AUTO: 33 % (ref 34.8–46.1)
HGB BLD-MCNC: 10.5 G/DL (ref 11.5–15.4)
LYMPHOCYTES # BLD AUTO: 2.03 THOUSAND/UL (ref 0.6–4.47)
LYMPHOCYTES # BLD AUTO: 43 % (ref 14–44)
MAGNESIUM SERPL-MCNC: 2 MG/DL (ref 1.9–2.7)
MCH RBC QN AUTO: 30.8 PG (ref 26.8–34.3)
MCHC RBC AUTO-ENTMCNC: 31.8 G/DL (ref 31.4–37.4)
MCV RBC AUTO: 97 FL (ref 82–98)
MONOCYTES # BLD AUTO: 0.42 THOUSAND/UL (ref 0–1.22)
MONOCYTES NFR BLD: 9 % (ref 4–12)
NEUTROPHILS # BLD MANUAL: 2.17 THOUSAND/UL (ref 1.85–7.62)
NEUTS BAND NFR BLD MANUAL: 1 % (ref 0–8)
NEUTS SEG NFR BLD AUTO: 45 % (ref 43–75)
PLATELET # BLD AUTO: 223 THOUSANDS/UL (ref 149–390)
PLATELET BLD QL SMEAR: ADEQUATE
PMV BLD AUTO: 10.6 FL (ref 8.9–12.7)
POTASSIUM SERPL-SCNC: 3.8 MMOL/L (ref 3.5–5.3)
PROT SERPL-MCNC: 7.2 G/DL (ref 6.4–8.4)
RBC # BLD AUTO: 3.41 MILLION/UL (ref 3.81–5.12)
RBC MORPH BLD: NORMAL
SODIUM SERPL-SCNC: 139 MMOL/L (ref 135–147)
WBC # BLD AUTO: 4.71 THOUSAND/UL (ref 4.31–10.16)

## 2025-06-11 PROCEDURE — 83036 HEMOGLOBIN GLYCOSYLATED A1C: CPT

## 2025-06-11 PROCEDURE — 85007 BL SMEAR W/DIFF WBC COUNT: CPT

## 2025-06-11 PROCEDURE — 80053 COMPREHEN METABOLIC PANEL: CPT

## 2025-06-11 PROCEDURE — 36415 COLL VENOUS BLD VENIPUNCTURE: CPT

## 2025-06-11 PROCEDURE — 83735 ASSAY OF MAGNESIUM: CPT

## 2025-06-11 PROCEDURE — 85027 COMPLETE CBC AUTOMATED: CPT

## 2025-06-12 ENCOUNTER — TELEPHONE (OUTPATIENT)
Dept: RADIATION ONCOLOGY | Facility: CLINIC | Age: 65
End: 2025-06-12

## 2025-06-12 DIAGNOSIS — E11.9 TYPE 2 DIABETES MELLITUS WITHOUT COMPLICATION, WITHOUT LONG-TERM CURRENT USE OF INSULIN (HCC): ICD-10-CM

## 2025-06-12 DIAGNOSIS — I50.9 ACUTE DECOMPENSATED HEART FAILURE (HCC): ICD-10-CM

## 2025-06-12 DIAGNOSIS — C54.1 ENDOMETRIAL CANCER (HCC): Primary | ICD-10-CM

## 2025-06-12 DIAGNOSIS — I42.8 NONISCHEMIC CARDIOMYOPATHY (HCC): Chronic | ICD-10-CM

## 2025-06-12 RX ORDER — SODIUM CHLORIDE 9 MG/ML
20 INJECTION, SOLUTION INTRAVENOUS ONCE
Status: CANCELLED | OUTPATIENT
Start: 2025-06-13

## 2025-06-12 RX ORDER — PALONOSETRON 0.05 MG/ML
0.25 INJECTION, SOLUTION INTRAVENOUS ONCE
Status: CANCELLED | OUTPATIENT
Start: 2025-06-13

## 2025-06-12 NOTE — TELEPHONE ENCOUNTER
Left VM requesting call back to schedule 1 hr apt with Dr. Quintanilla at [place] office on 7/15/25 at 9am to review PET Scan.

## 2025-06-13 ENCOUNTER — HOSPITAL ENCOUNTER (OUTPATIENT)
Dept: INFUSION CENTER | Facility: HOSPITAL | Age: 65
End: 2025-06-13
Attending: OBSTETRICS & GYNECOLOGY
Payer: COMMERCIAL

## 2025-06-13 ENCOUNTER — TELEPHONE (OUTPATIENT)
Dept: GYNECOLOGIC ONCOLOGY | Facility: CLINIC | Age: 65
End: 2025-06-13

## 2025-06-13 VITALS
WEIGHT: 167.99 LBS | TEMPERATURE: 97.7 F | HEART RATE: 83 BPM | HEIGHT: 58 IN | SYSTOLIC BLOOD PRESSURE: 132 MMHG | DIASTOLIC BLOOD PRESSURE: 80 MMHG | RESPIRATION RATE: 20 BRPM | BODY MASS INDEX: 35.26 KG/M2

## 2025-06-13 DIAGNOSIS — Z95.828 PORT-A-CATH IN PLACE: Primary | ICD-10-CM

## 2025-06-13 DIAGNOSIS — C54.1 ENDOMETRIAL CANCER (HCC): Primary | ICD-10-CM

## 2025-06-13 LAB
AMYLASE SERPL-CCNC: 49 IU/L (ref 29–103)
LIPASE SERPL-CCNC: 30 U/L (ref 11–82)
TSH SERPL DL<=0.05 MIU/L-ACNC: 2.54 UIU/ML (ref 0.45–4.5)

## 2025-06-13 PROCEDURE — 84443 ASSAY THYROID STIM HORMONE: CPT | Performed by: OBSTETRICS & GYNECOLOGY

## 2025-06-13 PROCEDURE — 96367 TX/PROPH/DG ADDL SEQ IV INF: CPT

## 2025-06-13 PROCEDURE — 96417 CHEMO IV INFUS EACH ADDL SEQ: CPT

## 2025-06-13 PROCEDURE — 96375 TX/PRO/DX INJ NEW DRUG ADDON: CPT

## 2025-06-13 PROCEDURE — 83690 ASSAY OF LIPASE: CPT | Performed by: OBSTETRICS & GYNECOLOGY

## 2025-06-13 PROCEDURE — 96415 CHEMO IV INFUSION ADDL HR: CPT

## 2025-06-13 PROCEDURE — 82150 ASSAY OF AMYLASE: CPT | Performed by: OBSTETRICS & GYNECOLOGY

## 2025-06-13 PROCEDURE — 96413 CHEMO IV INFUSION 1 HR: CPT

## 2025-06-13 RX ORDER — ASPIRIN 81 MG
81 TABLET,CHEWABLE ORAL DAILY
Qty: 30 TABLET | Refills: 0 | Status: SHIPPED | OUTPATIENT
Start: 2025-06-13

## 2025-06-13 RX ORDER — FUROSEMIDE 20 MG/1
20 TABLET ORAL DAILY
Qty: 30 TABLET | Refills: 5 | Status: SHIPPED | OUTPATIENT
Start: 2025-06-13

## 2025-06-13 RX ORDER — SODIUM CHLORIDE 9 MG/ML
20 INJECTION, SOLUTION INTRAVENOUS ONCE
Status: COMPLETED | OUTPATIENT
Start: 2025-06-13 | End: 2025-06-13

## 2025-06-13 RX ORDER — PALONOSETRON 0.05 MG/ML
0.25 INJECTION, SOLUTION INTRAVENOUS ONCE
Status: COMPLETED | OUTPATIENT
Start: 2025-06-13 | End: 2025-06-13

## 2025-06-13 RX ADMIN — SODIUM CHLORIDE 500 MG: 9 INJECTION, SOLUTION INTRAVENOUS at 11:39

## 2025-06-13 RX ADMIN — PACLITAXEL 226.8 MG: 6 INJECTION, SOLUTION INTRAVENOUS at 12:26

## 2025-06-13 RX ADMIN — FOSAPREPITANT DIMEGLUMINE 150 MG: 150 INJECTION, POWDER, LYOPHILIZED, FOR SOLUTION INTRAVENOUS at 10:46

## 2025-06-13 RX ADMIN — PALONOSETRON 0.25 MG: 0.25 INJECTION, SOLUTION INTRAVENOUS at 09:21

## 2025-06-13 RX ADMIN — FAMOTIDINE 20 MG: 10 INJECTION, SOLUTION INTRAVENOUS at 10:17

## 2025-06-13 RX ADMIN — DIPHENHYDRAMINE HYDROCHLORIDE 25 MG: 50 INJECTION, SOLUTION INTRAMUSCULAR; INTRAVENOUS at 09:26

## 2025-06-13 RX ADMIN — CARBOPLATIN 498.5 MG: 10 INJECTION, SOLUTION INTRAVENOUS at 15:31

## 2025-06-13 RX ADMIN — DEXAMETHASONE SODIUM PHOSPHATE 20 MG: 10 INJECTION, SOLUTION INTRAMUSCULAR; INTRAVENOUS at 09:56

## 2025-06-13 RX ADMIN — SODIUM CHLORIDE 20 ML/HR: 0.9 INJECTION, SOLUTION INTRAVENOUS at 09:19

## 2025-06-13 NOTE — PROGRESS NOTES
Pt tolerated Jemperli, Taxol, carboplatin without difficulty.  Confirmed next appt on 7/3 at 0800, and AVS provided.  Left ambulatory in stable condition.

## 2025-06-13 NOTE — TELEPHONE ENCOUNTER
#: [id number]    Patients brother is calling regarding apixaban (Eliquis) 5 mg . States they picked up medication from pharmacy yesterday and the medication was different. States originally the medication was in a box (starter dose) and now the medication came in a bottle. Brother states patient took medication last night and  had a lot of fatigue, burning feeling, redness, heart racing, tremors. Patient does not want to take the medication.      Brother also wants to let the provider know that patient was not able to get her chemo through her chest port today, they have to give it to her through her arm. Brother is asking for an appointment for patient.    Please call back [phone]

## 2025-06-13 NOTE — PROGRESS NOTES
Pt arrived for chemo tx today.  When attempting to access her port, it was noted that her port seemed to be flipped and was unable to be accessed.  Notified KIKE Tello.  Today's tx to be done peripherally, and IR consult will be ordered.  Pt aware and agreeable.

## 2025-06-13 NOTE — TELEPHONE ENCOUNTER
Return call placed to patient using . Discussed with patient that her prior Eliquis prescription was an Eliquis starter pack (10mg BID x 7 days, then 5mg BID x 21 days). The refill that she recently picked up was likely packaged differently because it is only 5mg tabs (to be continued BID). Reviewed there is no difference in this medication than what she has been taking over the past 3 weeks. I do not have a rationale for her symptoms, which are now resolved. No SOB, CP, dizziness. I asked her to call back or report to the ED if these occur.    Additionally, the infusion RN notified me this AM that her port is flipped. It will need to be removed/replaced. I've placed an IR referral and advised that they will call her for an appt.

## 2025-06-17 ENCOUNTER — TELEPHONE (OUTPATIENT)
Dept: RADIATION ONCOLOGY | Facility: CLINIC | Age: 65
End: 2025-06-17

## 2025-06-17 NOTE — TELEPHONE ENCOUNTER
Called pt and left VM requesting call back to schedule F/U apt with Dr. Quintanilla at the [facility] on 7/15/25.

## 2025-06-20 ENCOUNTER — PATIENT OUTREACH (OUTPATIENT)
Dept: HEMATOLOGY ONCOLOGY | Facility: CLINIC | Age: 65
End: 2025-06-20

## 2025-06-20 NOTE — PROGRESS NOTES
Called pt using  # [phone],  to discuss transportation needs. Pt stated her friend will be taking her to all her upcoming appointments, and she does not need transportation assistance at this time. She has my contact information if she has any future needs

## 2025-06-25 ENCOUNTER — TELEPHONE (OUTPATIENT)
Dept: RADIATION ONCOLOGY | Facility: CLINIC | Age: 65
End: 2025-06-25

## 2025-06-25 ENCOUNTER — HOSPITAL ENCOUNTER (OUTPATIENT)
Dept: NUCLEAR MEDICINE | Facility: HOSPITAL | Age: 65
Discharge: HOME | End: 2025-06-25
Attending: OBSTETRICS & GYNECOLOGY
Payer: COMMERCIAL

## 2025-06-25 DIAGNOSIS — C54.1 ENDOMETRIAL CANCER (HCC): ICD-10-CM

## 2025-06-25 LAB — GLUCOSE SERPL-MCNC: 118 MG/DL (ref 65–140)

## 2025-06-25 PROCEDURE — 78815 PET IMAGE W/CT SKULL-THIGH: CPT

## 2025-06-25 PROCEDURE — 82948 REAGENT STRIP/BLOOD GLUCOSE: CPT

## 2025-06-25 PROCEDURE — A9552 F18 FDG: HCPCS

## 2025-06-25 NOTE — TELEPHONE ENCOUNTER
Spoke with pt and scheduled F/U for 7/18/25 at 9am at [facility] per provider request.

## 2025-07-01 ENCOUNTER — TELEPHONE (OUTPATIENT)
Dept: GYNECOLOGIC ONCOLOGY | Facility: CLINIC | Age: 65
End: 2025-07-01

## 2025-07-01 ENCOUNTER — APPOINTMENT (OUTPATIENT)
Dept: LAB | Facility: CLINIC | Age: 65
End: 2025-07-01
Payer: COMMERCIAL

## 2025-07-01 ENCOUNTER — TELEPHONE (OUTPATIENT)
Dept: FAMILY MEDICINE CLINIC | Facility: CLINIC | Age: 65
End: 2025-07-01

## 2025-07-01 ENCOUNTER — OFFICE VISIT (OUTPATIENT)
Dept: GYNECOLOGIC ONCOLOGY | Facility: CLINIC | Age: 65
End: 2025-07-01
Payer: COMMERCIAL

## 2025-07-01 VITALS
BODY MASS INDEX: 34.74 KG/M2 | WEIGHT: 165.5 LBS | TEMPERATURE: 97.9 F | DIASTOLIC BLOOD PRESSURE: 80 MMHG | HEIGHT: 58 IN | SYSTOLIC BLOOD PRESSURE: 126 MMHG | OXYGEN SATURATION: 98 % | HEART RATE: 76 BPM

## 2025-07-01 DIAGNOSIS — G89.3 CANCER RELATED PAIN: ICD-10-CM

## 2025-07-01 DIAGNOSIS — M25.561 CHRONIC PAIN OF BOTH KNEES: ICD-10-CM

## 2025-07-01 DIAGNOSIS — I50.22 CHRONIC HFREF (HEART FAILURE WITH REDUCED EJECTION FRACTION) (HCC): Chronic | ICD-10-CM

## 2025-07-01 DIAGNOSIS — M25.562 CHRONIC PAIN OF BOTH KNEES: ICD-10-CM

## 2025-07-01 DIAGNOSIS — Z95.828 H/O INSERTION OF CENTRAL VENOUS ACCESS PORT: ICD-10-CM

## 2025-07-01 DIAGNOSIS — G89.29 CHRONIC PAIN OF BOTH KNEES: ICD-10-CM

## 2025-07-01 DIAGNOSIS — G62.9 NEUROPATHY: ICD-10-CM

## 2025-07-01 DIAGNOSIS — C54.1 ENDOMETRIAL CANCER (HCC): Primary | ICD-10-CM

## 2025-07-01 DIAGNOSIS — C54.1 ENDOMETRIAL CANCER (HCC): ICD-10-CM

## 2025-07-01 DIAGNOSIS — E11.9 TYPE 2 DIABETES MELLITUS WITHOUT COMPLICATION, WITHOUT LONG-TERM CURRENT USE OF INSULIN (HCC): ICD-10-CM

## 2025-07-01 LAB
ALBUMIN SERPL BCG-MCNC: 4.4 G/DL (ref 3.5–5)
ALP SERPL-CCNC: 65 U/L (ref 34–104)
ALT SERPL W P-5'-P-CCNC: 18 U/L (ref 7–52)
ANION GAP SERPL CALCULATED.3IONS-SCNC: 5 MMOL/L (ref 4–13)
ANISOCYTOSIS BLD QL SMEAR: PRESENT
AST SERPL W P-5'-P-CCNC: 17 U/L (ref 13–39)
BASOPHILS # BLD MANUAL: 0 THOUSAND/UL (ref 0–0.1)
BASOPHILS NFR MAR MANUAL: 0 % (ref 0–1)
BILIRUB SERPL-MCNC: 0.39 MG/DL (ref 0.2–1)
BUN SERPL-MCNC: 17 MG/DL (ref 5–25)
CALCIUM SERPL-MCNC: 9.3 MG/DL (ref 8.4–10.2)
CHLORIDE SERPL-SCNC: 104 MMOL/L (ref 96–108)
CO2 SERPL-SCNC: 29 MMOL/L (ref 21–32)
CREAT SERPL-MCNC: 0.61 MG/DL (ref 0.6–1.3)
EOSINOPHIL # BLD MANUAL: 0.05 THOUSAND/UL (ref 0–0.4)
EOSINOPHIL NFR BLD MANUAL: 1 % (ref 0–6)
ERYTHROCYTE [DISTWIDTH] IN BLOOD BY AUTOMATED COUNT: 14.4 % (ref 11.6–15.1)
GFR SERPL CREATININE-BSD FRML MDRD: 96 ML/MIN/1.73SQ M
GIANT PLATELETS BLD QL SMEAR: PRESENT
GLUCOSE SERPL-MCNC: 78 MG/DL (ref 65–140)
HCT VFR BLD AUTO: 33.5 % (ref 34.8–46.1)
HGB BLD-MCNC: 11.2 G/DL (ref 11.5–15.4)
LYMPHOCYTES # BLD AUTO: 1.86 THOUSAND/UL (ref 0.6–4.47)
LYMPHOCYTES # BLD AUTO: 36 % (ref 14–44)
MAGNESIUM SERPL-MCNC: 1.8 MG/DL (ref 1.9–2.7)
MCH RBC QN AUTO: 32.2 PG (ref 26.8–34.3)
MCHC RBC AUTO-ENTMCNC: 33.4 G/DL (ref 31.4–37.4)
MCV RBC AUTO: 96 FL (ref 82–98)
MONOCYTES # BLD AUTO: 0.57 THOUSAND/UL (ref 0–1.22)
MONOCYTES NFR BLD: 11 % (ref 4–12)
MYELOCYTE ABSOLUTE CT: 0.05 THOUSAND/UL (ref 0–0.1)
MYELOCYTES NFR BLD MANUAL: 1 % (ref 0–1)
NEUTROPHILS # BLD MANUAL: 2.63 THOUSAND/UL (ref 1.85–7.62)
NEUTS BAND NFR BLD MANUAL: 1 % (ref 0–8)
NEUTS SEG NFR BLD AUTO: 50 % (ref 43–75)
OVALOCYTES BLD QL SMEAR: PRESENT
PLATELET # BLD AUTO: 235 THOUSANDS/UL (ref 149–390)
PLATELET BLD QL SMEAR: ADEQUATE
PMV BLD AUTO: 10.3 FL (ref 8.9–12.7)
POIKILOCYTOSIS BLD QL SMEAR: PRESENT
POTASSIUM SERPL-SCNC: 4 MMOL/L (ref 3.5–5.3)
PROT SERPL-MCNC: 7.6 G/DL (ref 6.4–8.4)
RBC # BLD AUTO: 3.48 MILLION/UL (ref 3.81–5.12)
RBC MORPH BLD: PRESENT
SODIUM SERPL-SCNC: 138 MMOL/L (ref 135–147)
WBC # BLD AUTO: 5.16 THOUSAND/UL (ref 4.31–10.16)

## 2025-07-01 PROCEDURE — 36415 COLL VENOUS BLD VENIPUNCTURE: CPT

## 2025-07-01 PROCEDURE — 83735 ASSAY OF MAGNESIUM: CPT

## 2025-07-01 PROCEDURE — 83690 ASSAY OF LIPASE: CPT | Performed by: STUDENT IN AN ORGANIZED HEALTH CARE EDUCATION/TRAINING PROGRAM

## 2025-07-01 PROCEDURE — 84443 ASSAY THYROID STIM HORMONE: CPT | Performed by: STUDENT IN AN ORGANIZED HEALTH CARE EDUCATION/TRAINING PROGRAM

## 2025-07-01 PROCEDURE — 80053 COMPREHEN METABOLIC PANEL: CPT

## 2025-07-01 PROCEDURE — 83036 HEMOGLOBIN GLYCOSYLATED A1C: CPT

## 2025-07-01 PROCEDURE — 82150 ASSAY OF AMYLASE: CPT | Performed by: STUDENT IN AN ORGANIZED HEALTH CARE EDUCATION/TRAINING PROGRAM

## 2025-07-01 PROCEDURE — 85027 COMPLETE CBC AUTOMATED: CPT

## 2025-07-01 PROCEDURE — 99214 OFFICE O/P EST MOD 30 MIN: CPT | Performed by: OBSTETRICS & GYNECOLOGY

## 2025-07-01 PROCEDURE — 85007 BL SMEAR W/DIFF WBC COUNT: CPT

## 2025-07-01 NOTE — ASSESSMENT & PLAN NOTE
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   - 2/2025-6/2025: s/p 6 cycles carbo/taxol/dostarlimab  - 5/29: Tova negative  - 6/25/25: post-chemo PETCT with no e/o hypermetabolic malignancy  - Rad onc appt with Dr. Quintanilla 7/18 for XRT planning  - Plan to begin dostarlimab maintenance 1000mg IV q 6 weeks x up to 3 years or until progression/toxicity    Today, we reviewed her post-chemo PETCT as above which demonstrates NEWTON. I reviewed the recommendations to complete XRT given her advanced stage high risk histology, and I will follow-up with Dr. Quintanilla. I reviewed the recommendation for maintenance IO due to improved progression-free and overall survival.    - Will provide updated treatment calendar   - I will f/u rad onc appt and treatment plan  - Pending pre-treatment labs, plan for dostarlimab 1000mg IV on 7/3 per treatment calendar  - Pending conclusion of XRT, will begin surveillance visits with dostarlimab maintenance  Orders:     VAS VENOUS DUPLEX - LOWER LIMB BILATERAL; Future

## 2025-07-01 NOTE — TELEPHONE ENCOUNTER
----- Message from Olivier Lea MD sent at 6/29/2025 11:14 AM EDT -----  Regarding: Home Visit  [facility],  Can you please schedule upcoming home visit on 8/5 with Dr. Upton.  Thank you.

## 2025-07-01 NOTE — TELEPHONE ENCOUNTER
While the patient was being checked out she was asked if she would like to schedule the Vas that provider ordered. Patient friend responded saying that she didn't want to schedule anything right now.

## 2025-07-01 NOTE — PROGRESS NOTES
Name: Adrienne Theodore      : 1960      MRN: [id number]  Encounter Provider: Belkys Ho MD  Encounter Date: 2025   Encounter department: [facility] [place]  :  Assessment & Plan  Endometrial cancer (HCC)  Stage IIIC2 MMRp p53m [id number] uterine papillary serous carcinoma, with extensive LVSI   -  25 s/p RATLH/BSO with PPALND  - Plan for: chemotherapy/IO x 6 cycles, adjuvant XRT, and dostarlimab maintenance.   - 2025-2025: s/p 6 cycles carbo/taxol/dostarlimab  - : Tova negative  - 25: post-chemo PETCT with no e/o hypermetabolic malignancy  - Rad onc appt with Dr. Quintanilla  for XRT planning  - Plan to begin dostarlimab maintenance 1000mg IV q 6 weeks x up to 3 years or until progression/toxicity    Today, we reviewed her post-chemo PETCT as above which demonstrates NEWTON. I reviewed the recommendations to complete XRT given her advanced stage high risk histology, and I will follow-up with Dr. Quitnanilla. I reviewed the recommendation for maintenance IO due to improved progression-free and overall survival.    - Will provide updated treatment calendar   - I will f/u rad onc appt and treatment plan  - Pending pre-treatment labs, plan for dostarlimab 1000mg IV on 7/3 per treatment calendar  - Pending conclusion of XRT, will begin surveillance visits with dostarlimab maintenance  Orders:     VAS VENOUS DUPLEX - LOWER LIMB BILATERAL; Future    Cancer related pain  - Follows with Dr. Wood, next appt   - pain well controlled on oxycodone IR 5mg q6hr PRN       Chronic pain of both knees  -  LED w acute DVT   -  Bilateral knee x-rays negative   - pain previously improved with eliquis and lidocaine cream to knees. Patient states compliance with eliquis  - Will repeat LED to ensure no progression of clot   - Will reach out to Dr. Ahn regarding cardiac medication dosing given possibility of pain/swelling 2/2 known HFrEF  - if no  progressive clot or cardiac etiology, will start compression stockings and consider leg MRI       Chronic HFrEF (heart failure with reduced ejection fraction) (formerly [facility])  Wt Readings from Last 3 Encounters:   07/01/25 75.1 kg (165 lb 8 oz)   06/13/25 76.2 kg (167 lb 15.9 oz)   06/06/25 75.3 kg (166 lb)      Follows with [facility]'s cardiology Dr. Ahn, last appt 6/19  - 11/2024 TTE with LVEF 55%, grade 1 diastolic dysfunction  -4/2025 Repeat TTE LVEF 50%  - Continue atorvastatin, furosemide, losartan, metoprolol, jardiance    - HR today wnl, BP wnl, SpO2 wnl  - Will reach out to Dr. Ahn regarding ongoing lower extremity swelling and palpitations       H/O insertion of central venous access port  - 6/13 noted to be flipped and unable to access  - IR referral for port check placed 6/13, still pending. Will reach out to their team to ensure follow-up for ongoing labs/infusions       Neuropathy  - Pre-existing T2DM  - Symptoms improved after taxol dose-reduced  - Started on gabapentin 4/29, currently taking 100mg qhs. Instructed to increase to 300mg qhs               History of Present Illness   Reason for Visit / CC: Treatment planning    Adrienne Theodore is a 64 y.o. female with PMH of HTN, HLD, DM-2, MI and stage IIIC2 MMRp p53m DIV3dxl uterine papillary serous carcinoma s/p RATLH/BSO/PPALND now on chemoIO with plan for consolidative XRT. She has completed 6 cycles carbo/taxol/dostarlimab (C6 6/13) and had a post-chemo PETCT with NEWTON.    Her chemo course has been complicated by acute DVT (on eliquis), urinary dysfunction (improved w pyridium), and pain (follows with palliative care, on oxycodone IR PRN). She has chronic CHF and follows with Dr. Ahn with cardiology (most recent TTE on 4/24 LVEF 50%).     Today, she reports that her leg swelling has gotten worse again (particularly in left knee and left ankle), and also feels that her leg knee is more painful. She continues to ambulate with a walker without  "issues and has had no falls. She reports compliance with her eliquis and furosemide. Abdominal pain is well controlled. She feels that her peripheral neuropathy is worse, especially on the bottoms of her feet. She also occasionally feels like her heart is \"racing\" especially at night, which she attributes to around the time she takes her eliquis. Denies chest pain/SOB. She denies abdominal/pelvic pain, early satiety/bloating, nausea/emesis, unanticipated weight loss, or changes in urinary/bowel habits, no vaginal bleeding/discharge.            Oncology History   Cancer Staging   Endometrial cancer (Formerly [facility])  Staging form: Corpus Uteri - Carcinoma, AJCC 8th Edition  - Clinical stage from 1/16/2025: FIGO Stage IIIC2 (cT1b, cN2, cM0) - Signed by Belkys Ho MD on 2/3/2025  Histopathologic type: Papillary serous cystadenocarcinoma  Stage prefix: Initial diagnosis  Histologic grade (G): G3  Histologic grading system: 3 grade system  Lymph-vascular invasion (LVI): LVI present/identified, NOS  Pelvic laura status: Positive  Number of pelvic nodes positive from dissection: 9  Number of pelvic nodes examined during dissection: 20  Para-aortic status: Positive  Number of para-aortic nodes positive from dissection: 7  Number of para-aortic nodes examined during dissection: 9  Lymph node metastasis: Present  Omentectomy performed: Yes  Oncology History   Endometrial cancer (Formerly [facility])   12/5/2024 Initial Diagnosis    Endometrial cancer (Formerly [facility])     1/16/2025 -  Cancer Staged    Staging form: Corpus Uteri - Carcinoma, AJCC 8th Edition  - Clinical stage from 1/16/2025: FIGO Stage IIIC2 (cT1b, cN2, cM0) - Signed by Belkys Ho MD on 2/3/2025  Histopathologic type: Papillary serous cystadenocarcinoma  Stage prefix: Initial diagnosis  Histologic grade (G): G3  Histologic grading system: 3 grade system  Lymph-vascular invasion (LVI): LVI present/identified, NOS  Pelvic laura status: Positive  Number of pelvic nodes " "positive from dissection: 9  Number of pelvic nodes examined during dissection: 20  Para-aortic status: Positive  Number of para-aortic nodes positive from dissection: 7  Number of para-aortic nodes examined during dissection: 9  Lymph node metastasis: Present  Omentectomy performed: Yes       2/21/2025 -  Chemotherapy    dostarlimab-gxly (JEMPERLI) IVPB, 500 mg, 6 of 12 cycles  Administration: 500 mg (2/21/2025), 500 mg (4/11/2025), 500 mg (3/14/2025), 500 mg (5/2/2025), 500 mg (5/23/2025), 500 mg (6/13/2025)  CARBOplatin (PARAPLATIN) IVPB (GOG AUC DOSING), 484 mg, 6 of 6 cycles  Administration: 484 mg (2/21/2025), 495 mg (4/11/2025), 484 mg (3/14/2025), 495 mg (5/2/2025), 495 mg (5/23/2025), 498.5 mg (6/13/2025)  PACLItaxel (TAXOL) chemo IVPB, 175 mg/m2 = 295.8 mg, 6 of 6 cycles  Dose modification: 135 mg/m2 (original dose 175 mg/m2, Cycle 3, Reason: Neuropathy)  Administration: 295.8 mg (2/21/2025), 225.6 mg (4/11/2025), 228 mg (3/14/2025), 225.6 mg (5/2/2025), 225.6 mg (5/23/2025), 226.8 mg (6/13/2025)        Review of Systems A complete review of systems is negative other than that noted above in the HPI.  Medical History Reviewed by provider this encounter:  Tobacco  Allergies  Meds  Problems  Med Hx  Surg Hx  Fam Hx     .     Objective   /80   Pulse 76   Temp 97.9 °F (36.6 °C) (Temporal)   Ht 4' 9.87\" (1.47 m)   Wt 75.1 kg (165 lb 8 oz)   SpO2 98%   BMI 34.75 kg/m²     Body mass index is 34.75 kg/m².  Pain Screening:     ECOG ECOG Performance Status: 1 - Restricted in physically strenuous activity but ambulatory and able to carry out work of a light or sedentary nature, e.g., light house work, office work   Physical Exam  Vitals reviewed. Exam conducted with a chaperone present.   Constitutional:       General: She is not in acute distress.     Appearance: Normal appearance. She is not ill-appearing.      Comments: Ambulating with wheeled walker   HENT:      Head: Normocephalic and " atraumatic.      Mouth/Throat:      Mouth: Mucous membranes are moist.     Eyes:      General:         Right eye: No discharge.         Left eye: No discharge.      Conjunctiva/sclera: Conjunctivae normal.     Neck:      Comments: Port site c/d/I without e/o infection. Port flipped.  Cardiovascular:      Rate and Rhythm: Normal rate and regular rhythm.   Pulmonary:      Effort: Pulmonary effort is normal.   Abdominal:      Palpations: Abdomen is soft. There is no mass.      Tenderness: There is no abdominal tenderness.      Hernia: No hernia is present.   Genitourinary:     Comments: Deferred    Musculoskeletal:         General: Swelling and tenderness present.      Right lower leg: Edema present.      Left lower leg: Edema present.      Comments: 1-2+ LE edema bilaterally, worst in left knee/ankle. Mildly tender L knee. No erythema/induration     Skin:     General: Skin is warm and dry.      Coloration: Skin is not jaundiced.      Findings: No rash.     Neurological:      General: No focal deficit present.      Mental Status: She is alert and oriented to person, place, and time.      Cranial Nerves: No cranial nerve deficit.      Sensory: No sensory deficit.      Motor: No weakness.      Gait: Gait normal.     Psychiatric:         Mood and Affect: Mood normal.         Behavior: Behavior normal.         Thought Content: Thought content normal.         Judgment: Judgment normal.          Labs: I have reviewed pertinent labs.   Lab Results   Component Value Date/Time     5.7 04/30/2025 08:58 AM     Lab Results   Component Value Date/Time    Potassium 4.0 07/01/2025 02:58 PM    Chloride 104 07/01/2025 02:58 PM    CO2 29 07/01/2025 02:58 PM    BUN 17 07/01/2025 02:58 PM    Creatinine 0.61 07/01/2025 02:58 PM    Glucose, Fasting 120 (H) 06/11/2025 08:39 AM    Calcium 9.3 07/01/2025 02:58 PM    AST 17 07/01/2025 02:58 PM    ALT 18 07/01/2025 02:58 PM    Alkaline Phosphatase 65 07/01/2025 02:58 PM    eGFR 96  07/01/2025 02:58 PM     Lab Results   Component Value Date/Time    WBC 5.16 07/01/2025 02:58 PM    Hemoglobin 11.2 (L) 07/01/2025 02:58 PM    Hematocrit 33.5 (L) 07/01/2025 02:58 PM    MCV 96 07/01/2025 02:58 PM    Platelets 235 07/01/2025 02:58 PM     Lab Results   Component Value Date/Time    Absolute Neutrophils 2.39 05/21/2025 10:38 AM        Trend:  Lab Results   Component Value Date     5.7 04/30/2025     5.7 04/09/2025     5.0 04/02/2025     6.2 03/12/2025     5.4 03/07/2025     7.2 02/19/2025     15.2 02/04/2025     7.3 12/18/2024       Radiology Results Review: I personally reviewed the following image studies in PACS and associated radiology reports: PETCT. My interpretation of the radiology images/reports is: NEWTON.  Other Study Results Review : No additional pertinent studies reviewed.    Administrative Statements   I have spent a total time of 30 minutes in caring for this patient on the day of the visit/encounter including Prognosis, Instructions for management, Patient and family education, Importance of tx compliance, Counseling / Coordination of care, and Communicating with other healthcare professionals .    Belkys Ho MD, MPH  Division of Gynecologic Oncology  07/02/25 8:34 AM

## 2025-07-02 ENCOUNTER — DOCUMENTATION (OUTPATIENT)
Dept: GYNECOLOGIC ONCOLOGY | Facility: CLINIC | Age: 65
End: 2025-07-02

## 2025-07-02 ENCOUNTER — TELEPHONE (OUTPATIENT)
Dept: GYNECOLOGIC ONCOLOGY | Facility: CLINIC | Age: 65
End: 2025-07-02

## 2025-07-02 DIAGNOSIS — C54.1 ENDOMETRIAL CANCER (HCC): Primary | ICD-10-CM

## 2025-07-02 DIAGNOSIS — C54.1 ENDOMETRIAL CANCER (HCC): ICD-10-CM

## 2025-07-02 DIAGNOSIS — Z51.12 ENCOUNTER FOR ANTINEOPLASTIC CHEMOTHERAPY AND IMMUNOTHERAPY: Primary | ICD-10-CM

## 2025-07-02 DIAGNOSIS — Z51.11 ENCOUNTER FOR ANTINEOPLASTIC CHEMOTHERAPY AND IMMUNOTHERAPY: Primary | ICD-10-CM

## 2025-07-02 PROBLEM — Z95.828: Status: ACTIVE | Noted: 2025-07-02

## 2025-07-02 LAB
AMYLASE SERPL-CCNC: 37 IU/L (ref 29–103)
EST. AVERAGE GLUCOSE BLD GHB EST-MCNC: 160 MG/DL
HBA1C MFR BLD: 7.2 %
LIPASE SERPL-CCNC: 19 U/L (ref 11–82)
TSH SERPL DL<=0.05 MIU/L-ACNC: 1.15 UIU/ML (ref 0.45–4.5)

## 2025-07-02 RX ORDER — SODIUM CHLORIDE 9 MG/ML
20 INJECTION, SOLUTION INTRAVENOUS ONCE
Status: CANCELLED | OUTPATIENT
Start: 2025-07-03

## 2025-07-02 NOTE — ASSESSMENT & PLAN NOTE
- 5/1 LED w acute DVT   - 5/21 Bilateral knee x-rays negative 5/21  - pain previously improved with eliquis and lidocaine cream to knees. Patient states compliance with eliquis  - Will repeat LED to ensure no progression of clot   - Will reach out to Dr. Ahn regarding cardiac medication dosing given possibility of pain/swelling 2/2 known HFrEF  - if no progressive clot or cardiac etiology, will start compression stockings and consider leg MRI

## 2025-07-02 NOTE — PROGRESS NOTES
July 2025 Sunday Monday Tuesday Wednesday Thursday Friday Saturday             1    Office Visit   2:15 PM   Belkys Ho MD   [facility] [place]    Laboratory Walk In   3:05 PM   AN MOB PHLEB CHAIR 5   [facility] MOB 2     3    Oncology Treatment   8:00 AM   SH CHAIR 1   [facility] 4     5           Cycle 7, Day 1     6     7     8     9     10    IR PORT CHECK     AL IR 1   [facility] Interventional Radiology 11     12                13     14     15     16     17    OFFICE VISIT  11:15 AM   Tray Kang DO   [facility] Palliative [facility] 18    OFFICE VISIT   9:00 AM   Eliezer Quintanilla MD   [facility] Radiation Oncology 19                20     21     22     23     24     25     26                27     28     29     30     31 August 2025 Sunday Monday Tuesday Wednesday Thursday Friday Saturday                            1     2                3     4     5    HOME VISIT  12:45 PM   Olivier Lea MD   [facility] [place] 6     7     8     9                10     11     12    Labs 13     14     15    Oncology Treatment   8:00 AM   SH CHAIR 1   [facility] 16            Cycle 8, Day 1    17     18     19     20     21     22     23                24  Happy Birthday!     25     26     27     28     29     30                31 September 2025 Sunday Monday Tuesday Wednesday Thursday Friday Saturday        1     2     3     4     5     6                7     8     9     10     11     12     13                14     15     16     17     18     19     20                21     22     23    Labs 24     25     26    Oncology Treatment   8:00 AM   SH CHAIR 7   [facility] access hospital Heart Infusion  [place] 27            Cycle 9, Day 1    28     29     30                                               October 2025      [place] Monday Tuesday Wednesday Thursday Friday Saturday                  1     2     3    Cardiology Office Visit   9:05 AM   [facility] [place] 4                5     6     7    Office Visit   9:45 AM   Belkys Ho MD   [facility] [place] 8     9     10    Cardiology Office Visit  11:25 AM   DO Douglas Brandon [facility] [place] 11                12     13     14     15     16     17     18                19     20     21     22     23     24     25                26     27     28     29     30     31

## 2025-07-02 NOTE — ASSESSMENT & PLAN NOTE
- 6/13 noted to be flipped and unable to access  - IR referral for port check placed 6/13, still pending. Will reach out to their team to ensure follow-up for ongoing labs/infusions

## 2025-07-02 NOTE — H&P (VIEW-ONLY)
July 2025 Sunday Monday Tuesday Wednesday Thursday Friday Saturday             1    Office Visit   2:15 PM   Belkys Ho MD   [facility] [place]    Laboratory Walk In   3:05 PM   AN MOB PHLEB CHAIR 5   [facility] MOB 2     3    Oncology Treatment   8:00 AM   SH CHAIR 1   [facility] 4     5           Cycle 7, Day 1     6     7     8     9     10    IR PORT CHECK     AL IR 1   [facility] Interventional Radiology 11     12                13     14     15     16     17    OFFICE VISIT  11:15 AM   Tray Kang DO   [facility] Palliative [facility] 18    OFFICE VISIT   9:00 AM   Eliezer Quintanilla MD   [facility] Radiation Oncology 19                20     21     22     23     24     25     26                27     28     29     30     31 August 2025 Sunday Monday Tuesday Wednesday Thursday Friday Saturday                            1     2                3     4     5    HOME VISIT  12:45 PM   Olivier Lea MD   [facility] [place] 6     7     8     9                10     11     12    Labs 13     14     15    Oncology Treatment   8:00 AM   SH CHAIR 1   [facility]fusion [facility] 16            Cycle 8, Day 1    17     18     19     20     21     22     23                24  Happy Birthday!     25     26     27     28     29     30                31 September 2025 Sunday Monday Tuesday Wednesday Thursday Friday Saturday        1     2     3     4     5     6                7     8     9     10     11     12     13                14     15     16     17     18     19     20                21     22     23    Labs 24     25     26    Oncology Treatment   8:00 AM   SH CHAIR 7   [facility] Heart Infusion  [place] 27            Cycle 9, Day 1    28     29     30                                               October 2025      [place] Monday Tuesday Wednesday Thursday Friday Saturday                  1     2     3    Cardiology Office Visit   9:05 AM   [facility] [place] 4                5     6     7    Office Visit   9:45 AM   Belkys Ho MD   [facility] [place] 8     9     10    Cardiology Office Visit  11:25 AM   DO Douglas Brandon [facility] [place] 11                12     13     14     15     16     17     18                19     20     21     22     23     24     25                26     27     28     29     30     31

## 2025-07-02 NOTE — TELEPHONE ENCOUNTER
I spoke with the patient using the  and informed her that the port check is scheduled for 7/10/2025 at 10:30AM in the [facility].   Patient asked me to send her text message with the appointment details as a reminder. I sent her a message.

## 2025-07-02 NOTE — ASSESSMENT & PLAN NOTE
- Pre-existing T2DM  - Symptoms improved after taxol dose-reduced  - Started on gabapentin 4/29, currently taking 100mg qhs. Instructed to increase to 300mg qhs

## 2025-07-02 NOTE — ASSESSMENT & PLAN NOTE
Wt Readings from Last 3 Encounters:   07/01/25 75.1 kg (165 lb 8 oz)   06/13/25 76.2 kg (167 lb 15.9 oz)   06/06/25 75.3 kg (166 lb)      [facility], last appt 6/19  - 11/2024 TTE with LVEF 55%, grade 1 diastolic dysfunction  -4/2025 Repeat TTE LVEF 50%  - Continue atorvastatin, furosemide, losartan, metoprolol, jardiance    - HR today wnl, BP wnl, SpO2 wnl  - Will reach out to Dr. Ahn regarding ongoing lower extremity swelling and palpitations

## 2025-07-03 ENCOUNTER — PREP FOR PROCEDURE (OUTPATIENT)
Dept: INTERVENTIONAL RADIOLOGY/VASCULAR | Facility: CLINIC | Age: 65
End: 2025-07-03

## 2025-07-03 ENCOUNTER — HOSPITAL ENCOUNTER (OUTPATIENT)
Dept: INFUSION CENTER | Facility: HOSPITAL | Age: 65
End: 2025-07-03
Attending: OBSTETRICS & GYNECOLOGY
Payer: COMMERCIAL

## 2025-07-03 VITALS
TEMPERATURE: 97.1 F | SYSTOLIC BLOOD PRESSURE: 131 MMHG | RESPIRATION RATE: 18 BRPM | HEART RATE: 76 BPM | DIASTOLIC BLOOD PRESSURE: 84 MMHG

## 2025-07-03 DIAGNOSIS — C54.1 ENDOMETRIAL CANCER (HCC): Primary | ICD-10-CM

## 2025-07-03 PROCEDURE — 96413 CHEMO IV INFUSION 1 HR: CPT

## 2025-07-03 RX ORDER — SODIUM CHLORIDE 9 MG/ML
20 INJECTION, SOLUTION INTRAVENOUS ONCE
Status: COMPLETED | OUTPATIENT
Start: 2025-07-03 | End: 2025-07-03

## 2025-07-03 RX ADMIN — SODIUM CHLORIDE 1000 MG: 9 INJECTION, SOLUTION INTRAVENOUS at 09:32

## 2025-07-03 RX ADMIN — SODIUM CHLORIDE 20 ML/HR: 0.9 INJECTION, SOLUTION INTRAVENOUS at 08:46

## 2025-07-03 NOTE — PROGRESS NOTES
Pt tolerated Jemperli without difficulty via RFA PIV.  Confirmed next appt on 8/15 at 0800, and AVS provided.  Left ambulatory in stable condition.

## 2025-07-07 ENCOUNTER — TELEPHONE (OUTPATIENT)
Dept: RADIOLOGY | Facility: HOSPITAL | Age: 65
End: 2025-07-07

## 2025-07-07 DIAGNOSIS — N39.498 OTHER URINARY INCONTINENCE: ICD-10-CM

## 2025-07-08 ENCOUNTER — TELEPHONE (OUTPATIENT)
Dept: RADIOLOGY | Facility: HOSPITAL | Age: 65
End: 2025-07-08

## 2025-07-08 RX ORDER — PHENAZOPYRIDINE HYDROCHLORIDE 100 MG/1
100 TABLET, FILM COATED ORAL 3 TIMES DAILY PRN
Qty: 60 TABLET | Refills: 2 | Status: SHIPPED | OUTPATIENT
Start: 2025-07-08

## 2025-07-14 ENCOUNTER — HOSPITAL ENCOUNTER (OUTPATIENT)
Dept: RADIOLOGY | Facility: HOSPITAL | Age: 65
Discharge: HOME | End: 2025-07-14
Attending: RADIOLOGY
Payer: COMMERCIAL

## 2025-07-14 VITALS
RESPIRATION RATE: 16 BRPM | SYSTOLIC BLOOD PRESSURE: 144 MMHG | HEART RATE: 75 BPM | DIASTOLIC BLOOD PRESSURE: 75 MMHG | OXYGEN SATURATION: 98 % | TEMPERATURE: 97.8 F

## 2025-07-14 DIAGNOSIS — C54.1 ENDOMETRIAL CANCER (HCC): ICD-10-CM

## 2025-07-14 LAB
ERYTHROCYTE [DISTWIDTH] IN BLOOD BY AUTOMATED COUNT: 13.9 % (ref 11.6–15.1)
HCT VFR BLD AUTO: 35.5 % (ref 34.8–46.1)
HGB BLD-MCNC: 12.1 G/DL (ref 11.5–15.4)
INR PPP: 0.92 (ref 0.85–1.19)
MCH RBC QN AUTO: 32 PG (ref 26.8–34.3)
MCHC RBC AUTO-ENTMCNC: 34.1 G/DL (ref 31.4–37.4)
MCV RBC AUTO: 94 FL (ref 82–98)
PLATELET # BLD AUTO: 183 THOUSANDS/UL (ref 149–390)
PMV BLD AUTO: 10.5 FL (ref 8.9–12.7)
PROTHROMBIN TIME: 12.6 SECONDS (ref 12.3–15)
RBC # BLD AUTO: 3.78 MILLION/UL (ref 3.81–5.12)
WBC # BLD AUTO: 6.61 THOUSAND/UL (ref 4.31–10.16)

## 2025-07-14 PROCEDURE — 37799 UNLISTED PX VASCULAR SURGERY: CPT | Performed by: STUDENT IN AN ORGANIZED HEALTH CARE EDUCATION/TRAINING PROGRAM

## 2025-07-14 PROCEDURE — 36597 REPOSITION VENOUS CATHETER: CPT

## 2025-07-14 PROCEDURE — 99152 MOD SED SAME PHYS/QHP 5/>YRS: CPT | Performed by: STUDENT IN AN ORGANIZED HEALTH CARE EDUCATION/TRAINING PROGRAM

## 2025-07-14 PROCEDURE — 85027 COMPLETE CBC AUTOMATED: CPT | Performed by: RADIOLOGY

## 2025-07-14 PROCEDURE — C1894 INTRO/SHEATH, NON-LASER: HCPCS | Performed by: STUDENT IN AN ORGANIZED HEALTH CARE EDUCATION/TRAINING PROGRAM

## 2025-07-14 PROCEDURE — 99152 MOD SED SAME PHYS/QHP 5/>YRS: CPT

## 2025-07-14 PROCEDURE — 99153 MOD SED SAME PHYS/QHP EA: CPT

## 2025-07-14 PROCEDURE — 85610 PROTHROMBIN TIME: CPT | Performed by: RADIOLOGY

## 2025-07-14 RX ORDER — FENTANYL CITRATE 50 UG/ML
INJECTION, SOLUTION INTRAMUSCULAR; INTRAVENOUS AS NEEDED
Status: COMPLETED | OUTPATIENT
Start: 2025-07-14 | End: 2025-07-14

## 2025-07-14 RX ORDER — CEFAZOLIN SODIUM 2 G/50ML
2000 SOLUTION INTRAVENOUS ONCE
Status: COMPLETED | OUTPATIENT
Start: 2025-07-14 | End: 2025-07-14

## 2025-07-14 RX ORDER — MIDAZOLAM HYDROCHLORIDE 2 MG/2ML
INJECTION, SOLUTION INTRAMUSCULAR; INTRAVENOUS AS NEEDED
Status: COMPLETED | OUTPATIENT
Start: 2025-07-14 | End: 2025-07-14

## 2025-07-14 RX ADMIN — CEFAZOLIN SODIUM 2000 MG: 2 SOLUTION INTRAVENOUS at 07:40

## 2025-07-14 RX ADMIN — MIDAZOLAM HYDROCHLORIDE 1 MG: 1 INJECTION, SOLUTION INTRAMUSCULAR; INTRAVENOUS at 09:29

## 2025-07-14 RX ADMIN — MIDAZOLAM HYDROCHLORIDE 0.5 MG: 1 INJECTION, SOLUTION INTRAMUSCULAR; INTRAVENOUS at 09:38

## 2025-07-14 RX ADMIN — FENTANYL CITRATE 50 MCG: 50 INJECTION INTRAMUSCULAR; INTRAVENOUS at 09:29

## 2025-07-14 RX ADMIN — FENTANYL CITRATE 25 MCG: 50 INJECTION INTRAMUSCULAR; INTRAVENOUS at 09:38

## 2025-07-14 NOTE — SEDATION DOCUMENTATION
Case delay due to environment problems in IR suite.  Arrangements made to use Cath Lab.  contacted.

## 2025-07-14 NOTE — DISCHARGE INSTRUCTIONS
Implanted Venous Access Port     WHAT YOU NEED TO KNOW:   An implanted venous access port is a device used to give treatments and take blood. It may also be called a central venous access device (CVAD). The port is a small container that is placed under your skin, usually in your upper chest. The port is attached to a catheter that enters a large vein.   DISCHARGE INSTRUCTIONS:   Resume your normal diet. Small sips of flat soda will help with mild nausea.  Prevent an infection:   Wash your hands often.  Use soap and water. Clean your hands before and after you care for your port. Remind everyone who cares for your port to wash their hands.   Check your skin for infection every day.  Look for redness, swelling, or fluid oozing from the port site.  Care for your port:   1. You may shower beginning 48 hours after procedure.     2.  Leave glue in place.    3. It is normal for some bruising to occur.    4. Use Tylenol for pain.    5. Limit use of arm on the side that your port was placed. Lift nothing heavier than 5 pounds for 1 week, and then gradually increase activity as tolerated.    6. DO NOT apply ointment, lotion or cream to port site until incision is healed. Allow glue to fall off. DO NOT attempt to peel glue from skin even it it begins to flake.     7. After the port incision is healed you may swim, bathe.  Notify the Interventional Radiologist if you have any of the followin. Fever above 101 F    2. Increased redness or swelling after 1st day.     3. Increased pain after 1st day.    4. Any sign of infection (drainage from port site, skin separation, hot to touch).    5. Persistent nausea or vomiting.    Contact Interventional Radiology at [phone] ([place] PATIENTS: Contact Interventional Radiology at [phone]) ([facility] PATIENTS: Contact Interventional Radiology at [phone]).     Procedural Sedation   WHAT YOU NEED TO KNOW:   Procedural sedation is medicine used during procedures to help you  feel relaxed and calm. You will remember little to none of the procedure. After sedation you may feel tired, weak, or unsteady on your feet. You may also have trouble concentrating or short-term memory loss. These symptoms should go away in 24 hours or less.   DISCHARGE INSTRUCTIONS:   Call 911 or have someone else call for any of the following:   You have sudden trouble breathing.     You cannot be woken.     Contact Interventional Radiology at [phone]   [place] PATIENTS: Contact Interventional Radiology at [phone] [place] PATIENTS: Contact Interventional Radiology at [phone]) if any of the following occur:      You have a severe headache or dizziness.     Your heart is beating faster than usual.    You have a fever or chills.     Your skin is itchy, swollen, or you have a rash.     You have nausea or are vomiting for more than 8 hours after the procedure.      You have questions or concerns about your condition or care.  Self-care:   Have someone stay with you for 24 hours. This person can drive you to errands and help you do things around the house. This person can also watch for problems.      Rest and do quiet activities for 24 hours. Do not exercise, ride a bike, or play sports. Stand up slowly to prevent dizziness and falls. Take short walks around the house with another person. Slowly return to your usual activities the next day.      Do not drive or use dangerous machines or tools for 24 hours. You may injure yourself or others. Examples include a lawnmower, saw, or drill. Do not return to work for 24 hours if you use dangerous machines or tools for work.      Do not make important decisions for 24 hours. For example, do not sign important papers or invest money.      Drink liquids as directed. Liquids help flush the sedation medicine out of your body. Ask how much liquid to drink each day and which liquids are best for you.      Eat small, frequent meals to prevent nausea and vomiting. Start  with clear liquids such as juice or broth. If you do not vomit after clear liquids, you can eat your usual foods.      Do not drink alcohol or take medicines that make you drowsy. This includes medicines that help you sleep and anxiety medicines. Ask your healthcare provider if it is safe for you to take pain medicine.  Follow up with your healthcare provider as directed: Write down your questions so you remember to ask them during your visits.

## 2025-07-14 NOTE — INTERVAL H&P NOTE
Update: (This section must be completed if the H&P was completed greater than 24 hrs to procedure or admission)    H&P reviewed. After examining the patient, I find no changed to the H&P since it had been written.    Patient re-evaluated. Accept as history and physical.    Port is flipped, patient has ongoing chemo requirement. Plan for port pocket access with blunt dissection to the port. Will flush and evaluate port and if functional will flip and suture in place otherwise plan for replacement.    Mallampati: 2  ASA: 3  Tone Richardson/July 14, 2025/8:41 AM

## 2025-07-14 NOTE — BRIEF OP NOTE (RAD/CATH)
INTERVENTIONAL RADIOLOGY PROCEDURE NOTE    Date: 7/14/2025    Procedure:   Procedure Summary       Date: 07/14/25 Room / Location: [facility] Interventional Radiology    Anesthesia Start:  Anesthesia Stop:     Procedure: IR PORT PLACEMENT Diagnosis:       Endometrial cancer (HCC)      (Flipped port.  Requires new port placement.)    Scheduled Providers: Tone Richardson Responsible Provider:     Anesthesia Type: Not recorded ASA Status: Not recorded            Preoperative diagnosis:   1. Endometrial cancer (HCC)         Postoperative diagnosis: Same.    Surgeon: Tone Richardson     Assistant: None. No qualified resident was available.    Blood loss: 1    Specimens: none     Findings: Low profile chemotherapy port flipped on spot fluoroscopic images. Port site was accessed and port was flipped into appropriate orientation. Port aspirated and flushed without resistance. Top left suture site on port used to secure the port to the pocket with 2.0 prolene. Access site closed with 3.0 Vicryl and dermabond.     Complications: None immediate.    Anesthesia: conscious sedation

## 2025-07-15 DIAGNOSIS — I50.9 ACUTE DECOMPENSATED HEART FAILURE (HCC): ICD-10-CM

## 2025-07-15 DIAGNOSIS — I42.8 NONISCHEMIC CARDIOMYOPATHY (HCC): Chronic | ICD-10-CM

## 2025-07-16 RX ORDER — FUROSEMIDE 20 MG/1
20 TABLET ORAL DAILY
Qty: 30 TABLET | Refills: 5 | Status: SHIPPED | OUTPATIENT
Start: 2025-07-16

## 2025-07-17 ENCOUNTER — OFFICE VISIT (OUTPATIENT)
Dept: PALLIATIVE MEDICINE | Facility: CLINIC | Age: 65
End: 2025-07-17
Payer: COMMERCIAL

## 2025-07-17 VITALS
BODY MASS INDEX: 35.27 KG/M2 | SYSTOLIC BLOOD PRESSURE: 102 MMHG | WEIGHT: 168 LBS | HEART RATE: 83 BPM | DIASTOLIC BLOOD PRESSURE: 70 MMHG | OXYGEN SATURATION: 96 % | TEMPERATURE: 97.6 F

## 2025-07-17 DIAGNOSIS — G89.3 CANCER RELATED PAIN: Primary | ICD-10-CM

## 2025-07-17 DIAGNOSIS — C54.1 ENDOMETRIAL CANCER (HCC): ICD-10-CM

## 2025-07-17 DIAGNOSIS — Z51.5 PALLIATIVE CARE ENCOUNTER: ICD-10-CM

## 2025-07-17 PROCEDURE — 99214 OFFICE O/P EST MOD 30 MIN: CPT | Performed by: STUDENT IN AN ORGANIZED HEALTH CARE EDUCATION/TRAINING PROGRAM

## 2025-07-17 RX ORDER — ACETAMINOPHEN 500 MG
500 TABLET ORAL EVERY 6 HOURS PRN
Qty: 90 TABLET | Refills: 0 | Status: SHIPPED | OUTPATIENT
Start: 2025-07-17

## 2025-07-17 NOTE — ASSESSMENT & PLAN NOTE
Patient has 11 days left of OxyIR  Typically using 2 doses a day of OxyIR 5mg  Patient to call 2-3 days prior to running out so that a refill can be provided to avoid any lapse in medication supply.    Can continue current plan:  1) OxyIR 5mg q6 hours PRN  2) Bowel regimen to avoid OIC    Patient does endorse chronic bilateral knee pain L>R. Patient has a history of DVT for which she is on anticoagulation with Eliquis for which she remains compliant with.  She endorses pain to the lateral aspect of the left knee particularly at the left knee joint space on palpation. She endorses standing can be painful as well.  I do not see any erythema, warmth, or pitting edema to the bilateral lower extremities today.    I did review Dr. Ho's encounter on 7/1/2025 in which patient was advised to get a repeat Duplex of the lower extremities to rule out worsening of her existing DVT.  I did speak with Gyn/Onc  who assisted in getting patient coordinated for scheduling ASAP with the Duplex already on order.    I did provide ER precautions to patient in which she states understanding.     ER Precautions  Watch for red flag symptoms including, but not limited to fevers, chills, chest pain, shortness of breath, intractable nausea/vomiting/diarrhea, or acute intractable pain (especially if pain is new or has changed).      Medication safety issues - Do not drive under the influence of narcotics (including opioids), watch for adverse effects including confusion / altered mental status / respiratory depression (slowed breathing), keep medications stored in a safe/locked environment, do not use alcohol while opioids or other narcotics are in your system. Do not travel with more than the minimum number of tablets or capsules required for the trip.    Orders:    acetaminophen (TYLENOL) 500 mg tablet; Take 1 tablet (500 mg total) by mouth every 6 (six) hours as needed for mild pain

## 2025-07-17 NOTE — PROGRESS NOTES
Consultation Visit   Name: Adrienne Theodore      : 1960      MRN: [id number]  Encounter Provider: Eliezer Quintanilla MD  Encounter Date: 2025   Encounter department: [facility] RADIATION ONCOLOGY  :  Assessment & Plan  Endometrial cancer (HCC)  Adrienne Theodore is a 64 y.o. female with FIGO IIIC2 ([id number]) serous endometrial cancer s/p CASSANDRA/BSO with PLND and PALND on 25 revealing 4.7cm of disease, with 60% MMI, extensive LVSI, negative margins, no cervical stromal invasion, with 9/20 bilateral pelvic nodes and 7/9 PA nodes involved. Washings were negative.  She had a preoperative CT chest abdomen pelvis showing endometrial malignancy, iliac and retroperitoneal lymphadenopathy, but no evidence of metastatic disease in the chest.  There was an adrenal finding for which follow-up was recommended.  Postoperative CT abdomen pelvis showed postoperative changes and stability in the left adrenal nodule.  Her case was reviewed at multidisciplinary tumor board with recommendations for systemic therapy with chemotherapy/immunotherapy, followed by external beam radiation therapy.  She is planned for Paclitaxel, carboplatin, Dostarlimab which she has completed, planned for maintenance thereafter. Interval PET/CT showed no suspicious findings and postoperative changes.     Based on her histology, pathologic features, and imaging findings, we reviewed pelvic and paraaortic EBRT (45Gy in 25fx) +/- brachytherapy. Her pathology was reviewed in detail with the pathologist, who confirmed that although there was some lymph/vascular involvement in the cervix, she does not have cervical stromal invasion. Per pathology, LVI can be called when tumor is within vascular spaces passing through a site without stromal invasion. In the absence of cervical stromal invasion, there is not a strong indication for brachytherapy boost.    The benefits, alternatives and potential adverse  effects of radiation therapy were explained to the patient. Risks include but are not limited to fatigue, skin reaction/dermatitis, hyperpigmentation, urinary frequency/urgency, dysuria, diarrhea, abdominal fullness/bloating, a permanent change in bowel habits, vaginal stenosis, cystitis, proctitis, risk of fistulas/adhesions,, bleeding, and risk of secondary malignancy. We also discussed that, even with appropriate therapy, there is still a risk of progression or recurrence.      She agreed to proceed with radiation therapy and informed consent was signed. CT simulation scheduled.  Orders:  •  Radiation Simulation Treatment       History of Present Illness   Chief Complaint   Patient presents with   • Follow-up     Radiation Oncology      Pertinent Medical History   Returns for complex follow-up s/p 6 cycles of chemo/immuno and repeat PET/CT to discuss adjuvant radiation. She will continue maintenance immunotherapy with Jemperli.     Patient had initial consult on 2/10/25.  She has a history of FIGO IIIC2 ([id number]) serous endometrial cancer s/p CASSANDRA/BSO with PLND and PALND on 1/16/25 revealing 4.7cm of disease, with 60% MMI, extensive LVSI, negative margins, no cervical stromal invasion, with 9/20 bilateral pelvic nodes and 7/9 PA nodes involved. Washings were negative.  She had a preoperative CT chest abdomen pelvis showing endometrial malignancy, iliac and retroperitoneal lymphadenopathy, but no evidence of metastatic disease in the chest.  There was an adrenal finding for which follow-up was recommended.  Postoperative CT abdomen pelvis showed postoperative changes and stability in the left adrenal nodule.  Her case was reviewed at multidisciplinary tumor board with recommendations for systemic therapy with chemotherapy/immunotherapy, followed by external beam radiation therapy.  She is planned for Paclitaxel, carboplatin, Dostarlimab, with last chemotherapy cycle booked 6/6/25.     Plan was to proceed with  chemo/immunotherapy followed by restaging and subsequent planning for pelvic and paraaortic EBRT (45Gy in 25fx)        25 - 2025 6 cycles Carbo/Taxol/Jemperli      25 PET/CT  1.  No findings suspicious for hypermetabolic malignancy. Improving collections in the pelvis with resolution of previously noted indistinct soft tissue thickening at the aortic bifurcation likely related to post operative changes.  2.  Mild uptake at the stable 1.8 cm left adrenal nodule, nonspecific. Given stability in size, this may represent a lipid poor adrenal adenoma. This could be confirmed with dedicated CT or MRI adrenal protocol or reassessed on follow-up imaging.       25 Dr. Ho  Plan for: chemotherapy/IO x 6 cycles, adjuvant XRT, and dostarlimab maintenance.   - 25: post-chemo PETCT with no e/o hypermetabolic malignancy  - Rad onc appt with Dr. Quintanilla  for XRT planning  - Plan to begin dostarlimab maintenance 1000mg IV q 6 weeks x up to 3 years or until progression/toxicity      Upcomin25 Palliative care  8/15/25 Infusion  10/7/25 Dr. Ho    Today she reports feeling well overall.  She has some abdominal discomfort.  She denies bleeding.  She denies pain.  She presents with her caregiver. She has some urinary incontinence and wears pads.      Oncology History   Cancer Staging   Endometrial cancer (HCC)  Staging form: Corpus Uteri - Carcinoma, AJCC 8th Edition  - Clinical stage from 2025: FIGO Stage IIIC2 (cT1b, cN2, cM0) - Signed by Belkys Ho MD on 2/3/2025  Histopathologic type: Papillary serous cystadenocarcinoma  Stage prefix: Initial diagnosis  Histologic grade (G): G3  Histologic grading system: 3 grade system  Lymph-vascular invasion (LVI): LVI present/identified, NOS  Pelvic laura status: Positive  Number of pelvic nodes positive from dissection: 9  Number of pelvic nodes examined during dissection: 20  Para-aortic status: Positive  Number of  para-aortic nodes positive from dissection: 7  Number of para-aortic nodes examined during dissection: 9  Lymph node metastasis: Present  Omentectomy performed: Yes  Oncology History   Endometrial cancer (HCC)   12/5/2024 Initial Diagnosis    Endometrial cancer (HCC)     1/16/2025 -  Cancer Staged    Staging form: Corpus Uteri - Carcinoma, AJCC 8th Edition  - Clinical stage from 1/16/2025: FIGO Stage IIIC2 (cT1b, cN2, cM0) - Signed by Belkys Ho MD on 2/3/2025  Histopathologic type: Papillary serous cystadenocarcinoma  Stage prefix: Initial diagnosis  Histologic grade (G): G3  Histologic grading system: 3 grade system  Lymph-vascular invasion (LVI): LVI present/identified, NOS  Pelvic laura status: Positive  Number of pelvic nodes positive from dissection: 9  Number of pelvic nodes examined during dissection: 20  Para-aortic status: Positive  Number of para-aortic nodes positive from dissection: 7  Number of para-aortic nodes examined during dissection: 9  Lymph node metastasis: Present  Omentectomy performed: Yes       2/21/2025 -  Chemotherapy    dostarlimab-gxly (JEMPERLI) IVPB, 500 mg, 7 of 12 cycles  Administration: 500 mg (2/21/2025), 500 mg (4/11/2025), 1,000 mg (7/3/2025), 500 mg (3/14/2025), 500 mg (5/2/2025), 500 mg (5/23/2025), 500 mg (6/13/2025)  CARBOplatin (PARAPLATIN) IVPB (GOG AUC DOSING), 484 mg, 6 of 6 cycles  Administration: 484 mg (2/21/2025), 495 mg (4/11/2025), 484 mg (3/14/2025), 495 mg (5/2/2025), 495 mg (5/23/2025), 498.5 mg (6/13/2025)  PACLItaxel (TAXOL) chemo IVPB, 175 mg/m2 = 295.8 mg, 6 of 6 cycles  Dose modification: 135 mg/m2 (original dose 175 mg/m2, Cycle 3, Reason: Neuropathy)  Administration: 295.8 mg (2/21/2025), 225.6 mg (4/11/2025), 228 mg (3/14/2025), 225.6 mg (5/2/2025), 225.6 mg (5/23/2025), 226.8 mg (6/13/2025)        Review of Systems Refer to nursing note.         Objective   /72 (BP Location: Left arm)   Pulse 77   Temp 97.9 °F (36.6 °C) (Temporal)    Resp 16   SpO2 95%     Pain Screening:     ECOG ECOG Performance Status: 1 - Restricted in physically strenuous activity but ambulatory and able to carry out work of a light or sedentary nature, e.g., light house work, office work  Physical Exam   General Appearance:  Alert, cooperative, no distress, appears stated age.  Appears fatigued.  Cardiovascular:  Extremities warm and well perfused  Lungs: Respirations unlabored, no cyanosis, able to speak in complete sentences without dyspnea.  Abdomen: Non-distended  Extremities: No cyanosis or edema  Skin: No generalized rash or dermatitis  Neurologic: ANOx3, speech and cognition intact.  Ambulates with walker.    Radiology Results: I have reviewed radiology images/reports described above.       Administrative Statements   I have spent a total time of 60 minutes in caring for this patient on the day of the visit/encounter including Diagnostic results, Prognosis, Risks and benefits of tx options, Instructions for management, Patient and family education, Importance of tx compliance, Risk factor reductions, Impressions, Counseling / Coordination of care, Documenting in the medical record, Reviewing/placing orders in the medical record (including tests, medications, and/or procedures), Obtaining or reviewing history  , and Communicating with other healthcare professionals .

## 2025-07-17 NOTE — ASSESSMENT & PLAN NOTE
Adrienne Theodore is a 64 y.o. female with FIGO IIIC2 (dR4pF2e) serous endometrial cancer s/p CASSANDRA/BSO with PLND and PALND on 1/16/25 revealing 4.7cm of disease, with 60% MMI, extensive LVSI, negative margins, no cervical stromal invasion, with 9/20 bilateral pelvic nodes and 7/9 PA nodes involved. Washings were negative.  She had a preoperative CT chest abdomen pelvis showing endometrial malignancy, iliac and retroperitoneal lymphadenopathy, but no evidence of metastatic disease in the chest.  There was an adrenal finding for which follow-up was recommended.  Postoperative CT abdomen pelvis showed postoperative changes and stability in the left adrenal nodule.  Her case was reviewed at multidisciplinary tumor board with recommendations for systemic therapy with chemotherapy/immunotherapy, followed by external beam radiation therapy.  She is planned for Paclitaxel, carboplatin, Dostarlimab which she has completed, planned for maintenance thereafter. Interval PET/CT showed no suspicious findings and postoperative changes.     Based on her histology, pathologic features, and imaging findings, we reviewed pelvic and paraaortic EBRT (45Gy in 25fx) +/- brachytherapy. Her pathology was reviewed in detail with the pathologist, who confirmed that although there was some lymph/vascular involvement in the cervix, she does not have cervical stromal invasion. Per pathology, LVI can be called when tumor is within vascular spaces passing through a site without stromal invasion. In the absence of cervical stromal invasion, there is not a strong indication for brachytherapy boost.    The benefits, alternatives and potential adverse effects of radiation therapy were explained to the patient. Risks include but are not limited to fatigue, skin reaction/dermatitis, hyperpigmentation, urinary frequency/urgency, dysuria, diarrhea, abdominal fullness/bloating, a permanent change in bowel habits, vaginal stenosis, cystitis,  proctitis, risk of fistulas/adhesions,, bleeding, and risk of secondary malignancy. We also discussed that, even with appropriate therapy, there is still a risk of progression or recurrence.      She agreed to proceed with radiation therapy and informed consent was signed. CT simulation scheduled.  Orders:  •  Radiation Simulation Treatment

## 2025-07-17 NOTE — ASSESSMENT & PLAN NOTE
Psychosocial   Supportive listening provided  Normalized experience of patient/family  Provided anxiety containment     Referrals Placed / Medical Equipment Ordered  -None  -will have follow-up Duplex as ordered by Gyn/Onc    Follow-Up Recommendations  -Follow-up with PCP and current medical specialists  -Follow-up with palliative care: 4-6 weeks    Orders:    acetaminophen (TYLENOL) 500 mg tablet; Take 1 tablet (500 mg total) by mouth every 6 (six) hours as needed for mild pain

## 2025-07-17 NOTE — ASSESSMENT & PLAN NOTE
[facility]  used for this visit today - (Name: Ruby, ID# [id number])  Patient is accompanied by her caretaker as well today.  Following Gyn/Onc  Treatment plan with systemic chemo.  She is tolerating this at this time.  However, endorsing chronic pain to lower extremities bilaterally (which appear to be chronic as she states- have been going on for months).    She also endorsed bitterness to her mouth, changes in taste likely associated with dysgeusia and dry mouth for which she endorses.    We discussed options for dry mouth management including Biotene mouth rinse for which I printed a picture for patient's reference when she goes to the store. Discussed trying Biotene mouth rinse before meals to see if this helps with dry mouth as well as dysgeusia.    She also endorses a headache after her recent treatment for which she is requesting tylenol. Script for Tylenol sent, but discussed that this may need to be obtained OTC if insurance does not cover.  Reviewed maximum alloted amount not to exceed 3,000mg in a 24 hour period with Tylenol.  Patient states understanding and agreement.    She also has some soreness to her port since placement but on exam, does not appear to be agitated, no erythema or purulence or skin wound or signs consistent with an infection. Patient is afebrile, normotensive, and in no acute distress on exam.    Orders:    acetaminophen (TYLENOL) 500 mg tablet; Take 1 tablet (500 mg total) by mouth every 6 (six) hours as needed for mild pain

## 2025-07-17 NOTE — PATIENT INSTRUCTIONS
It was a pleasure speaking with you today.    As discussed:  -Continue your medications as prescribed.  -Continue with a bowel regimen to avoid constipation.    Follow-up in: 4-6 weeks    Please do not hesitate to call me sooner should you have any questions/concerns or needs.    Medication safety issues - Do not drive under the influence of narcotics (including opioids), watch for adverse effects including confusion / altered mental status / respiratory depression (slowed breathing), keep medications stored in a safe/locked environment, do not use alcohol while opioids or other narcotics are in your system. Do not travel with more than the minimum number of tablets or capsules required for the trip.    ER Precautions  Watch for red flag symptoms including, but not limited to fevers, chills, chest pain, shortness of breath, intractable nausea/vomiting/diarrhea, or acute intractable pain (especially if pain is new or has changed).

## 2025-07-18 ENCOUNTER — OFFICE VISIT (OUTPATIENT)
Dept: RADIATION ONCOLOGY | Facility: HOSPITAL | Age: 65
End: 2025-07-18
Attending: INTERNAL MEDICINE
Payer: COMMERCIAL

## 2025-07-18 ENCOUNTER — HOSPITAL ENCOUNTER (OUTPATIENT)
Dept: NON INVASIVE DIAGNOSTICS | Facility: CLINIC | Age: 65
Discharge: HOME | End: 2025-07-18
Attending: OBSTETRICS & GYNECOLOGY
Payer: COMMERCIAL

## 2025-07-18 VITALS
SYSTOLIC BLOOD PRESSURE: 118 MMHG | OXYGEN SATURATION: 95 % | HEART RATE: 77 BPM | TEMPERATURE: 97.9 F | DIASTOLIC BLOOD PRESSURE: 72 MMHG | RESPIRATION RATE: 16 BRPM

## 2025-07-18 DIAGNOSIS — C54.1 ENDOMETRIAL CANCER (HCC): ICD-10-CM

## 2025-07-18 DIAGNOSIS — C54.1 ENDOMETRIAL CANCER (HCC): Primary | ICD-10-CM

## 2025-07-18 PROCEDURE — 99211 OFF/OP EST MAY X REQ PHY/QHP: CPT | Performed by: INTERNAL MEDICINE

## 2025-07-18 PROCEDURE — 93970 EXTREMITY STUDY: CPT

## 2025-07-18 PROCEDURE — 93970 EXTREMITY STUDY: CPT | Performed by: SURGERY

## 2025-07-18 PROCEDURE — 99215 OFFICE O/P EST HI 40 MIN: CPT | Performed by: INTERNAL MEDICINE

## 2025-07-18 NOTE — PROGRESS NOTES
Adrienne Theodore 1960 is a 64 y.o. female returns for complex follow-up s/p 6 cycles of chemo/immuno and repeat PET/CT to discuss adjuvant radiation. She will continue maintenance immunotherapy with Jemperli.     Patient had initial consult on 2/10/25.  She has a history of FIGO IIIC2 (gL9uD2g) serous endometrial cancer s/p CASSANDRA/BSO with PLND and PALND on 1/16/25 revealing 4.7cm of disease, with 60% MMI, extensive LVSI, negative margins, no cervical stromal invasion, with 9/20 bilateral pelvic nodes and 7/9 PA nodes involved. Washings were negative.  She had a preoperative CT chest abdomen pelvis showing endometrial malignancy, iliac and retroperitoneal lymphadenopathy, but no evidence of metastatic disease in the chest.  There was an adrenal finding for which follow-up was recommended.  Postoperative CT abdomen pelvis showed postoperative changes and stability in the left adrenal nodule.  Her case was reviewed at multidisciplinary tumor board with recommendations for systemic therapy with chemotherapy/immunotherapy, followed by external beam radiation therapy.  She is planned for Paclitaxel, carboplatin, Dostarlimab, with last chemotherapy cycle booked 6/6/25.     Plan was to proceed with chemo/immunotherapy followed by restaging and subsequent planning for pelvic and paraaortic EBRT (45Gy in 25fx)     2/21/25 - 6/6/2025 6 cycles Carbo/Taxol/Jemperli      6/25/25 PET/CT  1.  No findings suspicious for hypermetabolic malignancy. Improving collections in the pelvis with resolution of previously noted indistinct soft tissue thickening at the aortic bifurcation likely related to post operative changes.  2.  Mild uptake at the stable 1.8 cm left adrenal nodule, nonspecific. Given stability in size, this may represent a lipid poor adrenal adenoma. This could be confirmed with dedicated CT or MRI adrenal protocol or reassessed on follow-up imaging.       7/1/25 Dr. Ho  Plan for: chemotherapy/IO x  6 cycles, adjuvant XRT, and dostarlimab maintenance.   - 25: post-chemo PETCT with no e/o hypermetabolic malignancy  - Rad onc appt with Dr. Quintanilla  for XRT planning  - Plan to begin dostarlimab maintenance 1000mg IV q 6 weeks x up to 3 years or until progression/toxicity      Upcomin25 Palliative care  8/15/25 Infusion  10/7/25 Dr. Ho      [facility]  #[id number] ([place])        Oncology History   Endometrial cancer (HCC)   2024 Initial Diagnosis    Endometrial cancer (HCC)     2025 -  Cancer Staged    Staging form: Corpus Uteri - Carcinoma, AJCC 8th Edition  - Clinical stage from 2025: FIGO Stage IIIC2 (cT1b, cN2, cM0) - Signed by Belkys Ho MD on 2/3/2025  Histopathologic type: Papillary serous cystadenocarcinoma  Stage prefix: Initial diagnosis  Histologic grade (G): G3  Histologic grading system: 3 grade system  Lymph-vascular invasion (LVI): LVI present/identified, NOS  Pelvic laura status: Positive  Number of pelvic nodes positive from dissection: 9  Number of pelvic nodes examined during dissection: 20  Para-aortic status: Positive  Number of para-aortic nodes positive from dissection: 7  Number of para-aortic nodes examined during dissection: 9  Lymph node metastasis: Present  Omentectomy performed: Yes       2025 -  Chemotherapy    dostarlimab-gxly (JEMPERLI) IVPB, 500 mg, 7 of 12 cycles  Administration: 500 mg (2025), 500 mg (2025), 1,000 mg (7/3/2025), 500 mg (3/14/2025), 500 mg (2025), 500 mg (2025), 500 mg (2025)  CARBOplatin (PARAPLATIN) IVPB (GOG AUC DOSING), 484 mg, 6 of 6 cycles  Administration: 484 mg (2025), 495 mg (2025), 484 mg (3/14/2025), 495 mg (2025), 495 mg (2025), 498.5 mg (2025)  PACLItaxel (TAXOL) chemo IVPB, 175 mg/m2 = 295.8 mg, 6 of 6 cycles  Dose modification: 135 mg/m2 (original dose 175 mg/m2, Cycle 3, Reason: Neuropathy)  Administration: 295.8 mg (2025),  225.6 mg (4/11/2025), 228 mg (3/14/2025), 225.6 mg (5/2/2025), 225.6 mg (5/23/2025), 226.8 mg (6/13/2025)         Review of Systems:  Review of Systems   Constitutional:  Positive for unexpected weight change (some weight gain). Negative for appetite change.   HENT: Negative.     Eyes: Negative.    Respiratory: Negative.     Cardiovascular: Negative.    Gastrointestinal:  Positive for constipation (taking a stool softeners prn). Negative for diarrhea and nausea.   Endocrine: Negative.    Genitourinary:  Positive for dysuria (discomfort when urinating, incontinence at times since sugery, wearing a brief), frequency and urgency. Negative for hematuria, pelvic pain, vaginal bleeding, vaginal discharge and vaginal pain.   Musculoskeletal:  Positive for back pain.   Allergic/Immunologic: Negative.    Neurological: Negative.    Hematological: Negative.    Psychiatric/Behavioral:  The patient is nervous/anxious.         H/o anxiety       Clinical Trial: no      Pregnancy test needed:  no    Prior Radiation: no    History of autoimmune or connective tissue disorder: no    Teaching: [facility] radiation packet/Vietnamese     MST completed     Implantable Devices (Port, Pacemaker, pain stimulator): RCW port    Hip Replacement: no      Health Maintenance   Topic Date Due    COVID-19 Vaccine (1) Never done    Diabetic Eye Exam  Never done    HIV Screening  Never done    Pneumococcal Vaccine: 50+ Years (1 of 2 - PCV) Never done    Breast Cancer Screening: Mammogram  Never done    Colorectal Cancer Screening  Never done    BMI: Followup Plan  07/29/2025    Annual Physical  07/29/2025    ONC Colorectal Surgery Screening  08/04/2025    Influenza Vaccine (1) 09/01/2025    Endometrial Survivorship Visit  10/31/2025    ONC Weight Management Consult  11/14/2025    ONC Mammogram  11/28/2025    ONC DXA Scan  12/26/2025    Hepatitis C Screening  07/29/2025 (Originally 1960)    RSV Vaccine for Pregnant Patients and Patients Age 60+ Years (1 -  Risk 60-74 years 1-dose series) 07/29/2025 (Originally 8/24/2020)    Zoster Vaccine (2 of 2) 11/18/2025 (Originally 9/23/2024)    HEMOGLOBIN A1C  01/01/2026    Diabetic Foot Exam  03/24/2026    BMI: Adult  07/17/2026    Depression Screening  07/18/2026    DTaP,Tdap,and Td Vaccines (2 - Td or Tdap) 07/29/2034    Meningococcal B Vaccine  Aged Out    RSV Vaccine age 0-20 Months  Aged Out    HIB Vaccine  Aged Out    IPV Vaccine  Aged Out    Hepatitis A Vaccine  Aged Out    Meningococcal ACWY Vaccine  Aged Out    HPV Vaccine  Aged Out     Past Medical History[1]  Past Surgical History[2]  Family History[3]  Social History[4]   Current Medications[5]  Allergies[6]   Vitals:    07/18/25 0920   BP: 118/72   BP Location: Left arm   Pulse: 77   Resp: 16   Temp: 97.9 °F (36.6 °C)   TempSrc: Temporal   SpO2: 95%             [1]   Past Medical History:  Diagnosis Date    Diabetes mellitus (HCC)     Fibroid     Heart failure (HCC)     Hyperlipidemia     Hypertension     Myocardial infarction (HCC) 06/05/2024    Nonischemic cardiomyopathy (HCC)    [2]   Past Surgical History:  Procedure Laterality Date    BACK SURGERY      BLADDER NECK SUSPENSION      CARDIAC CATHETERIZATION Left 07/10/2024    Procedure: Cardiac Left Heart Cath;  Surgeon: Mello Pedroza MD;  Location: [facility];  Service: Cardiology    FL GUIDED CENTRAL VENOUS ACCESS DEVICE INSERTION  3/13/2025    IR PORT PLACEMENT  7/14/2025    WI INSJ TUNNELED CTR VAD W/SUBQ PORT AGE 5 YR/> N/A 3/13/2025    Procedure: INSERTION VENOUS PORT ( PORT-A-CATH) IR;  Surgeon: Maurilio Harrington DO;  Location: [facility];  Service: Interventional Radiology    WI LAPS TOTAL HYSTERECT 250 GM/< W/RMVL TUBE/OVARY N/A 1/16/2025    Procedure: ROBOTIC ASSISTED TOTAL LAPAROSCOPIC HYSTERECTOMY, BILATERAL SALPINGO-OOPHERECTOMY, PELVIC AND PARA-AORTIC LYMPHADENECTOMY, OMENTAL BIOPSY, EXAM UNDER ANESTHESIA, CYSTOSCOPY;  Surgeon: Belkys Ho MD;  Location: [facility];  Service:  Gynecology Oncology    SPINAL FIXATION SURGERY      L3-L5 fusion    VAGINA SURGERY     [3]   Family History  Problem Relation Name Age of Onset    Cancer Paternal Aunt          vaginal    Vaginal cancer Paternal Aunt      Cancer Paternal Grandmother          vaginal    Vaginal cancer Paternal Grandmother      Breast cancer Neg Hx      Colon cancer Neg Hx      Ovarian cancer Neg Hx      Endometrial cancer Neg Hx      Hypertension Mother      Diabetes Mother      Hypertension Father     [4]   Social History  Tobacco Use    Smoking status: Never    Smokeless tobacco: Never   Vaping Use    Vaping status: Never Used   Substance Use Topics    Alcohol use: Never    Drug use: Never   [5]   Current Outpatient Medications:     acetaminophen (TYLENOL) 500 mg tablet, Take 1 tablet (500 mg total) by mouth every 6 (six) hours as needed for mild pain, Disp: 90 tablet, Rfl: 0    apixaban (Eliquis) 5 mg, Take 1 tablet (5 mg total) by mouth 2 (two) times a day, Disp: 60 tablet, Rfl: 2    Aspirin Low Dose 81 MG chewable tablet, Chew 1 tablet (81 mg total) daily, Disp: 30 tablet, Rfl: 0    atorvastatin (LIPITOR) 40 mg tablet, Take 1 tablet (40 mg total) by mouth daily, Disp: 90 tablet, Rfl: 2    Blood Glucose Monitoring Suppl (OneTouch Verio Reflect) w/Device KIT, Check blood sugars three times daily. Please substitute with appropriate alternative as covered by patient's insurance. Dx: E11.65, Disp: 1 kit, Rfl: 0    Blood Pressure Monitoring (B-D ASSURE BPM/DELUXE ARM CUFF) MISC, Use in the morning, Disp: 1 each, Rfl: 0    famotidine (PEPCID) 20 mg tablet, Take 1 tablet (20 mg total) by mouth 2 (two) times a day, Disp: 180 tablet, Rfl: 3    furosemide (LASIX) 20 mg tablet, Take 1 tablet (20 mg total) by mouth daily, Disp: 30 tablet, Rfl: 5    gabapentin (NEURONTIN) 100 mg capsule, TAKE 1 CAPSULE (100 MG TOTAL) BY MOUTH DAILY AT BEDTIME, Disp: 30 capsule, Rfl: 2    glucose blood (OneTouch Verio) test strip, Check blood sugars three  times daily. Please substitute with appropriate alternative as covered by patient's insurance. Dx: E11.65, Disp: 300 each, Rfl: 3    Incontinence Supplies MISC, Use in the morning Size medium, Disp: 100 each, Rfl: 5    lidocaine (LMX) 4 % cream, Apply topically as needed for mild pain (to both kness), Disp: 28 g, Rfl: 1    lidocaine-prilocaine (EMLA) cream, Apply topically as needed for mild pain, Disp: 50 g, Rfl: 2    LORazepam (ATIVAN) 1 mg tablet, Take 1 tablet (1 mg total) by mouth every 8 (eight) hours as needed (nausea or anxiety), Disp: 20 tablet, Rfl: 0    losartan (COZAAR) 25 mg tablet, Take 1 tablet (25 mg total) by mouth daily, Disp: 30 tablet, Rfl: 5    metFORMIN (GLUCOPHAGE) 500 mg tablet, Take 1 tablet (500 mg total) by mouth daily with breakfast, Disp: 90 tablet, Rfl: 0    naloxone (NARCAN) 4 mg/0.1 mL nasal spray, Administer 1 spray into a nostril. If no response after 2-3 minutes, give another dose in the other nostril using a new spray., Disp: 1 each, Rfl: 1    ondansetron (ZOFRAN) 8 mg tablet, Take 1 tablet (8 mg total) by mouth every 8 (eight) hours as needed for nausea or vomiting, Disp: 30 tablet, Rfl: 1    OneTouch Delica Lancets 33G MISC, Check blood sugars three times daily. Please substitute with appropriate alternative as covered by patient's insurance. Dx: E11.65, Disp: 300 each, Rfl: 5    oxyCODONE (ROXICODONE) 5 immediate release tablet, Take 1 tablet (5 mg total) by mouth every 6 (six) hours as needed for severe pain Max Daily Amount: 20 mg, Disp: 90 tablet, Rfl: 0    phenazopyridine (PYRIDIUM) 100 mg tablet, TAKE 1 TABLET (100 MG TOTAL) BY MOUTH 3 (THREE) TIMES A DAY AS NEEDED FOR BLADDER SPASMS, Disp: 60 tablet, Rfl: 2    metoprolol succinate (TOPROL-XL) 25 mg 24 hr tablet, Take 1 tablet (25 mg total) by mouth 2 (two) times a day, Disp: 180 tablet, Rfl: 2  [6] No Known Allergies

## 2025-07-21 ENCOUNTER — TELEPHONE (OUTPATIENT)
Dept: GYNECOLOGIC ONCOLOGY | Facility: CLINIC | Age: 65
End: 2025-07-21

## 2025-07-21 NOTE — TELEPHONE ENCOUNTER
Spoke to the patient in regards to her U/S results I did let her know that everything came back negative. The patient was happy to hear this information, the patient then spoke about concerns she had about receiving radiation. She wanted to know if this will have any  affect on her heart, she was very worried about it since with Chemotherapy she had experienced some symptoms. I let her know that I will reach out to our nurse or APs that can speak to her about this to put her mind at ease about this treatment. She then thanked me again for this and disconnected the call.

## 2025-07-22 ENCOUNTER — TELEPHONE (OUTPATIENT)
Dept: GYNECOLOGIC ONCOLOGY | Facility: CLINIC | Age: 65
End: 2025-07-22

## 2025-07-22 NOTE — TELEPHONE ENCOUNTER
Spoke to the patient and let her know that I did pass along her concerns to the team and  will call her next week when she returns for vacation. She thanked me for the call and disconnected.

## 2025-07-23 PROCEDURE — 77263 THER RADIOLOGY TX PLNG CPLX: CPT | Performed by: INTERNAL MEDICINE

## 2025-07-30 ENCOUNTER — APPOINTMENT (OUTPATIENT)
Dept: RADIATION ONCOLOGY | Facility: CLINIC | Age: 65
End: 2025-07-30
Attending: INTERNAL MEDICINE
Payer: COMMERCIAL

## 2025-07-30 PROCEDURE — 77334 RADIATION TREATMENT AID(S): CPT | Performed by: INTERNAL MEDICINE

## 2025-08-04 ENCOUNTER — APPOINTMENT (OUTPATIENT)
Dept: LAB | Facility: HOSPITAL | Age: 65
End: 2025-08-04
Payer: COMMERCIAL

## 2025-08-04 DIAGNOSIS — C54.1 ENDOMETRIAL CANCER (HCC): ICD-10-CM

## 2025-08-04 DIAGNOSIS — E11.9 TYPE 2 DIABETES MELLITUS WITHOUT COMPLICATION, WITHOUT LONG-TERM CURRENT USE OF INSULIN (HCC): ICD-10-CM

## 2025-08-04 DIAGNOSIS — Z51.12 ENCOUNTER FOR ANTINEOPLASTIC CHEMOTHERAPY AND IMMUNOTHERAPY: ICD-10-CM

## 2025-08-04 DIAGNOSIS — Z51.11 ENCOUNTER FOR ANTINEOPLASTIC CHEMOTHERAPY AND IMMUNOTHERAPY: ICD-10-CM

## 2025-08-04 LAB
ALBUMIN SERPL BCG-MCNC: 4.7 G/DL (ref 3.5–5)
ALP SERPL-CCNC: 66 U/L (ref 34–104)
ALT SERPL W P-5'-P-CCNC: 23 U/L (ref 7–52)
AMYLASE SERPL-CCNC: 44 IU/L (ref 29–103)
ANION GAP SERPL CALCULATED.3IONS-SCNC: 12 MMOL/L (ref 4–13)
AST SERPL W P-5'-P-CCNC: 19 U/L (ref 13–39)
BASOPHILS # BLD AUTO: 0.02 THOUSANDS/ÂΜL (ref 0–0.1)
BASOPHILS NFR BLD AUTO: 0 % (ref 0–1)
BILIRUB SERPL-MCNC: 0.55 MG/DL (ref 0.2–1)
BUN SERPL-MCNC: 14 MG/DL (ref 5–25)
CALCIUM SERPL-MCNC: 9.8 MG/DL (ref 8.4–10.2)
CANCER AG125 SERPL-ACNC: 5.6 U/ML (ref 0–35)
CHLORIDE SERPL-SCNC: 100 MMOL/L (ref 96–108)
CO2 SERPL-SCNC: 28 MMOL/L (ref 21–32)
CREAT SERPL-MCNC: 0.65 MG/DL (ref 0.6–1.3)
EOSINOPHIL # BLD AUTO: 0.08 THOUSAND/ÂΜL (ref 0–0.61)
EOSINOPHIL NFR BLD AUTO: 2 % (ref 0–6)
ERYTHROCYTE [DISTWIDTH] IN BLOOD BY AUTOMATED COUNT: 13.5 % (ref 11.6–15.1)
EST. AVERAGE GLUCOSE BLD GHB EST-MCNC: 151 MG/DL
GFR SERPL CREATININE-BSD FRML MDRD: 94 ML/MIN/1.73SQ M
GLUCOSE P FAST SERPL-MCNC: 106 MG/DL (ref 65–99)
HBA1C MFR BLD: 6.9 %
HCT VFR BLD AUTO: 39.5 % (ref 34.8–46.1)
HGB BLD-MCNC: 12.2 G/DL (ref 11.5–15.4)
IMM GRANULOCYTES # BLD AUTO: 0.02 THOUSAND/UL (ref 0–0.2)
IMM GRANULOCYTES NFR BLD AUTO: 0 % (ref 0–2)
LIPASE SERPL-CCNC: 17 U/L (ref 11–82)
LYMPHOCYTES # BLD AUTO: 1.71 THOUSANDS/ÂΜL (ref 0.6–4.47)
LYMPHOCYTES NFR BLD AUTO: 31 % (ref 14–44)
MAGNESIUM SERPL-MCNC: 1.9 MG/DL (ref 1.9–2.7)
MCH RBC QN AUTO: 30 PG (ref 26.8–34.3)
MCHC RBC AUTO-ENTMCNC: 30.9 G/DL (ref 31.4–37.4)
MCV RBC AUTO: 97 FL (ref 82–98)
MONOCYTES # BLD AUTO: 0.46 THOUSAND/ÂΜL (ref 0.17–1.22)
MONOCYTES NFR BLD AUTO: 8 % (ref 4–12)
NEUTROPHILS # BLD AUTO: 3.19 THOUSANDS/ÂΜL (ref 1.85–7.62)
NEUTS SEG NFR BLD AUTO: 59 % (ref 43–75)
NRBC BLD AUTO-RTO: 0 /100 WBCS
PLATELET # BLD AUTO: 180 THOUSANDS/UL (ref 149–390)
PMV BLD AUTO: 11.9 FL (ref 8.9–12.7)
POTASSIUM SERPL-SCNC: 3.9 MMOL/L (ref 3.5–5.3)
PROT SERPL-MCNC: 8.2 G/DL (ref 6.4–8.4)
RBC # BLD AUTO: 4.06 MILLION/UL (ref 3.81–5.12)
SODIUM SERPL-SCNC: 140 MMOL/L (ref 135–147)
TSH SERPL DL<=0.05 MIU/L-ACNC: 2.64 UIU/ML (ref 0.45–4.5)
WBC # BLD AUTO: 5.48 THOUSAND/UL (ref 4.31–10.16)

## 2025-08-04 PROCEDURE — 83690 ASSAY OF LIPASE: CPT

## 2025-08-04 PROCEDURE — 83735 ASSAY OF MAGNESIUM: CPT

## 2025-08-04 PROCEDURE — 85025 COMPLETE CBC W/AUTO DIFF WBC: CPT

## 2025-08-04 PROCEDURE — 84443 ASSAY THYROID STIM HORMONE: CPT

## 2025-08-04 PROCEDURE — 83036 HEMOGLOBIN GLYCOSYLATED A1C: CPT

## 2025-08-04 PROCEDURE — 82150 ASSAY OF AMYLASE: CPT

## 2025-08-04 PROCEDURE — 80053 COMPREHEN METABOLIC PANEL: CPT

## 2025-08-04 PROCEDURE — 86304 IMMUNOASSAY TUMOR CA 125: CPT

## 2025-08-04 PROCEDURE — 36415 COLL VENOUS BLD VENIPUNCTURE: CPT

## 2025-08-06 ENCOUNTER — PATIENT OUTREACH (OUTPATIENT)
Dept: HEMATOLOGY ONCOLOGY | Facility: CLINIC | Age: 65
End: 2025-08-06

## 2025-08-06 DIAGNOSIS — C54.1 ENDOMETRIAL CANCER (HCC): Primary | ICD-10-CM

## 2025-08-11 ENCOUNTER — PATIENT OUTREACH (OUTPATIENT)
Dept: CASE MANAGEMENT | Facility: OTHER | Age: 65
End: 2025-08-11

## 2025-08-12 ENCOUNTER — TELEPHONE (OUTPATIENT)
Dept: RADIATION ONCOLOGY | Facility: CLINIC | Age: 65
End: 2025-08-12

## 2025-08-14 ENCOUNTER — APPOINTMENT (OUTPATIENT)
Dept: LAB | Facility: HOSPITAL | Age: 65
End: 2025-08-14
Payer: COMMERCIAL

## 2025-08-15 ENCOUNTER — HOSPITAL ENCOUNTER (OUTPATIENT)
Dept: INFUSION CENTER | Facility: HOSPITAL | Age: 65
End: 2025-08-15
Attending: OBSTETRICS & GYNECOLOGY
Payer: COMMERCIAL

## 2025-08-15 ENCOUNTER — PATIENT OUTREACH (OUTPATIENT)
Dept: CASE MANAGEMENT | Facility: OTHER | Age: 65
End: 2025-08-15

## 2025-08-18 ENCOUNTER — APPOINTMENT (OUTPATIENT)
Dept: LAB | Facility: HOSPITAL | Age: 65
End: 2025-08-18
Payer: COMMERCIAL

## 2025-08-18 DIAGNOSIS — C54.1 ENDOMETRIAL SARCOMA (HCC): ICD-10-CM

## 2025-08-18 PROCEDURE — 36415 COLL VENOUS BLD VENIPUNCTURE: CPT

## 2025-08-19 ENCOUNTER — IN HOME VISIT (OUTPATIENT)
Dept: FAMILY MEDICINE CLINIC | Facility: CLINIC | Age: 65
End: 2025-08-19

## 2025-08-19 ENCOUNTER — PATIENT OUTREACH (OUTPATIENT)
Dept: CASE MANAGEMENT | Facility: OTHER | Age: 65
End: 2025-08-19

## 2025-08-19 DIAGNOSIS — I50.22 CHRONIC HFREF (HEART FAILURE WITH REDUCED EJECTION FRACTION) (HCC): Chronic | ICD-10-CM

## 2025-08-19 DIAGNOSIS — G89.3 CANCER RELATED PAIN: ICD-10-CM

## 2025-08-19 DIAGNOSIS — I50.9 ACUTE DECOMPENSATED HEART FAILURE (HCC): ICD-10-CM

## 2025-08-19 DIAGNOSIS — C54.1 ENDOMETRIAL CANCER (HCC): ICD-10-CM

## 2025-08-19 DIAGNOSIS — M25.551 RIGHT HIP PAIN: Primary | ICD-10-CM

## 2025-08-19 DIAGNOSIS — E11.9 TYPE 2 DIABETES MELLITUS WITHOUT COMPLICATION, WITHOUT LONG-TERM CURRENT USE OF INSULIN (HCC): ICD-10-CM

## 2025-08-19 DIAGNOSIS — R30.0 DYSURIA: ICD-10-CM

## 2025-08-19 DIAGNOSIS — I42.8 NONISCHEMIC CARDIOMYOPATHY (HCC): Chronic | ICD-10-CM

## 2025-08-19 DIAGNOSIS — I10 PRIMARY HYPERTENSION: Chronic | ICD-10-CM

## 2025-08-19 DIAGNOSIS — G62.9 NEUROPATHY: ICD-10-CM

## 2025-08-19 PROCEDURE — 3075F SYST BP GE 130 - 139MM HG: CPT | Performed by: STUDENT IN AN ORGANIZED HEALTH CARE EDUCATION/TRAINING PROGRAM

## 2025-08-19 PROCEDURE — 99350 HOME/RES VST EST HIGH MDM 60: CPT | Performed by: STUDENT IN AN ORGANIZED HEALTH CARE EDUCATION/TRAINING PROGRAM

## 2025-08-19 PROCEDURE — 3080F DIAST BP >= 90 MM HG: CPT | Performed by: STUDENT IN AN ORGANIZED HEALTH CARE EDUCATION/TRAINING PROGRAM

## 2025-08-20 ENCOUNTER — OFFICE VISIT (OUTPATIENT)
Dept: PALLIATIVE MEDICINE | Facility: CLINIC | Age: 65
End: 2025-08-20
Payer: COMMERCIAL

## 2025-08-20 VITALS
DIASTOLIC BLOOD PRESSURE: 62 MMHG | HEART RATE: 72 BPM | BODY MASS INDEX: 35.06 KG/M2 | TEMPERATURE: 98.4 F | WEIGHT: 167 LBS | RESPIRATION RATE: 16 BRPM | OXYGEN SATURATION: 91 % | SYSTOLIC BLOOD PRESSURE: 108 MMHG

## 2025-08-20 VITALS
BODY MASS INDEX: 35.9 KG/M2 | WEIGHT: 171 LBS | SYSTOLIC BLOOD PRESSURE: 130 MMHG | TEMPERATURE: 97.8 F | DIASTOLIC BLOOD PRESSURE: 90 MMHG | HEART RATE: 77 BPM | RESPIRATION RATE: 20 BRPM | OXYGEN SATURATION: 98 %

## 2025-08-20 DIAGNOSIS — R30.0 BURNING WITH URINATION: ICD-10-CM

## 2025-08-20 DIAGNOSIS — G89.3 CANCER RELATED PAIN: ICD-10-CM

## 2025-08-20 DIAGNOSIS — Z51.5 PALLIATIVE CARE ENCOUNTER: ICD-10-CM

## 2025-08-20 DIAGNOSIS — C54.1 ENDOMETRIAL CANCER (HCC): Primary | ICD-10-CM

## 2025-08-20 PROCEDURE — 99214 OFFICE O/P EST MOD 30 MIN: CPT | Performed by: STUDENT IN AN ORGANIZED HEALTH CARE EDUCATION/TRAINING PROGRAM

## 2025-08-20 RX ORDER — GABAPENTIN 100 MG/1
100 CAPSULE ORAL
Start: 2025-08-20

## 2025-08-20 RX ORDER — ATORVASTATIN CALCIUM 40 MG/1
40 TABLET, FILM COATED ORAL DAILY
Start: 2025-08-20

## 2025-08-20 RX ORDER — OXYCODONE HYDROCHLORIDE 5 MG/1
2.5 TABLET ORAL EVERY 6 HOURS PRN
Qty: 90 TABLET | Refills: 0 | Status: SHIPPED | OUTPATIENT
Start: 2025-08-20

## 2025-08-20 RX ORDER — ASPIRIN 81 MG/1
81 TABLET, CHEWABLE ORAL DAILY
Start: 2025-08-20

## 2025-08-20 RX ORDER — FUROSEMIDE 20 MG/1
20 TABLET ORAL DAILY
Start: 2025-08-20

## 2025-08-20 RX ORDER — ACETAMINOPHEN 500 MG
500 TABLET ORAL EVERY 6 HOURS PRN
Start: 2025-08-20

## 2025-08-22 ENCOUNTER — PATIENT OUTREACH (OUTPATIENT)
Dept: CASE MANAGEMENT | Facility: OTHER | Age: 65
End: 2025-08-22

## 2025-08-22 ENCOUNTER — TELEPHONE (OUTPATIENT)
Dept: FAMILY MEDICINE CLINIC | Facility: CLINIC | Age: 65
End: 2025-08-22

## 2025-08-22 DIAGNOSIS — E11.9 TYPE 2 DIABETES MELLITUS WITHOUT COMPLICATION, WITHOUT LONG-TERM CURRENT USE OF INSULIN (HCC): ICD-10-CM

## (undated) DEVICE — PREMIUM DRY TRAY LF: Brand: MEDLINE INDUSTRIES, INC.

## (undated) DEVICE — EXOFIN PRECISION PEN HIGH VISCOSITY TOPICAL SKIN ADHESIVE: Brand: EXOFIN PRECISION PEN, 1G

## (undated) DEVICE — SUT MONOCRYL 4-0 PS-2 27 IN Y426H

## (undated) DEVICE — INTENDED FOR TISSUE SEPARATION, AND OTHER PROCEDURES THAT REQUIRE A SHARP SURGICAL BLADE TO PUNCTURE OR CUT.: Brand: BARD-PARKER SAFETY BLADES SIZE 11, STERILE

## (undated) DEVICE — GLOVE SRG LF STRL BGL SKNSNS 6.5 PF

## (undated) DEVICE — TR BAND RADIAL ARTERY COMPRESSION DEVICE: Brand: TR BAND

## (undated) DEVICE — TISSUE RETRIEVAL SYSTEM: Brand: INZII RETRIEVAL SYSTEM

## (undated) DEVICE — ELECTRO LUBE IS A SINGLE PATIENT USE DEVICE THAT IS INTENDED TO BE USED ON ELECTROSURGICAL ELECTRODES TO REDUCE STICKING.: Brand: KEY SURGICAL ELECTRO LUBE

## (undated) DEVICE — BLADELESS OBTURATOR: Brand: WECK VISTA

## (undated) DEVICE — GUIDEWIRE WHOLEY HI TORQUE INTERM MOD J .035 145CM

## (undated) DEVICE — MONOPOLAR CURVED SCISSORS: Brand: ENDOWRIST

## (undated) DEVICE — MINOR PROCEDURE DRAPE: Brand: CONVERTORS

## (undated) DEVICE — VISUALIZATION SYSTEM: Brand: CLEARIFY

## (undated) DEVICE — ARM DRAPE

## (undated) DEVICE — HEMOSTATIC MATRIX SURGIFLO 8ML W/THROMBIN

## (undated) DEVICE — HEM-O-LOK CLIP CARTRIDGE MED/LARGE DA VINCI SI/XI

## (undated) DEVICE — SURGIFLO ENDOSCOPIC APPICATOR: Brand: ETHICON

## (undated) DEVICE — GLOVE INDICATOR PI UNDERGLOVE SZ 7 BLUE

## (undated) DEVICE — TRAY FOLEY 16FR URIMETER SILICONE SURESTEP

## (undated) DEVICE — GLOVE SRG BIOGEL 6.5

## (undated) DEVICE — DECANTER: Brand: UNBRANDED

## (undated) DEVICE — DRAPE TOWEL: Brand: CONVERTORS

## (undated) DEVICE — RADIFOCUS OPTITORQUE ANGIOGRAPHIC CATHETER: Brand: OPTITORQUE

## (undated) DEVICE — CHLORHEXIDINE 4PCT 4 OZ

## (undated) DEVICE — PROGRASP FORCEPS: Brand: ENDOWRIST

## (undated) DEVICE — TROCAR PORT ACCESS 8 X100MML W BLDLS OPTICAL TIP AIRSEAL

## (undated) DEVICE — FENESTRATED BIPOLAR FORCEPS: Brand: ENDOWRIST

## (undated) DEVICE — COLUMN DRAPE

## (undated) DEVICE — ANTIBACTERIAL UNDYED BRAIDED (POLYGLACTIN 910), SYNTHETIC ABSORBABLE SUTURE: Brand: COATED VICRYL

## (undated) DEVICE — VCARE MEDIUM, UTERINE MANIPULATOR, VAGINAL-CERVICAL-AHLUWALIA'S-RETRACTOR-ELEVATOR: Brand: VCARE

## (undated) DEVICE — TIP COVER ACCESSORY

## (undated) DEVICE — KIT, BETHLEHEM ROBOTIC PROST: Brand: CARDINAL HEALTH

## (undated) DEVICE — SYRINGE 5ML LL

## (undated) DEVICE — PACK CCL/IR MINOR PROCEDURE

## (undated) DEVICE — PROBE COVER: Brand: STERILE PROBE COVER

## (undated) DEVICE — AIRSEAL TUBE SMOKE EVAC LUMENX3 FILTERED

## (undated) DEVICE — CHLORAPREP HI-LITE 26ML ORANGE

## (undated) DEVICE — MEGA SUTURECUT ND: Brand: ENDOWRIST

## (undated) DEVICE — MEDIUM-LARGE CLIP APPLIER: Brand: ENDOWRIST

## (undated) DEVICE — PAD GROUNDING DUAL ADULT

## (undated) DEVICE — GAUZE SPONGES,USP TYPE VII GAUZE, 12 PLY: Brand: CURITY

## (undated) DEVICE — DGW .035 FC J3MM 260CM TEF: Brand: EMERALD

## (undated) DEVICE — SPONGE 4 X 4 XRAY 16 PLY STRL LF RFD

## (undated) DEVICE — GLIDESHEATH BASIC HYDROPHILIC COATED INTRODUCER SHEATH: Brand: GLIDESHEATH

## (undated) DEVICE — CANNULA SEAL

## (undated) DEVICE — TROCAR: Brand: KII FIOS FIRST ENTRY

## (undated) DEVICE — INTENDED FOR TISSUE SEPARATION, AND OTHER PROCEDURES THAT REQUIRE A SHARP SURGICAL BLADE TO PUNCTURE OR CUT.: Brand: BARD-PARKER SAFETY BLADES SIZE 15, STERILE

## (undated) DEVICE — 40595 XL TRENDELENBURG POSITIONING KIT: Brand: 40595 XL TRENDELENBURG POSITIONING KIT

## (undated) DEVICE — CATH DIAG 5FR IMPULSE 100CM FR4

## (undated) DEVICE — ULTRASOUND GEL STERILE FOIL PK

## (undated) DEVICE — SUT STRATAFIX SPIRAL 2-0 CT-1 30 CM SXPP1B410